# Patient Record
Sex: FEMALE | Race: WHITE | Employment: OTHER | URBAN - METROPOLITAN AREA
[De-identification: names, ages, dates, MRNs, and addresses within clinical notes are randomized per-mention and may not be internally consistent; named-entity substitution may affect disease eponyms.]

---

## 2017-01-26 ENCOUNTER — OFFICE VISIT (OUTPATIENT)
Dept: FAMILY MEDICINE CLINIC | Age: 57
End: 2017-01-26

## 2017-01-26 VITALS
SYSTOLIC BLOOD PRESSURE: 142 MMHG | WEIGHT: 218.4 LBS | RESPIRATION RATE: 16 BRPM | DIASTOLIC BLOOD PRESSURE: 88 MMHG | BODY MASS INDEX: 35.1 KG/M2 | HEART RATE: 100 BPM | HEIGHT: 66 IN

## 2017-01-26 DIAGNOSIS — E11.9 TYPE 2 DIABETES MELLITUS WITHOUT COMPLICATION, WITH LONG-TERM CURRENT USE OF INSULIN (HCC): Primary | ICD-10-CM

## 2017-01-26 DIAGNOSIS — I10 ESSENTIAL HYPERTENSION: ICD-10-CM

## 2017-01-26 DIAGNOSIS — Z12.11 COLON CANCER SCREENING: ICD-10-CM

## 2017-01-26 DIAGNOSIS — F33.1 MODERATE EPISODE OF RECURRENT MAJOR DEPRESSIVE DISORDER (HCC): ICD-10-CM

## 2017-01-26 DIAGNOSIS — E78.00 PURE HYPERCHOLESTEROLEMIA: ICD-10-CM

## 2017-01-26 DIAGNOSIS — Z79.4 TYPE 2 DIABETES MELLITUS WITHOUT COMPLICATION, WITH LONG-TERM CURRENT USE OF INSULIN (HCC): Primary | ICD-10-CM

## 2017-01-26 LAB
ALBUMIN SERPL-MCNC: 4.7 G/DL (ref 3.4–5)
ALP BLD-CCNC: 112 U/L (ref 40–129)
ALT SERPL-CCNC: 24 U/L (ref 10–40)
ANION GAP SERPL CALCULATED.3IONS-SCNC: 16 MMOL/L (ref 3–16)
AST SERPL-CCNC: 19 U/L (ref 15–37)
BILIRUB SERPL-MCNC: 0.3 MG/DL (ref 0–1)
BILIRUBIN DIRECT: <0.2 MG/DL (ref 0–0.3)
BILIRUBIN, INDIRECT: NORMAL MG/DL (ref 0–1)
BUN BLDV-MCNC: 15 MG/DL (ref 7–20)
CALCIUM SERPL-MCNC: 10 MG/DL (ref 8.3–10.6)
CHLORIDE BLD-SCNC: 102 MMOL/L (ref 99–110)
CHOLESTEROL, TOTAL: 207 MG/DL (ref 0–199)
CO2: 25 MMOL/L (ref 21–32)
CREAT SERPL-MCNC: 0.9 MG/DL (ref 0.6–1.1)
GFR AFRICAN AMERICAN: >60
GFR NON-AFRICAN AMERICAN: >60
GLUCOSE BLD-MCNC: 145 MG/DL (ref 70–99)
HDLC SERPL-MCNC: 70 MG/DL (ref 40–60)
LDL CHOLESTEROL CALCULATED: 124 MG/DL
POTASSIUM SERPL-SCNC: 5.1 MMOL/L (ref 3.5–5.1)
SODIUM BLD-SCNC: 143 MMOL/L (ref 136–145)
TOTAL PROTEIN: 7 G/DL (ref 6.4–8.2)
TRIGL SERPL-MCNC: 64 MG/DL (ref 0–150)
VLDLC SERPL CALC-MCNC: 13 MG/DL

## 2017-01-26 PROCEDURE — 36415 COLL VENOUS BLD VENIPUNCTURE: CPT | Performed by: INTERNAL MEDICINE

## 2017-01-26 PROCEDURE — 99214 OFFICE O/P EST MOD 30 MIN: CPT | Performed by: INTERNAL MEDICINE

## 2017-01-26 RX ORDER — METHYLPHENIDATE HYDROCHLORIDE 54 MG/1
54 TABLET ORAL EVERY MORNING
COMMUNITY
End: 2019-04-26

## 2017-01-26 ASSESSMENT — ENCOUNTER SYMPTOMS
RESPIRATORY NEGATIVE: 1
ALLERGIC/IMMUNOLOGIC NEGATIVE: 1

## 2017-01-27 LAB
ESTIMATED AVERAGE GLUCOSE: 203 MG/DL
HBA1C MFR BLD: 8.7 %

## 2017-01-30 RX ORDER — PIOGLITAZONEHYDROCHLORIDE 15 MG/1
15 TABLET ORAL DAILY
Qty: 30 TABLET | Refills: 5 | Status: SHIPPED | OUTPATIENT
Start: 2017-01-30 | End: 2017-04-27 | Stop reason: ALTCHOICE

## 2017-01-31 ENCOUNTER — TELEPHONE (OUTPATIENT)
Dept: FAMILY MEDICINE CLINIC | Age: 57
End: 2017-01-31

## 2017-03-20 ENCOUNTER — TELEPHONE (OUTPATIENT)
Dept: FAMILY MEDICINE CLINIC | Age: 57
End: 2017-03-20

## 2017-04-27 ENCOUNTER — OFFICE VISIT (OUTPATIENT)
Dept: FAMILY MEDICINE CLINIC | Age: 57
End: 2017-04-27

## 2017-04-27 VITALS
HEIGHT: 66 IN | WEIGHT: 216.8 LBS | DIASTOLIC BLOOD PRESSURE: 84 MMHG | BODY MASS INDEX: 34.84 KG/M2 | RESPIRATION RATE: 18 BRPM | HEART RATE: 84 BPM | SYSTOLIC BLOOD PRESSURE: 118 MMHG

## 2017-04-27 DIAGNOSIS — E11.9 TYPE 2 DIABETES MELLITUS WITHOUT COMPLICATION, WITH LONG-TERM CURRENT USE OF INSULIN (HCC): Primary | ICD-10-CM

## 2017-04-27 DIAGNOSIS — F33.1 MODERATE EPISODE OF RECURRENT MAJOR DEPRESSIVE DISORDER (HCC): ICD-10-CM

## 2017-04-27 DIAGNOSIS — Z79.4 TYPE 2 DIABETES MELLITUS WITHOUT COMPLICATION, WITH LONG-TERM CURRENT USE OF INSULIN (HCC): Primary | ICD-10-CM

## 2017-04-27 DIAGNOSIS — I10 ESSENTIAL HYPERTENSION: ICD-10-CM

## 2017-04-27 DIAGNOSIS — E78.00 PURE HYPERCHOLESTEROLEMIA: ICD-10-CM

## 2017-04-27 LAB
ANION GAP SERPL CALCULATED.3IONS-SCNC: 15 MMOL/L (ref 3–16)
BUN BLDV-MCNC: 19 MG/DL (ref 7–20)
CALCIUM SERPL-MCNC: 10 MG/DL (ref 8.3–10.6)
CHLORIDE BLD-SCNC: 99 MMOL/L (ref 99–110)
CO2: 28 MMOL/L (ref 21–32)
CREAT SERPL-MCNC: 1 MG/DL (ref 0.6–1.1)
GFR AFRICAN AMERICAN: >60
GFR NON-AFRICAN AMERICAN: 57
GLUCOSE BLD-MCNC: 91 MG/DL (ref 70–99)
POTASSIUM SERPL-SCNC: 5 MMOL/L (ref 3.5–5.1)
SODIUM BLD-SCNC: 142 MMOL/L (ref 136–145)

## 2017-04-27 PROCEDURE — 36415 COLL VENOUS BLD VENIPUNCTURE: CPT | Performed by: INTERNAL MEDICINE

## 2017-04-27 PROCEDURE — 99214 OFFICE O/P EST MOD 30 MIN: CPT | Performed by: INTERNAL MEDICINE

## 2017-04-27 RX ORDER — ACYCLOVIR 200 MG/1
400 CAPSULE ORAL 3 TIMES DAILY
Qty: 60 CAPSULE | Refills: 0 | Status: SHIPPED | OUTPATIENT
Start: 2017-04-27 | End: 2019-03-22

## 2017-04-27 ASSESSMENT — ENCOUNTER SYMPTOMS
ALLERGIC/IMMUNOLOGIC NEGATIVE: 1
RESPIRATORY NEGATIVE: 1
COLOR CHANGE: 0

## 2017-04-28 LAB
ESTIMATED AVERAGE GLUCOSE: 111.2 MG/DL
HBA1C MFR BLD: 5.5 %

## 2017-06-16 DIAGNOSIS — E11.9 TYPE 2 DIABETES MELLITUS WITHOUT COMPLICATION, WITHOUT LONG-TERM CURRENT USE OF INSULIN (HCC): ICD-10-CM

## 2017-06-16 RX ORDER — METFORMIN HYDROCHLORIDE EXTENDED-RELEASE TABLETS 1000 MG/1
TABLET, FILM COATED, EXTENDED RELEASE ORAL
Qty: 30 TABLET | Refills: 5 | Status: SHIPPED | OUTPATIENT
Start: 2017-06-16 | End: 2018-03-04 | Stop reason: SDUPTHER

## 2017-07-10 DIAGNOSIS — E11.9 TYPE 2 DIABETES MELLITUS WITHOUT COMPLICATION, WITHOUT LONG-TERM CURRENT USE OF INSULIN (HCC): ICD-10-CM

## 2017-07-27 ENCOUNTER — OFFICE VISIT (OUTPATIENT)
Dept: FAMILY MEDICINE CLINIC | Age: 57
End: 2017-07-27

## 2017-07-27 VITALS
HEIGHT: 66 IN | RESPIRATION RATE: 16 BRPM | SYSTOLIC BLOOD PRESSURE: 128 MMHG | OXYGEN SATURATION: 97 % | WEIGHT: 212.6 LBS | BODY MASS INDEX: 34.17 KG/M2 | DIASTOLIC BLOOD PRESSURE: 80 MMHG | HEART RATE: 102 BPM

## 2017-07-27 DIAGNOSIS — E78.00 PURE HYPERCHOLESTEROLEMIA: ICD-10-CM

## 2017-07-27 DIAGNOSIS — Z79.4 TYPE 2 DIABETES MELLITUS WITHOUT COMPLICATION, WITH LONG-TERM CURRENT USE OF INSULIN (HCC): Primary | ICD-10-CM

## 2017-07-27 DIAGNOSIS — I10 ESSENTIAL HYPERTENSION: ICD-10-CM

## 2017-07-27 DIAGNOSIS — E11.9 TYPE 2 DIABETES MELLITUS WITHOUT COMPLICATION, WITH LONG-TERM CURRENT USE OF INSULIN (HCC): Primary | ICD-10-CM

## 2017-07-27 DIAGNOSIS — F33.1 MODERATE EPISODE OF RECURRENT MAJOR DEPRESSIVE DISORDER (HCC): ICD-10-CM

## 2017-07-27 LAB
ANION GAP SERPL CALCULATED.3IONS-SCNC: 13 MMOL/L (ref 3–16)
BUN BLDV-MCNC: 23 MG/DL (ref 7–20)
CALCIUM SERPL-MCNC: 10.3 MG/DL (ref 8.3–10.6)
CHLORIDE BLD-SCNC: 98 MMOL/L (ref 99–110)
CHOLESTEROL, TOTAL: 201 MG/DL (ref 0–199)
CO2: 29 MMOL/L (ref 21–32)
CREAT SERPL-MCNC: 0.9 MG/DL (ref 0.6–1.1)
CREATININE URINE POCT: NORMAL
GFR AFRICAN AMERICAN: >60
GFR NON-AFRICAN AMERICAN: >60
GLUCOSE BLD-MCNC: 208 MG/DL (ref 70–99)
HDLC SERPL-MCNC: 62 MG/DL (ref 40–60)
LDL CHOLESTEROL CALCULATED: 122 MG/DL
MICROALBUMIN/CREAT 24H UR: 0 MG/G{CREAT}
MICROALBUMIN/CREAT UR-RTO: NORMAL
POTASSIUM SERPL-SCNC: 4.9 MMOL/L (ref 3.5–5.1)
SODIUM BLD-SCNC: 140 MMOL/L (ref 136–145)
TRIGL SERPL-MCNC: 83 MG/DL (ref 0–150)
VLDLC SERPL CALC-MCNC: 17 MG/DL

## 2017-07-27 PROCEDURE — G0444 DEPRESSION SCREEN ANNUAL: HCPCS | Performed by: INTERNAL MEDICINE

## 2017-07-27 PROCEDURE — 99214 OFFICE O/P EST MOD 30 MIN: CPT | Performed by: INTERNAL MEDICINE

## 2017-07-27 PROCEDURE — 36415 COLL VENOUS BLD VENIPUNCTURE: CPT | Performed by: INTERNAL MEDICINE

## 2017-07-27 PROCEDURE — 82044 UR ALBUMIN SEMIQUANTITATIVE: CPT | Performed by: INTERNAL MEDICINE

## 2017-07-27 RX ORDER — TRAZODONE HYDROCHLORIDE 100 MG/1
200 TABLET ORAL NIGHTLY
COMMUNITY
Start: 2017-07-16

## 2017-07-27 ASSESSMENT — PATIENT HEALTH QUESTIONNAIRE - PHQ9
10. IF YOU CHECKED OFF ANY PROBLEMS, HOW DIFFICULT HAVE THESE PROBLEMS MADE IT FOR YOU TO DO YOUR WORK, TAKE CARE OF THINGS AT HOME, OR GET ALONG WITH OTHER PEOPLE: 2
6. FEELING BAD ABOUT YOURSELF - OR THAT YOU ARE A FAILURE OR HAVE LET YOURSELF OR YOUR FAMILY DOWN: 2
3. TROUBLE FALLING OR STAYING ASLEEP: 3
SUM OF ALL RESPONSES TO PHQ QUESTIONS 1-9: 22
5. POOR APPETITE OR OVEREATING: 2
4. FEELING TIRED OR HAVING LITTLE ENERGY: 3
9. THOUGHTS THAT YOU WOULD BE BETTER OFF DEAD, OR OF HURTING YOURSELF: 0
7. TROUBLE CONCENTRATING ON THINGS, SUCH AS READING THE NEWSPAPER OR WATCHING TELEVISION: 3
8. MOVING OR SPEAKING SO SLOWLY THAT OTHER PEOPLE COULD HAVE NOTICED. OR THE OPPOSITE, BEING SO FIGETY OR RESTLESS THAT YOU HAVE BEEN MOVING AROUND A LOT MORE THAN USUAL: 3
1. LITTLE INTEREST OR PLEASURE IN DOING THINGS: 3
SUM OF ALL RESPONSES TO PHQ9 QUESTIONS 1 & 2: 6
2. FEELING DOWN, DEPRESSED OR HOPELESS: 3

## 2017-07-27 ASSESSMENT — ENCOUNTER SYMPTOMS
ALLERGIC/IMMUNOLOGIC NEGATIVE: 1
RESPIRATORY NEGATIVE: 1

## 2017-07-28 LAB
ESTIMATED AVERAGE GLUCOSE: 174.3 MG/DL
HBA1C MFR BLD: 7.7 %

## 2017-07-31 ENCOUNTER — TELEPHONE (OUTPATIENT)
Dept: FAMILY MEDICINE CLINIC | Age: 57
End: 2017-07-31

## 2017-08-04 RX ORDER — PIOGLITAZONEHYDROCHLORIDE 15 MG/1
TABLET ORAL
Qty: 30 TABLET | Refills: 4 | Status: SHIPPED | OUTPATIENT
Start: 2017-08-04 | End: 2018-01-13 | Stop reason: SDUPTHER

## 2017-08-04 RX ORDER — CANAGLIFLOZIN 100 MG/1
TABLET, FILM COATED ORAL
Qty: 30 TABLET | Refills: 4 | Status: SHIPPED | OUTPATIENT
Start: 2017-08-04 | End: 2018-01-13 | Stop reason: SDUPTHER

## 2017-11-02 ENCOUNTER — OFFICE VISIT (OUTPATIENT)
Dept: FAMILY MEDICINE CLINIC | Age: 57
End: 2017-11-02

## 2017-11-02 VITALS
BODY MASS INDEX: 33.46 KG/M2 | SYSTOLIC BLOOD PRESSURE: 124 MMHG | HEIGHT: 66 IN | HEART RATE: 98 BPM | RESPIRATION RATE: 16 BRPM | WEIGHT: 208.2 LBS | OXYGEN SATURATION: 98 % | DIASTOLIC BLOOD PRESSURE: 84 MMHG

## 2017-11-02 DIAGNOSIS — E78.00 PURE HYPERCHOLESTEROLEMIA: ICD-10-CM

## 2017-11-02 DIAGNOSIS — Z23 FLU VACCINE NEED: ICD-10-CM

## 2017-11-02 DIAGNOSIS — E11.9 TYPE 2 DIABETES MELLITUS WITHOUT COMPLICATION, WITHOUT LONG-TERM CURRENT USE OF INSULIN (HCC): Primary | ICD-10-CM

## 2017-11-02 DIAGNOSIS — Z79.4 TYPE 2 DIABETES MELLITUS WITHOUT COMPLICATION, WITH LONG-TERM CURRENT USE OF INSULIN (HCC): ICD-10-CM

## 2017-11-02 DIAGNOSIS — E11.9 TYPE 2 DIABETES MELLITUS WITHOUT COMPLICATION, WITH LONG-TERM CURRENT USE OF INSULIN (HCC): ICD-10-CM

## 2017-11-02 DIAGNOSIS — I10 ESSENTIAL HYPERTENSION: ICD-10-CM

## 2017-11-02 LAB
ANION GAP SERPL CALCULATED.3IONS-SCNC: 15 MMOL/L (ref 3–16)
BUN BLDV-MCNC: 18 MG/DL (ref 7–20)
CALCIUM SERPL-MCNC: 9.8 MG/DL (ref 8.3–10.6)
CHLORIDE BLD-SCNC: 102 MMOL/L (ref 99–110)
CO2: 28 MMOL/L (ref 21–32)
CREAT SERPL-MCNC: 1.1 MG/DL (ref 0.6–1.1)
GFR AFRICAN AMERICAN: >60
GFR NON-AFRICAN AMERICAN: 51
GLUCOSE BLD-MCNC: 152 MG/DL (ref 70–99)
POTASSIUM SERPL-SCNC: 4.7 MMOL/L (ref 3.5–5.1)
SODIUM BLD-SCNC: 145 MMOL/L (ref 136–145)

## 2017-11-02 PROCEDURE — 90471 IMMUNIZATION ADMIN: CPT | Performed by: INTERNAL MEDICINE

## 2017-11-02 PROCEDURE — 99214 OFFICE O/P EST MOD 30 MIN: CPT | Performed by: INTERNAL MEDICINE

## 2017-11-02 PROCEDURE — 36415 COLL VENOUS BLD VENIPUNCTURE: CPT | Performed by: INTERNAL MEDICINE

## 2017-11-02 PROCEDURE — 90686 IIV4 VACC NO PRSV 0.5 ML IM: CPT | Performed by: INTERNAL MEDICINE

## 2017-11-02 RX ORDER — ATORVASTATIN CALCIUM 10 MG/1
TABLET, FILM COATED ORAL
Qty: 30 TABLET | Refills: 5 | Status: SHIPPED | OUTPATIENT
Start: 2017-11-02 | End: 2018-05-15 | Stop reason: SDUPTHER

## 2017-11-02 RX ORDER — BENAZEPRIL HYDROCHLORIDE AND HYDROCHLOROTHIAZIDE 20; 12.5 MG/1; MG/1
TABLET ORAL
Qty: 30 TABLET | Refills: 5 | Status: SHIPPED | OUTPATIENT
Start: 2017-11-02 | End: 2018-06-20 | Stop reason: SDUPTHER

## 2017-11-02 ASSESSMENT — ENCOUNTER SYMPTOMS
ALLERGIC/IMMUNOLOGIC NEGATIVE: 1
RESPIRATORY NEGATIVE: 1

## 2017-11-02 NOTE — PROGRESS NOTES
and EOM are normal. Pupils are equal, round, and reactive to light. Neck: Trachea normal, normal range of motion and full passive range of motion without pain. Neck supple. Normal carotid pulses, no hepatojugular reflux and no JVD present. No spinous process tenderness and no muscular tenderness present. Carotid bruit is not present. No tracheal deviation, no edema, no erythema and normal range of motion present. No thyroid mass and no thyromegaly present. Cardiovascular: Normal rate, regular rhythm, S1 normal, S2 normal, normal heart sounds, intact distal pulses and normal pulses. No extrasystoles are present. PMI is not displaced. Exam reveals no gallop, no S3, no S4, no distant heart sounds and no friction rub. No murmur heard. Pulses:       Carotid pulses are 2+ on the right side, and 2+ on the left side. Radial pulses are 2+ on the right side, and 2+ on the left side. Femoral pulses are 2+ on the right side, and 2+ on the left side. Popliteal pulses are 2+ on the right side, and 2+ on the left side. Dorsalis pedis pulses are 2+ on the right side, and 2+ on the left side. Posterior tibial pulses are 2+ on the right side, and 2+ on the left side. Pulmonary/Chest: Effort normal and breath sounds normal. No accessory muscle usage. No apnea, no tachypnea and no bradypnea. No respiratory distress. She has no decreased breath sounds. She has no wheezes. She has no rhonchi. She has no rales. She exhibits no tenderness. Musculoskeletal: Normal range of motion. She exhibits no edema or tenderness. Lymphadenopathy:     She has no cervical adenopathy. She has no axillary adenopathy. Neurological: She is alert and oriented to person, place, and time. She has normal strength and normal reflexes. She is not disoriented. She displays no atrophy and no tremor. No cranial nerve deficit or sensory deficit. She exhibits normal muscle tone.  Coordination normal.   Diabetic foot

## 2017-11-02 NOTE — ASSESSMENT & PLAN NOTE
No change in meds, no c/o with meds, no chest pain, SOB, palpatations, or syncope. Home bp was: 120s/80s.

## 2017-11-02 NOTE — ASSESSMENT & PLAN NOTE
Sugars are good, diet is fair, weight is down 4 lbs, no reported neuropathy, no change in vision, no claudication, no foot ulcers, no new skin lesions. No change in medications. No c/o with meds. Last eye exam 8/2016.  Due.

## 2017-11-03 LAB
ESTIMATED AVERAGE GLUCOSE: 142.7 MG/DL
HBA1C MFR BLD: 6.6 %

## 2017-11-06 ENCOUNTER — TELEPHONE (OUTPATIENT)
Dept: FAMILY MEDICINE CLINIC | Age: 57
End: 2017-11-06

## 2017-11-06 DIAGNOSIS — I10 ESSENTIAL HYPERTENSION: ICD-10-CM

## 2017-11-06 DIAGNOSIS — Z79.4 TYPE 2 DIABETES MELLITUS WITHOUT COMPLICATION, WITH LONG-TERM CURRENT USE OF INSULIN (HCC): Primary | ICD-10-CM

## 2017-11-06 DIAGNOSIS — E78.00 PURE HYPERCHOLESTEROLEMIA: ICD-10-CM

## 2017-11-06 DIAGNOSIS — E11.9 TYPE 2 DIABETES MELLITUS WITHOUT COMPLICATION, WITH LONG-TERM CURRENT USE OF INSULIN (HCC): Primary | ICD-10-CM

## 2017-11-06 NOTE — TELEPHONE ENCOUNTER
Lidia Evans with [facility] is calling regarding the order they received for an internal referral a dietician. They would like to know if this was supposed to be an order for Diabetes Education? If so they will need a new order sent over to them.

## 2017-11-07 ENCOUNTER — TELEPHONE (OUTPATIENT)
Dept: FAMILY MEDICINE CLINIC | Age: 57
End: 2017-11-07

## 2017-11-16 ENCOUNTER — NURSE ONLY (OUTPATIENT)
Dept: FAMILY MEDICINE CLINIC | Age: 57
End: 2017-11-16

## 2017-11-16 DIAGNOSIS — Z23 NEED FOR 23-POLYVALENT PNEUMOCOCCAL POLYSACCHARIDE VACCINE: Primary | ICD-10-CM

## 2017-11-16 PROCEDURE — 90732 PPSV23 VACC 2 YRS+ SUBQ/IM: CPT | Performed by: INTERNAL MEDICINE

## 2017-11-16 PROCEDURE — 90471 IMMUNIZATION ADMIN: CPT | Performed by: INTERNAL MEDICINE

## 2018-01-02 ENCOUNTER — OFFICE VISIT (OUTPATIENT)
Dept: FAMILY MEDICINE CLINIC | Age: 58
End: 2018-01-02

## 2018-01-02 VITALS
HEIGHT: 66 IN | DIASTOLIC BLOOD PRESSURE: 82 MMHG | OXYGEN SATURATION: 97 % | SYSTOLIC BLOOD PRESSURE: 136 MMHG | WEIGHT: 207.4 LBS | BODY MASS INDEX: 33.33 KG/M2 | HEART RATE: 112 BPM

## 2018-01-02 DIAGNOSIS — R35.0 URINARY FREQUENCY: ICD-10-CM

## 2018-01-02 DIAGNOSIS — R30.0 DYSURIA: Primary | ICD-10-CM

## 2018-01-02 LAB
BILIRUBIN, POC: NEGATIVE
BLOOD URINE, POC: NEGATIVE
CLARITY, POC: NORMAL
COLOR, POC: NORMAL
GLUCOSE URINE, POC: NORMAL
KETONES, POC: NORMAL
LEUKOCYTE EST, POC: NEGATIVE
NITRITE, POC: NEGATIVE
PH, POC: 5
PROTEIN, POC: NEGATIVE
SPECIFIC GRAVITY, POC: 1.02
UROBILINOGEN, POC: 0.2

## 2018-01-02 PROCEDURE — 99213 OFFICE O/P EST LOW 20 MIN: CPT | Performed by: PHYSICIAN ASSISTANT

## 2018-01-02 PROCEDURE — 81002 URINALYSIS NONAUTO W/O SCOPE: CPT | Performed by: PHYSICIAN ASSISTANT

## 2018-01-02 NOTE — PROGRESS NOTES
SUBJECTIVE: Severino Carreon is a 65 y.o. female who complains of urinary frequency, urgency and dysuria x weeks, without flank pain, fever, chills, or abnormal vaginal discharge or bleeding. She has been treated late November with macrobid initially at [facility], once culture came back they call and changed to agmentin. Was still symptomatic 2 days after so was recultred and started on keflex on 12/16 for 10 days and symptoms are still present. OBJECTIVE: Appears well, in no apparent distress. Vital signs are normal. The abdomen is soft without tenderness, guarding, mass, rebound or organomegaly. No CVA tenderness or inguinal adenopathy noted. Urine dipstick shows positive for glucose. ASSESSMENT:   1. Dysuria  POCT Urinalysis no Micro    URINE CULTURE   2. Urinary frequency  POCT Urinalysis no Micro    URINE CULTURE         PLAN: Treatment per orders - also push fluids, may use Pyridium OTC prn. Call or return to clinic prn if these symptoms worsen or fail to improve as anticipated.   Will await culture

## 2018-01-04 ENCOUNTER — TELEPHONE (OUTPATIENT)
Dept: FAMILY MEDICINE CLINIC | Age: 58
End: 2018-01-04

## 2018-01-04 NOTE — TELEPHONE ENCOUNTER
Sent Ampicillin 250 mg QID for 10 d to [facility]. rechx urine 1-2 weeks after finish.  Call if no improvement by Monday

## 2018-01-04 NOTE — TELEPHONE ENCOUNTER
The patient was in on 1/2/18 to see Trang Reina for a UTI. She said that Trang Reina told her to call back today if she had not heard anything from our office. She would like to know what the results were of the urine culture. Please give her a call back once those results have been reviewed.

## 2018-01-05 ENCOUNTER — TELEPHONE (OUTPATIENT)
Dept: FAMILY MEDICINE CLINIC | Age: 58
End: 2018-01-05

## 2018-01-05 LAB
ORGANISM: ABNORMAL
URINE CULTURE, ROUTINE: ABNORMAL
URINE CULTURE, ROUTINE: ABNORMAL

## 2018-01-05 RX ORDER — CIPROFLOXACIN 500 MG/1
500 TABLET, FILM COATED ORAL 2 TIMES DAILY
Qty: 10 TABLET | Refills: 0 | Status: SHIPPED | OUTPATIENT
Start: 2018-01-05 | End: 2018-01-10

## 2018-01-05 RX ORDER — NITROFURANTOIN 25; 75 MG/1; MG/1
100 CAPSULE ORAL 2 TIMES DAILY
Qty: 28 CAPSULE | Refills: 0 | Status: SHIPPED | OUTPATIENT
Start: 2018-01-05 | End: 2018-01-19

## 2018-01-15 RX ORDER — PIOGLITAZONEHYDROCHLORIDE 15 MG/1
TABLET ORAL
Qty: 30 TABLET | Refills: 3 | Status: SHIPPED | OUTPATIENT
Start: 2018-01-15 | End: 2018-05-27 | Stop reason: SDUPTHER

## 2018-01-15 RX ORDER — CANAGLIFLOZIN 100 MG/1
TABLET, FILM COATED ORAL
Qty: 30 TABLET | Refills: 3 | Status: SHIPPED | OUTPATIENT
Start: 2018-01-15 | End: 2018-05-30 | Stop reason: SDUPTHER

## 2018-01-24 ENCOUNTER — TELEPHONE (OUTPATIENT)
Dept: FAMILY MEDICINE CLINIC | Age: 58
End: 2018-01-24

## 2018-01-24 NOTE — TELEPHONE ENCOUNTER
Patient was seen on 01-02-18 for a UTI,  and has a 3 mos f/u appt complex for 02-05-18. She thinks her UTI has not been resolved. She would like to know if she needs to see a urologist, wait until her appt on 02-05-18 or other options? Advised Dr. Sophie Ferrer is out of the office until tomorrow.

## 2018-01-24 NOTE — TELEPHONE ENCOUNTER
Per Trina's notes which were reviewed with patient on 1/5/18 she is to follow up with urology if no better.   Patient given phone number for urology group

## 2018-02-05 ENCOUNTER — OFFICE VISIT (OUTPATIENT)
Dept: FAMILY MEDICINE CLINIC | Age: 58
End: 2018-02-05

## 2018-02-05 VITALS
DIASTOLIC BLOOD PRESSURE: 82 MMHG | SYSTOLIC BLOOD PRESSURE: 122 MMHG | WEIGHT: 201.4 LBS | HEART RATE: 109 BPM | OXYGEN SATURATION: 98 % | BODY MASS INDEX: 32.37 KG/M2 | HEIGHT: 66 IN | RESPIRATION RATE: 18 BRPM

## 2018-02-05 DIAGNOSIS — I10 ESSENTIAL HYPERTENSION: ICD-10-CM

## 2018-02-05 DIAGNOSIS — Z79.4 TYPE 2 DIABETES MELLITUS WITHOUT COMPLICATION, WITH LONG-TERM CURRENT USE OF INSULIN (HCC): Primary | ICD-10-CM

## 2018-02-05 DIAGNOSIS — E11.9 TYPE 2 DIABETES MELLITUS WITHOUT COMPLICATION, WITH LONG-TERM CURRENT USE OF INSULIN (HCC): Primary | ICD-10-CM

## 2018-02-05 DIAGNOSIS — N30.01 ACUTE CYSTITIS WITH HEMATURIA: ICD-10-CM

## 2018-02-05 DIAGNOSIS — E78.00 PURE HYPERCHOLESTEROLEMIA: ICD-10-CM

## 2018-02-05 LAB
ALBUMIN SERPL-MCNC: 4.8 G/DL (ref 3.4–5)
ALP BLD-CCNC: 106 U/L (ref 40–129)
ALT SERPL-CCNC: 20 U/L (ref 10–40)
ANION GAP SERPL CALCULATED.3IONS-SCNC: 13 MMOL/L (ref 3–16)
AST SERPL-CCNC: 16 U/L (ref 15–37)
BILIRUB SERPL-MCNC: 0.3 MG/DL (ref 0–1)
BILIRUBIN DIRECT: <0.2 MG/DL (ref 0–0.3)
BILIRUBIN, INDIRECT: NORMAL MG/DL (ref 0–1)
BUN BLDV-MCNC: 17 MG/DL (ref 7–20)
CALCIUM SERPL-MCNC: 10.1 MG/DL (ref 8.3–10.6)
CHLORIDE BLD-SCNC: 98 MMOL/L (ref 99–110)
CHOLESTEROL, TOTAL: 193 MG/DL (ref 0–199)
CO2: 29 MMOL/L (ref 21–32)
CREAT SERPL-MCNC: 0.9 MG/DL (ref 0.6–1.1)
GFR AFRICAN AMERICAN: >60
GFR NON-AFRICAN AMERICAN: >60
GLUCOSE BLD-MCNC: 150 MG/DL (ref 70–99)
HDLC SERPL-MCNC: 66 MG/DL (ref 40–60)
LDL CHOLESTEROL CALCULATED: 111 MG/DL
POTASSIUM SERPL-SCNC: 4.3 MMOL/L (ref 3.5–5.1)
SODIUM BLD-SCNC: 140 MMOL/L (ref 136–145)
TOTAL PROTEIN: 7.3 G/DL (ref 6.4–8.2)
TRIGL SERPL-MCNC: 78 MG/DL (ref 0–150)
VLDLC SERPL CALC-MCNC: 16 MG/DL

## 2018-02-05 PROCEDURE — 36415 COLL VENOUS BLD VENIPUNCTURE: CPT | Performed by: INTERNAL MEDICINE

## 2018-02-05 PROCEDURE — 99214 OFFICE O/P EST MOD 30 MIN: CPT | Performed by: INTERNAL MEDICINE

## 2018-02-05 RX ORDER — GLUCOSAMINE HCL/CHONDROITIN SU 500-400 MG
CAPSULE ORAL
Qty: 100 STRIP | Refills: 5 | Status: SHIPPED | OUTPATIENT
Start: 2018-02-05 | End: 2018-07-10 | Stop reason: SDUPTHER

## 2018-02-05 RX ORDER — LANCETS 30 GAUGE
EACH MISCELLANEOUS
Qty: 100 EACH | Refills: 3 | Status: SHIPPED | OUTPATIENT
Start: 2018-02-05 | End: 2018-07-10 | Stop reason: SDUPTHER

## 2018-02-05 ASSESSMENT — ENCOUNTER SYMPTOMS
RESPIRATORY NEGATIVE: 1
GASTROINTESTINAL NEGATIVE: 1
ALLERGIC/IMMUNOLOGIC NEGATIVE: 1

## 2018-02-05 NOTE — PROGRESS NOTES
edema, no erythema and normal range of motion present. No thyroid mass and no thyromegaly present. Cardiovascular: Normal rate, regular rhythm, S1 normal, S2 normal, normal heart sounds, intact distal pulses and normal pulses. No extrasystoles are present. PMI is not displaced. Exam reveals no gallop, no S3, no S4, no distant heart sounds and no friction rub. No murmur heard. Pulses:       Carotid pulses are 2+ on the right side, and 2+ on the left side. Radial pulses are 2+ on the right side, and 2+ on the left side. Femoral pulses are 2+ on the right side, and 2+ on the left side. Popliteal pulses are 2+ on the right side, and 2+ on the left side. Dorsalis pedis pulses are 2+ on the right side, and 2+ on the left side. Posterior tibial pulses are 2+ on the right side, and 2+ on the left side. Pulmonary/Chest: Effort normal and breath sounds normal. No accessory muscle usage. No apnea, no tachypnea and no bradypnea. No respiratory distress. She has no decreased breath sounds. She has no wheezes. She has no rhonchi. She has no rales. She exhibits no tenderness. Musculoskeletal: Normal range of motion. She exhibits no edema or tenderness. Lymphadenopathy:     She has no cervical adenopathy. She has no axillary adenopathy. Neurological: She is alert and oriented to person, place, and time. She has normal strength and normal reflexes. She is not disoriented. She displays no atrophy and no tremor. No cranial nerve deficit or sensory deficit. She exhibits normal muscle tone. Coordination normal.   Diabetic foot exam was normal with intact light touch and vibratory senses. Skin: Skin is warm, dry and intact. No abrasion and no rash noted. She is not diaphoretic. No cyanosis or erythema. No pallor. Nails show no clubbing. Psychiatric: She has a normal mood and affect.  Her speech is normal and behavior is normal. Judgment and thought content normal. Cognition and memory are normal.       Assessment:      Acute Cystitis With Hematuria. No recent c/o. Saw Uro. Pure Hypercholesterolemia. Continues to improve diet and lose weight. No c/o w/ med. Essential Hypertension. Doing well w/ present med. Good home BPs. Type 2 Diabetes Mellitus Without Complication, With Long-Term Current Use of Insulin ([facility]). Recently no sugars but good idet and wt loss. No c/o w/ med. Plan:      All above problems reviewed and the found to be unchanged except for the following :     Acute Cystitis With Hematuria. To uro for flow study and Cysto. Pure Hypercholesterolemia. Continue to iprove diet and lose weight. Continue med. Essential Hypertension. Continue present meds. Home BP checks. Call if>140/90. Improve diet. Avoid caffeine and salt. Call if new c/o w/ meds. Type 2 Diabetes Mellitus Without Complication, With Long-Term Current Use of Insulin ([facility]). Continue to improve diet and lose weight. Will do labs. Will adjust meds as needed. New meter given.  May need to change Invokana if Uro tests show chronic structural problem

## 2018-02-06 LAB
ESTIMATED AVERAGE GLUCOSE: 157.1 MG/DL
HBA1C MFR BLD: 7.1 %

## 2018-02-08 ENCOUNTER — TELEPHONE (OUTPATIENT)
Dept: FAMILY MEDICINE CLINIC | Age: 58
End: 2018-02-08

## 2018-02-08 NOTE — TELEPHONE ENCOUNTER
The patient called to get the results of her labs completed on 2/5/18. I read over those results with her, she understood and had no questions regarding those results. She would like to know if Dr. Rommel Das would be okay with completing a form she needs for her surgery at the Urology group. She said that she was just in on 2/5/18 and wanted to see if the information from that visit would be okay for the form. She is going to fax it over to the office today so the physician can look it over before making his decision.

## 2018-03-04 DIAGNOSIS — E11.9 TYPE 2 DIABETES MELLITUS WITHOUT COMPLICATION, WITHOUT LONG-TERM CURRENT USE OF INSULIN (HCC): ICD-10-CM

## 2018-03-05 RX ORDER — METFORMIN HYDROCHLORIDE EXTENDED-RELEASE TABLETS 1000 MG/1
TABLET, FILM COATED, EXTENDED RELEASE ORAL
Qty: 30 TABLET | Refills: 5 | Status: SHIPPED | OUTPATIENT
Start: 2018-03-05 | End: 2018-07-10 | Stop reason: SDUPTHER

## 2018-05-04 ENCOUNTER — OFFICE VISIT (OUTPATIENT)
Dept: FAMILY MEDICINE CLINIC | Age: 58
End: 2018-05-04

## 2018-05-04 VITALS
HEIGHT: 66 IN | HEART RATE: 82 BPM | OXYGEN SATURATION: 96 % | RESPIRATION RATE: 16 BRPM | BODY MASS INDEX: 31.57 KG/M2 | DIASTOLIC BLOOD PRESSURE: 80 MMHG | WEIGHT: 196.4 LBS | SYSTOLIC BLOOD PRESSURE: 120 MMHG

## 2018-05-04 DIAGNOSIS — F33.1 MODERATE EPISODE OF RECURRENT MAJOR DEPRESSIVE DISORDER (HCC): ICD-10-CM

## 2018-05-04 DIAGNOSIS — I10 ESSENTIAL HYPERTENSION: ICD-10-CM

## 2018-05-04 DIAGNOSIS — Z79.4 TYPE 2 DIABETES MELLITUS WITHOUT COMPLICATION, WITH LONG-TERM CURRENT USE OF INSULIN (HCC): ICD-10-CM

## 2018-05-04 DIAGNOSIS — E78.00 PURE HYPERCHOLESTEROLEMIA: ICD-10-CM

## 2018-05-04 DIAGNOSIS — E11.9 TYPE 2 DIABETES MELLITUS WITHOUT COMPLICATION, WITH LONG-TERM CURRENT USE OF INSULIN (HCC): ICD-10-CM

## 2018-05-04 DIAGNOSIS — N30.01 ACUTE CYSTITIS WITH HEMATURIA: ICD-10-CM

## 2018-05-04 LAB
BILIRUBIN, POC: 0
BLOOD URINE, POC: 0
CLARITY, POC: CLEAR
COLOR, POC: YELLOW
GLUCOSE URINE, POC: 500
KETONES, POC: ABNORMAL
LEUKOCYTE EST, POC: 0
NITRITE, POC: 0
PH, POC: 5.5
PROTEIN, POC: 0
SPECIFIC GRAVITY, POC: 1.02
UROBILINOGEN, POC: 0.2

## 2018-05-04 PROCEDURE — 81002 URINALYSIS NONAUTO W/O SCOPE: CPT | Performed by: INTERNAL MEDICINE

## 2018-05-04 PROCEDURE — 99214 OFFICE O/P EST MOD 30 MIN: CPT | Performed by: INTERNAL MEDICINE

## 2018-05-04 PROCEDURE — 36415 COLL VENOUS BLD VENIPUNCTURE: CPT | Performed by: INTERNAL MEDICINE

## 2018-05-04 RX ORDER — LAMOTRIGINE 200 MG/1
1 TABLET ORAL NIGHTLY
COMMUNITY
Start: 2018-04-26 | End: 2019-05-28 | Stop reason: SDUPTHER

## 2018-05-04 RX ORDER — BUPROPION HYDROCHLORIDE 150 MG/1
2 TABLET ORAL DAILY
COMMUNITY
Start: 2018-04-19 | End: 2018-10-11 | Stop reason: DRUGHIGH

## 2018-05-04 ASSESSMENT — ENCOUNTER SYMPTOMS
RESPIRATORY NEGATIVE: 1
ALLERGIC/IMMUNOLOGIC NEGATIVE: 1
GASTROINTESTINAL NEGATIVE: 1

## 2018-05-05 LAB
ALBUMIN SERPL-MCNC: 4.8 G/DL (ref 3.4–5)
ALP BLD-CCNC: 96 U/L (ref 40–129)
ALT SERPL-CCNC: 18 U/L (ref 10–40)
ANION GAP SERPL CALCULATED.3IONS-SCNC: 12 MMOL/L (ref 3–16)
AST SERPL-CCNC: 16 U/L (ref 15–37)
BILIRUB SERPL-MCNC: 0.6 MG/DL (ref 0–1)
BILIRUBIN DIRECT: <0.2 MG/DL (ref 0–0.3)
BILIRUBIN, INDIRECT: NORMAL MG/DL (ref 0–1)
BUN BLDV-MCNC: 10 MG/DL (ref 7–20)
CALCIUM SERPL-MCNC: 10.1 MG/DL (ref 8.3–10.6)
CHLORIDE BLD-SCNC: 96 MMOL/L (ref 99–110)
CHOLESTEROL, TOTAL: 190 MG/DL (ref 0–199)
CO2: 29 MMOL/L (ref 21–32)
CREAT SERPL-MCNC: 0.9 MG/DL (ref 0.6–1.1)
ESTIMATED AVERAGE GLUCOSE: 191.5 MG/DL
GFR AFRICAN AMERICAN: >60
GFR NON-AFRICAN AMERICAN: >60
GLUCOSE BLD-MCNC: 120 MG/DL (ref 70–99)
HBA1C MFR BLD: 8.3 %
HDLC SERPL-MCNC: 71 MG/DL (ref 40–60)
LDL CHOLESTEROL CALCULATED: 101 MG/DL
POTASSIUM SERPL-SCNC: 4.7 MMOL/L (ref 3.5–5.1)
SODIUM BLD-SCNC: 137 MMOL/L (ref 136–145)
TOTAL PROTEIN: 6.9 G/DL (ref 6.4–8.2)
TRIGL SERPL-MCNC: 90 MG/DL (ref 0–150)
VLDLC SERPL CALC-MCNC: 18 MG/DL

## 2018-05-09 ENCOUNTER — TELEPHONE (OUTPATIENT)
Dept: FAMILY MEDICINE CLINIC | Age: 58
End: 2018-05-09

## 2018-05-15 DIAGNOSIS — E78.00 PURE HYPERCHOLESTEROLEMIA: ICD-10-CM

## 2018-05-15 RX ORDER — ATORVASTATIN CALCIUM 10 MG/1
TABLET, FILM COATED ORAL
Qty: 30 TABLET | Refills: 5 | Status: SHIPPED | OUTPATIENT
Start: 2018-05-15 | End: 2018-07-10 | Stop reason: SDUPTHER

## 2018-05-29 RX ORDER — PIOGLITAZONEHYDROCHLORIDE 15 MG/1
TABLET ORAL
Qty: 30 TABLET | Refills: 5 | Status: SHIPPED | OUTPATIENT
Start: 2018-05-29 | End: 2018-07-10 | Stop reason: SDUPTHER

## 2018-05-30 RX ORDER — CANAGLIFLOZIN 100 MG/1
TABLET, FILM COATED ORAL
Qty: 30 TABLET | Refills: 2 | Status: SHIPPED | OUTPATIENT
Start: 2018-05-30 | End: 2018-07-10 | Stop reason: SDUPTHER

## 2018-06-20 ENCOUNTER — TELEPHONE (OUTPATIENT)
Dept: FAMILY MEDICINE CLINIC | Age: 58
End: 2018-06-20

## 2018-06-20 DIAGNOSIS — E11.9 TYPE 2 DIABETES MELLITUS WITHOUT COMPLICATION, WITHOUT LONG-TERM CURRENT USE OF INSULIN (HCC): ICD-10-CM

## 2018-06-20 RX ORDER — BENAZEPRIL HYDROCHLORIDE AND HYDROCHLOROTHIAZIDE 20; 12.5 MG/1; MG/1
TABLET ORAL
Qty: 30 TABLET | Refills: 0 | Status: SHIPPED | OUTPATIENT
Start: 2018-06-20 | End: 2018-07-10 | Stop reason: SDUPTHER

## 2018-07-10 ENCOUNTER — OFFICE VISIT (OUTPATIENT)
Dept: FAMILY MEDICINE CLINIC | Age: 58
End: 2018-07-10

## 2018-07-10 VITALS
WEIGHT: 206 LBS | SYSTOLIC BLOOD PRESSURE: 130 MMHG | BODY MASS INDEX: 33.11 KG/M2 | DIASTOLIC BLOOD PRESSURE: 80 MMHG | HEART RATE: 98 BPM | OXYGEN SATURATION: 99 % | HEIGHT: 66 IN

## 2018-07-10 DIAGNOSIS — I10 ESSENTIAL HYPERTENSION: ICD-10-CM

## 2018-07-10 DIAGNOSIS — E78.00 PURE HYPERCHOLESTEROLEMIA: ICD-10-CM

## 2018-07-10 DIAGNOSIS — E11.9 TYPE 2 DIABETES MELLITUS WITHOUT COMPLICATION, WITHOUT LONG-TERM CURRENT USE OF INSULIN (HCC): ICD-10-CM

## 2018-07-10 DIAGNOSIS — E11.9 TYPE 2 DIABETES MELLITUS WITHOUT COMPLICATION, WITH LONG-TERM CURRENT USE OF INSULIN (HCC): ICD-10-CM

## 2018-07-10 DIAGNOSIS — F33.1 MODERATE EPISODE OF RECURRENT MAJOR DEPRESSIVE DISORDER (HCC): ICD-10-CM

## 2018-07-10 DIAGNOSIS — Z79.4 TYPE 2 DIABETES MELLITUS WITHOUT COMPLICATION, WITH LONG-TERM CURRENT USE OF INSULIN (HCC): ICD-10-CM

## 2018-07-10 DIAGNOSIS — Z00.00 ANNUAL PHYSICAL EXAM: Primary | ICD-10-CM

## 2018-07-10 DIAGNOSIS — Z12.39 SCREENING FOR BREAST CANCER: ICD-10-CM

## 2018-07-10 DIAGNOSIS — Z13.820 SCREENING FOR OSTEOPOROSIS: ICD-10-CM

## 2018-07-10 PROCEDURE — 99396 PREV VISIT EST AGE 40-64: CPT | Performed by: INTERNAL MEDICINE

## 2018-07-10 RX ORDER — BUPROPION HYDROCHLORIDE 150 MG/1
300 TABLET ORAL DAILY
Qty: 30 TABLET | Status: CANCELLED | OUTPATIENT
Start: 2018-07-10

## 2018-07-10 RX ORDER — PIOGLITAZONEHYDROCHLORIDE 15 MG/1
15 TABLET ORAL DAILY
Qty: 30 TABLET | Refills: 5 | Status: SHIPPED | OUTPATIENT
Start: 2018-07-10 | End: 2020-06-25 | Stop reason: SDUPTHER

## 2018-07-10 RX ORDER — BLOOD-GLUCOSE METER
1 EACH MISCELLANEOUS DAILY
Qty: 1 KIT | Refills: 0 | Status: SHIPPED | OUTPATIENT
Start: 2018-07-10 | End: 2019-08-25 | Stop reason: SDUPTHER

## 2018-07-10 RX ORDER — ATORVASTATIN CALCIUM 10 MG/1
TABLET, FILM COATED ORAL
Qty: 30 TABLET | Refills: 5 | Status: SHIPPED | OUTPATIENT
Start: 2018-07-10 | End: 2019-05-28 | Stop reason: SDUPTHER

## 2018-07-10 RX ORDER — METFORMIN HYDROCHLORIDE EXTENDED-RELEASE TABLETS 1000 MG/1
1000 TABLET, FILM COATED, EXTENDED RELEASE ORAL 2 TIMES DAILY WITH MEALS
Qty: 60 TABLET | Refills: 5 | Status: SHIPPED | OUTPATIENT
Start: 2018-07-10 | End: 2018-10-11 | Stop reason: SDUPTHER

## 2018-07-10 RX ORDER — GLUCOSAMINE HCL/CHONDROITIN SU 500-400 MG
CAPSULE ORAL
Qty: 100 STRIP | Refills: 5 | Status: SHIPPED | OUTPATIENT
Start: 2018-07-10 | End: 2019-08-25 | Stop reason: SDUPTHER

## 2018-07-10 RX ORDER — TRAZODONE HYDROCHLORIDE 100 MG/1
250 TABLET ORAL
Status: CANCELLED | OUTPATIENT
Start: 2018-07-10

## 2018-07-10 RX ORDER — BENAZEPRIL HYDROCHLORIDE AND HYDROCHLOROTHIAZIDE 20; 12.5 MG/1; MG/1
TABLET ORAL
Qty: 30 TABLET | Refills: 0 | Status: SHIPPED | OUTPATIENT
Start: 2018-07-10 | End: 2018-09-11 | Stop reason: SDUPTHER

## 2018-07-10 RX ORDER — LAMOTRIGINE 200 MG/1
200 TABLET ORAL DAILY
Qty: 30 TABLET | Status: CANCELLED | OUTPATIENT
Start: 2018-07-10

## 2018-07-10 RX ORDER — METHYLPHENIDATE HYDROCHLORIDE 54 MG/1
54 TABLET ORAL EVERY MORNING
Status: CANCELLED | OUTPATIENT
Start: 2018-07-10

## 2018-07-10 RX ORDER — ESCITALOPRAM OXALATE 20 MG/1
20 TABLET ORAL DAILY
Qty: 30 TABLET | Status: CANCELLED | OUTPATIENT
Start: 2018-07-10

## 2018-07-10 RX ORDER — LANCETS 30 GAUGE
EACH MISCELLANEOUS
Qty: 100 EACH | Refills: 3 | Status: SHIPPED | OUTPATIENT
Start: 2018-07-10

## 2018-07-10 ASSESSMENT — ENCOUNTER SYMPTOMS
RESPIRATORY NEGATIVE: 1
EYES NEGATIVE: 1
GASTROINTESTINAL NEGATIVE: 1
ALLERGIC/IMMUNOLOGIC NEGATIVE: 1

## 2018-07-10 NOTE — PROGRESS NOTES
Subjective:      Patient ID: Jimenez Hoyt is a 65 y.o. female. HPI  Annual physical exam  Mammo: way past due  Pap: past due. S/p hysterectomy oophorectomy. Colonoscopy: 5/2009. Due 5/2019. DEXA: due  Eye: 2/4/2018. Hearing: decreased. Immunnization: Flu shot in Oct/Nov. Gave Rx for Shingrix. MMSE: na  Get Up and Go: na  Tob: nonsmoker/never  ETOH: none  Caffeine: low  Cardiac Risk Assessment: on statin  Living will:  yes  Medical power of : yes    Type 2 diabetes mellitus without complication, with long-term current use of insulin ([facility])  Sugars are good, diet is fair, weight is up 10 lbs, no reported neuropathy, no change in vision, no claudication, no foot ulcers, no new skin lesions. No change in medications. No c/o with meds. Last eye exam 2/4/2018. Pure hypercholesterolemia  Stable diet and some wt gain(10 lbs). No c/o w/ meds. Moderate episode of recurrent major depressive disorder ([address]  Saw Psychiatrist and therapist regularly. Stable meds. W/ no new c/o. Still some issues w/ sleep but improved w/ Trazodone. Essential hypertension  No change in meds, no c/o with meds, no chest pain, SOB, palpatations, or syncope. Home bp was: not taken.     Past Medical History:   Diagnosis Date    Anxiety     Depression     DJD (degenerative joint disease)     hands/knees/ankles    Fatty liver     Hyperlipidemia     Hypertension     Irritable bowel syndrome     Mood disorder (HCC)     NOS    Narcotic addiction (HCC)     ANGEL (obstructive sleep apnea)     no CPAP machine    Restless leg syndrome     Type II or unspecified type diabetes mellitus without mention of complication, not stated as uncontrolled     Vision impairment     wears glasses and contacts     Current Outpatient Prescriptions   Medication Sig Dispense Refill    benazepril-hydrochlorthiazide (LOTENSIN HCT) 20-12.5 MG per tablet TAKE ONE TABLET BY MOUTH DAILY 30 tablet 0    INVOKANA 100 MG TABS tablet TAKE ONE TABLET BY MOUTH EVERY MORNING BEFORE BREAKFAST 30 tablet 2    pioglitazone (ACTOS) 15 MG tablet TAKE ONE TABLET BY MOUTH DAILY 30 tablet 5    atorvastatin (LIPITOR) 10 MG tablet TAKE ONE TABLET BY MOUTH DAILY 30 tablet 5    buPROPion (WELLBUTRIN XL) 150 MG extended release tablet Take 2 tablets by mouth daily      lamoTRIgine (LAMICTAL) 200 MG tablet Take 1 tablet by mouth daily      metFORMIN, OSM, (FORTAMET) 1000 MG extended release tablet TAKE ONE TABLET BY MOUTH DAILY WITH BREAKFAST 30 tablet 5    Blood Glucose Monitoring Suppl CONCETTA Test once daily for diabetes e11.9 1 Device 0    Glucose Blood (BLOOD GLUCOSE TEST STRIPS) STRP Test once dialy for diabetes e11.9 100 strip 5    Lancets MISC Test once daily for diabetes e11.9 100 each 3    traZODone (DESYREL) 100 MG tablet 250 mg      insulin glargine (LANTUS SOLOSTAR) 100 UNIT/ML injection pen INJECT 60 UNITS UNDER THE SKIN EVERY NIGHT 15 Pen 5    methylphenidate (CONCERTA) 54 MG extended release tablet Take 54 mg by mouth every morning .  escitalopram (LEXAPRO) 20 MG tablet Take 20 mg by mouth daily.  Blood Glucose Monitoring Suppl (ONE TOUCH ULTRA 2) W/DEVICE KIT by Does not apply route. 1 kit 0    Naproxen Sodium (ALEVE) 220 MG CAPS Take 1 capsule by mouth 2 times daily.  acyclovir (ZOVIRAX) 200 MG capsule Take 2 capsules by mouth 3 times daily 60 capsule 0    glucose blood VI test strips (ASCENSIA AUTODISC VI;ONE TOUCH ULTRA TEST VI) strip Patient has One Touch Ultra 2, tests daily 100 strip 5     No current facility-administered medications for this visit.       Allergies   Allergen Reactions    Morphine Hives     Social History   Substance Use Topics    Smoking status: Never Smoker    Smokeless tobacco: Never Used    Alcohol use No     Family History   Problem Relation Age of Onset    Diabetes Mother     Arthritis Mother     Diabetes Father     Heart Disease Father     Cancer Father         colon    Dementia Father        Review and no CVA tenderness. No hernia. Hernia confirmed negative in the ventral area. Genitourinary: No breast swelling, tenderness, discharge or bleeding. Pelvic exam was performed with patient supine. Genitourinary Comments: NE   Musculoskeletal: Normal range of motion. She exhibits tenderness (OA knees R >>L.). She exhibits no edema. Lymphadenopathy:        Head (right side): No submental, no submandibular, no tonsillar, no preauricular, no posterior auricular and no occipital adenopathy present. Head (left side): No submental, no submandibular, no tonsillar, no preauricular, no posterior auricular and no occipital adenopathy present. She has no cervical adenopathy. She has no axillary adenopathy. Right: No inguinal, no supraclavicular and no epitrochlear adenopathy present. Left: No inguinal, no supraclavicular and no epitrochlear adenopathy present. Neurological: She is alert and oriented to person, place, and time. She has normal strength and normal reflexes. She is not disoriented. She displays no atrophy, no tremor and normal reflexes. No cranial nerve deficit or sensory deficit. She exhibits normal muscle tone. She displays a negative Romberg sign. She displays no seizure activity. Coordination and gait normal.   Reflex Scores:       Tricep reflexes are 2+ on the right side and 2+ on the left side. Bicep reflexes are 2+ on the right side and 2+ on the left side. Brachioradialis reflexes are 2+ on the right side and 2+ on the left side. Patellar reflexes are 2+ on the right side and 2+ on the left side. Achilles reflexes are 2+ on the right side and 2+ on the left side. Diabetic foot exam was normal with intact light touch and vibratory senses. Skin: Skin is warm, dry and intact. No abrasion and no rash noted. She is not diaphoretic. No cyanosis or erythema. No pallor. Nails show no clubbing. Psychiatric: She has a normal mood and affect.  Her speech is normal and behavior is normal. Judgment and thought content normal. Cognition and memory are normal.       Assessment:      Annual Physical Exam   Moderate Episode of Recurrent Major Depressive Disorder (Hcc). Doing well w/ meds. Pure Hypercholesterolemia. Wt is up. Essential Hypertension. Stable w/ meds. Type 2 Diabetes Mellitus Without Complication, With Long-Term Current Use of Insulin (Hcc). Low in am and higher in pm.          Plan:      All above problems reviewed and the found to be unchanged except for the following :     Annual Physical Exam. Due for Mammogram,DEXA scan due to family hx. Rx for Shingrix vaccine given. Moderate Episode of Recurrent Major Depressive Disorder (Hcc). Continue meds and f/u w/ Psychiatrist as scheduled. Pure Hypercholesterolemia. To improve diet and lose weight. Continue med. Essential Hypertension. Continue present meds. Home BP checks. Call if>140/90. Improve diet. Avoid caffeine and salt. Call if new c/o w/ meds. Type 2 Diabetes Mellitus Without Complication, With Long-Term Current Use of Insulin (Hcc). Will decrease LAntus to 58 units. Call if AM sugars still low or PM sugars >150 regularly. Must improve diet and lose weight. May need to adjust meds further.

## 2018-07-10 NOTE — PATIENT INSTRUCTIONS
All above problems reviewed and the found to be unchanged except for the following :     Annual Physical Exam. Due for Mammogram,DEXA scan due to family hx. Rx for Shingrix vaccine given. Moderate Episode of Recurrent Major Depressive Disorder (Hcc). Continue meds and f/u w/ Psychiatrist as scheduled. Pure Hypercholesterolemia. To improve diet and lose weight. Continue med. Essential Hypertension. Continue present meds. Home BP checks. Call if>140/90. Improve diet. Avoid caffeine and salt. Call if new c/o w/ meds. Type 2 Diabetes Mellitus Without Complication, With Long-Term Current Use of Insulin (Hcc). Will decrease LAntus to 58 units. Call if AM sugars still low or PM sugars >150 regularly. Must improve diet and lose weight. May need to adjust meds further. Mammo: way past due  Pap: past due. S/p hysterectomy oophorectomy. Colonoscopy: 5/2009. Due 5/2019. DEXA: due  Eye: 2/4/2018. Hearing: decreased. Immunnization: Flu shot in Oct/Nov. Gave Rx for Shingrix.    MMSE: na  Get Up and Go: na  Tob: nonsmoker/never  ETOH: none  Caffeine: low  Cardiac Risk Assessment: on statin  Living will:  yes  Medical power of : yes

## 2018-07-20 ENCOUNTER — TELEPHONE (OUTPATIENT)
Dept: PRIMARY CARE CLINIC | Age: 58
End: 2018-07-20

## 2018-08-09 PROBLEM — Z00.00 ANNUAL PHYSICAL EXAM: Status: RESOLVED | Noted: 2018-07-10 | Resolved: 2018-08-09

## 2018-08-21 ENCOUNTER — HOSPITAL ENCOUNTER (OUTPATIENT)
Dept: WOMENS IMAGING | Age: 58
Discharge: HOME | End: 2018-08-21
Payer: COMMERCIAL

## 2018-08-21 DIAGNOSIS — Z12.31 VISIT FOR SCREENING MAMMOGRAM: ICD-10-CM

## 2018-08-21 DIAGNOSIS — Z13.820 SCREENING FOR OSTEOPOROSIS: ICD-10-CM

## 2018-08-21 PROCEDURE — 77063 BREAST TOMOSYNTHESIS BI: CPT

## 2018-08-21 PROCEDURE — 77080 DXA BONE DENSITY AXIAL: CPT

## 2018-09-04 DIAGNOSIS — E11.9 TYPE 2 DIABETES MELLITUS WITHOUT COMPLICATION, WITHOUT LONG-TERM CURRENT USE OF INSULIN (HCC): ICD-10-CM

## 2018-09-04 RX ORDER — METFORMIN HYDROCHLORIDE EXTENDED-RELEASE TABLETS 1000 MG/1
TABLET, FILM COATED, EXTENDED RELEASE ORAL
Qty: 30 TABLET | Refills: 5 | Status: SHIPPED | OUTPATIENT
Start: 2018-09-04 | End: 2019-05-27 | Stop reason: ALTCHOICE

## 2018-09-11 DIAGNOSIS — E11.9 TYPE 2 DIABETES MELLITUS WITHOUT COMPLICATION, WITHOUT LONG-TERM CURRENT USE OF INSULIN (HCC): ICD-10-CM

## 2018-09-12 RX ORDER — BENAZEPRIL HYDROCHLORIDE AND HYDROCHLOROTHIAZIDE 20; 12.5 MG/1; MG/1
TABLET ORAL
Qty: 30 TABLET | Refills: 0 | Status: SHIPPED | OUTPATIENT
Start: 2018-09-12 | End: 2018-10-11 | Stop reason: SDUPTHER

## 2018-09-12 NOTE — TELEPHONE ENCOUNTER
Patient called to let Dr. Lin Jules know that she is completely out of medication and her blood pressure has gone up a bit. Please advise.

## 2018-10-11 ENCOUNTER — OFFICE VISIT (OUTPATIENT)
Dept: FAMILY MEDICINE CLINIC | Age: 58
End: 2018-10-11
Payer: COMMERCIAL

## 2018-10-11 VITALS
WEIGHT: 205.4 LBS | BODY MASS INDEX: 33.01 KG/M2 | SYSTOLIC BLOOD PRESSURE: 124 MMHG | RESPIRATION RATE: 16 BRPM | HEIGHT: 66 IN | DIASTOLIC BLOOD PRESSURE: 84 MMHG | OXYGEN SATURATION: 97 % | HEART RATE: 99 BPM

## 2018-10-11 DIAGNOSIS — E78.00 PURE HYPERCHOLESTEROLEMIA: ICD-10-CM

## 2018-10-11 DIAGNOSIS — I10 ESSENTIAL HYPERTENSION: ICD-10-CM

## 2018-10-11 DIAGNOSIS — E11.9 TYPE 2 DIABETES MELLITUS WITHOUT COMPLICATION, WITH LONG-TERM CURRENT USE OF INSULIN (HCC): ICD-10-CM

## 2018-10-11 DIAGNOSIS — Z79.4 TYPE 2 DIABETES MELLITUS WITHOUT COMPLICATION, WITH LONG-TERM CURRENT USE OF INSULIN (HCC): ICD-10-CM

## 2018-10-11 DIAGNOSIS — E11.9 TYPE 2 DIABETES MELLITUS WITHOUT COMPLICATION, WITHOUT LONG-TERM CURRENT USE OF INSULIN (HCC): Primary | ICD-10-CM

## 2018-10-11 DIAGNOSIS — F33.1 MODERATE EPISODE OF RECURRENT MAJOR DEPRESSIVE DISORDER (HCC): ICD-10-CM

## 2018-10-11 LAB
ANION GAP SERPL CALCULATED.3IONS-SCNC: 15 MMOL/L (ref 3–16)
BUN BLDV-MCNC: 20 MG/DL (ref 7–20)
CALCIUM SERPL-MCNC: 10.1 MG/DL (ref 8.3–10.6)
CHLORIDE BLD-SCNC: 102 MMOL/L (ref 99–110)
CO2: 27 MMOL/L (ref 21–32)
CREAT SERPL-MCNC: 0.9 MG/DL (ref 0.6–1.1)
GFR AFRICAN AMERICAN: >60
GFR NON-AFRICAN AMERICAN: >60
GLUCOSE BLD-MCNC: 171 MG/DL (ref 70–99)
POTASSIUM SERPL-SCNC: 5 MMOL/L (ref 3.5–5.1)
SODIUM BLD-SCNC: 144 MMOL/L (ref 136–145)

## 2018-10-11 PROCEDURE — 99214 OFFICE O/P EST MOD 30 MIN: CPT | Performed by: INTERNAL MEDICINE

## 2018-10-11 PROCEDURE — 90686 IIV4 VACC NO PRSV 0.5 ML IM: CPT | Performed by: INTERNAL MEDICINE

## 2018-10-11 PROCEDURE — 90471 IMMUNIZATION ADMIN: CPT | Performed by: INTERNAL MEDICINE

## 2018-10-11 RX ORDER — BUPROPION HYDROCHLORIDE 300 MG/1
300 TABLET ORAL EVERY MORNING
COMMUNITY
End: 2019-10-23 | Stop reason: ALTCHOICE

## 2018-10-11 RX ORDER — BENAZEPRIL HYDROCHLORIDE AND HYDROCHLOROTHIAZIDE 20; 12.5 MG/1; MG/1
1 TABLET ORAL DAILY
Qty: 30 TABLET | Refills: 5 | Status: SHIPPED | OUTPATIENT
Start: 2018-10-11 | End: 2019-03-22

## 2018-10-11 ASSESSMENT — ENCOUNTER SYMPTOMS
ALLERGIC/IMMUNOLOGIC NEGATIVE: 1
RESPIRATORY NEGATIVE: 1

## 2018-10-11 NOTE — PROGRESS NOTES
has no decreased breath sounds. She has no wheezes. She has no rhonchi. She has no rales. She exhibits no tenderness. Musculoskeletal: Normal range of motion. She exhibits no edema or tenderness. Lymphadenopathy:     She has no cervical adenopathy. She has no axillary adenopathy. Neurological: She is alert and oriented to person, place, and time. She has normal strength and normal reflexes. She is not disoriented. She displays no atrophy and no tremor. No cranial nerve deficit or sensory deficit. She exhibits normal muscle tone. Coordination normal.   Diabetic foot exam was normal with intact light touch and vibratory senses. Skin: Skin is warm, dry and intact. No abrasion and no rash noted. She is not diaphoretic. No cyanosis or erythema. No pallor. Nails show no clubbing. Psychiatric: She has a normal mood and affect. Her speech is normal and behavior is normal. Judgment and thought content normal. Cognition and memory are normal.       Assessment:      Problem   Moderate Episode of Recurrent Major Depressive Disorder (Hcc). C/o of memory issues. Pure Hypercholesterolemia. Stable diet and wt. Essential Hypertension. Ok but Diastolic getting higher. Type 2 Diabetes Mellitus Without Complication, With Long-Term Current Use of Insulin (Hcc). Having low sugars when forgets snack. Plan:      All above problems reviewed and the found to be unchanged except for the following : Moderate Episode of Recurrent Major Depressive Disorder (Hcc). To discuss changing ADHD med to longer acting med like Vyvanse. Pure Hypercholesterolemia. Must improve diet and lose weight. Goal is<165 lbs. Essential Hypertension. Continue present meds. Home BP checks. Call if>140/90. Improve diet. Avoid caffeine and salt. Call if new c/o w/ meds. Type 2 Diabetes Mellitus Without Complication, With Long-Term Current Use of Insulin (Hcc). Will do labs and adjust meds as needed.  To eat snack and increase exercise. Improve diet and lose weight as discussed.               Suzanne Sheldon MD

## 2018-10-11 NOTE — ASSESSMENT & PLAN NOTE
Saw Psychiatrist ~ 4 weeks ago. Recent change meds(doubled Wellbutrin). W/ no new c/o. Still some issues w/ sleep but improved w/ Trazodone. C/o of forgetfulness.

## 2018-10-12 LAB
ESTIMATED AVERAGE GLUCOSE: 131.2 MG/DL
HBA1C MFR BLD: 6.2 %

## 2019-01-21 ENCOUNTER — OFFICE VISIT (OUTPATIENT)
Dept: ORTHOPEDIC SURGERY | Age: 59
End: 2019-01-21
Payer: COMMERCIAL

## 2019-01-21 VITALS — WEIGHT: 202 LBS | HEIGHT: 66 IN | BODY MASS INDEX: 32.47 KG/M2

## 2019-01-21 DIAGNOSIS — M25.561 PAIN, JOINT, KNEE, RIGHT: ICD-10-CM

## 2019-01-21 DIAGNOSIS — M25.562 PAIN IN JOINT OF LEFT KNEE: ICD-10-CM

## 2019-01-21 DIAGNOSIS — M17.11 PRIMARY OSTEOARTHRITIS OF RIGHT KNEE: Primary | ICD-10-CM

## 2019-01-21 PROCEDURE — 99243 OFF/OP CNSLTJ NEW/EST LOW 30: CPT | Performed by: ORTHOPAEDIC SURGERY

## 2019-01-21 PROCEDURE — L1830 KO IMMOB CANVAS LONG PRE OTS: HCPCS | Performed by: ORTHOPAEDIC SURGERY

## 2019-01-28 DIAGNOSIS — E11.9 TYPE 2 DIABETES MELLITUS WITHOUT COMPLICATION, WITHOUT LONG-TERM CURRENT USE OF INSULIN (HCC): ICD-10-CM

## 2019-01-28 RX ORDER — METFORMIN HYDROCHLORIDE EXTENDED-RELEASE TABLETS 1000 MG/1
TABLET, FILM COATED, EXTENDED RELEASE ORAL
Qty: 60 TABLET | Refills: 5 | Status: SHIPPED | OUTPATIENT
Start: 2019-01-28 | End: 2019-02-01 | Stop reason: SDUPTHER

## 2019-01-30 ENCOUNTER — HOSPITAL ENCOUNTER (OUTPATIENT)
Age: 59
Discharge: HOME | End: 2019-01-30
Payer: COMMERCIAL

## 2019-01-30 ENCOUNTER — HOSPITAL ENCOUNTER (OUTPATIENT)
Dept: PHYSICAL THERAPY | Age: 59
Setting detail: THERAPIES SERIES
Discharge: HOME | End: 2019-01-30
Payer: COMMERCIAL

## 2019-01-30 LAB
ALBUMIN SERPL-MCNC: 4.4 G/DL (ref 3.4–5)
ANION GAP SERPL CALCULATED.3IONS-SCNC: 14 MMOL/L (ref 3–16)
APTT: 32.8 SEC (ref 26–36)
BASOPHILS ABSOLUTE: 0.1 K/UL (ref 0–0.2)
BASOPHILS RELATIVE PERCENT: 0.7 %
BILIRUBIN URINE: NEGATIVE
BLOOD, URINE: NEGATIVE
BUN BLDV-MCNC: 17 MG/DL (ref 7–20)
CALCIUM SERPL-MCNC: 9.8 MG/DL (ref 8.3–10.6)
CHLORIDE BLD-SCNC: 100 MMOL/L (ref 99–110)
CLARITY: CLEAR
CO2: 28 MMOL/L (ref 21–32)
COLOR: YELLOW
CREAT SERPL-MCNC: 1 MG/DL (ref 0.6–1.1)
EOSINOPHILS ABSOLUTE: 0.1 K/UL (ref 0–0.6)
EOSINOPHILS RELATIVE PERCENT: 1.1 %
GFR AFRICAN AMERICAN: >60
GFR NON-AFRICAN AMERICAN: 57
GLUCOSE BLD-MCNC: 115 MG/DL (ref 70–99)
GLUCOSE URINE: >=1000 MG/DL
HCT VFR BLD CALC: 45.7 % (ref 36–48)
HEMOGLOBIN: 15.3 G/DL (ref 12–16)
INR BLD: 0.92 (ref 0.86–1.14)
KETONES, URINE: NEGATIVE MG/DL
LEUKOCYTE ESTERASE, URINE: NEGATIVE
LYMPHOCYTES ABSOLUTE: 2.1 K/UL (ref 1–5.1)
LYMPHOCYTES RELATIVE PERCENT: 25.9 %
MCH RBC QN AUTO: 31.2 PG (ref 26–34)
MCHC RBC AUTO-ENTMCNC: 33.6 G/DL (ref 31–36)
MCV RBC AUTO: 93 FL (ref 80–100)
MICROSCOPIC EXAMINATION: ABNORMAL
MONOCYTES ABSOLUTE: 0.6 K/UL (ref 0–1.3)
MONOCYTES RELATIVE PERCENT: 7.7 %
NEUTROPHILS ABSOLUTE: 5.3 K/UL (ref 1.7–7.7)
NEUTROPHILS RELATIVE PERCENT: 64.6 %
NITRITE, URINE: NEGATIVE
PDW BLD-RTO: 13 % (ref 12.4–15.4)
PH UA: 5.5
PLATELET # BLD: 267 K/UL (ref 135–450)
PMV BLD AUTO: 9.3 FL (ref 5–10.5)
POTASSIUM SERPL-SCNC: 3.9 MMOL/L (ref 3.5–5.1)
PROTEIN UA: NEGATIVE MG/DL
PROTHROMBIN TIME: 10.5 SEC (ref 9.8–13)
RBC # BLD: 4.92 M/UL (ref 4–5.2)
SODIUM BLD-SCNC: 142 MMOL/L (ref 136–145)
SPECIFIC GRAVITY UA: 1.02
TRANSFERRIN: 234 MG/DL (ref 200–360)
URINE TYPE: ABNORMAL
UROBILINOGEN, URINE: 0.2 E.U./DL
WBC # BLD: 8.2 K/UL (ref 4–11)

## 2019-01-30 PROCEDURE — 87077 CULTURE AEROBIC IDENTIFY: CPT

## 2019-01-30 PROCEDURE — G8978 MOBILITY CURRENT STATUS: HCPCS | Performed by: PHYSICAL THERAPIST

## 2019-01-30 PROCEDURE — 97161 PT EVAL LOW COMPLEX 20 MIN: CPT | Performed by: PHYSICAL THERAPIST

## 2019-01-30 PROCEDURE — 83036 HEMOGLOBIN GLYCOSYLATED A1C: CPT

## 2019-01-30 PROCEDURE — 84466 ASSAY OF TRANSFERRIN: CPT

## 2019-01-30 PROCEDURE — 87186 SC STD MICRODIL/AGAR DIL: CPT

## 2019-01-30 PROCEDURE — 85610 PROTHROMBIN TIME: CPT

## 2019-01-30 PROCEDURE — 85025 COMPLETE CBC W/AUTO DIFF WBC: CPT

## 2019-01-30 PROCEDURE — 81003 URINALYSIS AUTO W/O SCOPE: CPT

## 2019-01-30 PROCEDURE — G8979 MOBILITY GOAL STATUS: HCPCS | Performed by: PHYSICAL THERAPIST

## 2019-01-30 PROCEDURE — 36415 COLL VENOUS BLD VENIPUNCTURE: CPT

## 2019-01-30 PROCEDURE — 80048 BASIC METABOLIC PNL TOTAL CA: CPT

## 2019-01-30 PROCEDURE — 97110 THERAPEUTIC EXERCISES: CPT | Performed by: PHYSICAL THERAPIST

## 2019-01-30 PROCEDURE — 82040 ASSAY OF SERUM ALBUMIN: CPT

## 2019-01-30 PROCEDURE — 85730 THROMBOPLASTIN TIME PARTIAL: CPT

## 2019-01-30 PROCEDURE — 87086 URINE CULTURE/COLONY COUNT: CPT

## 2019-01-31 ENCOUNTER — TELEPHONE (OUTPATIENT)
Dept: ORTHOPEDIC SURGERY | Age: 59
End: 2019-01-31

## 2019-01-31 LAB
ESTIMATED AVERAGE GLUCOSE: 142.7 MG/DL
HBA1C MFR BLD: 6.6 %

## 2019-02-01 ENCOUNTER — OFFICE VISIT (OUTPATIENT)
Dept: FAMILY MEDICINE CLINIC | Age: 59
End: 2019-02-01
Payer: COMMERCIAL

## 2019-02-01 VITALS
HEART RATE: 102 BPM | DIASTOLIC BLOOD PRESSURE: 84 MMHG | RESPIRATION RATE: 16 BRPM | BODY MASS INDEX: 32.88 KG/M2 | OXYGEN SATURATION: 98 % | SYSTOLIC BLOOD PRESSURE: 132 MMHG | WEIGHT: 204.6 LBS | HEIGHT: 66 IN

## 2019-02-01 DIAGNOSIS — F33.1 MODERATE EPISODE OF RECURRENT MAJOR DEPRESSIVE DISORDER (HCC): ICD-10-CM

## 2019-02-01 DIAGNOSIS — E78.00 PURE HYPERCHOLESTEROLEMIA: ICD-10-CM

## 2019-02-01 DIAGNOSIS — Z79.4 TYPE 2 DIABETES MELLITUS WITHOUT COMPLICATION, WITH LONG-TERM CURRENT USE OF INSULIN (HCC): ICD-10-CM

## 2019-02-01 DIAGNOSIS — M25.561 RIGHT KNEE PAIN, UNSPECIFIED CHRONICITY: Primary | ICD-10-CM

## 2019-02-01 DIAGNOSIS — E11.9 TYPE 2 DIABETES MELLITUS WITHOUT COMPLICATION, WITH LONG-TERM CURRENT USE OF INSULIN (HCC): ICD-10-CM

## 2019-02-01 DIAGNOSIS — I10 ESSENTIAL HYPERTENSION: ICD-10-CM

## 2019-02-01 LAB
ORGANISM: ABNORMAL
URINE CULTURE, ROUTINE: ABNORMAL
URINE CULTURE, ROUTINE: ABNORMAL

## 2019-02-01 PROCEDURE — 99214 OFFICE O/P EST MOD 30 MIN: CPT | Performed by: INTERNAL MEDICINE

## 2019-02-01 RX ORDER — SULFAMETHOXAZOLE AND TRIMETHOPRIM 800; 160 MG/1; MG/1
1 TABLET ORAL 2 TIMES DAILY
Qty: 14 TABLET | Refills: 0 | Status: SHIPPED | OUTPATIENT
Start: 2019-02-01 | End: 2019-02-08

## 2019-02-01 ASSESSMENT — ENCOUNTER SYMPTOMS
RESPIRATORY NEGATIVE: 1
ALLERGIC/IMMUNOLOGIC NEGATIVE: 1
GASTROINTESTINAL NEGATIVE: 1

## 2019-02-04 ENCOUNTER — TELEPHONE (OUTPATIENT)
Dept: ORTHOPEDIC SURGERY | Age: 59
End: 2019-02-04

## 2019-02-04 DIAGNOSIS — M17.12 PRIMARY OSTEOARTHRITIS OF LEFT KNEE: Primary | ICD-10-CM

## 2019-02-06 ENCOUNTER — TELEPHONE (OUTPATIENT)
Dept: ORTHOPEDIC SURGERY | Age: 59
End: 2019-02-06

## 2019-02-18 RX ORDER — CANAGLIFLOZIN 100 MG/1
TABLET, FILM COATED ORAL
Qty: 30 TABLET | Refills: 3 | Status: SHIPPED | OUTPATIENT
Start: 2019-02-18 | End: 2019-03-22

## 2019-03-05 ENCOUNTER — TELEPHONE (OUTPATIENT)
Dept: ORTHOPEDIC SURGERY | Age: 59
End: 2019-03-05

## 2019-03-05 ENCOUNTER — OFFICE VISIT (OUTPATIENT)
Dept: FAMILY MEDICINE CLINIC | Age: 59
End: 2019-03-05
Payer: COMMERCIAL

## 2019-03-05 VITALS
SYSTOLIC BLOOD PRESSURE: 104 MMHG | OXYGEN SATURATION: 99 % | HEIGHT: 66 IN | DIASTOLIC BLOOD PRESSURE: 76 MMHG | HEART RATE: 108 BPM | BODY MASS INDEX: 32.5 KG/M2 | WEIGHT: 202.2 LBS

## 2019-03-05 DIAGNOSIS — R30.0 DYSURIA: Primary | ICD-10-CM

## 2019-03-05 LAB
BILIRUBIN, POC: NEGATIVE
BLOOD URINE, POC: ABNORMAL
CLARITY, POC: ABNORMAL
COLOR, POC: ABNORMAL
GLUCOSE URINE, POC: >1000
KETONES, POC: NEGATIVE
LEUKOCYTE EST, POC: ABNORMAL
NITRITE, POC: NEGATIVE
PH, POC: 5
PROTEIN, POC: NEGATIVE
SPECIFIC GRAVITY, POC: 1.02
UROBILINOGEN, POC: 0.2

## 2019-03-05 PROCEDURE — 81002 URINALYSIS NONAUTO W/O SCOPE: CPT | Performed by: PHYSICIAN ASSISTANT

## 2019-03-05 PROCEDURE — 99213 OFFICE O/P EST LOW 20 MIN: CPT | Performed by: PHYSICIAN ASSISTANT

## 2019-03-07 LAB
ORGANISM: ABNORMAL
URINE CULTURE, ROUTINE: ABNORMAL
URINE CULTURE, ROUTINE: ABNORMAL

## 2019-03-13 ENCOUNTER — TELEPHONE (OUTPATIENT)
Dept: FAMILY MEDICINE CLINIC | Age: 59
End: 2019-03-13

## 2019-03-15 RX ORDER — LISINOPRIL AND HYDROCHLOROTHIAZIDE 20; 12.5 MG/1; MG/1
1 TABLET ORAL DAILY
Qty: 30 TABLET | Refills: 5 | Status: SHIPPED | OUTPATIENT
Start: 2019-03-15 | End: 2019-05-03 | Stop reason: ALTCHOICE

## 2019-03-19 ENCOUNTER — OFFICE VISIT (OUTPATIENT)
Dept: FAMILY MEDICINE CLINIC | Age: 59
End: 2019-03-19
Payer: COMMERCIAL

## 2019-03-19 VITALS
HEIGHT: 66 IN | WEIGHT: 203.6 LBS | HEART RATE: 105 BPM | BODY MASS INDEX: 32.72 KG/M2 | DIASTOLIC BLOOD PRESSURE: 74 MMHG | SYSTOLIC BLOOD PRESSURE: 134 MMHG | OXYGEN SATURATION: 98 %

## 2019-03-19 DIAGNOSIS — M17.11 OSTEOARTHRITIS OF RIGHT KNEE, UNSPECIFIED OSTEOARTHRITIS TYPE: Primary | ICD-10-CM

## 2019-03-19 DIAGNOSIS — Z01.818 PREOP EXAMINATION: ICD-10-CM

## 2019-03-19 PROCEDURE — 99242 OFF/OP CONSLTJ NEW/EST SF 20: CPT | Performed by: PHYSICIAN ASSISTANT

## 2019-03-19 PROCEDURE — 93000 ELECTROCARDIOGRAM COMPLETE: CPT | Performed by: PHYSICIAN ASSISTANT

## 2019-03-21 ENCOUNTER — TELEPHONE (OUTPATIENT)
Dept: ORTHOPEDIC SURGERY | Age: 59
End: 2019-03-21

## 2019-03-22 NOTE — PROGRESS NOTES
Obstructive Sleep Apnea (ANGEL) Screening     Patient:  Deven Vuong    YOB: 1960      Medical Record #:  [id number]                     Date:  3/22/2019     1. Are you a loud and/or regular snorer? [x]  Yes       [] No    2. Have you been observed to gasp or stop breathing during sleep? []  Yes       [x] No    3. Do you feel tired or groggy upon awakening or do you awaken with a headache?           []  Yes       [x] No    4. Are you often tired or fatigued during the wake time hours? [x]  Yes       [] No    5. Do you fall asleep sitting, reading, watching TV or driving? []  Yes       [x] No    6. Do you often have problems with memory or concentration? []  Yes       [x] No    **If patient's score is ? 3 they are considered high risk for ANGEL. Notify the anesthesiologist of the high risk and document in focus note. Note:  If the patient's BMI is more than 35 kg m¯² , has neck circumference > 40 cm, and/or high blood pressure the risk is greater (© [employer]nea [employer], 2006).     DID NOT QUALIFY FOR CPAP MACHINE PER SLEEP STUDY YRS AGO PER PATIENT

## 2019-03-25 ENCOUNTER — TELEPHONE (OUTPATIENT)
Dept: FAMILY MEDICINE CLINIC | Age: 59
End: 2019-03-25

## 2019-03-27 ENCOUNTER — ANESTHESIA EVENT (OUTPATIENT)
Dept: OPERATING ROOM | Age: 59
DRG: 470 | End: 2019-03-27
Payer: COMMERCIAL

## 2019-03-27 ENCOUNTER — TELEPHONE (OUTPATIENT)
Dept: ORTHOPEDIC SURGERY | Age: 59
End: 2019-03-27

## 2019-03-29 ENCOUNTER — ANESTHESIA (OUTPATIENT)
Dept: OPERATING ROOM | Age: 59
DRG: 470 | End: 2019-03-29
Payer: COMMERCIAL

## 2019-03-29 ENCOUNTER — HOSPITAL ENCOUNTER (INPATIENT)
Age: 59
LOS: 1 days | Discharge: HOME | DRG: 470 | End: 2019-03-30
Attending: ORTHOPAEDIC SURGERY | Admitting: ORTHOPAEDIC SURGERY
Payer: COMMERCIAL

## 2019-03-29 VITALS
SYSTOLIC BLOOD PRESSURE: 87 MMHG | TEMPERATURE: 98.6 F | RESPIRATION RATE: 3 BRPM | OXYGEN SATURATION: 98 % | DIASTOLIC BLOOD PRESSURE: 53 MMHG

## 2019-03-29 DIAGNOSIS — M17.10 LOCALIZED OSTEOARTHROSIS, LOWER LEG: ICD-10-CM

## 2019-03-29 DIAGNOSIS — M17.11 PRIMARY OSTEOARTHRITIS OF RIGHT KNEE: ICD-10-CM

## 2019-03-29 DIAGNOSIS — Z96.651 S/P TOTAL KNEE ARTHROPLASTY, RIGHT: Primary | ICD-10-CM

## 2019-03-29 LAB
ABO/RH: NORMAL
ANTIBODY SCREEN: NORMAL
GLUCOSE BLD-MCNC: 102 MG/DL (ref 70–99)
GLUCOSE BLD-MCNC: 114 MG/DL (ref 70–99)
GLUCOSE BLD-MCNC: 249 MG/DL (ref 70–99)
GLUCOSE BLD-MCNC: 253 MG/DL (ref 70–99)
PERFORMED ON: ABNORMAL

## 2019-03-29 PROCEDURE — C9290 INJ, BUPIVACAINE LIPOSOME: HCPCS | Performed by: ORTHOPAEDIC SURGERY

## 2019-03-29 PROCEDURE — 6360000002 HC RX W HCPCS: Performed by: PHYSICIAN ASSISTANT

## 2019-03-29 PROCEDURE — 2709999900 HC NON-CHARGEABLE SUPPLY: Performed by: ORTHOPAEDIC SURGERY

## 2019-03-29 PROCEDURE — 6360000002 HC RX W HCPCS: Performed by: NURSE ANESTHETIST, CERTIFIED REGISTERED

## 2019-03-29 PROCEDURE — 76942 ECHO GUIDE FOR BIOPSY: CPT | Performed by: ANESTHESIOLOGY

## 2019-03-29 PROCEDURE — 97110 THERAPEUTIC EXERCISES: CPT

## 2019-03-29 PROCEDURE — 3700000001 HC ADD 15 MINUTES (ANESTHESIA): Performed by: ORTHOPAEDIC SURGERY

## 2019-03-29 PROCEDURE — 86900 BLOOD TYPING SEROLOGIC ABO: CPT

## 2019-03-29 PROCEDURE — 2580000003 HC RX 258: Performed by: PHYSICIAN ASSISTANT

## 2019-03-29 PROCEDURE — 6360000002 HC RX W HCPCS: Performed by: ANESTHESIOLOGY

## 2019-03-29 PROCEDURE — 7100000001 HC PACU RECOVERY - ADDTL 15 MIN: Performed by: ORTHOPAEDIC SURGERY

## 2019-03-29 PROCEDURE — 0MNN0ZZ RELEASE RIGHT KNEE BURSA AND LIGAMENT, OPEN APPROACH: ICD-10-PCS | Performed by: ORTHOPAEDIC SURGERY

## 2019-03-29 PROCEDURE — 2500000003 HC RX 250 WO HCPCS: Performed by: ORTHOPAEDIC SURGERY

## 2019-03-29 PROCEDURE — 2580000003 HC RX 258: Performed by: ANESTHESIOLOGY

## 2019-03-29 PROCEDURE — 2500000003 HC RX 250 WO HCPCS: Performed by: PHYSICIAN ASSISTANT

## 2019-03-29 PROCEDURE — 97162 PT EVAL MOD COMPLEX 30 MIN: CPT

## 2019-03-29 PROCEDURE — 3600000005 HC SURGERY LEVEL 5 BASE: Performed by: ORTHOPAEDIC SURGERY

## 2019-03-29 PROCEDURE — 2500000003 HC RX 250 WO HCPCS: Performed by: NURSE ANESTHETIST, CERTIFIED REGISTERED

## 2019-03-29 PROCEDURE — 86901 BLOOD TYPING SEROLOGIC RH(D): CPT

## 2019-03-29 PROCEDURE — 6360000002 HC RX W HCPCS: Performed by: ORTHOPAEDIC SURGERY

## 2019-03-29 PROCEDURE — 0SRC0JA REPLACEMENT OF RIGHT KNEE JOINT WITH SYNTHETIC SUBSTITUTE, UNCEMENTED, OPEN APPROACH: ICD-10-PCS | Performed by: ORTHOPAEDIC SURGERY

## 2019-03-29 PROCEDURE — 88305 TISSUE EXAM BY PATHOLOGIST: CPT

## 2019-03-29 PROCEDURE — 7100000000 HC PACU RECOVERY - FIRST 15 MIN: Performed by: ORTHOPAEDIC SURGERY

## 2019-03-29 PROCEDURE — 3700000000 HC ANESTHESIA ATTENDED CARE: Performed by: ORTHOPAEDIC SURGERY

## 2019-03-29 PROCEDURE — 88311 DECALCIFY TISSUE: CPT

## 2019-03-29 PROCEDURE — 86850 RBC ANTIBODY SCREEN: CPT

## 2019-03-29 PROCEDURE — 6370000000 HC RX 637 (ALT 250 FOR IP): Performed by: PHYSICIAN ASSISTANT

## 2019-03-29 PROCEDURE — 6370000000 HC RX 637 (ALT 250 FOR IP): Performed by: ANESTHESIOLOGY

## 2019-03-29 PROCEDURE — 3E0T3BZ INTRODUCTION OF ANESTHETIC AGENT INTO PERIPHERAL NERVES AND PLEXI, PERCUTANEOUS APPROACH: ICD-10-PCS | Performed by: ORTHOPAEDIC SURGERY

## 2019-03-29 PROCEDURE — C1776 JOINT DEVICE (IMPLANTABLE): HCPCS | Performed by: ORTHOPAEDIC SURGERY

## 2019-03-29 PROCEDURE — 1200000000 HC SEMI PRIVATE

## 2019-03-29 PROCEDURE — 97165 OT EVAL LOW COMPLEX 30 MIN: CPT

## 2019-03-29 PROCEDURE — 2720000010 HC SURG SUPPLY STERILE: Performed by: ORTHOPAEDIC SURGERY

## 2019-03-29 PROCEDURE — 2580000003 HC RX 258: Performed by: ORTHOPAEDIC SURGERY

## 2019-03-29 PROCEDURE — 64447 NJX AA&/STRD FEMORAL NRV IMG: CPT | Performed by: ANESTHESIOLOGY

## 2019-03-29 PROCEDURE — 8E0Y0CZ ROBOTIC ASSISTED PROCEDURE OF LOWER EXTREMITY, OPEN APPROACH: ICD-10-PCS | Performed by: ORTHOPAEDIC SURGERY

## 2019-03-29 PROCEDURE — 3600000015 HC SURGERY LEVEL 5 ADDTL 15MIN: Performed by: ORTHOPAEDIC SURGERY

## 2019-03-29 PROCEDURE — 97535 SELF CARE MNGMENT TRAINING: CPT

## 2019-03-29 DEVICE — INSERT TIB SZ 3 THK9MM KNEE X3 CNDYL STBL BEAR TECHNOLOGY: Type: IMPLANTABLE DEVICE | Site: KNEE | Status: FUNCTIONAL

## 2019-03-29 DEVICE — BASEPLATE TIB SZ 3 AP44MM ML67MM KNEE TRITANIUM 4 CRUCFRM: Type: IMPLANTABLE DEVICE | Site: KNEE | Status: FUNCTIONAL

## 2019-03-29 DEVICE — IMPLANTABLE DEVICE: Type: IMPLANTABLE DEVICE | Site: KNEE | Status: FUNCTIONAL

## 2019-03-29 DEVICE — COMPONENT PAT DIA31MM THK9MM KNEE TRITANIUM MTL BK: Type: IMPLANTABLE DEVICE | Site: KNEE | Status: FUNCTIONAL

## 2019-03-29 RX ORDER — ONDANSETRON 2 MG/ML
4 INJECTION INTRAMUSCULAR; INTRAVENOUS EVERY 6 HOURS PRN
Status: DISCONTINUED | OUTPATIENT
Start: 2019-03-29 | End: 2019-03-30 | Stop reason: HOSPADM

## 2019-03-29 RX ORDER — NICOTINE POLACRILEX 4 MG
15 LOZENGE BUCCAL PRN
Status: DISCONTINUED | OUTPATIENT
Start: 2019-03-29 | End: 2019-03-30 | Stop reason: HOSPADM

## 2019-03-29 RX ORDER — PROPOFOL 10 MG/ML
INJECTION, EMULSION INTRAVENOUS PRN
Status: DISCONTINUED | OUTPATIENT
Start: 2019-03-29 | End: 2019-03-29 | Stop reason: SDUPTHER

## 2019-03-29 RX ORDER — ATORVASTATIN CALCIUM 10 MG/1
10 TABLET, FILM COATED ORAL DAILY
Status: DISCONTINUED | OUTPATIENT
Start: 2019-03-29 | End: 2019-03-30 | Stop reason: HOSPADM

## 2019-03-29 RX ORDER — ONDANSETRON 2 MG/ML
4 INJECTION INTRAMUSCULAR; INTRAVENOUS PRN
Status: DISCONTINUED | OUTPATIENT
Start: 2019-03-29 | End: 2019-03-29 | Stop reason: HOSPADM

## 2019-03-29 RX ORDER — CELECOXIB 100 MG/1
100 CAPSULE ORAL 2 TIMES DAILY
Status: DISCONTINUED | OUTPATIENT
Start: 2019-03-29 | End: 2019-03-30 | Stop reason: HOSPADM

## 2019-03-29 RX ORDER — METHYLPHENIDATE HYDROCHLORIDE 10 MG/1
15 TABLET ORAL
Status: DISCONTINUED | OUTPATIENT
Start: 2019-03-29 | End: 2019-03-29

## 2019-03-29 RX ORDER — DIPHENHYDRAMINE HYDROCHLORIDE 50 MG/ML
12.5 INJECTION INTRAMUSCULAR; INTRAVENOUS
Status: DISCONTINUED | OUTPATIENT
Start: 2019-03-29 | End: 2019-03-29 | Stop reason: HOSPADM

## 2019-03-29 RX ORDER — OXYCODONE HYDROCHLORIDE AND ACETAMINOPHEN 5; 325 MG/1; MG/1
1 TABLET ORAL PRN
Status: DISCONTINUED | OUTPATIENT
Start: 2019-03-29 | End: 2019-03-29 | Stop reason: HOSPADM

## 2019-03-29 RX ORDER — INSULIN GLARGINE 100 [IU]/ML
60 INJECTION, SOLUTION SUBCUTANEOUS NIGHTLY
Status: DISCONTINUED | OUTPATIENT
Start: 2019-03-29 | End: 2019-03-30 | Stop reason: HOSPADM

## 2019-03-29 RX ORDER — SODIUM CHLORIDE 0.9 % (FLUSH) 0.9 %
10 SYRINGE (ML) INJECTION EVERY 12 HOURS SCHEDULED
Status: DISCONTINUED | OUTPATIENT
Start: 2019-03-29 | End: 2019-03-30 | Stop reason: HOSPADM

## 2019-03-29 RX ORDER — SODIUM CHLORIDE, SODIUM LACTATE, POTASSIUM CHLORIDE, CALCIUM CHLORIDE 600; 310; 30; 20 MG/100ML; MG/100ML; MG/100ML; MG/100ML
INJECTION, SOLUTION INTRAVENOUS CONTINUOUS
Status: DISCONTINUED | OUTPATIENT
Start: 2019-03-29 | End: 2019-03-29

## 2019-03-29 RX ORDER — HYDRALAZINE HYDROCHLORIDE 20 MG/ML
5 INJECTION INTRAMUSCULAR; INTRAVENOUS EVERY 10 MIN PRN
Status: DISCONTINUED | OUTPATIENT
Start: 2019-03-29 | End: 2019-03-29 | Stop reason: HOSPADM

## 2019-03-29 RX ORDER — LAMOTRIGINE 100 MG/1
200 TABLET ORAL NIGHTLY
Status: DISCONTINUED | OUTPATIENT
Start: 2019-03-29 | End: 2019-03-30 | Stop reason: HOSPADM

## 2019-03-29 RX ORDER — DOCUSATE SODIUM 100 MG/1
100 CAPSULE, LIQUID FILLED ORAL 2 TIMES DAILY
Status: DISCONTINUED | OUTPATIENT
Start: 2019-03-29 | End: 2019-03-30 | Stop reason: HOSPADM

## 2019-03-29 RX ORDER — OXYCODONE HYDROCHLORIDE AND ACETAMINOPHEN 5; 325 MG/1; MG/1
2 TABLET ORAL PRN
Status: DISCONTINUED | OUTPATIENT
Start: 2019-03-29 | End: 2019-03-29 | Stop reason: HOSPADM

## 2019-03-29 RX ORDER — LABETALOL HYDROCHLORIDE 5 MG/ML
5 INJECTION, SOLUTION INTRAVENOUS EVERY 10 MIN PRN
Status: DISCONTINUED | OUTPATIENT
Start: 2019-03-29 | End: 2019-03-29 | Stop reason: HOSPADM

## 2019-03-29 RX ORDER — ESCITALOPRAM OXALATE 10 MG/1
20 TABLET ORAL DAILY
Status: DISCONTINUED | OUTPATIENT
Start: 2019-03-29 | End: 2019-03-30 | Stop reason: HOSPADM

## 2019-03-29 RX ORDER — PIOGLITAZONEHYDROCHLORIDE 30 MG/1
15 TABLET ORAL DAILY
Status: DISCONTINUED | OUTPATIENT
Start: 2019-03-29 | End: 2019-03-30 | Stop reason: HOSPADM

## 2019-03-29 RX ORDER — MIDAZOLAM HYDROCHLORIDE 1 MG/ML
INJECTION INTRAMUSCULAR; INTRAVENOUS PRN
Status: DISCONTINUED | OUTPATIENT
Start: 2019-03-29 | End: 2019-03-29 | Stop reason: SDUPTHER

## 2019-03-29 RX ORDER — SODIUM CHLORIDE 0.9 % (FLUSH) 0.9 %
10 SYRINGE (ML) INJECTION EVERY 12 HOURS SCHEDULED
Status: DISCONTINUED | OUTPATIENT
Start: 2019-03-29 | End: 2019-03-29 | Stop reason: HOSPADM

## 2019-03-29 RX ORDER — KETOROLAC TROMETHAMINE 30 MG/ML
15 INJECTION, SOLUTION INTRAMUSCULAR; INTRAVENOUS EVERY 6 HOURS PRN
Status: DISCONTINUED | OUTPATIENT
Start: 2019-03-29 | End: 2019-03-30 | Stop reason: HOSPADM

## 2019-03-29 RX ORDER — CELECOXIB 100 MG/1
200 CAPSULE ORAL ONCE
Status: COMPLETED | OUTPATIENT
Start: 2019-03-29 | End: 2019-03-29

## 2019-03-29 RX ORDER — SODIUM CHLORIDE, SODIUM LACTATE, POTASSIUM CHLORIDE, CALCIUM CHLORIDE 600; 310; 30; 20 MG/100ML; MG/100ML; MG/100ML; MG/100ML
INJECTION, SOLUTION INTRAVENOUS CONTINUOUS
Status: DISCONTINUED | OUTPATIENT
Start: 2019-03-29 | End: 2019-03-30 | Stop reason: HOSPADM

## 2019-03-29 RX ORDER — OXYCODONE HYDROCHLORIDE AND ACETAMINOPHEN 5; 325 MG/1; MG/1
2 TABLET ORAL EVERY 4 HOURS PRN
Status: DISCONTINUED | OUTPATIENT
Start: 2019-03-29 | End: 2019-03-30 | Stop reason: HOSPADM

## 2019-03-29 RX ORDER — CYCLOBENZAPRINE HCL 10 MG
10 TABLET ORAL 3 TIMES DAILY PRN
Status: DISCONTINUED | OUTPATIENT
Start: 2019-03-29 | End: 2019-03-30 | Stop reason: HOSPADM

## 2019-03-29 RX ORDER — ACETAMINOPHEN 500 MG
1000 TABLET ORAL ONCE
Status: COMPLETED | OUTPATIENT
Start: 2019-03-29 | End: 2019-03-29

## 2019-03-29 RX ORDER — LISINOPRIL AND HYDROCHLOROTHIAZIDE 20; 12.5 MG/1; MG/1
1 TABLET ORAL DAILY
Status: DISCONTINUED | OUTPATIENT
Start: 2019-03-29 | End: 2019-03-29 | Stop reason: CLARIF

## 2019-03-29 RX ORDER — CYCLOBENZAPRINE HCL 10 MG
10 TABLET ORAL ONCE
Status: COMPLETED | OUTPATIENT
Start: 2019-03-29 | End: 2019-03-29

## 2019-03-29 RX ORDER — LIDOCAINE HYDROCHLORIDE 20 MG/ML
INJECTION, SOLUTION EPIDURAL; INFILTRATION; INTRACAUDAL; PERINEURAL PRN
Status: DISCONTINUED | OUTPATIENT
Start: 2019-03-29 | End: 2019-03-29 | Stop reason: SDUPTHER

## 2019-03-29 RX ORDER — PROPOFOL 10 MG/ML
INJECTION, EMULSION INTRAVENOUS CONTINUOUS PRN
Status: DISCONTINUED | OUTPATIENT
Start: 2019-03-29 | End: 2019-03-29 | Stop reason: SDUPTHER

## 2019-03-29 RX ORDER — METFORMIN HYDROCHLORIDE 500 MG/1
1000 TABLET, EXTENDED RELEASE ORAL
Status: DISCONTINUED | OUTPATIENT
Start: 2019-03-30 | End: 2019-03-29

## 2019-03-29 RX ORDER — DEXTROSE MONOHYDRATE 25 G/50ML
12.5 INJECTION, SOLUTION INTRAVENOUS PRN
Status: DISCONTINUED | OUTPATIENT
Start: 2019-03-29 | End: 2019-03-30 | Stop reason: HOSPADM

## 2019-03-29 RX ORDER — LISINOPRIL 20 MG/1
20 TABLET ORAL DAILY
Status: DISCONTINUED | OUTPATIENT
Start: 2019-03-29 | End: 2019-03-30 | Stop reason: HOSPADM

## 2019-03-29 RX ORDER — FAMOTIDINE 20 MG/1
20 TABLET, FILM COATED ORAL 2 TIMES DAILY
Status: DISCONTINUED | OUTPATIENT
Start: 2019-03-29 | End: 2019-03-30 | Stop reason: HOSPADM

## 2019-03-29 RX ORDER — EPHEDRINE SULFATE 50 MG/ML
INJECTION, SOLUTION INTRAVENOUS PRN
Status: DISCONTINUED | OUTPATIENT
Start: 2019-03-29 | End: 2019-03-29 | Stop reason: SDUPTHER

## 2019-03-29 RX ORDER — SODIUM CHLORIDE 0.9 % (FLUSH) 0.9 %
10 SYRINGE (ML) INJECTION PRN
Status: DISCONTINUED | OUTPATIENT
Start: 2019-03-29 | End: 2019-03-29 | Stop reason: HOSPADM

## 2019-03-29 RX ORDER — DEXTROSE MONOHYDRATE 50 MG/ML
100 INJECTION, SOLUTION INTRAVENOUS PRN
Status: DISCONTINUED | OUTPATIENT
Start: 2019-03-29 | End: 2019-03-30 | Stop reason: HOSPADM

## 2019-03-29 RX ORDER — FENTANYL CITRATE 50 UG/ML
INJECTION, SOLUTION INTRAMUSCULAR; INTRAVENOUS PRN
Status: DISCONTINUED | OUTPATIENT
Start: 2019-03-29 | End: 2019-03-29 | Stop reason: SDUPTHER

## 2019-03-29 RX ORDER — HYDROCHLOROTHIAZIDE 25 MG/1
12.5 TABLET ORAL DAILY
Status: DISCONTINUED | OUTPATIENT
Start: 2019-03-29 | End: 2019-03-30 | Stop reason: HOSPADM

## 2019-03-29 RX ORDER — MEPERIDINE HYDROCHLORIDE 50 MG/ML
12.5 INJECTION INTRAMUSCULAR; INTRAVENOUS; SUBCUTANEOUS EVERY 5 MIN PRN
Status: DISCONTINUED | OUTPATIENT
Start: 2019-03-29 | End: 2019-03-29 | Stop reason: HOSPADM

## 2019-03-29 RX ORDER — LIDOCAINE HYDROCHLORIDE 10 MG/ML
0.3 INJECTION, SOLUTION EPIDURAL; INFILTRATION; INTRACAUDAL; PERINEURAL
Status: DISCONTINUED | OUTPATIENT
Start: 2019-03-29 | End: 2019-03-29 | Stop reason: HOSPADM

## 2019-03-29 RX ORDER — PROMETHAZINE HYDROCHLORIDE 25 MG/ML
6.25 INJECTION, SOLUTION INTRAMUSCULAR; INTRAVENOUS
Status: DISCONTINUED | OUTPATIENT
Start: 2019-03-29 | End: 2019-03-29 | Stop reason: HOSPADM

## 2019-03-29 RX ORDER — SODIUM CHLORIDE 0.9 % (FLUSH) 0.9 %
10 SYRINGE (ML) INJECTION PRN
Status: DISCONTINUED | OUTPATIENT
Start: 2019-03-29 | End: 2019-03-30 | Stop reason: HOSPADM

## 2019-03-29 RX ORDER — OXYCODONE HYDROCHLORIDE AND ACETAMINOPHEN 5; 325 MG/1; MG/1
1 TABLET ORAL EVERY 4 HOURS PRN
Status: DISCONTINUED | OUTPATIENT
Start: 2019-03-29 | End: 2019-03-30 | Stop reason: HOSPADM

## 2019-03-29 RX ORDER — BUPROPION HYDROCHLORIDE 150 MG/1
300 TABLET ORAL EVERY MORNING
Status: DISCONTINUED | OUTPATIENT
Start: 2019-03-29 | End: 2019-03-30 | Stop reason: HOSPADM

## 2019-03-29 RX ORDER — TRAZODONE HYDROCHLORIDE 50 MG/1
250 TABLET ORAL NIGHTLY
Status: DISCONTINUED | OUTPATIENT
Start: 2019-03-29 | End: 2019-03-30

## 2019-03-29 RX ORDER — DEXAMETHASONE SODIUM PHOSPHATE 10 MG/ML
INJECTION INTRAMUSCULAR; INTRAVENOUS PRN
Status: DISCONTINUED | OUTPATIENT
Start: 2019-03-29 | End: 2019-03-29 | Stop reason: SDUPTHER

## 2019-03-29 RX ADMIN — LIDOCAINE HYDROCHLORIDE 3 ML: 20 INJECTION, SOLUTION EPIDURAL; INFILTRATION; INTRACAUDAL; PERINEURAL at 09:55

## 2019-03-29 RX ADMIN — PHENYLEPHRINE HYDROCHLORIDE 200 MCG: 10 INJECTION INTRAVENOUS at 10:20

## 2019-03-29 RX ADMIN — SODIUM CHLORIDE, POTASSIUM CHLORIDE, SODIUM LACTATE AND CALCIUM CHLORIDE: 600; 310; 30; 20 INJECTION, SOLUTION INTRAVENOUS at 10:51

## 2019-03-29 RX ADMIN — OXYCODONE HYDROCHLORIDE AND ACETAMINOPHEN 1 TABLET: 5; 325 TABLET ORAL at 20:57

## 2019-03-29 RX ADMIN — PROPOFOL 50 MG: 10 INJECTION, EMULSION INTRAVENOUS at 09:55

## 2019-03-29 RX ADMIN — SODIUM CHLORIDE, POTASSIUM CHLORIDE, SODIUM LACTATE AND CALCIUM CHLORIDE: 600; 310; 30; 20 INJECTION, SOLUTION INTRAVENOUS at 20:07

## 2019-03-29 RX ADMIN — ONDANSETRON 4 MG: 2 INJECTION INTRAMUSCULAR; INTRAVENOUS at 10:06

## 2019-03-29 RX ADMIN — PHENYLEPHRINE HYDROCHLORIDE 200 MCG: 10 INJECTION INTRAVENOUS at 10:57

## 2019-03-29 RX ADMIN — SODIUM CHLORIDE, POTASSIUM CHLORIDE, SODIUM LACTATE AND CALCIUM CHLORIDE: 600; 310; 30; 20 INJECTION, SOLUTION INTRAVENOUS at 09:30

## 2019-03-29 RX ADMIN — INSULIN LISPRO 3 UNITS: 100 INJECTION, SOLUTION INTRAVENOUS; SUBCUTANEOUS at 18:21

## 2019-03-29 RX ADMIN — FAMOTIDINE 20 MG: 20 TABLET ORAL at 16:22

## 2019-03-29 RX ADMIN — PHENYLEPHRINE HYDROCHLORIDE 100 MCG: 10 INJECTION INTRAVENOUS at 10:29

## 2019-03-29 RX ADMIN — DEXAMETHASONE SODIUM PHOSPHATE 10 MG: 10 INJECTION INTRAMUSCULAR; INTRAVENOUS at 10:06

## 2019-03-29 RX ADMIN — DOCUSATE SODIUM 100 MG: 100 CAPSULE, LIQUID FILLED ORAL at 20:52

## 2019-03-29 RX ADMIN — INSULIN GLARGINE 60 UNITS: 100 INJECTION, SOLUTION SUBCUTANEOUS at 20:57

## 2019-03-29 RX ADMIN — SODIUM CHLORIDE, POTASSIUM CHLORIDE, SODIUM LACTATE AND CALCIUM CHLORIDE: 600; 310; 30; 20 INJECTION, SOLUTION INTRAVENOUS at 16:24

## 2019-03-29 RX ADMIN — INSULIN LISPRO 1 UNITS: 100 INJECTION, SOLUTION INTRAVENOUS; SUBCUTANEOUS at 20:58

## 2019-03-29 RX ADMIN — CELECOXIB 100 MG: 100 CAPSULE ORAL at 20:52

## 2019-03-29 RX ADMIN — FENTANYL CITRATE 50 MCG: 50 INJECTION, SOLUTION INTRAMUSCULAR; INTRAVENOUS at 09:52

## 2019-03-29 RX ADMIN — EPHEDRINE SULFATE 10 MG: 50 INJECTION INTRAMUSCULAR; INTRAVENOUS; SUBCUTANEOUS at 10:00

## 2019-03-29 RX ADMIN — PHENYLEPHRINE HYDROCHLORIDE 100 MCG: 10 INJECTION INTRAVENOUS at 10:12

## 2019-03-29 RX ADMIN — SODIUM CHLORIDE, POTASSIUM CHLORIDE, SODIUM LACTATE AND CALCIUM CHLORIDE: 600; 310; 30; 20 INJECTION, SOLUTION INTRAVENOUS at 08:35

## 2019-03-29 RX ADMIN — DOCUSATE SODIUM 100 MG: 100 CAPSULE, LIQUID FILLED ORAL at 16:22

## 2019-03-29 RX ADMIN — MIDAZOLAM HYDROCHLORIDE 2 MG: 2 INJECTION, SOLUTION INTRAMUSCULAR; INTRAVENOUS at 09:38

## 2019-03-29 RX ADMIN — FENTANYL CITRATE 50 MCG: 50 INJECTION, SOLUTION INTRAMUSCULAR; INTRAVENOUS at 09:40

## 2019-03-29 RX ADMIN — PHENYLEPHRINE HYDROCHLORIDE 200 MCG: 10 INJECTION INTRAVENOUS at 10:45

## 2019-03-29 RX ADMIN — CELECOXIB 200 MG: 100 CAPSULE ORAL at 08:54

## 2019-03-29 RX ADMIN — TRANEXAMIC ACID 1000 G: 100 INJECTION, SOLUTION INTRAVENOUS at 09:54

## 2019-03-29 RX ADMIN — FAMOTIDINE 20 MG: 20 TABLET ORAL at 20:52

## 2019-03-29 RX ADMIN — PIOGLITAZONE 15 MG: 30 TABLET ORAL at 16:22

## 2019-03-29 RX ADMIN — HYDROMORPHONE HYDROCHLORIDE 0.25 MG: 1 INJECTION, SOLUTION INTRAMUSCULAR; INTRAVENOUS; SUBCUTANEOUS at 12:23

## 2019-03-29 RX ADMIN — PHENYLEPHRINE HYDROCHLORIDE 100 MCG: 10 INJECTION INTRAVENOUS at 10:32

## 2019-03-29 RX ADMIN — PHENYLEPHRINE HYDROCHLORIDE 100 MCG: 10 INJECTION INTRAVENOUS at 11:27

## 2019-03-29 RX ADMIN — CYCLOBENZAPRINE HYDROCHLORIDE 10 MG: 10 TABLET, FILM COATED ORAL at 08:43

## 2019-03-29 RX ADMIN — Medication 2 G: at 18:20

## 2019-03-29 RX ADMIN — LAMOTRIGINE 200 MG: 100 TABLET ORAL at 20:52

## 2019-03-29 RX ADMIN — PROPOFOL 100 MCG/KG/MIN: 10 INJECTION, EMULSION INTRAVENOUS at 10:00

## 2019-03-29 RX ADMIN — PHENYLEPHRINE HYDROCHLORIDE 100 MCG: 10 INJECTION INTRAVENOUS at 10:34

## 2019-03-29 RX ADMIN — FENTANYL CITRATE 50 MCG: 50 INJECTION, SOLUTION INTRAMUSCULAR; INTRAVENOUS at 09:43

## 2019-03-29 RX ADMIN — Medication 2 G: at 09:39

## 2019-03-29 RX ADMIN — ACETAMINOPHEN 1000 MG: 500 TABLET ORAL at 08:44

## 2019-03-29 RX ADMIN — EPHEDRINE SULFATE 10 MG: 50 INJECTION INTRAMUSCULAR; INTRAVENOUS; SUBCUTANEOUS at 10:21

## 2019-03-29 RX ADMIN — SODIUM CHLORIDE, POTASSIUM CHLORIDE, SODIUM LACTATE AND CALCIUM CHLORIDE: 600; 310; 30; 20 INJECTION, SOLUTION INTRAVENOUS at 11:27

## 2019-03-29 ASSESSMENT — PULMONARY FUNCTION TESTS
PIF_VALUE: 0

## 2019-03-29 ASSESSMENT — PAIN SCALES - GENERAL
PAINLEVEL_OUTOF10: 0
PAINLEVEL_OUTOF10: 5
PAINLEVEL_OUTOF10: 3
PAINLEVEL_OUTOF10: 5
PAINLEVEL_OUTOF10: 4
PAINLEVEL_OUTOF10: 5
PAINLEVEL_OUTOF10: 0
PAINLEVEL_OUTOF10: 6

## 2019-03-29 ASSESSMENT — PAIN DESCRIPTION - PAIN TYPE: TYPE: CHRONIC PAIN;SURGICAL PAIN

## 2019-03-29 ASSESSMENT — PAIN - FUNCTIONAL ASSESSMENT: PAIN_FUNCTIONAL_ASSESSMENT: 0-10

## 2019-03-29 ASSESSMENT — PAIN DESCRIPTION - LOCATION: LOCATION: BACK

## 2019-03-29 ASSESSMENT — PAIN DESCRIPTION - DESCRIPTORS: DESCRIPTORS: PRESSURE

## 2019-03-29 NOTE — PROGRESS NOTES
Physical Therapy    Facility/Department: [place] [address] - MED SURG/ORTHO  Initial Assessment    NAME: Marquise Jay  : 1960  MRN: [id number]    Date of Service: 3/29/2019    Discharge Recommendations:  24 hour supervision or assist, Outpatient PT   PT Equipment Recommendations  Equipment Needed: No    Assessment   Body structures, Functions, Activity limitations: Decreased functional mobility ; Decreased ROM; Decreased strength;Decreased balance; Increased Pain;Decreased sensation;Decreased endurance  Assessment: Pt functioning below baseline following R TKA. Pt would benefit from skilled PT to address above deficits and improve functional mobility. Pt with poor balance upon standing needing Mod A. Pt reported being dizzy but BP stable. SPT from bed to chair. Pt positioned in the chair for knee flexion with good ROM. recommend home wtih 24 hr assist and OP PT. Treatment Diagnosis: decreased mobility   Prognosis: Good  Decision Making: Medium Complexity  Patient Education: role of PT, precautions, exercises  Barriers to Learning: none  REQUIRES PT FOLLOW UP: Yes  Activity Tolerance  Activity Tolerance: Patient limited by endurance; Patient limited by fatigue       Patient Diagnosis(es): The primary encounter diagnosis was Localized osteoarthrosis, lower leg. A diagnosis of Primary osteoarthritis of right knee was also pertinent to this visit. has a past medical history of Anxiety, Depression, DJD (degenerative joint disease), Fatty liver, Hyperlipidemia, Hypertension, Irritable bowel syndrome, Mood disorder ([address] Narcotic addiction ([address] ANGEL (obstructive sleep apnea), Restless leg syndrome, Type II or unspecified type diabetes mellitus without mention of complication, not stated as uncontrolled, and Vision impairment. has a past surgical history that includes ele and bso (cervix removed) (4/10); Knee arthroscopy (Right); Plantar fascia surgery; Appendectomy; Cholecystectomy ();  Cystoscopy (7/9/10); Cystoscopy (11/18/10); Colonoscopy (1/2004); and Knee arthroscopy (Left, 10/13). Restrictions  Restrictions/Precautions  Restrictions/Precautions: Weight Bearing  Required Braces or Orthoses?: Yes  Lower Extremity Weight Bearing Restrictions  Right Lower Extremity Weight Bearing: Weight Bearing As Tolerated  Required Braces or Orthoses  Right Lower Extremity Brace: Knee Immobilizer(until able to demo SLR)  Vision/Hearing  Vision: Impaired  Vision Exceptions: Wears glasses for reading  Hearing: Within functional limits     Subjective  General  Chart Reviewed: Yes  Patient assessed for rehabilitation services?: Yes  Response To Previous Treatment: Not applicable  Family / Caregiver Present: Yes  Referring Practitioner: Darvin Barton PA-C  Referral Date : 03/29/19  Diagnosis: R TKA  Follows Commands: Within Functional Limits  General Comment  Comments: cleared by nursing   Subjective  Subjective: Pt resting in bed. Reports 5/10 R knee pain.    Pain Screening  Patient Currently in Pain: Yes     Pre Treatment Pain Screening  Intervention List: Patient able to continue with treatment    Orientation  Orientation  Overall Orientation Status: Within Normal Limits  Social/Functional History  Social/Functional History  Lives With: Spouse  Type of Home: House  Home Layout: One level  Home Access: Stairs to enter without rails  Entrance Stairs - Number of Steps: 1  Bathroom Shower/Tub: Walk-in shower  Bathroom Toilet: Handicap height  Bathroom Equipment: Built-in shower seat, Grab bars in shower  Home Equipment: Cane, Standard walker  ADL Assistance: [address]: Independent  Homemaking Responsibilities: ( is retired and takes care of the house)  Ambulation Assistance: Independent  Active : Yes  Occupation: Full time employment  Cognition        Objective     Observation/Palpation  Posture: Good    PROM RLE (degrees)  RLE General PROM: 0-90*  PROM LLE (degrees)  LLE PROM: [facility]  Strength RLE  Comment: not formally tested  Strength LLE  Strength LLE: WFL  Tone RLE  RLE Tone: Normotonic  Tone LLE  LLE Tone: Normotonic  Sensation  Overall Sensation Status: Impaired(decreased sensation in R LE)  Bed mobility  Supine to Sit: Minimal assistance  Sit to Supine: Unable to assess  Transfers  Sit to Stand: Moderate Assistance  Stand to sit: Minimal Assistance  Stand Pivot Transfers: Moderate Assistance(with SW)  Ambulation  Ambulation?: No     Balance  Posture: Good  Sitting - Static: Good  Sitting - Dynamic: Good  Standing - Static: Poor  Standing - Dynamic: Poor  Exercises  Straight Leg Raise: x10 R with min assist  Quad Sets: x10 R  Heelslides: x10 R  Gluteal Sets: x10   Knee Short Arc Quad: x10 R  Ankle Pumps: x10 R     Plan   Plan  Times per week: BID  Times per day: Twice a day  Current Treatment Recommendations: Strengthening, ROM, Balance Training, Functional Mobility Training, Endurance Training, Transfer Training, Gait Training, Neuromuscular Re-education, Home Exercise Program, Positioning, Modalities, Patient/Caregiver Education & Training, Equipment Evaluation, Education, & procurement  Safety Devices  Type of devices:  All fall risk precautions in place, Call light within reach, Gait belt, Patient at risk for falls, Nurse notified, Left in chair, Chair alarm in place    G-Code       OutComes Score                                                  AM-PAC Score  AM-PAC Inpatient Mobility Raw Score : 12  AM-PAC Inpatient T-Scale Score : 35.33  Mobility Inpatient CMS 0-100% Score: 68.66  Mobility Inpatient CMS G-Code Modifier : CL          Goals  Short term goals  Time Frame for Short term goals: 3 days  Short term goal 1: Pt will compete R LE exercises per TKA protocol  Short term goal 2: Pt will complete all transfers wtih supervision  Short term goal 3: Pt will ambulate x 150' wtih SW with supervision  Short term goal 4: Pt will acheive 0-90* R knee flexion   Short term goal 5: Pt will complete landon step with SW with CGA  Patient Goals   Patient goals : to go home       Therapy Time   Individual Concurrent Group Co-treatment   Time In 1452         Time Out 1520         Minutes 28         Timed Code Treatment Minutes: [address], PT

## 2019-03-29 NOTE — PROGRESS NOTES
Occupational Therapy   Occupational Therapy Initial Assessment and Treatment Note  Date: 3/29/2019   Patient Name: Giancarlo Cotter  MRN: [id number]     : 1960    Date of Service: 3/29/2019    Discharge Recommendations:  24 hour supervision or assist     Assessment   Performance deficits / Impairments: Decreased functional mobility ; Decreased ADL status  After evaluation, pt found to be presenting with the above mentioned occupational performance deficits which are affecting participation in daily living skills. Pt would benefit from continued skilled occupational therapy to address ADLs, functional mobility, and safety while in acute care. Prognosis: Good  Decision Making: Low Complexity  Patient Education: role of OT, transfers, ADLs, safety   REQUIRES OT FOLLOW UP: Yes  Activity Tolerance  Activity Tolerance: Patient Tolerated treatment well  Safety Devices  Safety Devices in place: Yes  Type of devices: Left in chair;Nurse notified;Gait belt;Call light within reach; Chair alarm in place         Patient Diagnosis(es): The primary encounter diagnosis was Localized osteoarthrosis, lower leg. A diagnosis of Primary osteoarthritis of right knee was also pertinent to this visit. has a past medical history of Anxiety, Depression, DJD (degenerative joint disease), Fatty liver, Hyperlipidemia, Hypertension, Irritable bowel syndrome, Mood disorder ([address] Narcotic addiction ([address] ANGEL (obstructive sleep apnea), Restless leg syndrome, Type II or unspecified type diabetes mellitus without mention of complication, not stated as uncontrolled, and Vision impairment. has a past surgical history that includes ele and bso (cervix removed) (4/10); Knee arthroscopy (Right); Plantar fascia surgery; Appendectomy; Cholecystectomy (); Cystoscopy (7/9/10); Cystoscopy (11/18/10); Colonoscopy (2004); and Knee arthroscopy (Left, 10/13).        Restrictions  Restrictions/Precautions  Restrictions/Precautions: Weight Bearing  Required Braces or Orthoses?: Yes  Lower Extremity Weight Bearing Restrictions  Right Lower Extremity Weight Bearing: Weight Bearing As Tolerated  Required Braces or Orthoses  Right Lower Extremity Brace: Knee Immobilizer(until able to demo SLR)  Subjective   General  Chart Reviewed: Yes  Patient assessed for rehabilitation services?: Yes  Family / Caregiver Present: Yes(spouse)  Referring Practitioner: Janine Edmond  Diagnosis: R Knee OA s/p R TKR 3/29/19  General Comment  Comments: RN approved therapy   Pain Assessment  Pain Assessment: 0-10  Pain Level: 5  Patient's Stated Pain Goal: No pain  Pain Type: Chronic pain, Surgical pain  Pain Location: Back  Pain Descriptors: Pressure  Non-Pharmaceutical Pain Intervention(s): Repositioned  Oxygen Therapy  SpO2: 95 %  O2 Device: None (Room air)  Social/Functional History  Social/Functional History  Lives With: Spouse  Type of Home: House  Home Layout: One level  Home Access: Stairs to enter without rails  Entrance Stairs - Number of Steps: 1  Bathroom Shower/Tub: Walk-in shower  Bathroom Toilet: Handicap height  Bathroom Equipment: Built-in shower seat, Grab bars in shower  Home Equipment: Cane, Standard walker  ADL Assistance: Independent  Homemaking Assistance: Independent  Homemaking Responsibilities: ( is retired and takes care of the house)  Ambulation Assistance: Independent  Active : Yes  Occupation: Full time employment     Objective   Vision: Impaired  Vision Exceptions: Wears glasses for reading  Hearing: Within functional limits    Orientation  Overall Orientation Status: Within Functional Limits  Observation/Palpation  Posture: Good  Balance  Sitting Balance: Supervision  Standing Balance: Moderate assistance(SW)  Standing Balance  Activity: Pt attempted to stand 1x from EOB. Pt unable to stand upright due to RLE instability and c/o dizziness. BP was 139/79. Pt stood again and was able to transfer to chair with SW and mod x1.  Cues to compensate for RLE numbness with BUE. RN notified of pt's performance. Sit to stand: Moderate assistance  Stand to sit: Moderate assistance  ADL  Feeding: Independent  LE Dressing: Moderate assistance  Tone RUE  RUE Tone: Normotonic  Tone LUE  LUE Tone: Normotonic  Coordination  Movements Are Fluid And Coordinated: Yes     Bed mobility  Supine to Sit: Minimal assistance  Transfers  Stand Pivot Transfers: Moderate assistance(SW)  Sit to stand: Moderate assistance  Stand to sit: Moderate assistance     Cognition  Overall Cognitive Status: WFL     Sensation  Overall Sensation Status: Impaired(decreased sensation in R LE)      LUE AROM (degrees)  LUE AROM : WFL  RUE AROM (degrees)  RUE AROM : WFL  LUE Strength  Gross LUE Strength: WFL  RUE Strength  Gross RUE Strength: WFL      Plan   Plan  Times per week: 4-6x  AM-PAC Score      AM-PAC Inpatient Daily Activity Raw Score: 17  AM-PAC Inpatient ADL T-Scale Score : 37.26  ADL Inpatient CMS 0-100% Score: 50.11  ADL Inpatient CMS G-Code Modifier : CK  Goals  Short term goals  Time Frame for Short term goals: for 1 week  Short term goal 1: Perform LE dressing with min A  Short term goal 2: Perform functional transfers with SBA with SW by 4/05  Short term goal 3: Stand for 2 ADL tasks at sink with SBA with SW  Short term goal 4: Verbalize understanding of safe car transfer technique  Patient Goals   Patient goals :  \"Go home\"     Therapy Time   Individual Concurrent Group Co-treatment   Time In 7317         Time Out 1520         Minutes 27         Timed Code Treatment Minutes: 17 Minutes(10 min eval )     Nancy Waite OTR/L

## 2019-03-29 NOTE — PROGRESS NOTES
Consult has been PerfectServed to [facility] on 3/29/2019 at 13:51.   Wanda Keller  3/29/2019

## 2019-03-30 VITALS
RESPIRATION RATE: 14 BRPM | SYSTOLIC BLOOD PRESSURE: 109 MMHG | WEIGHT: 200 LBS | HEIGHT: 65 IN | BODY MASS INDEX: 33.32 KG/M2 | OXYGEN SATURATION: 100 % | HEART RATE: 90 BPM | TEMPERATURE: 98.2 F | DIASTOLIC BLOOD PRESSURE: 68 MMHG

## 2019-03-30 LAB
ANION GAP SERPL CALCULATED.3IONS-SCNC: 7 MMOL/L (ref 3–16)
BASOPHILS ABSOLUTE: 0 K/UL (ref 0–0.2)
BASOPHILS RELATIVE PERCENT: 0.2 %
BUN BLDV-MCNC: 18 MG/DL (ref 7–20)
CALCIUM SERPL-MCNC: 9 MG/DL (ref 8.3–10.6)
CHLORIDE BLD-SCNC: 103 MMOL/L (ref 99–110)
CO2: 29 MMOL/L (ref 21–32)
CREAT SERPL-MCNC: 0.7 MG/DL (ref 0.6–1.1)
EOSINOPHILS ABSOLUTE: 0 K/UL (ref 0–0.6)
EOSINOPHILS RELATIVE PERCENT: 0.1 %
GFR AFRICAN AMERICAN: >60
GFR NON-AFRICAN AMERICAN: >60
GLUCOSE BLD-MCNC: 108 MG/DL (ref 70–99)
GLUCOSE BLD-MCNC: 121 MG/DL (ref 70–99)
GLUCOSE BLD-MCNC: 93 MG/DL (ref 70–99)
HCT VFR BLD CALC: 35.5 % (ref 36–48)
HEMOGLOBIN: 11.9 G/DL (ref 12–16)
LYMPHOCYTES ABSOLUTE: 1 K/UL (ref 1–5.1)
LYMPHOCYTES RELATIVE PERCENT: 8.4 %
MCH RBC QN AUTO: 31.6 PG (ref 26–34)
MCHC RBC AUTO-ENTMCNC: 33.6 G/DL (ref 31–36)
MCV RBC AUTO: 94.1 FL (ref 80–100)
MONOCYTES ABSOLUTE: 1.2 K/UL (ref 0–1.3)
MONOCYTES RELATIVE PERCENT: 9.5 %
NEUTROPHILS ABSOLUTE: 10.2 K/UL (ref 1.7–7.7)
NEUTROPHILS RELATIVE PERCENT: 81.8 %
PDW BLD-RTO: 12.7 % (ref 12.4–15.4)
PERFORMED ON: ABNORMAL
PERFORMED ON: NORMAL
PLATELET # BLD: 196 K/UL (ref 135–450)
PMV BLD AUTO: 8.7 FL (ref 5–10.5)
POTASSIUM REFLEX MAGNESIUM: 4.6 MMOL/L (ref 3.5–5.1)
RBC # BLD: 3.77 M/UL (ref 4–5.2)
SODIUM BLD-SCNC: 139 MMOL/L (ref 136–145)
WBC # BLD: 12.4 K/UL (ref 4–11)

## 2019-03-30 PROCEDURE — 2580000003 HC RX 258: Performed by: PHYSICIAN ASSISTANT

## 2019-03-30 PROCEDURE — 85025 COMPLETE CBC W/AUTO DIFF WBC: CPT

## 2019-03-30 PROCEDURE — 99024 POSTOP FOLLOW-UP VISIT: CPT | Performed by: PHYSICIAN ASSISTANT

## 2019-03-30 PROCEDURE — 6370000000 HC RX 637 (ALT 250 FOR IP): Performed by: PHYSICIAN ASSISTANT

## 2019-03-30 PROCEDURE — 97530 THERAPEUTIC ACTIVITIES: CPT

## 2019-03-30 PROCEDURE — 97535 SELF CARE MNGMENT TRAINING: CPT

## 2019-03-30 PROCEDURE — 97110 THERAPEUTIC EXERCISES: CPT

## 2019-03-30 PROCEDURE — 80048 BASIC METABOLIC PNL TOTAL CA: CPT

## 2019-03-30 PROCEDURE — 97116 GAIT TRAINING THERAPY: CPT

## 2019-03-30 PROCEDURE — 36415 COLL VENOUS BLD VENIPUNCTURE: CPT

## 2019-03-30 PROCEDURE — 6360000002 HC RX W HCPCS: Performed by: PHYSICIAN ASSISTANT

## 2019-03-30 RX ORDER — TRAZODONE HYDROCHLORIDE 50 MG/1
200 TABLET ORAL NIGHTLY
Status: DISCONTINUED | OUTPATIENT
Start: 2019-03-30 | End: 2019-03-30 | Stop reason: HOSPADM

## 2019-03-30 RX ORDER — CELECOXIB 100 MG/1
100 CAPSULE ORAL 2 TIMES DAILY
Qty: 60 CAPSULE | Refills: 3 | Status: SHIPPED | OUTPATIENT
Start: 2019-03-30 | End: 2019-05-27 | Stop reason: ALTCHOICE

## 2019-03-30 RX ORDER — OXYCODONE HYDROCHLORIDE AND ACETAMINOPHEN 5; 325 MG/1; MG/1
1-2 TABLET ORAL EVERY 4 HOURS PRN
Qty: 40 TABLET | Refills: 0 | Status: SHIPPED | OUTPATIENT
Start: 2019-03-30 | End: 2019-04-06

## 2019-03-30 RX ORDER — ASPIRIN 325 MG
325 TABLET, DELAYED RELEASE (ENTERIC COATED) ORAL 2 TIMES DAILY
Qty: 60 TABLET | Refills: 0 | Status: SHIPPED | OUTPATIENT
Start: 2019-03-30 | End: 2019-05-03 | Stop reason: ALTCHOICE

## 2019-03-30 RX ADMIN — TRAZODONE HYDROCHLORIDE 200 MG: 50 TABLET ORAL at 00:05

## 2019-03-30 RX ADMIN — ESCITALOPRAM OXALATE 20 MG: 10 TABLET ORAL at 08:34

## 2019-03-30 RX ADMIN — BUPROPION HYDROCHLORIDE 300 MG: 150 TABLET, FILM COATED, EXTENDED RELEASE ORAL at 08:34

## 2019-03-30 RX ADMIN — APIXABAN 2.5 MG: 2.5 TABLET, FILM COATED ORAL at 08:35

## 2019-03-30 RX ADMIN — PIOGLITAZONE 15 MG: 30 TABLET ORAL at 08:35

## 2019-03-30 RX ADMIN — CELECOXIB 100 MG: 100 CAPSULE ORAL at 08:35

## 2019-03-30 RX ADMIN — FAMOTIDINE 20 MG: 20 TABLET ORAL at 08:35

## 2019-03-30 RX ADMIN — SODIUM CHLORIDE, PRESERVATIVE FREE 10 ML: 5 INJECTION INTRAVENOUS at 08:35

## 2019-03-30 RX ADMIN — Medication 2 G: at 01:38

## 2019-03-30 RX ADMIN — DOCUSATE SODIUM 100 MG: 100 CAPSULE, LIQUID FILLED ORAL at 08:35

## 2019-03-30 RX ADMIN — OXYCODONE HYDROCHLORIDE AND ACETAMINOPHEN 1 TABLET: 5; 325 TABLET ORAL at 08:34

## 2019-03-30 RX ADMIN — ATORVASTATIN CALCIUM 10 MG: 10 TABLET, FILM COATED ORAL at 08:35

## 2019-03-30 ASSESSMENT — PAIN SCALES - GENERAL
PAINLEVEL_OUTOF10: 4
PAINLEVEL_OUTOF10: 4

## 2019-03-30 NOTE — PROGRESS NOTES
Occupational Therapy  Facility/Department: [place] [address] - MED SURG/ORTHO  Daily Treatment Note/Discharge Summary   NAME: Natanael Campa  : 1960  MRN: [id number]    Date of Service: 3/30/2019    Discharge Recommendations:  Home with assist PRN     Assessment   Assessment: Pt met OT goals. She plans to d/c home today with 24/7 assistance available from spouse. Pt reports no further concerns regarding ADLs and functional mobility for home. Patient Education: role of OT, transfers, ADLs, safety   REQUIRES OT FOLLOW UP: No  Activity Tolerance  Activity Tolerance: Patient Tolerated treatment well  Safety Devices  Type of devices: Left in chair;Nurse notified;Gait belt;Call light within reach; Chair alarm in place         Patient Diagnosis(es): The primary encounter diagnosis was S/P total knee arthroplasty, right. Diagnoses of Localized osteoarthrosis, lower leg and Primary osteoarthritis of right knee were also pertinent to this visit. has a past medical history of Anxiety, Depression, DJD (degenerative joint disease), Fatty liver, Hyperlipidemia, Hypertension, Irritable bowel syndrome, Mood disorder ([address] Narcotic addiction ([address] ANGEL (obstructive sleep apnea), Restless leg syndrome, Type II or unspecified type diabetes mellitus without mention of complication, not stated as uncontrolled, and Vision impairment. has a past surgical history that includes ele and bso (cervix removed) (4/10); Knee arthroscopy (Right); Plantar fascia surgery; Appendectomy; Cholecystectomy (); Cystoscopy (7/9/10); Cystoscopy (11/18/10); Colonoscopy (2004); Knee arthroscopy (Left, 10/13); and Total knee arthroplasty (Right, 3/29/2019).     Restrictions  Restrictions/Precautions  Restrictions/Precautions: Weight Bearing  Required Braces or Orthoses?: No(Pt is indep w/ R SLR)  Lower Extremity Weight Bearing Restrictions  Right Lower Extremity Weight Bearing: Weight Bearing As Tolerated  Required Braces or Orthoses  Right Lower Extremity Brace: ( )  Subjective   General  Chart Reviewed: Yes  Patient assessed for rehabilitation services?: Yes  Family / Caregiver Present: No  Referring Practitioner: Lissy Berumen  Diagnosis: R Knee OA s/p R TKR 3/29/19  General Comment  Comments: RN approved therapy   Pain Assessment  Pain Level: 4   Orientation  Orientation  Overall Orientation Status: Within Functional Limits  Objective    ADL  Grooming: Supervision(at sink for 3 ADL tasks )  LE Bathing: Minimal assistance  LE Dressing: Minimal assistance  Toileting: Stand by assistance     Balance  Sitting Balance: Supervision  Standing Balance: Stand by assistance(SW)  Standing Balance  Time: x5 min with SW  Sit to stand: Stand by assistance  Stand to sit: Stand by assistance  Functional Mobility  Functional - Mobility Device: Standard Walker  Activity: To/from bathroom  Assist Level: Stand by assistance  Functional Mobility Comments: vc's for sequencing steps with walker   Toilet Transfers  Toilet - Technique: Ambulating(SW)  Equipment Used: Grab bars  Toilet Transfer: Stand by assistance  Shower Transfers  Shower Transfers Comments: Pt instructed on safe shower transfer technique. OT recommended for pt to use shower chair at home. Pt has grab bars in shower. Bed mobility  Supine to Sit: Modified independent  Transfers  Stand Pivot Transfers: Stand by assistance(SW)  Sit to stand: Stand by assistance  Stand to sit: Stand by assistance   Cognition  Overall Cognitive Status: [facility]     Car Transfers  Car Transfers: Pt instructed on safe car transfer technique. Pt reported understanding of directions.     Plan   Plan  Times per week: no further OT  AM-PAC Score   AM-PAC Inpatient Daily Activity Raw Score: 21  AM-PAC Inpatient ADL T-Scale Score : 44.27  ADL Inpatient CMS 0-100% Score: 32.79  ADL Inpatient CMS G-Code Modifier : CJ  Goals  Short term goals  Time Frame for Short term goals: for 1 week  Short term goal 1: Perform LE dressing with min A-goal met 3/30  Short term goal 2: Perform functional transfers with SBA with SW by 4/05-goal met for chair/toilet 3/30  Short term goal 3: Stand for 2 ADL tasks at sink with SBA with SW-goal met 3/30  Short term goal 4: Verbalize understanding of safe car transfer technique-goal met 3/30  Patient Goals   Patient goals :  \"Go home\"     Therapy Time   Individual Concurrent Group Co-treatment   Time In 1564         Time Out 0912         Minutes 39         Timed Code Treatment Minutes: [facility] OTR/L

## 2019-03-30 NOTE — PLAN OF CARE
Problem: Pain:  Description  Pain management should include both nonpharmacologic and pharmacologic interventions. Goal: Pain level will decrease  Description  Pain level will decrease  Outcome: Ongoing     Problem: Pain:  Description  Pain management should include both nonpharmacologic and pharmacologic interventions. Goal: Control of acute pain  Description  Control of acute pain  Outcome: Ongoing     Problem: Falls - Risk of:  Goal: Will remain free from falls  Description  Will remain free from falls  Outcome: Ongoing   Pt using pain scale 0-10 to rate pain . Instructed to call with increasing pain, pain unrelieved or when needs pain med. calllght within reach.  Will continue to monitor

## 2019-03-30 NOTE — DISCHARGE SUMMARY
HYDROmorphone (DILAUDID) injection 0.5 mg, 0.5 mg, Intravenous, Q3H PRN  insulin lispro (HUMALOG) injection vial 0-6 Units, 0-6 Units, Subcutaneous, TID WC  insulin lispro (HUMALOG) injection vial 0-3 Units, 0-3 Units, Subcutaneous, Nightly  ketorolac (TORADOL) injection 15 mg, 15 mg, Intravenous, Q6H PRN  lisinopril (PRINIVIL;ZESTRIL) tablet 20 mg, 20 mg, Oral, Daily **AND** hydrochlorothiazide (HYDRODIURIL) tablet 12.5 mg, 12.5 mg, Oral, Daily    Post-operatively the patients diet was advanced as tolerated and their incision was checked on POD #1. The incision is dressing in place, clean, dry and intact with no signs of infection. The patient remained neurovascularly intact in the lower extremity and had intact pulses distally. Patients calf remained soft and showed no evidence of DVT. The patient was able to move their leg and ankle/foot without any problems post-operatively. Physical therapy and occupational therapy were consulted and began working with the patient post-operatively. The patient progressed with PT/OT as would be expected and continued to improve through their stay. The patients pain was initially controlled with IV medications but we were able to transition to oral pain medications soon after arrival to the floor and their pain remained under good control through their hospital stay. From a medical standpoint the patient remained stable and continued to have the medicine team follow throughout their stay. The patients dressing was changed/incison was checked on day of d/c. The patient will be discharged at this time to Home  with their current diet restrictions and will continue to follow the total knee precautions outlined to them by us and PT/OT. Condition on Discharge: Stable    Patient will take 325mg ASA BID for 4 weeks    Plan  Return visit in 2 weeks. .  Patient was instructed on the use of pain medications, the signs and symptoms of infection, and was given our number to call should they have any questions or concerns following discharge. For opioid prescriptions given at discharge the following statement is provided for compliance with [employer] rules. Patient being given increased dosage/quantity of opoid pain medication in excess of [employer] guidelines which noted a 30 MED daily of opioids due to the fact that he/she has undergone major orthopaedic surgery as outlined in rule 4731-11-13. Dosages and further duration of the pain medication will be re-evaluated at her post op visit in 2 weeks. Patient was educated on dosing expectations and limits of prescribing as a result of the new [employer] rules enacted August 31, 2017. Please also note that this is not the initial opoid prescription issued to this patient but a continuation of medication utilized during the hospital admission as noted in the medical record. [place]RS report has also been utilized to screen for any abuse history or suspicious activity as outlined in [place]. All efforts have been taken to prevent abuse potential and misuse of opioid medications including education, screening, and close clinical follow up.

## 2019-03-30 NOTE — CONSULTS
Hospital Medicine  Consult History & Physical        Chief Complaint:  Right knee pain    Date of Service: Pt seen/examined in consultation on 3/29/2019    History Of Present Illness:      63 y.o. female, s/p total right knee replacement, who we are asked to see/evaluate by Abdulaziz Grey MD for medical management of hypertension. The patient is doing well post operatively. She is resting comfortably in bed. She stated she is taking po well and her pain is well controlled. The patient stated she did work with pt/ot today. Her bp is well controlled at this time. Past Medical History:        Diagnosis Date    Anxiety     Depression     DJD (degenerative joint disease)     hands/knees/ankles    Fatty liver     Hyperlipidemia     Hypertension     Irritable bowel syndrome     Mood disorder (HCC)     NOS    Narcotic addiction ([address]     ANGEL (obstructive sleep apnea)     no CPAP machine - not recommended per sleep study    Restless leg syndrome     Type II or unspecified type diabetes mellitus without mention of complication, not stated as uncontrolled     Vision impairment     wears glasses and contacts     Past Surgical History:        Procedure Laterality Date    APPENDECTOMY      CHOLECYSTECTOMY  2001    COLONOSCOPY  1/2004    Due 2014    CYSTOSCOPY  7/9/10    CYSTOSCOPY  11/18/10    insertion of sparc mesh sling with cystoscopy    KNEE ARTHROSCOPY Right     KNEE ARTHROSCOPY Left 10/13    partial medial menisectomy    PLANTAR FASCIA SURGERY      bilateral    BRANDI AND BSO  4/10    DUB    TOTAL KNEE ARTHROPLASTY Right 3/29/2019    RIGHT TOTAL KNEE MAKOPLASTY REPLACEMENT WITH ADDUCTOR CANAL BLOCK FOR PAIN CONTROL                 JOE MCCLENDON  CPT CODE - 91998  performed by Abdulaziz Grey MD at [facility]     Medications Prior to Admission:    Prior to Admission medications    Medication Sig Start Date End Date Taking?  Authorizing Provider   lisinopril-hydrochlorothiazide [facility]) 20-12.5 MG per tablet Take 1 tablet by mouth daily 3/15/19   C Joseluis Walsh MD   mupirocin [facility]) 2 % ointment 1 22 gram tube. Apply 1 cm (small drop) to a q-tip and swab the inside of each nostril twice a day starting 5 days prior to surgery. 2/19/19   Toribio Rojas MD   buPROPion (WELLBUTRIN XL) 300 MG extended release tablet Take 300 mg by mouth every morning    Historical Provider, MD   metFORMIN, OSM, (FORTAMET) 1000 MG extended release tablet TAKE ONE TABLET BY MOUTH DAILY WITH BREAKFAST 9/4/18   JILL Walsh MD   atorvastatin (LIPITOR) 10 MG tablet TAKE ONE TABLET BY MOUTH DAILY 7/10/18   C Joseluis Walsh MD   Blood Glucose Monitoring Suppl (ONE TOUCH ULTRA 2) w/Device KIT 1 kit by Does not apply route daily 7/10/18   Miki Ballard MD   blood glucose monitor strips Test once dialy for diabetes e11.9 7/10/18   C Joseluis Walsh MD   insulin glargine (LANTUS SOLOSTAR) 100 UNIT/ML injection pen INJECT 60 UNITS UNDER THE SKIN EVERY NIGHT 7/10/18   JILL Walsh MD   canagliflozin (INVOKANA) 100 MG TABS tablet Take 1 tablet by mouth every morning (before breakfast) 7/10/18   C Joseluis Walsh MD   Lancets MISC Test once daily for diabetes e11.9 7/10/18   JILL Walsh MD   pioglitazone (ACTOS) 15 MG tablet Take 1 tablet by mouth daily 7/10/18   Miki Ballard MD   lamoTRIgine (LAMICTAL) 200 MG tablet Take 1 tablet by mouth nightly  4/26/18   Historical Provider, MD   Blood Glucose Monitoring Suppl CONCETTA Test once daily for diabetes e11.9 2/5/18   Miki Ballard MD   traZODone (DESYREL) 100 MG tablet Take 250 mg by mouth nightly  7/16/17   Historical Provider, MD   methylphenidate (CONCERTA) 54 MG extended release tablet Take 54 mg by mouth every morning .     Historical Provider, MD   glucose blood VI test strips (ASCENSIA AUTODISC VI;ONE TOUCH ULTRA TEST VI) strip Patient has One Touch Ultra 2, tests daily 1/26/16 1/20/17  George Whitney MD   escitalopram (LEXAPRO) 20 MG tablet Take 20 mg by mouth daily.    Historical Provider, MD   Naproxen Sodium (ALEVE) 220 MG CAPS Take 1 capsule by mouth 2 times daily. Historical Provider, MD     Allergies:  Morphine    Social History:      The patient currently lives at home with her . She plans to return home upon discharge. TOBACCO:   reports that she has never smoked. She has never used smokeless tobacco.  ETOH:   reports that she does not drink alcohol. Family History:     Reviewed in detail. Positive as follows:        Problem Relation Age of Onset    Diabetes Mother     Arthritis Mother     Diabetes Father     Heart Disease Father     Cancer Father         colon    Dementia Father      REVIEW OF SYSTEMS:   Pertinent positives as noted in the HPI. All other systems reviewed and negative. PHYSICAL EXAM PERFORMED:    /74   Pulse 89   Temp 98.1 °F (36.7 °C) (Oral)   Resp 20   Ht 5' 5\" (1.651 m)   Wt 200 lb (90.7 kg)   SpO2 97%   BMI 33.28 kg/m²      General appearance: Pleasant, obese female in no apparent distress, appears stated age and cooperative. HEENT:  Pupils equal, round, and reactive to light. Extra ocular muscles intact. Conjunctivae/corneas clear. Neck: Supple, with full range of motion. No jugular venous distention. Trachea midline. Respiratory:  Normal respiratory effort. Clear to auscultation, bilaterally without Rales/Wheezes/Rhonchi. Cardiovascular: Regular rate and rhythm with normal S1/S2 without murmurs, rubs or gallops. Abdomen: Soft, obese, round, non-tender, non-distended with normal bowel sounds. Musculoskeletal: No clubbing, cyanosis or edema bilaterally. Decreased ROM RLE d/t surgery  Skin: Skin color, texture, turgor normal.  Right knee ace and brace intact, not taken down   Neurologic:  Neurovascularly intact.  Cranial nerves: II-XII intact, grossly non-focal.  Psychiatric: Alert and oriented, thought content appropriate, normal insight  Capillary Refill: Brisk,< 3 seconds   Peripheral Pulses: +2 palpable, equal bilaterally     Labs:     Urinalysis:    Lab Results   Component Value Date    NITRU Negative 01/30/2019    WBCUA Rare 10/23/2012    BACTERIA 1+ 10/23/2012    RBCUA Rare 10/23/2012    BLOODU trace-lysed 03/05/2019    BLOODU Negative 01/30/2019    SPECGRAV 1.025 03/05/2019    SPECGRAV 1.025 01/30/2019    GLUCOSEU >1,000 03/05/2019    GLUCOSEU >=1000 01/30/2019    GLUCOSEU >=1000 11/13/2010     Radiology: I have reviewed the radiology reports with the following interpretation:     No orders to display     EKG:  I have reviewed the EKG with the following interpretation:    ASSESSMENT:    C/Flako Cook 1106 Problems    Diagnosis Date Noted    Primary localized osteoarthritis of right knee [M17.11] 03/29/2019    Status post total right knee replacement [Z96.651] 03/29/2019    Type 2 diabetes mellitus without complication, with long-term current use of insulin (HCC) [E11.9, Z79.4]     Essential hypertension [I10]      PLAN:    Primary osteoarthritis of right knee  -s/p total right knee replacement  -post op management and pain control per ortho  -continue cefazolin  -cbc in am  -iv and po pain medication    Obesity  With Body mass index is 33.4 kg/m². Complicating assessment and treatment. Placing patient at risk for multiple co-morbidities as well as early death and contributing to the patient's presentation.  Counseled on weight loss    Essential HTN,   -continue lisinopril/hctz    HLD, stable  -continue atorvastatin    DM2, uncontrolled  -  -hemglobin a1c 6.6 on 1/30/2019  -hold home metformin  -continue lantus  -ldssi  -poct ac/hs  -hypoglycemia protocol  -carb control diet    DVT Prophylaxis: eliquis  Diet: DIET GENERAL;  Code Status: Full Code    PT/OT Eval Status: Active and ongoing    Dispo - per primary team    Thank you for the consultation, will follow up as needed    Ashley Segal CNP  -------------------------------------------------Anticipated Dr. quinn

## 2019-03-30 NOTE — PROGRESS NOTES
Department of Orthopedic Surgery  Physician Assistant   Progress Note        Subjective:  No complaints. Doing well postoperatively. S/P right TKA POD #1. pain is perceived as mild (1-3  pain scale)      Vitals  VITALS:  /68   Pulse 90   Temp 98.2 °F (36.8 °C) (Oral)   Resp 14   Ht 5' 5\" (1.651 m)   Wt 200 lb (90.7 kg)   SpO2 100%   BMI 33.28 kg/m²     PHYSICAL EXAM:    Orientation:  alert and oriented to person, place and time    Right Lower Extremity    Incision:  dressing in place, clean, dry and intact    Lower Extremity Motor :    Moving lower extremities without difficulty today. Able to dorsiflex and plantar flex foot/ankle. Lower Extremity Sensory:   Neurovascularly intact to gross sensation and touch in lower extremities. Pulses:    present 2+ bilaterally lower extremities. Brace: Intact and will continue to wear during inpatient stay. LABS:    HgB:    Lab Results   Component Value Date    HGB 11.9 03/30/2019     ASSESSMENT AND PLAN:    Post operative day 1 status post right total knee arthroplasty. 1:  Weight bearing as tolerated  2:  Continue Deep venous thrombosis prophylaxis- ASA  3:  Continue physical therapy  4:  D/C Plan:  Home today  5:  Continue Pain Control      Patient being given increased dosage/quantity of opoid pain medication in excess of OSMB guidelines which noted a 30 MED daily of opioids due to the fact that he/she has undergone major orthopaedic surgery as outlined in rule 4731-11-13. Dosages and further duration of the pain medication will be re-evaluated at her post op visit in 2 weeks. Patient was educated on dosing expectations and limits of prescribing as a result of the new [facility] rules enacted August 31, 2017. Please also note that this is not the initial opoid prescription issued to this patient but a continuation of medication utilized during the hospital admission as noted in the medical record.  OARRS report has also been utilized to screen for any abuse history or suspicious activity as outlined in [place]. All efforts have been taken to prevent abuse potential and misuse of opioid medications including education, screening, and close clinical follow up.

## 2019-03-30 NOTE — PROGRESS NOTES
Physical Therapy  Facility/Department: [place] [address] - MED SURG/ORTHO  Daily Treatment Note  NAME: Ela Welch  : 1960  MRN: [id number]    Date of Service: 3/30/2019    Discharge Recommendations:  24 hour supervision or assist, Outpatient PT   PT Equipment Recommendations  Equipment Needed: No    Patient Diagnosis(es): The primary encounter diagnosis was S/P total knee arthroplasty, right. Diagnoses of Localized osteoarthrosis, lower leg and Primary osteoarthritis of right knee were also pertinent to this visit. has a past medical history of Anxiety, Depression, DJD (degenerative joint disease), Fatty liver, Hyperlipidemia, Hypertension, Irritable bowel syndrome, Mood disorder ([address] Narcotic addiction ([address] ANGEL (obstructive sleep apnea), Restless leg syndrome, Type II or unspecified type diabetes mellitus without mention of complication, not stated as uncontrolled, and Vision impairment. has a past surgical history that includes ele and bso (cervix removed) (4/10); Knee arthroscopy (Right); Plantar fascia surgery; Appendectomy; Cholecystectomy (); Cystoscopy (7/9/10); Cystoscopy (11/18/10); Colonoscopy (2004); Knee arthroscopy (Left, 10/13); and Total knee arthroplasty (Right, 3/29/2019). Restrictions  Restrictions/Precautions  Restrictions/Precautions: Weight Bearing  Required Braces or Orthoses?: No(Pt is indep w/ R SLR)  Lower Extremity Weight Bearing Restrictions  Right Lower Extremity Weight Bearing: Weight Bearing As Tolerated  Required Braces or Orthoses  Right Lower Extremity Brace: ( )  Subjective   General  Chart Reviewed: Yes  Response To Previous Treatment: Patient with no complaints from previous session. Family / Caregiver Present: No  Referring Practitioner: Nicole Reich PA-C  Subjective  Subjective: Pt resting in bed.   General Comment  Comments: cleared by nursing   Pain Screening  Patient Currently in Pain: No(at rest)  Vital Signs  Patient Currently in Pain: No(at rest) Orientation  Orientation  Overall Orientation Status: Within Normal Limits  Cognition      Objective   Bed mobility  Supine to Sit: Modified independent  Sit to Supine: Modified independent  Transfers  Sit to Stand: Supervision  Stand to sit: Supervision  Ambulation  Ambulation?: Yes  Ambulation 1  Surface: level tile  Device: Standard Walker  Assistance: Supervision  Quality of Gait: steady, step-to pattern  Distance: 85 ft  Stairs/Curb  Stairs?: Yes  Stairs  Curbs: 6\"  Device: Standard walker  Assistance: Stand by assistance  Comment: 2 x     Balance  Posture: Good  Sitting - Static: Good  Sitting - Dynamic: Good  Standing - Static: Good;-  Standing - Dynamic: Fair;+  Exercises  Straight Leg Raise: x10 R with min assist  Quad Sets: x10 B  Heelslides: x10 R  Gluteal Sets: x10   Hip Abduction: x10 B  Knee Long Arc Quad: x10 R  Knee Active Range of Motion: 0-100* R knee AAROM  Ankle Pumps: x20 B         Other Activities: Dangling protocol      Assessment   Body structures, Functions, Activity limitations: Decreased functional mobility ; Decreased ROM; Decreased strength;Decreased balance; Increased Pain;Decreased sensation;Decreased endurance  Assessment: Pt is mod indep bed mobility and supervision with transfs, ambulation (SBA on step).   Treatment Diagnosis: decreased mobility   Prognosis: Good  Patient Education: role of PT, precautions, exercises  REQUIRES PT FOLLOW UP: Yes  Activity Tolerance  Activity Tolerance: Patient Tolerated treatment well       AM-PAC Score  AM-PAC Inpatient Mobility Raw Score : 23  AM-PAC Inpatient T-Scale Score : 56.93  Mobility Inpatient CMS 0-100% Score: 11.2  Mobility Inpatient CMS G-Code Modifier : CI          Goals  Short term goals  Time Frame for Short term goals: 3 days  Short term goal 1: Pt will compete R LE exercises per TKA protocol  Short term goal 2: Pt will complete all transfers wtih supervision  Short term goal 3: Pt will ambulate x 150' wtih SW with supervision  Short

## 2019-04-01 ENCOUNTER — TELEPHONE (OUTPATIENT)
Dept: ORTHOPEDIC SURGERY | Age: 59
End: 2019-04-01

## 2019-04-02 ENCOUNTER — TELEPHONE (OUTPATIENT)
Dept: ORTHOPEDIC SURGERY | Age: 59
End: 2019-04-02

## 2019-04-02 DIAGNOSIS — Z96.651 STATUS POST TOTAL RIGHT KNEE REPLACEMENT: ICD-10-CM

## 2019-04-02 NOTE — TELEPHONE ENCOUNTER
Pt called back complaining of constipation. Last BM was 3/28/19, taking stool softeners daily, drinking water, and taking pain meds 4 times daily as needed. Recommended to pt to try a laxative, recommended Milk of magnesia, if that is unsuccessful pt should try a suppository. If that doesn't work, pt should call RN back. Pt verbalized understanding. Pt had no other questions or concerns at this time.     Junior Ohara   Orthopedic Nurse Navigator   Phone number: [phone]

## 2019-04-02 NOTE — TELEPHONE ENCOUNTER
Attempted to contact pt, left voicemail with purpose and call back number.     Dipak Franco  Orthopedic Nurse Navigator  Phone number: [phone]    Follow up appointments:    Future Appointments   Date Time Provider Melecio Cerrato   4/3/2019 12:00 PM Kim Paniagua, PT Collin Oneal AND PT None   4/12/2019 10:50 AM PAULA Johnson AND BRIAN   8/2/2019  9:00 AM JILL Doty MD [facility]

## 2019-04-03 ENCOUNTER — HOSPITAL ENCOUNTER (OUTPATIENT)
Dept: PHYSICAL THERAPY | Age: 59
Setting detail: THERAPIES SERIES
Discharge: HOME | End: 2019-04-03
Payer: COMMERCIAL

## 2019-04-03 PROCEDURE — 97161 PT EVAL LOW COMPLEX 20 MIN: CPT

## 2019-04-03 PROCEDURE — 97016 VASOPNEUMATIC DEVICE THERAPY: CPT

## 2019-04-03 PROCEDURE — 97110 THERAPEUTIC EXERCISES: CPT

## 2019-04-03 PROCEDURE — 97140 MANUAL THERAPY 1/> REGIONS: CPT

## 2019-04-03 NOTE — FLOWSHEET NOTE
Pt ed anatomy, surgery, RICE, PT progression, prognosis 8 min     Manual Intervention      PROM, HS/gastroc stretch 8 min                                   NMR re-education                                                                Therapeutic Exercise and NMR EXR  [x] (92080) Provided verbal/tactile cueing for activities related to strengthening, flexibility, endurance, ROM for improvements in LE, proximal hip, and core control with self care, mobility, lifting, ambulation.  [] (99414) Provided verbal/tactile cueing for activities related to improving balance, coordination, kinesthetic sense, posture, motor skill, proprioception  to assist with LE, proximal hip, and core control in self care, mobility, lifting, ambulation and eccentric single leg control.      NMR and Therapeutic Activities:    [x] (29047 or 97577) Provided verbal/tactile cueing for activities related to improving balance, coordination, kinesthetic sense, posture, motor skill, proprioception and motor activation to allow for proper function of core, proximal hip and LE with self care and ADLs  [] (67666) Gait Re-education- Provided training and instruction to the patient for proper LE, core and proximal hip recruitment and positioning and eccentric body weight control with ambulation re-education including up and down stairs     Home Exercise Program:    [x] (19674) Reviewed/Progressed HEP activities related to strengthening, flexibility, endurance, ROM of core, proximal hip and LE for functional self-care, mobility, lifting and ambulation/stair navigation   [] (57246)Reviewed/Progressed HEP activities related to improving balance, coordination, kinesthetic sense, posture, motor skill, proprioception of core, proximal hip and LE for self care, mobility, lifting, and ambulation/stair navigation      Manual Treatments:  PROM / STM / Oscillations-Mobs:  G-I, II, III, IV (PA's, Inf., Post.)  [x] (39059) Provided manual therapy to ADLs.     New or Updated Goals (if applicable):  [x] No change to goals established upon initial eval/last progress note:  New Goals:    Progression Towards Functional goals:   [] Patient is progressing as expected towards functional goals listed. [] Progression is slowed due to complexities listed. [] Progression has been slowed due to co-morbidities.   [x] Plan just implemented, too soon to assess goals progression  [] Other:     ASSESSMENT:    [] Improvement noted relative to goals:  [] No Improvement noted related to goals:  Summary/Patient's response to treatment: See Eval    Treatment/Activity Tolerance:  [x] Patient tolerated treatment well [] Patient limited by fatique  [] Patient limited by pain  [] Patient limited by other medical complications  [] Other:     Prognosis: [x] Good [] Fair  [] Poor    Patient Requires Follow-up: [x] Yes  [] No    PLAN: See eval  [] Continue per plan of care [] Alter current plan (see comments)  [x] Plan of care initiated [] Hold pending MD visit [] Discharge    Electronically signed by: Adin Gordillo [facility], DPT  [id number]

## 2019-04-03 NOTE — PLAN OF CARE
killers  Provocative factors: movement, walking, bending     Type: []Constant   []Intermittent  []Radiating []Localized []other:     Numbness/Tingling: numbness near incision    Occupation/School:    Living Status/Prior Level of Function: Independent with ADLs and IADLs, walking, stairs at home    OBJECTIVE:     ROM LEFT RIGHT   Knee ext  -4   Knee Flex  85   Strength  LEFT RIGHT   HIP Abductors     HIP Ext     Hip ER     Knee EXT (quad)  SLR mod assist   Knee Flex (HS)       Reflexes/Sensation:    []Dermatomes/Myotomes intact    []Reflexes equal and normal bilaterally   []Other:    Joint mobility:    []Normal    [x]Hypo   []Hyper     Palpation: TTP and moderate effusion present in knee joint, medial aspect most tender    Functional Mobility/Transfers: requires mod assist to lift leg to plintyh    Posture: anterior pelvic tilt, genu valgum in single leg stance    Bandages/Dressings/Incisions: healing well no signs of infection    Gait: (include devices/WB status) walker, step to, locked in immobilizer     Orthopedic Special Tests: none                       [x] Patient history, allergies, meds reviewed. Medical chart reviewed. See intake form. Review Of Systems (ROS):  [x]Performed Review of systems (Integumentary, CardioPulmonary, Neurological) by intake and observation. Intake form has been scanned into medical record. Patient has been instructed to contact their primary care physician regarding ROS issues if not already being addressed at this time.       Co-morbidities/Complexities (which will affect course of rehabilitation):   []None           Arthritic conditions   []Rheumatoid arthritis (M05.9)  [x]Osteoarthritis (M19.91)   Cardiovascular conditions   [x]Hypertension (I10)  []Hyperlipidemia (E78.5)  []Angina pectoris (I20)  []Atherosclerosis (I70)   Musculoskeletal conditions   []Disc pathology   []Congenital spine pathologies   [x]Prior surgical intervention  []Osteoporosis complexity using standardized patient assessment instrument and/or measurable assessment of functional outcome. [x] EVAL (LOW) 23073 (typically 20 minutes face-to-face)  [] EVAL (MOD) 62098 (typically 30 minutes face-to-face)  [] EVAL (HIGH) 43138 (typically 45 minutes face-to-face)  [] RE-EVAL     PLAN:   Frequency/Duration:  1-2 days per week for 12 Weeks:  Interventions:  [x]  Therapeutic exercise including: strength training, ROM, for Lower extremity and core   [x]  NMR activation and proprioception for LE, Glutes and Core   [x]  Manual therapy as indicated for LE, Hip and spine to include: Dry Needling/IASTM, STM, PROM, Gr I-IV mobilizations, manipulation. [x] Modalities as needed that may include: thermal agents, E-stim, Biofeedback, US, iontophoresis as indicated  [x] Patient education on joint protection, postural re-education, activity modification, progression of HEP. HEP instruction: (see scanned forms)    GOALS:  Patient stated goal: Walk with no AD. Therapist goals for Patient:   Short Term Goals: To be achieved in: 2 weeks  1. Independent in HEP and progression per patient tolerance, in order to prevent re-injury. 2. Patient will have a decrease in pain to facilitate improvement in movement, function, and ADLs as indicated by Functional Deficits. Long Term Goals: To be achieved in: 12 weeks  1. Disability index score of 40% or less for the LEFS to assist with reaching prior level of function. 2. Patient will demonstrate increased AROM to 0-120 to allow for proper joint functioning as indicated by patients Functional Deficits. 3. Patient will demonstrate an increase in Strength to good proximal hip strength and control, within 5lb HHD in LE to allow for proper functional mobility as indicated by patients Functional Deficits. 4. Patient will return to functional activities without increased symptoms or restriction.    5. Patient will be able to ascend/descend stairs with reciprocal pattern for return to normal ADLs.      Electronically signed by:  Jarrell Arrieta 05, [id number]

## 2019-04-05 ENCOUNTER — HOSPITAL ENCOUNTER (OUTPATIENT)
Dept: PHYSICAL THERAPY | Age: 59
Setting detail: THERAPIES SERIES
Discharge: HOME | End: 2019-04-05
Payer: COMMERCIAL

## 2019-04-05 PROCEDURE — 97140 MANUAL THERAPY 1/> REGIONS: CPT

## 2019-04-05 PROCEDURE — 97110 THERAPEUTIC EXERCISES: CPT

## 2019-04-05 PROCEDURE — 97016 VASOPNEUMATIC DEVICE THERAPY: CPT

## 2019-04-05 NOTE — FLOWSHEET NOTE
stretch 3 x 30\"  X   Heel slide  x 10  X   Quad set  SAQ 10 x 10\"  2 x 10  X   SLR  x 10 Mod-min assist X   Heel prop 1 x 6 min  X   EOB flexion  10 x 10\"                                                     Pt ed anatomy, surgery, RICE, PT progression, prognosis 8 min     Manual Intervention      PROM, HS/gastroc stretch 8 min     Assisted flexion EOB 5 min                             NMR re-education                                                                Therapeutic Exercise and NMR EXR  [x] (89158) Provided verbal/tactile cueing for activities related to strengthening, flexibility, endurance, ROM for improvements in LE, proximal hip, and core control with self care, mobility, lifting, ambulation.  [] (32102) Provided verbal/tactile cueing for activities related to improving balance, coordination, kinesthetic sense, posture, motor skill, proprioception  to assist with LE, proximal hip, and core control in self care, mobility, lifting, ambulation and eccentric single leg control.      NMR and Therapeutic Activities:    [x] (30773 or 28676) Provided verbal/tactile cueing for activities related to improving balance, coordination, kinesthetic sense, posture, motor skill, proprioception and motor activation to allow for proper function of core, proximal hip and LE with self care and ADLs  [] (46462) Gait Re-education- Provided training and instruction to the patient for proper LE, core and proximal hip recruitment and positioning and eccentric body weight control with ambulation re-education including up and down stairs     Home Exercise Program:    [x] (14722) Reviewed/Progressed HEP activities related to strengthening, flexibility, endurance, ROM of core, proximal hip and LE for functional self-care, mobility, lifting and ambulation/stair navigation   [] (57822)Reviewed/Progressed HEP activities related to improving balance, coordination, kinesthetic sense, posture, motor skill, proprioception of core, proximal hip and LE for self care, mobility, lifting, and ambulation/stair navigation      Manual Treatments:  PROM / STM / Oscillations-Mobs:  G-I, II, III, IV (PA's, Inf., Post.)  [x] (08587) Provided manual therapy to mobilize LE, proximal hip and/or LS spine soft tissue/joints for the purpose of modulating pain, promoting relaxation,  increasing ROM, reducing/eliminating soft tissue swelling/inflammation/restriction, improving soft tissue extensibility and allowing for proper ROM for normal function with self care, mobility, lifting and ambulation. Modalities:       [] GR/ESU 15 min    [x] GR 15 min  [] ESU     [] CP    [] MHP    [] declined     Charges:  Timed Code Treatment Minutes: 40   Total Treatment Minutes: 60     [] EVAL (LOW) 87616 (typically 20 minutes face-to-face)  [] EVAL (MOD) 21531 (typically 30 minutes face-to-face)  [] EVAL (HIGH) 93883 (typically 45 minutes face-to-face)  [] RE-EVAL     [x] UC(10113) x  2   [] IONTO  [] NMR (20964) x      [x] VASO  [x] Manual (55242) x  1    [] Other:  [] TA x       [] Mech Traction (83947)  [] ES(attended) (34698)      [] ES (un) (25506):     GOALS: Patient stated goal: Walk with no AD. Therapist goals for Patient:   Short Term Goals: To be achieved in: 2 weeks  1. Independent in HEP and progression per patient tolerance, in order to prevent re-injury. 2. Patient will have a decrease in pain to facilitate improvement in movement, function, and ADLs as indicated by Functional Deficits. Long Term Goals: To be achieved in: 12 weeks  1. Disability index score of 40% or less for the LEFS to assist with reaching prior level of function. 2. Patient will demonstrate increased AROM to 0-120 to allow for proper joint functioning as indicated by patients Functional Deficits.    3. Patient will demonstrate an increase in Strength to good proximal hip strength and control, within 5lb HHD in LE to allow for proper functional mobility as indicated by patients Functional Deficits. 4. Patient will return to functional activities without increased symptoms or restriction. 5. Patient will be able to ascend/descend stairs with reciprocal pattern for return to normal ADLs. New or Updated Goals (if applicable):  [x] No change to goals established upon initial eval/last progress note:  New Goals:    Progression Towards Functional goals:   [] Patient is progressing as expected towards functional goals listed. [] Progression is slowed due to complexities listed. [] Progression has been slowed due to co-morbidities.   [x] Plan just implemented, too soon to assess goals progression  [] Other:     ASSESSMENT:    [] Improvement noted relative to goals:  [] No Improvement noted related to goals:  Summary/Patient's response to treatment: See Eval    Treatment/Activity Tolerance:  [x] Patient tolerated treatment well [] Patient limited by fatique  [] Patient limited by pain  [] Patient limited by other medical complications  [] Other:     Prognosis: [x] Good [] Fair  [] Poor    Patient Requires Follow-up: [x] Yes  [] No    PLAN: See eval  [] Continue per plan of care [] Alter current plan (see comments)  [x] Plan of care initiated [] Hold pending MD visit [] Discharge    Electronically signed by: Phuong Lewis, DPT  [id number]

## 2019-04-09 ENCOUNTER — TELEPHONE (OUTPATIENT)
Dept: ORTHOPEDIC SURGERY | Age: 59
End: 2019-04-09

## 2019-04-09 DIAGNOSIS — Z96.651 S/P TOTAL KNEE ARTHROPLASTY, RIGHT: Primary | ICD-10-CM

## 2019-04-09 RX ORDER — HYDROCODONE BITARTRATE AND ACETAMINOPHEN 5; 325 MG/1; MG/1
1 TABLET ORAL EVERY 4 HOURS PRN
Qty: 42 TABLET | Refills: 0 | Status: SHIPPED | OUTPATIENT
Start: 2019-04-09 | End: 2019-04-18 | Stop reason: SDUPTHER

## 2019-04-09 NOTE — TELEPHONE ENCOUNTER
3/29/19 RT TKR HAKAN    Percocet is too hard on her stomach. Would like to step down to [place].  Is this ok? Patient would also like materials for a dressing change to be left at the  for her.         [address]

## 2019-04-10 ENCOUNTER — TELEPHONE (OUTPATIENT)
Dept: ORTHOPEDIC SURGERY | Age: 59
End: 2019-04-10

## 2019-04-10 ENCOUNTER — HOSPITAL ENCOUNTER (OUTPATIENT)
Dept: PHYSICAL THERAPY | Age: 59
Setting detail: THERAPIES SERIES
Discharge: HOME | End: 2019-04-10
Payer: COMMERCIAL

## 2019-04-10 DIAGNOSIS — Z96.651 STATUS POST TOTAL RIGHT KNEE REPLACEMENT: ICD-10-CM

## 2019-04-10 PROCEDURE — 97140 MANUAL THERAPY 1/> REGIONS: CPT

## 2019-04-10 PROCEDURE — 97016 VASOPNEUMATIC DEVICE THERAPY: CPT

## 2019-04-10 PROCEDURE — 97110 THERAPEUTIC EXERCISES: CPT

## 2019-04-10 NOTE — TELEPHONE ENCOUNTER
Attempted to contact pt, left voicemail with purpose and call back number.     Phillips Jennifre  Orthopedic Nurse Navigator  Phone number: [phone]    Follow up appointments:    Future Appointments   Date Time Provider Melecio Cerrato   4/10/2019  1:30 PM Matt Suero, PT [facility] AND PT None   4/12/2019 10:50 AM PAULA Oliva AND [facility]   5/20/7011 41:17 AM Hawk Stephens, PT [facility] AND PT None   4/15/2019  1:00 PM Matt Suero, PT [facility] AND PT None   7/08/8567  1:73 PM Hawk Stephens, PT [facility] AND PT None   4/23/2019  1:00 PM Matt Suero, PT [facility] AND PT None   7/73/2892  5:90 PM Hawk Stephens, PT [facility] AND PT None   4/30/2019  1:00 PM Matt Suero, PT [facility] AND PT None   5/3/2019  1:30 PM Matt Suero, PT [facility] AND PT None   8/2/2019  9:00 AM JILL Loredo MD [facility]

## 2019-04-10 NOTE — FLOWSHEET NOTE
[place] [facility], [address]  Phone: [phone]  Fax [phone]    Physical Therapy Daily Treatment Note  Date:  4/10/2019    Patient Name:  Sandra Guevara    :  1960  MRN: [id number]  Restrictions/Precautions:    Physician Information:  Referring Practitioner: Dr. Alla Tolentino  Medical/Treatment Diagnosis Information:  Diagnosis: M 25.561 R knee pain; M17.11 R knee OA  · Treatment Diagnosis: M25.561 R knee pain; M25.661 right knee stiffness   [] Conservative / [x] Surgical - DOS: 3/29/19  Therapy Diagnosis/Practice Pattern:  Practice Pattern H: Joint Arthroplasty  Insurance/Certification information:  PT Insurance Information:  BCBS - 250D-80/20-$0CP-25PT PER COND. - NEEDS AUTH AFTR 25V - EXP 19  Plan of care signed: [x] YES  [] NO  Number of Comorbidities:  []0     [x]1-2    []3+  Date of Patient follow up with Physician:     G-Code (if applicable):      Date G-Code Applied:         Progress Note: [x]  Yes  []  No  Next due by: Visit #10        Latex Allergy:  [x]NO      []YES  Preferred Language for Healthcare:   [x]English       []other:    Visit # Insurance Allowable Reporting Period   3  Needs auth after 25  Begin Date: 4/10/2019               End Date:      RECERT DUE BY: 12 weeks    SUBJECTIVE:  Patient had pain meds switched to Vicodin and starting to take miralax which has helped GI issues. Did not take pain killers before coming in and feeling knee pain starting to come on.  Doing HEP over weekend and SLR improving     OBJECTIVE: See eval  Observation:  Palpation:     Test used Initial score Current Score   Pain Summary VAS 6 5-6   Functional questionnaire LEFS 86%    ROM flexion 86 93    extension -6 -1   Strength quad SLR mod assist SLR mod assist    ABD      flexion          RESTRICTIONS/PRECAUTIONS: none    Exercises/Interventions:     Therapeutic Ex Sets/reps Notes HEP   Long sit HS stretch 3 x 30\"  X   Gastroc strap stretch 3 x 30\"  X   Heel slide  x 10  X   Quad set with ADD  SAQ with ADD 10 x 10\"  20 x  X   SLR 2 x 10 min assist X   Heel prop 1 x 6 min 5# X   EOB flexion  10 x 10\"     LAQ eccentric lower with ADD squeeze  2 x 10       Mini squats from table 10 x     Standing ext 10 x                                   Pt ed anatomy, surgery, RICE, PT progression, prognosis      Manual Intervention      PROM, HS/gastroc stretch 10 min     Assisted flexion EOB 5 min                             NMR re-education                                                                Therapeutic Exercise and NMR EXR  [x] (66189) Provided verbal/tactile cueing for activities related to strengthening, flexibility, endurance, ROM for improvements in LE, proximal hip, and core control with self care, mobility, lifting, ambulation.  [] (87653) Provided verbal/tactile cueing for activities related to improving balance, coordination, kinesthetic sense, posture, motor skill, proprioception  to assist with LE, proximal hip, and core control in self care, mobility, lifting, ambulation and eccentric single leg control.      NMR and Therapeutic Activities:    [x] (46540 or 32553) Provided verbal/tactile cueing for activities related to improving balance, coordination, kinesthetic sense, posture, motor skill, proprioception and motor activation to allow for proper function of core, proximal hip and LE with self care and ADLs  [] (16803) Gait Re-education- Provided training and instruction to the patient for proper LE, core and proximal hip recruitment and positioning and eccentric body weight control with ambulation re-education including up and down stairs     Home Exercise Program:    [x] (42419) Reviewed/Progressed HEP activities related to strengthening, flexibility, endurance, ROM of core, proximal hip and LE for functional self-care, mobility, lifting and ambulation/stair navigation   [] (03099)Reviewed/Progressed proximal hip strength and control, within 5lb HHD in LE to allow for proper functional mobility as indicated by patients Functional Deficits. 4. Patient will return to functional activities without increased symptoms or restriction. 5. Patient will be able to ascend/descend stairs with reciprocal pattern for return to normal ADLs. New or Updated Goals (if applicable):  [x] No change to goals established upon initial eval/last progress note:  New Goals:    Progression Towards Functional goals:   [] Patient is progressing as expected towards functional goals listed. [] Progression is slowed due to complexities listed. [] Progression has been slowed due to co-morbidities. [x] Plan just implemented, too soon to assess goals progression  [] Other:     ASSESSMENT:    [] Improvement noted relative to goals:  [] No Improvement noted related to goals:  Summary/Patient's response to treatment: Poor tolerance for manual and therex this visit secondary to increased pain.     Treatment/Activity Tolerance:  [x] Patient tolerated treatment well [] Patient limited by fatique  [] Patient limited by pain  [] Patient limited by other medical complications  [] Other:     Prognosis: [x] Good [] Fair  [] Poor    Patient Requires Follow-up: [x] Yes  [] No    PLAN: See eval  [x] Continue per plan of care [] Alter current plan (see comments)  [] Plan of care initiated [] Hold pending MD visit [] Discharge    Electronically signed by: Scar Bhakta, [address], DPT  [id number]

## 2019-04-12 ENCOUNTER — HOSPITAL ENCOUNTER (OUTPATIENT)
Dept: PHYSICAL THERAPY | Age: 59
Setting detail: THERAPIES SERIES
Discharge: HOME | End: 2019-04-12
Payer: COMMERCIAL

## 2019-04-12 ENCOUNTER — OFFICE VISIT (OUTPATIENT)
Dept: ORTHOPEDIC SURGERY | Age: 59
End: 2019-04-12

## 2019-04-12 DIAGNOSIS — Z96.651 S/P TOTAL KNEE ARTHROPLASTY, RIGHT: Primary | ICD-10-CM

## 2019-04-12 PROCEDURE — 97140 MANUAL THERAPY 1/> REGIONS: CPT | Performed by: PHYSICAL THERAPIST

## 2019-04-12 PROCEDURE — 99024 POSTOP FOLLOW-UP VISIT: CPT | Performed by: PHYSICIAN ASSISTANT

## 2019-04-12 RX ORDER — CYCLOBENZAPRINE HCL 10 MG
10 TABLET ORAL NIGHTLY PRN
Qty: 30 TABLET | Refills: 0 | Status: SHIPPED | OUTPATIENT
Start: 2019-04-12 | End: 2019-04-22

## 2019-04-12 NOTE — FLOWSHEET NOTE
[place] [facility], [address]  Phone: [phone]  Fax [phone]    Physical Therapy Daily Treatment Note  Date:  2019    Patient Name:  Harmony Hernandez    :  1960  MRN: [id number]  Restrictions/Precautions:    Physician Information:  Referring Practitioner: Dr. Mita Garcia  Medical/Treatment Diagnosis Information:  Diagnosis: M 25.561 R knee pain; M17.11 R knee OA  · Treatment Diagnosis: M25.561 R knee pain; M25.661 right knee stiffness   [] Conservative / [x] Surgical - DOS: 3/29/19  Therapy Diagnosis/Practice Pattern:  Practice Pattern H: Joint Arthroplasty  Insurance/Certification information:  PT Insurance Information:  BCBS - 250D-80/20-$0CP-25PT PER COND. - NEEDS AUTH AFTR 25V - EXP 19  Plan of care signed: [x] YES  [] NO  Number of Comorbidities:  []0     [x]1-2    []3+  Date of Patient follow up with Physician:     G-Code (if applicable):      Date G-Code Applied:         Progress Note: [x]  Yes  []  No  Next due by: Visit #10        Latex Allergy:  [x]NO      []YES  Preferred Language for Healthcare:   [x]English       []other:    Visit # Insurance Allowable Reporting Period    Needs auth after 25  Begin Date: 2019               End Date:      RECERT DUE BY: 12 weeks    SUBJECTIVE:  Pt came over from  30 min late. Pt has been battling nausea and pain.  prescribed new meds and removed immobilizer. Discussed need for freq ROM and quad activities hourly. Pt asked to leave apptmt    OBJECTIVE: See eval  Observation:  Palpation:     Test used Initial score Current Score   Pain Summary VAS 6 5-6   Functional questionnaire LEFS 86%    ROM flexion 86 86    extension -6 -1   Strength quad SLR mod assist Unable.      ABD      flexion          RESTRICTIONS/PRECAUTIONS: none    Exercises/Interventions:     Therapeutic Ex Sets/reps Notes HEP   Bike Rocking 2 min Pt became nauseated and had to stop. Long sit HS stretch 3 x 30\"  X   Gastroc strap stretch 3 x 30\"  X   Heel slide  x 10  X   Quad set with ADD  SAQ with ADD 10 x 10\"  20 x  X   SLR 2 x 10 min assist X   Heel prop 1 x 6 min 5# X   EOB flexion  10 x 10\"     LAQ eccentric lower with ADD squeeze  2 x 10       Mini squats from table 10 x     Standing ext 10 x                                   Pt ed anatomy, surgery, RICE, PT progression, prognosis      Manual Intervention      PROM, HS/gastroc stretch 10 min     Assisted flexion EOB 5 min                             NMR re-education                                                                Therapeutic Exercise and NMR EXR  [x] (61469) Provided verbal/tactile cueing for activities related to strengthening, flexibility, endurance, ROM for improvements in LE, proximal hip, and core control with self care, mobility, lifting, ambulation.  [] (95287) Provided verbal/tactile cueing for activities related to improving balance, coordination, kinesthetic sense, posture, motor skill, proprioception  to assist with LE, proximal hip, and core control in self care, mobility, lifting, ambulation and eccentric single leg control.      NMR and Therapeutic Activities:    [x] (18054 or 50415) Provided verbal/tactile cueing for activities related to improving balance, coordination, kinesthetic sense, posture, motor skill, proprioception and motor activation to allow for proper function of core, proximal hip and LE with self care and ADLs  [] (84328) Gait Re-education- Provided training and instruction to the patient for proper LE, core and proximal hip recruitment and positioning and eccentric body weight control with ambulation re-education including up and down stairs     Home Exercise Program:    [x] (96629) Reviewed/Progressed HEP activities related to strengthening, flexibility, endurance, ROM of core, proximal hip and LE for functional self-care, mobility, lifting and ambulation/stair demonstrate an increase in Strength to good proximal hip strength and control, within 5lb HHD in LE to allow for proper functional mobility as indicated by patients Functional Deficits. 4. Patient will return to functional activities without increased symptoms or restriction. 5. Patient will be able to ascend/descend stairs with reciprocal pattern for return to normal ADLs. New or Updated Goals (if applicable):  [x] No change to goals established upon initial eval/last progress note:  New Goals:    Progression Towards Functional goals:   [] Patient is progressing as expected towards functional goals listed. [] Progression is slowed due to complexities listed. [] Progression has been slowed due to co-morbidities. [x] Plan just implemented, too soon to assess goals progression  [] Other:     ASSESSMENT:    [] Improvement noted relative to goals:  [] No Improvement noted related to goals:  Summary/Patient's response to treatment: Poor tolerance for manual and therex this visit secondary to increased pain.     Treatment/Activity Tolerance:  [x] Patient tolerated treatment well [] Patient limited by fatique  [] Patient limited by pain  [] Patient limited by other medical complications  [] Other:     Prognosis: [x] Good [] Fair  [] Poor    Patient Requires Follow-up: [x] Yes  [] No    PLAN: See eval  [x] Continue per plan of care [] Alter current plan (see comments)  [] Plan of care initiated [] Hold pending MD visit [] Discharge    Electronically signed by: Brennen Porras, [facility], DPT  [id number]

## 2019-04-15 ENCOUNTER — HOSPITAL ENCOUNTER (OUTPATIENT)
Dept: PHYSICAL THERAPY | Age: 59
Setting detail: THERAPIES SERIES
Discharge: HOME | End: 2019-04-15
Payer: COMMERCIAL

## 2019-04-15 PROCEDURE — 97016 VASOPNEUMATIC DEVICE THERAPY: CPT

## 2019-04-15 PROCEDURE — 97110 THERAPEUTIC EXERCISES: CPT

## 2019-04-15 PROCEDURE — 97140 MANUAL THERAPY 1/> REGIONS: CPT

## 2019-04-15 NOTE — FLOWSHEET NOTE
[facility], [address]  Phone: [phone]  Fax [phone]    Physical Therapy Daily Treatment Note  Date:  4/15/2019    Patient Name:  Rafat Walker    :  1960  MRN: [id number]  Restrictions/Precautions:    Physician Information:  Referring Practitioner: Dr. Jose Guadalupe Cabral  Medical/Treatment Diagnosis Information:  Diagnosis: M 25.561 R knee pain; M17.11 R knee OA  · Treatment Diagnosis: M25.561 R knee pain; M25.661 right knee stiffness   [] Conservative / [x] Surgical - DOS: 3/29/19  Therapy Diagnosis/Practice Pattern:  Practice Pattern H: Joint Arthroplasty  Insurance/Certification information:  PT Insurance Information:  BCBS - 250D-80/20-$0CP-25PT PER COND. - NEEDS AUTH AFTR 25V - EXP 19  Plan of care signed: [x] YES  [] NO  Number of Comorbidities:  []0     [x]1-2    []3+  Date of Patient follow up with Physician:     G-Code (if applicable):      Date G-Code Applied:         Progress Note: [x]  Yes  []  No  Next due by: Visit #10        Latex Allergy:  [x]NO      []YES  Preferred Language for Healthcare:   [x]English       []other:    Visit # Insurance Allowable Reporting Period    Needs auth after 25  Begin Date: 4/15/2019               End Date:      RECERT DUE BY: 12 weeks    SUBJECTIVE:  Pt doing better today, took pain killer 30 mins prior to appointment. Patient feeling better with less nausea past few days. Feels knee is moving a little better and started taking muscle relaxer on Saturday. OBJECTIVE: See eval  Observation:  Palpation:     Test used Initial score Current Score   Pain Summary VAS 6 5-6   Functional questionnaire LEFS 86%    ROM flexion 86 93  4/15    extension -6 -1   Strength quad SLR mod assist Unable.      ABD      flexion          RESTRICTIONS/PRECAUTIONS: none    Exercises/Interventions:     Therapeutic Ex Sets/reps Notes HEP   Bike Rocking 2 min Pt became nauseated and had to stop. Long sit HS stretch 3 x 30\"  X   Gastroc strap stretch 3 x 30\"  X   Heel slide with SB  15 x 5\"  X   Quad set with ADD  SAQ with ADD 10 x 10\"  20 x  X   SLR 2 x 10 min assist X   Heel prop 1 x 6 min 5# X   EOB flexion  10 x 10\"     LAQ eccentric lower with ADD squeeze  2 x 10       Mini squats 20 x     Standing ext/abd/march 20 x                                   Pt ed anatomy, surgery, RICE, PT progression, prognosis      Manual Intervention      PROM, HS/gastroc stretch 10 min     Assisted flexion EOB 5 min                             NMR re-education                                                                Therapeutic Exercise and NMR EXR  [x] (13056) Provided verbal/tactile cueing for activities related to strengthening, flexibility, endurance, ROM for improvements in LE, proximal hip, and core control with self care, mobility, lifting, ambulation.  [] (10974) Provided verbal/tactile cueing for activities related to improving balance, coordination, kinesthetic sense, posture, motor skill, proprioception  to assist with LE, proximal hip, and core control in self care, mobility, lifting, ambulation and eccentric single leg control.      NMR and Therapeutic Activities:    [x] (26044 or 80573) Provided verbal/tactile cueing for activities related to improving balance, coordination, kinesthetic sense, posture, motor skill, proprioception and motor activation to allow for proper function of core, proximal hip and LE with self care and ADLs  [] (96891) Gait Re-education- Provided training and instruction to the patient for proper LE, core and proximal hip recruitment and positioning and eccentric body weight control with ambulation re-education including up and down stairs     Home Exercise Program:    [x] (51154) Reviewed/Progressed HEP activities related to strengthening, flexibility, endurance, ROM of core, proximal hip and LE for functional self-care, mobility, lifting and ambulation/stair navigation   [] (17393)Reviewed/Progressed HEP activities related to improving balance, coordination, kinesthetic sense, posture, motor skill, proprioception of core, proximal hip and LE for self care, mobility, lifting, and ambulation/stair navigation      Manual Treatments:  PROM / STM / Oscillations-Mobs:  G-I, II, III, IV (PA's, Inf., Post.)  [x] (97748) Provided manual therapy to mobilize LE, proximal hip and/or LS spine soft tissue/joints for the purpose of modulating pain, promoting relaxation,  increasing ROM, reducing/eliminating soft tissue swelling/inflammation/restriction, improving soft tissue extensibility and allowing for proper ROM for normal function with self care, mobility, lifting and ambulation. Modalities:       [] GR/ESU 15 min    [x] GR 15 min  [] ESU     [] CP    [] MHP    [] declined     Charges:  Timed Code Treatment Minutes: 40   Total Treatment Minutes: 60     [] EVAL (LOW) 44865 (typically 20 minutes face-to-face)  [] EVAL (MOD) 73950 (typically 30 minutes face-to-face)  [] EVAL (HIGH) 11446 (typically 45 minutes face-to-face)  [] RE-EVAL     [x] TW(44533) x  2   [] IONTO  [] NMR (00775) x      [x] VASO  [x] Manual (66617) x  1    [] Other:  [] TA x       [] Mech Traction (89961)  [] ES(attended) (01549)      [] ES (un) (20274):     GOALS: Patient stated goal: Walk with no AD. Therapist goals for Patient:   Short Term Goals: To be achieved in: 2 weeks  1. Independent in HEP and progression per patient tolerance, in order to prevent re-injury. 2. Patient will have a decrease in pain to facilitate improvement in movement, function, and ADLs as indicated by Functional Deficits. Long Term Goals: To be achieved in: 12 weeks  1. Disability index score of 40% or less for the LEFS to assist with reaching prior level of function. 2. Patient will demonstrate increased AROM to 0-120 to allow for proper joint functioning as indicated by patients Functional Deficits. 3. Patient will demonstrate an increase in Strength to good proximal hip strength and control, within 5lb HHD in LE to allow for proper functional mobility as indicated by patients Functional Deficits. 4. Patient will return to functional activities without increased symptoms or restriction. 5. Patient will be able to ascend/descend stairs with reciprocal pattern for return to normal ADLs. New or Updated Goals (if applicable):  [x] No change to goals established upon initial eval/last progress note:  New Goals:    Progression Towards Functional goals:   [] Patient is progressing as expected towards functional goals listed. [] Progression is slowed due to complexities listed. [] Progression has been slowed due to co-morbidities. [x] Plan just implemented, too soon to assess goals progression  [] Other:     ASSESSMENT:    [] Improvement noted relative to goals:  [] No Improvement noted related to goals:  Summary/Patient's response to treatment: Improved tolerance for therex this visit. Improved strength and quad activation.      Treatment/Activity Tolerance:  [x] Patient tolerated treatment well [] Patient limited by fatique  [] Patient limited by pain  [] Patient limited by other medical complications  [] Other:     Prognosis: [x] Good [] Fair  [] Poor    Patient Requires Follow-up: [x] Yes  [] No    PLAN: See eval  [x] Continue per plan of care [] Alter current plan (see comments)  [] Plan of care initiated [] Hold pending MD visit [] Discharge    Electronically signed by: Phuong Robert, DPT  [id number]

## 2019-04-18 ENCOUNTER — HOSPITAL ENCOUNTER (OUTPATIENT)
Dept: PHYSICAL THERAPY | Age: 59
Setting detail: THERAPIES SERIES
Discharge: HOME | End: 2019-04-18
Payer: COMMERCIAL

## 2019-04-18 DIAGNOSIS — Z96.651 S/P TOTAL KNEE ARTHROPLASTY, RIGHT: Primary | ICD-10-CM

## 2019-04-18 PROCEDURE — 97140 MANUAL THERAPY 1/> REGIONS: CPT | Performed by: PHYSICAL THERAPIST

## 2019-04-18 PROCEDURE — 97110 THERAPEUTIC EXERCISES: CPT | Performed by: PHYSICAL THERAPIST

## 2019-04-18 PROCEDURE — 97016 VASOPNEUMATIC DEVICE THERAPY: CPT | Performed by: PHYSICAL THERAPIST

## 2019-04-18 RX ORDER — HYDROCODONE BITARTRATE AND ACETAMINOPHEN 5; 325 MG/1; MG/1
1 TABLET ORAL EVERY 4 HOURS PRN
Qty: 42 TABLET | Refills: 0 | Status: SHIPPED | OUTPATIENT
Start: 2019-04-18 | End: 2019-04-25

## 2019-04-18 NOTE — FLOWSHEET NOTE
[facility], [address] [place] [address]  Phone: [phone]  Fax [phone]    Physical Therapy Daily Treatment Note  Date:  2019    Patient Name:  Harmony Hernandez    :  1960  MRN: [id number]  Restrictions/Precautions:    Physician Information:  Referring Practitioner: Dr. Mita Garcia  Medical/Treatment Diagnosis Information:  Diagnosis: M 25.561 R knee pain; M17.11 R knee OA  · Treatment Diagnosis: M25.561 R knee pain; M25.661 right knee stiffness   [] Conservative / [x] Surgical - DOS: 3/29/19  Therapy Diagnosis/Practice Pattern:  Practice Pattern H: Joint Arthroplasty  Insurance/Certification information:  PT Insurance Information:  BCBS - 250D-80/20-$0CP-25PT PER COND. - NEEDS AUTH AFTR 25V - EXP 19  Plan of care signed: [x] YES  [] NO  Number of Comorbidities:  []0     [x]1-2    []3+  Date of Patient follow up with Physician:     G-Code (if applicable):      Date G-Code Applied:         Progress Note: [x]  Yes  []  No  Next due by: Visit #10        Latex Allergy:  [x]NO      []YES  Preferred Language for Healthcare:   [x]English       []other:    Visit # Insurance Allowable Reporting Period   5 25 Needs auth after 25  Begin Date: 2019               End Date:      RECERT DUE BY: 12 weeks    SUBJECTIVE:  Pt cont to feel dizzy with meds. Pt is not sleeping well. Pt is using standard walker. OBJECTIVE: See eval  Observation:  Palpation:     Test used Initial score Current Score   Pain Summary VAS 6 5-6   Functional questionnaire LEFS 86%    ROM flexion 86 106       extension -6 -1   Strength quad SLR mod assist Unable. ABD      flexion          RESTRICTIONS/PRECAUTIONS: none    Exercises/Interventions:     Therapeutic Ex Sets/reps Notes HEP   Bike  3 min Able to perform full rev.     Long sit HS stretch 3 x 30\"  X   Gastroc strap stretch 3 x 30\"  X   Heel slide with SB  15 x 5\"  X   Quad set with ADD  SAQ with ADD 10 x 10\"  20 x  X   SLR 2 x 10 min assist X   Heel prop 1 x 6 min 5# X   EOB flexion  10 x 10\"     LAQ eccentric lower with ADD squeeze  2 x 10       Mini squats 20 x     Standing ext/abd/march 20 x                                   Pt ed anatomy, surgery, RICE, PT progression, prognosis      Manual Intervention      PROM, HS/gastroc stretch 10 min     Assisted flexion EOB 5 min                             NMR re-education                                                                Therapeutic Exercise and NMR EXR  [x] (52728) Provided verbal/tactile cueing for activities related to strengthening, flexibility, endurance, ROM for improvements in LE, proximal hip, and core control with self care, mobility, lifting, ambulation.  [] (90462) Provided verbal/tactile cueing for activities related to improving balance, coordination, kinesthetic sense, posture, motor skill, proprioception  to assist with LE, proximal hip, and core control in self care, mobility, lifting, ambulation and eccentric single leg control.      NMR and Therapeutic Activities:    [x] (60747 or 73442) Provided verbal/tactile cueing for activities related to improving balance, coordination, kinesthetic sense, posture, motor skill, proprioception and motor activation to allow for proper function of core, proximal hip and LE with self care and ADLs  [] (36296) Gait Re-education- Provided training and instruction to the patient for proper LE, core and proximal hip recruitment and positioning and eccentric body weight control with ambulation re-education including up and down stairs     Home Exercise Program:    [x] (96169) Reviewed/Progressed HEP activities related to strengthening, flexibility, endurance, ROM of core, proximal hip and LE for functional self-care, mobility, lifting and ambulation/stair navigation   [] (66759)Reviewed/Progressed HEP activities related to improving balance, coordination, kinesthetic sense, posture, mobility as indicated by patients Functional Deficits. 4. Patient will return to functional activities without increased symptoms or restriction. 5. Patient will be able to ascend/descend stairs with reciprocal pattern for return to normal ADLs. New or Updated Goals (if applicable):  [x] No change to goals established upon initial eval/last progress note:  New Goals:    Progression Towards Functional goals:   [] Patient is progressing as expected towards functional goals listed. [] Progression is slowed due to complexities listed. [] Progression has been slowed due to co-morbidities. [x] Plan just implemented, too soon to assess goals progression  [] Other:     ASSESSMENT:    [] Improvement noted relative to goals:  [] No Improvement noted related to goals:  Summary/Patient's response to treatment: Improved tolerance for therex this visit. Improved strength and quad activation.      Treatment/Activity Tolerance:  [x] Patient tolerated treatment well [] Patient limited by fatique  [] Patient limited by pain  [] Patient limited by other medical complications  [] Other:     Prognosis: [x] Good [] Fair  [] Poor    Patient Requires Follow-up: [x] Yes  [] No    PLAN: See eval  [x] Continue per plan of care [] Alter current plan (see comments)  [] Plan of care initiated [] Hold pending MD visit [] Discharge    Electronically signed by: Sunday Bourne PT

## 2019-04-19 ENCOUNTER — TELEPHONE (OUTPATIENT)
Dept: ORTHOPEDIC SURGERY | Age: 59
End: 2019-04-19

## 2019-04-19 DIAGNOSIS — Z96.651 STATUS POST TOTAL RIGHT KNEE REPLACEMENT: ICD-10-CM

## 2019-04-23 ENCOUNTER — APPOINTMENT (OUTPATIENT)
Dept: PHYSICAL THERAPY | Age: 59
End: 2019-04-23
Payer: COMMERCIAL

## 2019-04-25 ENCOUNTER — TELEPHONE (OUTPATIENT)
Dept: ORTHOPEDIC SURGERY | Age: 59
End: 2019-04-25

## 2019-04-25 DIAGNOSIS — Z96.651 STATUS POST TOTAL RIGHT KNEE REPLACEMENT: ICD-10-CM

## 2019-04-25 DIAGNOSIS — Z96.651 S/P TOTAL KNEE ARTHROPLASTY, RIGHT: Primary | ICD-10-CM

## 2019-04-25 RX ORDER — ONDANSETRON 4 MG/1
4 TABLET, FILM COATED ORAL DAILY PRN
Qty: 30 TABLET | Refills: 0 | Status: SHIPPED | OUTPATIENT
Start: 2019-04-25 | End: 2019-05-03 | Stop reason: ALTCHOICE

## 2019-04-25 NOTE — TELEPHONE ENCOUNTER
Nurse Navigator called patient for one final follow up:  Still not feeling that great, doesn't seem to hold a whole lot of food down. Feels gassy. Pt is having a BM once every other day, soft stool. States it is a lack of appetite and nausea after eating. Pt is taking some zofran that her  had from his surgeries, seems to be working. RN will call office to see if they can get her a script for zofran and RN suggested pt reach out to PCP about continued nausea. Otherwise knee is doing well. Pt has no questions or concerns. Signed off from pt. Instructed pt to call RN or [facility] office with any issues or concerns.     Beto John  Orthopedic Nurse Navigator  Phone number: [phone]

## 2019-04-26 ENCOUNTER — APPOINTMENT (OUTPATIENT)
Dept: PHYSICAL THERAPY | Age: 59
End: 2019-04-26
Payer: COMMERCIAL

## 2019-04-26 ENCOUNTER — APPOINTMENT (OUTPATIENT)
Dept: CT IMAGING | Age: 59
DRG: 683 | End: 2019-04-26
Payer: COMMERCIAL

## 2019-04-26 ENCOUNTER — HOSPITAL ENCOUNTER (INPATIENT)
Age: 59
LOS: 2 days | Discharge: HOME | DRG: 683 | End: 2019-04-28
Attending: EMERGENCY MEDICINE | Admitting: INTERNAL MEDICINE
Payer: COMMERCIAL

## 2019-04-26 DIAGNOSIS — R19.7 NAUSEA VOMITING AND DIARRHEA: ICD-10-CM

## 2019-04-26 DIAGNOSIS — N17.9 AKI (ACUTE KIDNEY INJURY) (HCC): Primary | ICD-10-CM

## 2019-04-26 DIAGNOSIS — R11.2 NAUSEA VOMITING AND DIARRHEA: ICD-10-CM

## 2019-04-26 DIAGNOSIS — I95.9 HYPOTENSION, UNSPECIFIED HYPOTENSION TYPE: ICD-10-CM

## 2019-04-26 DIAGNOSIS — R00.0 TACHYCARDIA: ICD-10-CM

## 2019-04-26 LAB
A/G RATIO: 1.6 (ref 1.1–2.2)
ALBUMIN SERPL-MCNC: 4.5 G/DL (ref 3.4–5)
ALP BLD-CCNC: 100 U/L (ref 40–129)
ALT SERPL-CCNC: 11 U/L (ref 10–40)
ANION GAP SERPL CALCULATED.3IONS-SCNC: 23 MMOL/L (ref 3–16)
AST SERPL-CCNC: 12 U/L (ref 15–37)
BACTERIA: ABNORMAL /HPF
BASOPHILS ABSOLUTE: 0.1 K/UL (ref 0–0.2)
BASOPHILS RELATIVE PERCENT: 0.6 %
BILIRUB SERPL-MCNC: 0.3 MG/DL (ref 0–1)
BILIRUBIN URINE: NEGATIVE
BLOOD, URINE: NEGATIVE
BUN BLDV-MCNC: 45 MG/DL (ref 7–20)
CALCIUM SERPL-MCNC: 10 MG/DL (ref 8.3–10.6)
CHLORIDE BLD-SCNC: 91 MMOL/L (ref 99–110)
CLARITY: CLEAR
CO2: 22 MMOL/L (ref 21–32)
COLOR: YELLOW
CREAT SERPL-MCNC: 3.8 MG/DL (ref 0.6–1.1)
CREATININE URINE: 136.8 MG/DL (ref 28–259)
EKG ATRIAL RATE: 93 BPM
EKG DIAGNOSIS: NORMAL
EKG P AXIS: 67 DEGREES
EKG P-R INTERVAL: 164 MS
EKG Q-T INTERVAL: 364 MS
EKG QRS DURATION: 90 MS
EKG QTC CALCULATION (BAZETT): 452 MS
EKG R AXIS: 47 DEGREES
EKG T AXIS: 63 DEGREES
EKG VENTRICULAR RATE: 93 BPM
EOSINOPHILS ABSOLUTE: 0.3 K/UL (ref 0–0.6)
EOSINOPHILS RELATIVE PERCENT: 2.3 %
EPITHELIAL CELLS, UA: ABNORMAL /HPF
GFR AFRICAN AMERICAN: 15
GFR NON-AFRICAN AMERICAN: 12
GLOBULIN: 2.8 G/DL
GLUCOSE BLD-MCNC: 133 MG/DL (ref 70–99)
GLUCOSE BLD-MCNC: 75 MG/DL (ref 70–99)
GLUCOSE URINE: 250 MG/DL
HCT VFR BLD CALC: 40.7 % (ref 36–48)
HEMOGLOBIN: 13.9 G/DL (ref 12–16)
KETONES, URINE: NEGATIVE MG/DL
LACTIC ACID: 1.9 MMOL/L (ref 0.4–2)
LEUKOCYTE ESTERASE, URINE: ABNORMAL
LYMPHOCYTES ABSOLUTE: 1.8 K/UL (ref 1–5.1)
LYMPHOCYTES RELATIVE PERCENT: 16.4 %
MCH RBC QN AUTO: 31.7 PG (ref 26–34)
MCHC RBC AUTO-ENTMCNC: 34 G/DL (ref 31–36)
MCV RBC AUTO: 93.3 FL (ref 80–100)
MICROSCOPIC EXAMINATION: YES
MONOCYTES ABSOLUTE: 0.6 K/UL (ref 0–1.3)
MONOCYTES RELATIVE PERCENT: 5.7 %
NEUTROPHILS ABSOLUTE: 8.4 K/UL (ref 1.7–7.7)
NEUTROPHILS RELATIVE PERCENT: 75 %
NITRITE, URINE: NEGATIVE
PDW BLD-RTO: 13.4 % (ref 12.4–15.4)
PERFORMED ON: NORMAL
PH UA: 5 (ref 5–8)
PLATELET # BLD: 321 K/UL (ref 135–450)
PMV BLD AUTO: 8.6 FL (ref 5–10.5)
POTASSIUM REFLEX MAGNESIUM: 4.6 MMOL/L (ref 3.5–5.1)
PROTEIN UA: NEGATIVE MG/DL
RBC # BLD: 4.36 M/UL (ref 4–5.2)
RBC UA: ABNORMAL /HPF (ref 0–2)
SODIUM BLD-SCNC: 136 MMOL/L (ref 136–145)
SODIUM URINE: 29 MMOL/L
SPECIFIC GRAVITY UA: 1.02 (ref 1–1.03)
SPECIMEN STATUS: NORMAL
TOTAL PROTEIN: 7.3 G/DL (ref 6.4–8.2)
URINE REFLEX TO CULTURE: YES
URINE TYPE: ABNORMAL
UROBILINOGEN, URINE: 0.2 E.U./DL
WBC # BLD: 11.2 K/UL (ref 4–11)
WBC UA: ABNORMAL /HPF (ref 0–5)

## 2019-04-26 PROCEDURE — 82570 ASSAY OF URINE CREATININE: CPT

## 2019-04-26 PROCEDURE — 96361 HYDRATE IV INFUSION ADD-ON: CPT

## 2019-04-26 PROCEDURE — 83935 ASSAY OF URINE OSMOLALITY: CPT

## 2019-04-26 PROCEDURE — 80053 COMPREHEN METABOLIC PANEL: CPT

## 2019-04-26 PROCEDURE — 1200000000 HC SEMI PRIVATE

## 2019-04-26 PROCEDURE — 99291 CRITICAL CARE FIRST HOUR: CPT

## 2019-04-26 PROCEDURE — 87086 URINE CULTURE/COLONY COUNT: CPT

## 2019-04-26 PROCEDURE — 36415 COLL VENOUS BLD VENIPUNCTURE: CPT

## 2019-04-26 PROCEDURE — 2580000003 HC RX 258: Performed by: INTERNAL MEDICINE

## 2019-04-26 PROCEDURE — 74176 CT ABD & PELVIS W/O CONTRAST: CPT

## 2019-04-26 PROCEDURE — 85025 COMPLETE CBC W/AUTO DIFF WBC: CPT

## 2019-04-26 PROCEDURE — 6370000000 HC RX 637 (ALT 250 FOR IP): Performed by: INTERNAL MEDICINE

## 2019-04-26 PROCEDURE — 81001 URINALYSIS AUTO W/SCOPE: CPT

## 2019-04-26 PROCEDURE — 83605 ASSAY OF LACTIC ACID: CPT

## 2019-04-26 PROCEDURE — 93010 ELECTROCARDIOGRAM REPORT: CPT | Performed by: INTERNAL MEDICINE

## 2019-04-26 PROCEDURE — 83930 ASSAY OF BLOOD OSMOLALITY: CPT

## 2019-04-26 PROCEDURE — 2580000003 HC RX 258: Performed by: EMERGENCY MEDICINE

## 2019-04-26 PROCEDURE — 93005 ELECTROCARDIOGRAM TRACING: CPT | Performed by: EMERGENCY MEDICINE

## 2019-04-26 PROCEDURE — 6360000002 HC RX W HCPCS: Performed by: INTERNAL MEDICINE

## 2019-04-26 PROCEDURE — 96360 HYDRATION IV INFUSION INIT: CPT

## 2019-04-26 PROCEDURE — 84300 ASSAY OF URINE SODIUM: CPT

## 2019-04-26 RX ORDER — ONDANSETRON 2 MG/ML
4 INJECTION INTRAMUSCULAR; INTRAVENOUS EVERY 6 HOURS PRN
Status: DISCONTINUED | OUTPATIENT
Start: 2019-04-26 | End: 2019-04-28 | Stop reason: HOSPADM

## 2019-04-26 RX ORDER — BUPROPION HYDROCHLORIDE 150 MG/1
300 TABLET ORAL EVERY MORNING
Status: DISCONTINUED | OUTPATIENT
Start: 2019-04-27 | End: 2019-04-28 | Stop reason: HOSPADM

## 2019-04-26 RX ORDER — NICOTINE POLACRILEX 4 MG
15 LOZENGE BUCCAL PRN
Status: DISCONTINUED | OUTPATIENT
Start: 2019-04-26 | End: 2019-04-28 | Stop reason: HOSPADM

## 2019-04-26 RX ORDER — HEPARIN SODIUM 5000 [USP'U]/ML
5000 INJECTION, SOLUTION INTRAVENOUS; SUBCUTANEOUS EVERY 8 HOURS SCHEDULED
Status: DISCONTINUED | OUTPATIENT
Start: 2019-04-26 | End: 2019-04-28 | Stop reason: HOSPADM

## 2019-04-26 RX ORDER — ESCITALOPRAM OXALATE 10 MG/1
20 TABLET ORAL DAILY
Status: DISCONTINUED | OUTPATIENT
Start: 2019-04-27 | End: 2019-04-28 | Stop reason: HOSPADM

## 2019-04-26 RX ORDER — LAMOTRIGINE 100 MG/1
200 TABLET ORAL NIGHTLY
Status: DISCONTINUED | OUTPATIENT
Start: 2019-04-26 | End: 2019-04-28 | Stop reason: HOSPADM

## 2019-04-26 RX ORDER — SODIUM CHLORIDE 9 MG/ML
INJECTION, SOLUTION INTRAVENOUS CONTINUOUS
Status: DISCONTINUED | OUTPATIENT
Start: 2019-04-26 | End: 2019-04-28 | Stop reason: HOSPADM

## 2019-04-26 RX ORDER — DOCUSATE SODIUM 100 MG/1
100 CAPSULE, LIQUID FILLED ORAL 2 TIMES DAILY
Status: DISCONTINUED | OUTPATIENT
Start: 2019-04-26 | End: 2019-04-28 | Stop reason: HOSPADM

## 2019-04-26 RX ORDER — ONDANSETRON HYDROCHLORIDE 8 MG/1
4 TABLET, FILM COATED ORAL DAILY PRN
Status: DISCONTINUED | OUTPATIENT
Start: 2019-04-26 | End: 2019-04-28 | Stop reason: HOSPADM

## 2019-04-26 RX ORDER — DEXTROSE MONOHYDRATE 25 G/50ML
12.5 INJECTION, SOLUTION INTRAVENOUS PRN
Status: DISCONTINUED | OUTPATIENT
Start: 2019-04-26 | End: 2019-04-28 | Stop reason: HOSPADM

## 2019-04-26 RX ORDER — SODIUM CHLORIDE 0.9 % (FLUSH) 0.9 %
10 SYRINGE (ML) INJECTION EVERY 12 HOURS SCHEDULED
Status: DISCONTINUED | OUTPATIENT
Start: 2019-04-26 | End: 2019-04-28 | Stop reason: HOSPADM

## 2019-04-26 RX ORDER — TRAZODONE HYDROCHLORIDE 50 MG/1
200 TABLET ORAL NIGHTLY
Status: DISCONTINUED | OUTPATIENT
Start: 2019-04-27 | End: 2019-04-28 | Stop reason: HOSPADM

## 2019-04-26 RX ORDER — ATORVASTATIN CALCIUM 10 MG/1
10 TABLET, FILM COATED ORAL DAILY
Status: DISCONTINUED | OUTPATIENT
Start: 2019-04-26 | End: 2019-04-28 | Stop reason: HOSPADM

## 2019-04-26 RX ORDER — SODIUM CHLORIDE 0.9 % (FLUSH) 0.9 %
10 SYRINGE (ML) INJECTION PRN
Status: DISCONTINUED | OUTPATIENT
Start: 2019-04-26 | End: 2019-04-28 | Stop reason: HOSPADM

## 2019-04-26 RX ORDER — 0.9 % SODIUM CHLORIDE 0.9 %
1000 INTRAVENOUS SOLUTION INTRAVENOUS ONCE
Status: COMPLETED | OUTPATIENT
Start: 2019-04-26 | End: 2019-04-26

## 2019-04-26 RX ORDER — DEXTROSE MONOHYDRATE 50 MG/ML
100 INJECTION, SOLUTION INTRAVENOUS PRN
Status: DISCONTINUED | OUTPATIENT
Start: 2019-04-26 | End: 2019-04-28 | Stop reason: HOSPADM

## 2019-04-26 RX ADMIN — LAMOTRIGINE 200 MG: 100 TABLET ORAL at 21:14

## 2019-04-26 RX ADMIN — Medication 10 ML: at 21:15

## 2019-04-26 RX ADMIN — ATORVASTATIN CALCIUM 10 MG: 10 TABLET, FILM COATED ORAL at 21:14

## 2019-04-26 RX ADMIN — HEPARIN SODIUM 5000 UNITS: 5000 INJECTION INTRAVENOUS; SUBCUTANEOUS at 21:20

## 2019-04-26 RX ADMIN — SODIUM CHLORIDE 1000 ML: 9 INJECTION, SOLUTION INTRAVENOUS at 17:27

## 2019-04-26 RX ADMIN — CEFTRIAXONE SODIUM 1 G: 1 INJECTION, POWDER, FOR SOLUTION INTRAMUSCULAR; INTRAVENOUS at 21:16

## 2019-04-26 RX ADMIN — DOCUSATE SODIUM 100 MG: 100 CAPSULE, LIQUID FILLED ORAL at 21:14

## 2019-04-26 RX ADMIN — SODIUM CHLORIDE: 9 INJECTION, SOLUTION INTRAVENOUS at 21:16

## 2019-04-26 ASSESSMENT — PAIN SCALES - GENERAL
PAINLEVEL_OUTOF10: 0
PAINLEVEL_OUTOF10: 5
PAINLEVEL_OUTOF10: 0

## 2019-04-26 ASSESSMENT — ENCOUNTER SYMPTOMS
STRIDOR: 0
DIARRHEA: 1
SHORTNESS OF BREATH: 0
COUGH: 0
NAUSEA: 1
WHEEZING: 0
TROUBLE SWALLOWING: 0
CHEST TIGHTNESS: 0
ABDOMINAL PAIN: 1
SORE THROAT: 0
RHINORRHEA: 0
VOMITING: 1

## 2019-04-26 ASSESSMENT — PAIN DESCRIPTION - LOCATION: LOCATION: KNEE

## 2019-04-26 NOTE — ED NOTES
5583 Nestor Mccabe @ 8126 per Dr. Alannah Ross.   Dr. Otto Pope called back @ 6515 and was transferred to Dr. Robert Oro  04/26/19 6199

## 2019-04-26 NOTE — H&P
Hospital Medicine History & Physical      PCP: Gianna Ma MD    Date of Admission: 4/26/2019    Date of Service: Pt seen/examined on 04/26/19 and Admitted to Inpatient with expected LOS greater than two midnights due to medical therapy. Chief Complaint   Patient presents with    Abdominal Pain     Had knee replacement end of March, now having abd pain with nausea, vomiting and diarrhea     History Of Present Illness:    63 y.o. female with PMH of HTN , HLD ,Type II DM,  recent right TKR on 3/29/19 who presented to [facility] ED with c/o nonspecific abdominal pain associated with nausea, vomiting and diarrhea for the past 2 days. Patient accompanied by her  at bedside who assisted in providing current history. Patient reports that she was taking Percocet when necessary for her right knee pain, which made her constipated and hence she was changed on  Vicodin and subsequently changed to Tylenol. For the last 2 days suddenly she started having nausea associated with vomiting and unable to keep anything down. She also had diarrhea for which she took Imodium and since been resolved.  reports that he had hard time getting her blood pressure checked at home for the last 2 days. Patient reports she continued to feel sick and hence came to ED for further evaluation and management    ED course : Upon arrival to ED patient was hypotensive and tachycardic ; labs suggestive of elevated BUN/creatinine - 45/3.8 with elevated anion gap 23 . UA showed Cystitis . CT abdomen and pelvis negative for any acute abnormality .    Lactic acid pending     Past Medical History:      Diagnosis Date    Anxiety     Depression     DJD (degenerative joint disease)     hands/knees/ankles    Fatty liver     Hyperlipidemia     Hypertension     Irritable bowel syndrome     Mood disorder (HCC)     NOS    Narcotic addiction (HCC)     ANGEL (obstructive sleep apnea)     no CPAP machine - not recommended per sleep study    Restless leg syndrome     Type II or unspecified type diabetes mellitus without mention of complication, not stated as uncontrolled     Vision impairment     wears glasses and contacts       Past Surgical History:        Procedure Laterality Date    APPENDECTOMY      CHOLECYSTECTOMY  2001    COLONOSCOPY  1/2004    Due 2014    CYSTOSCOPY  7/9/10    CYSTOSCOPY  11/18/10    insertion of sparc mesh sling with cystoscopy    KNEE ARTHROSCOPY Right     KNEE ARTHROSCOPY Left 10/13    partial medial menisectomy    PLANTAR FASCIA SURGERY      bilateral    BRANDI AND BSO  4/10    DUB    TOTAL KNEE ARTHROPLASTY Right 3/29/2019    RIGHT TOTAL KNEE MAKOPLASTY REPLACEMENT WITH ADDUCTOR CANAL BLOCK FOR PAIN CONTROL                 JOE HAKAN  CPT CODE - 92101  performed by Abdulaziz Grey MD at [facility] [address]       Medications Prior to Admission:    Prior to Admission medications    Medication Sig Start Date End Date Taking?  Authorizing Provider   ondansetron (ZOFRAN) 4 MG tablet Take 1 tablet by mouth daily as needed for Nausea or Vomiting 4/25/19   Adelia Meigs, PA-C   celecoxib (CELEBREX) 100 MG capsule Take 1 capsule by mouth 2 times daily 3/30/19   Adelia Meigs, PA-C   aspirin 325 MG EC tablet Take 1 tablet by mouth 2 times daily 3/30/19   Adelia Meigs, PA-C   lisinopril-hydrochlorothiazide (PRINZIDE;ZESTORETIC) 20-12.5 MG per tablet Take 1 tablet by mouth daily 3/15/19   C Mary Gaines MD   buPROPion (WELLBUTRIN XL) 300 MG extended release tablet Take 300 mg by mouth every morning    Historical Provider, MD   metFORMIN, OSM, (FORTAMET) 1000 MG extended release tablet TAKE ONE TABLET BY MOUTH DAILY WITH BREAKFAST 9/4/18   C Mary Gaines MD   atorvastatin (LIPITOR) 10 MG tablet TAKE ONE TABLET BY MOUTH DAILY 7/10/18   Hyacinth Raygoza MD   Blood Glucose Monitoring Suppl (ONE TOUCH ULTRA 2) w/Device KIT 1 kit by Does not apply route daily 7/10/18   Hyacinth Raygoza MD   blood glucose monitor strips Test once dialy for diabetes e11.9 7/10/18   C Jannie Wallace MD   insulin glargine (LANTUS SOLOSTAR) 100 UNIT/ML injection pen INJECT 60 UNITS UNDER THE SKIN EVERY NIGHT 7/10/18   JILL Wallace MD   canagliflozin (INVOKANA) 100 MG TABS tablet Take 1 tablet by mouth every morning (before breakfast) 7/10/18   JILL Wallace MD   Lancets MISC Test once daily for diabetes e11.9 7/10/18   JILL Wallace MD   pioglitazone (ACTOS) 15 MG tablet Take 1 tablet by mouth daily 7/10/18   C Jannie Wallace MD   lamoTRIgine (LAMICTAL) 200 MG tablet Take 1 tablet by mouth nightly  4/26/18   Historical Provider, MD   Blood Glucose Monitoring Suppl CONCETTA Test once daily for diabetes e11.9 2/5/18   C Jannie Wallace MD   traZODone (DESYREL) 100 MG tablet Take 200 mg by mouth nightly  7/16/17   Historical Provider, MD   glucose blood VI test strips (ASCENSIA AUTODISC VI;ONE TOUCH ULTRA TEST VI) strip Patient has One Touch Ultra 2, tests daily 1/26/16 1/20/17  Matthew Hanley MD   escitalopram (LEXAPRO) 20 MG tablet Take 20 mg by mouth daily. Historical Provider, MD       Allergies:  Morphine    Social History:    The patient currently lives at home and is independent of IADLs    TOBACCO:   reports that she has never smoked. She has never used smokeless tobacco.  ETOH:   reports that she does not drink alcohol. Family History:     Reviewed in detail and negative for DM, CAD, Cancer, CVA. Positive as follows:        Problem Relation Age of Onset    Diabetes Mother     Arthritis Mother     Diabetes Father     Heart Disease Father     Cancer Father         colon    Dementia Father        REVIEW OF SYSTEMS:   Pertinent positives as noted in the HPI. All other systems reviewed and negative.     PHYSICAL EXAM PERFORMED:  BP (!) 106/59   Pulse 98   Temp 97.9 °F (36.6 °C) (Oral)   Resp 13   Ht 5' 5.5\" (1.664 m)   Wt 200 lb (90.7 kg)   SpO2 100%   BMI 32.78 kg/m²     General appearance: Pleasant female in no apparent distress, appears stated age and cooperative. HEENT:  Pupils equal, round, and reactive to light. Extra ocular muscles intact. Conjunctivae/corneas clear. Neck: Supple, with full range of motion. No jugular venous distention. Trachea midline. Respiratory:  Normal respiratory effort. Clear to auscultation, bilaterally without Rales/Wheezes/Rhonchi. Cardiovascular: Regular rate and rhythm with normal S1/S2 without murmurs, rubs or gallops. Abdomen: Soft, obese, round, non-tender, non-distended with normal bowel sounds. Musculoskeletal: No clubbing, cyanosis or edema bilaterally. Skin: Skin color, texture, turgor normal.    Neurologic:  Neurovascularly intact. Cranial nerves: II-XII intact, grossly non-focal.  Psychiatric: Alert and oriented, thought content appropriate, normal insight  Capillary Refill: Brisk,< 3 seconds   Peripheral Pulses: +2 palpable, equal bilaterally     Labs:   Recent Labs     04/26/19  1620   WBC 11.2*   HGB 13.9   HCT 40.7        Recent Labs     04/26/19  1600      K 4.6   CL 91*   CO2 22   BUN 45*   CREATININE 3.8*   CALCIUM 10.0     Recent Labs     04/26/19  1600   AST 12*   ALT 11   BILITOT 0.3   ALKPHOS 100     Urinalysis:    Lab Results   Component Value Date    NITRU Negative 04/26/2019    BLOODU Negative 04/26/2019    SPECGRAV 1.020 04/26/2019    GLUCOSEU 250 04/26/2019       EKG:  I have reviewed the EKG with the following interpretation: NSR , Normal Axis , Normal Intervals , Nonspecific ST/T changes      Radiology:    I have reviewed the Imaging  with the following interpretation:  CT ABDOMEN PELVIS WO CONTRAST Additional Contrast? None   Final Result   1. No CT evidence of an acute intra-abdominal or intrapelvic process. 2.  Mild intra and extrahepatic bile duct dilatation status post   cholecystectomy typical of reservoir effect. 3. Nonobstructing punctate calculus inferior pole left kidney.            Active Hospital Problems    Diagnosis Date Noted    [facility] [N17.9] 04/26/2019     ASSESSMENT/PLAN:  1. ESTEBAN likely prerenal in the setting of nausea and vomiting, diarrhea ( POA) - as evidenced by elevated BUN/creatinine - 45/3.8 .   - CT abdomen pelvis negative for acute intra-abdominal/pelvic process   - Lactic pending; received 1 L fluid bolus in ED; continue aggressive IV hydration  - Hold home medications ( metformin, lisinopril, hydrochlorothiazide, Celebrex, invokona)   - Monitor BMP     2. Acute Cystitis w/o Hematuria - UA abnormal; started on IV Rocephin empirically; follow urine cultures    3. HTN - Currently Hypotensive on admission - hold home BP medications ; Continue aggressive  IV hydration     4. Type II DM - Optimal Control based on Recent A1C - 6.6% (1/30/19) -  patient on metformin, Actos, Invokona & Lantus 60 units Nightly  - Hold and cover with SSI while IP     5. Obesity -  With Body mass index is 32.07 kg/m². Complicating assessment and treatment. Placing patient at risk for multiple co-morbidities . Counseled on weight loss. 6. Recent Right TKR - stable currently     DVT Prophylaxis: Heparin   Diet: No diet orders on file  Code Status: Prior    PT/OT Eval Status: Not yet ordered     Dispo - Anticipate > 2 MN stay in the hospital      Paul Carr MD    The note was completed using Dragon -speech recognition software & EMR  . Every effort was made to ensure accuracy; however, inadvertent computerized transcription errors may be present. Thank you Gregorio Vieira MD for the opportunity to be involved in this patient's care. If you have any questions or concerns please feel free to contact me at [phone].

## 2019-04-26 NOTE — ED PROVIDER NOTES
PAST MEDICAL HISTORY     Past Medical History:   Diagnosis Date    Anxiety     Depression     DJD (degenerative joint disease)     hands/knees/ankles    Fatty liver     Hyperlipidemia     Hypertension     Irritable bowel syndrome     Mood disorder ([facility]     NOS    Narcotic addiction ([facility]     ANGEL (obstructive sleep apnea)     no CPAP machine - not recommended per sleep study    Restless leg syndrome     Type II or unspecified type diabetes mellitus without mention of complication, not stated as uncontrolled     Vision impairment     wears glasses and contacts         SURGICAL HISTORY       Past Surgical History:   Procedure Laterality Date    APPENDECTOMY      CHOLECYSTECTOMY  2001    COLONOSCOPY  1/2004    Due 2014    CYSTOSCOPY  7/9/10    CYSTOSCOPY  11/18/10    insertion of sparc mesh sling with cystoscopy    KNEE ARTHROSCOPY Right     KNEE ARTHROSCOPY Left 10/13    partial medial menisectomy    PLANTAR FASCIA SURGERY      bilateral    BRANDI AND BSO  4/10    DUB    TOTAL KNEE ARTHROPLASTY Right 3/29/2019    RIGHT TOTAL KNEE MAKOPLASTY REPLACEMENT WITH ADDUCTOR CANAL BLOCK FOR PAIN CONTROL                 JOE AYALAO  CPT CODE - 49652  performed by Adela Neal MD at [address] [facility]       Current Discharge Medication List      CONTINUE these medications which have NOT CHANGED    Details   ondansetron (ZOFRAN) 4 MG tablet Take 1 tablet by mouth daily as needed for Nausea or Vomiting  Qty: 30 tablet, Refills: 0    Associated Diagnoses: S/P total knee arthroplasty, right      celecoxib (CELEBREX) 100 MG capsule Take 1 capsule by mouth 2 times daily  Qty: 60 capsule, Refills: 3      aspirin 325 MG EC tablet Take 1 tablet by mouth 2 times daily  Qty: 60 tablet, Refills: 0      lisinopril-hydrochlorothiazide (PRINZIDE;ZESTORETIC) 20-12.5 MG per tablet Take 1 tablet by mouth daily  Qty: 30 tablet, Refills: 5      buPROPion (WELLBUTRIN XL) 300 MG extended release tablet Take 300 mg by mouth every morning      metFORMIN, OSM, (FORTAMET) 1000 MG extended release tablet TAKE ONE TABLET BY MOUTH DAILY WITH BREAKFAST  Qty: 30 tablet, Refills: 5    Associated Diagnoses: Type 2 diabetes mellitus without complication, without long-term current use of insulin ([facility])      atorvastatin (LIPITOR) 10 MG tablet TAKE ONE TABLET BY MOUTH DAILY  Qty: 30 tablet, Refills: 5    Associated Diagnoses: Pure hypercholesterolemia      canagliflozin (INVOKANA) 100 MG TABS tablet Take 1 tablet by mouth every morning (before breakfast)  Qty: 30 tablet, Refills: 5      pioglitazone (ACTOS) 15 MG tablet Take 1 tablet by mouth daily  Qty: 30 tablet, Refills: 5      lamoTRIgine (LAMICTAL) 200 MG tablet Take 1 tablet by mouth nightly       traZODone (DESYREL) 100 MG tablet Take 200 mg by mouth nightly       escitalopram (LEXAPRO) 20 MG tablet Take 20 mg by mouth daily.       Blood Glucose Monitoring Suppl (ONE TOUCH ULTRA 2) w/Device KIT 1 kit by Does not apply route daily  Qty: 1 kit, Refills: 0      blood glucose monitor strips Test once dialy for diabetes e11.9  Qty: 100 strip, Refills: 5      insulin glargine (LANTUS SOLOSTAR) 100 UNIT/ML injection pen INJECT 60 UNITS UNDER THE SKIN EVERY NIGHT  Qty: 15 pen, Refills: 5    Associated Diagnoses: Type 2 diabetes mellitus without complication, without long-term current use of insulin ([facility])      Lancets MISC Test once daily for diabetes e11.9  Qty: 100 each, Refills: 3      Blood Glucose Monitoring Suppl CONCETTA Test once daily for diabetes e11.9  Qty: 1 Device, Refills: 0             ALLERGIES     Morphine    FAMILY HISTORY       Family History   Problem Relation Age of Onset    Diabetes Mother     Arthritis Mother     Diabetes Father     Heart Disease Father     Cancer Father         colon    Dementia Father           SOCIAL HISTORY       Social History     Socioeconomic History    Marital status:      Spouse name: None    Number of children: None    light. Conjunctivae and EOM are normal.   Neck: Normal range of motion. Neck supple. Cardiovascular: Normal rate, regular rhythm, normal heart sounds and intact distal pulses. Exam reveals no gallop and no friction rub. No murmur heard. Pulmonary/Chest: Effort normal and breath sounds normal. No respiratory distress. She has no decreased breath sounds. She has no wheezes. She has no rhonchi. She has no rales. Abdominal: Soft. Normal appearance and bowel sounds are normal. She exhibits no distension. There is no tenderness. There is no rebound and no guarding. Musculoskeletal: Normal range of motion. She exhibits no edema, tenderness or deformity. Neurological: She is alert and oriented to person, place, and time. GCS eye subscore is 4. GCS verbal subscore is 5. GCS motor subscore is 6. Skin: Skin is warm and dry. No rash noted. DIAGNOSTIC RESULTS     EKG: All EKG's are interpreted by the Emergency Department Physicianwho either signs or Co-signs this chart in the absence of a cardiologist.    The Ekg interpreted by me shows  normal sinus rhythm with a rate of 93  Axis is   Normal  QTc is  452  Intervals and Durations are unremarkable. ST Segments: normal  No significant change from prior EKG dated 3*19*19      RADIOLOGY:   Non-plain film images such as CT, Ultrasound and MRI are read by the radiologist. Plain radiographic images are visualized and preliminarily interpreted by the emergency physician with the below findings:      Interpretation per the Radiologist below, if available at the time of this note:    CT ABDOMEN PELVIS WO CONTRAST Additional Contrast? None   Final Result   1. No CT evidence of an acute intra-abdominal or intrapelvic process. 2.  Mild intra and extrahepatic bile duct dilatation status post   cholecystectomy typical of reservoir effect. 3. Nonobstructing punctate calculus inferior pole left kidney.                ED BEDSIDE ULTRASOUND:   Performed by ED Physician - none    LABS:  Labs Reviewed   CBC WITH AUTO DIFFERENTIAL - Abnormal; Notable for the following components:       Result Value    WBC 11.2 (*)     Neutrophils # 8.4 (*)     All other components within normal limits    Narrative:     Performed at:  [address], [facility]   Phone [phone]   COMPREHENSIVE METABOLIC PANEL W/ REFLEX TO MG FOR LOW K - Abnormal; Notable for the following components:    Chloride 91 (*)     Anion Gap 23 (*)     Glucose 133 (*)     BUN 45 (*)     CREATININE 3.8 (*)     GFR Non- 12 (*)     GFR  15 (*)     AST 12 (*)     All other components within normal limits    Narrative:     Performed at:  [address], [facility]   Phone [phone]   URINE RT REFLEX TO CULTURE - Abnormal; Notable for the following components:    Glucose, Ur 250 (*)     Leukocyte Esterase, Urine SMALL (*)     All other components within normal limits    Narrative:     Performed at:  [address], [facility]   Phone [phone]   MICROSCOPIC URINALYSIS - Abnormal; Notable for the following components:    WBC, UA 10-20 (*)     RBC, UA 3-5 (*)     Bacteria, UA 2+ (*)     All other components within normal limits    Narrative:     Performed at:  [address], [facility]   Phone [phone]   URINE CULTURE   SAMPLE POSSIBLE BLOOD BANK TESTING    Narrative:     Performed at:  [address], [facility]   Phone [phone]   LACTIC ACID, PLASMA    Narrative:     Collection has been rescheduled by [facility] at 4/26/2019 20:47.  Reason: No   s  uitable vein for venipuncture  Performed at:  [address], [facility]   Phone [phone] CREATININE, RANDOM URINE    Narrative:     Performed at:  [facility]) [address], [facility] [employer]   Phone [phone]   SODIUM, URINE, RANDOM    Narrative:     Performed at:  [facility]) [address], [facility] [employer]   Phone [phone]   OSMOLALITY   OSMOLALITY, URINE   POCT GLUCOSE    Narrative:     Performed at:  [facility]) [address], [facility] [employer]   Phone [phone]   POCT GLUCOSE   POCT GLUCOSE       All other labs were within normal range ornot returned as of this dictation. EMERGENCY DEPARTMENT COURSE and DIFFERENTIAL DIAGNOSIS/MDM:   Vitals:    Vitals:    04/26/19 1801 04/26/19 1830 04/26/19 1945 04/26/19 2345   BP: 112/64 (!) 106/59 113/69 95/61   Pulse: 97 98 100 103   Resp: 12 13 18 17   Temp:  97.9 °F (36.6 °C) 99.1 °F (37.3 °C) 98.4 °F (36.9 °C)   TempSrc:  Oral Oral Oral   SpO2: 100% 100% 100% 100%   Weight:   195 lb 11.2 oz (88.8 kg)    Height:             [facility]    ED COURSE/MDM    -Jory Sands is a 63 y.o. female with a history of hypertension, diabetes who presents to ED for decreased in PO intake, n/v/d  -Patient seen and evaluated. Oldrecords reviewed. Labs and imaging reviewed and results discussed with patient. Workup was significant for elevated BUN, creatinine of 3.8, anion gap of 23  -in triage bp 74/57 and tachycardic at 112  -bp normally 109/68, 120/80, 130/86  -Patient was given IVF bolus in the ED   -declined zofran and pain medication  -CT a/p: No CT evidence of an acute intra-abdominal or intrapelvic process. Mild intra-and extrahepatic bile duct dilation status post cholecystectomy typical respiratory effort. Nonobstructing calculus inferior pole left kidney.   -IV fluid bolus patient's blood pressure improved to 106/59 and tachycardia improved to 98  -Plan for admission for further workup and treatment  For ESTEBAN discussed with patient and family. Patient and family in agreement with plan and have nofurther questions/concerns      REASSESSMENT      Well appearing, non toxic, alert, oriented speaking in full sentences and hemodynamically stable upon admission      CRITICAL CARE TIME   Total Critical Care time was 35 minutes, excluding separately reportableprocedures. There was a high probability of clinicallysignificant/life threatening deterioration in the patient's condition which required my urgent intervention. CONSULTS:  IP CONSULT TO HOSPITALIST    PROCEDURES:  Unless otherwise noted below, none     Procedures    FINAL IMPRESSION      1. ESTEBAN (acute kidney injury) ([facility] [address]    2. Nausea vomiting and diarrhea    3. Tachycardia    4.  Hypotension, unspecified hypotension type          DISPOSITION/PLAN   DISPOSITION        PATIENT REFERREDTO:  Keyanna Timmons MD  [address] [place] [address] [address]  [place] [id number]  [phone]            DISCHARGE MEDICATIONS:  Current Discharge Medication List             (Please note that portions of this note were completed with a voice recognition program.  Efforts were made to edit the dictations but occasionally wordsare mis-transcribed.)    Keely Marie MD (electronically signed)  Attending Emergency Physician            Keely Marie MD  04/27/19 6942

## 2019-04-27 LAB
ANION GAP SERPL CALCULATED.3IONS-SCNC: 11 MMOL/L (ref 3–16)
BASOPHILS ABSOLUTE: 0 K/UL (ref 0–0.2)
BASOPHILS RELATIVE PERCENT: 0.7 %
BUN BLDV-MCNC: 25 MG/DL (ref 7–20)
CALCIUM SERPL-MCNC: 8.9 MG/DL (ref 8.3–10.6)
CHLORIDE BLD-SCNC: 105 MMOL/L (ref 99–110)
CO2: 20 MMOL/L (ref 21–32)
CREAT SERPL-MCNC: 1.5 MG/DL (ref 0.6–1.1)
EOSINOPHILS ABSOLUTE: 0.2 K/UL (ref 0–0.6)
EOSINOPHILS RELATIVE PERCENT: 4.2 %
GFR AFRICAN AMERICAN: 43
GFR NON-AFRICAN AMERICAN: 36
GLUCOSE BLD-MCNC: 100 MG/DL (ref 70–99)
GLUCOSE BLD-MCNC: 102 MG/DL (ref 70–99)
GLUCOSE BLD-MCNC: 114 MG/DL (ref 70–99)
GLUCOSE BLD-MCNC: 82 MG/DL (ref 70–99)
GLUCOSE BLD-MCNC: 83 MG/DL (ref 70–99)
GLUCOSE BLD-MCNC: 85 MG/DL (ref 70–99)
HCT VFR BLD CALC: 37.8 % (ref 36–48)
HEMOGLOBIN: 12.7 G/DL (ref 12–16)
LYMPHOCYTES ABSOLUTE: 1.2 K/UL (ref 1–5.1)
LYMPHOCYTES RELATIVE PERCENT: 20.9 %
MCH RBC QN AUTO: 31.7 PG (ref 26–34)
MCHC RBC AUTO-ENTMCNC: 33.5 G/DL (ref 31–36)
MCV RBC AUTO: 94.6 FL (ref 80–100)
MONOCYTES ABSOLUTE: 0.4 K/UL (ref 0–1.3)
MONOCYTES RELATIVE PERCENT: 6.7 %
NEUTROPHILS ABSOLUTE: 3.8 K/UL (ref 1.7–7.7)
NEUTROPHILS RELATIVE PERCENT: 67.5 %
PDW BLD-RTO: 13.2 % (ref 12.4–15.4)
PERFORMED ON: ABNORMAL
PERFORMED ON: ABNORMAL
PERFORMED ON: NORMAL
PLATELET # BLD: 217 K/UL (ref 135–450)
PMV BLD AUTO: 8.6 FL (ref 5–10.5)
POTASSIUM REFLEX MAGNESIUM: 4.7 MMOL/L (ref 3.5–5.1)
RBC # BLD: 4 M/UL (ref 4–5.2)
SODIUM BLD-SCNC: 136 MMOL/L (ref 136–145)
WBC # BLD: 5.6 K/UL (ref 4–11)

## 2019-04-27 PROCEDURE — 1200000000 HC SEMI PRIVATE

## 2019-04-27 PROCEDURE — 6370000000 HC RX 637 (ALT 250 FOR IP): Performed by: NURSE PRACTITIONER

## 2019-04-27 PROCEDURE — 36415 COLL VENOUS BLD VENIPUNCTURE: CPT

## 2019-04-27 PROCEDURE — 6360000002 HC RX W HCPCS: Performed by: INTERNAL MEDICINE

## 2019-04-27 PROCEDURE — 85025 COMPLETE CBC W/AUTO DIFF WBC: CPT

## 2019-04-27 PROCEDURE — 2580000003 HC RX 258: Performed by: INTERNAL MEDICINE

## 2019-04-27 PROCEDURE — 80048 BASIC METABOLIC PNL TOTAL CA: CPT

## 2019-04-27 PROCEDURE — 6370000000 HC RX 637 (ALT 250 FOR IP): Performed by: INTERNAL MEDICINE

## 2019-04-27 RX ORDER — SIMETHICONE 80 MG
80 TABLET,CHEWABLE ORAL EVERY 6 HOURS PRN
Status: DISCONTINUED | OUTPATIENT
Start: 2019-04-27 | End: 2019-04-28 | Stop reason: HOSPADM

## 2019-04-27 RX ORDER — ACETAMINOPHEN 325 MG/1
650 TABLET ORAL EVERY 4 HOURS PRN
Status: DISCONTINUED | OUTPATIENT
Start: 2019-04-27 | End: 2019-04-28 | Stop reason: HOSPADM

## 2019-04-27 RX ADMIN — HEPARIN SODIUM 5000 UNITS: 5000 INJECTION INTRAVENOUS; SUBCUTANEOUS at 06:22

## 2019-04-27 RX ADMIN — SODIUM CHLORIDE: 9 INJECTION, SOLUTION INTRAVENOUS at 09:30

## 2019-04-27 RX ADMIN — TRAZODONE HYDROCHLORIDE 200 MG: 50 TABLET ORAL at 20:15

## 2019-04-27 RX ADMIN — ACETAMINOPHEN 650 MG: 325 TABLET ORAL at 16:38

## 2019-04-27 RX ADMIN — CEFTRIAXONE SODIUM 1 G: 1 INJECTION, POWDER, FOR SOLUTION INTRAMUSCULAR; INTRAVENOUS at 20:15

## 2019-04-27 RX ADMIN — DOCUSATE SODIUM 100 MG: 100 CAPSULE, LIQUID FILLED ORAL at 08:44

## 2019-04-27 RX ADMIN — ONDANSETRON 4 MG: 2 INJECTION INTRAMUSCULAR; INTRAVENOUS at 09:25

## 2019-04-27 RX ADMIN — LAMOTRIGINE 200 MG: 100 TABLET ORAL at 20:15

## 2019-04-27 RX ADMIN — TRAZODONE HYDROCHLORIDE 200 MG: 50 TABLET ORAL at 00:18

## 2019-04-27 RX ADMIN — Medication 10 ML: at 22:06

## 2019-04-27 RX ADMIN — SODIUM CHLORIDE: 9 INJECTION, SOLUTION INTRAVENOUS at 20:15

## 2019-04-27 RX ADMIN — HEPARIN SODIUM 5000 UNITS: 5000 INJECTION INTRAVENOUS; SUBCUTANEOUS at 23:01

## 2019-04-27 RX ADMIN — BUPROPION HYDROCHLORIDE 300 MG: 150 TABLET, FILM COATED, EXTENDED RELEASE ORAL at 08:43

## 2019-04-27 RX ADMIN — ACETAMINOPHEN 650 MG: 325 TABLET ORAL at 06:26

## 2019-04-27 RX ADMIN — HEPARIN SODIUM 5000 UNITS: 5000 INJECTION INTRAVENOUS; SUBCUTANEOUS at 13:46

## 2019-04-27 RX ADMIN — DOCUSATE SODIUM 100 MG: 100 CAPSULE, LIQUID FILLED ORAL at 20:15

## 2019-04-27 RX ADMIN — ESCITALOPRAM OXALATE 20 MG: 10 TABLET ORAL at 08:43

## 2019-04-27 RX ADMIN — ATORVASTATIN CALCIUM 10 MG: 10 TABLET, FILM COATED ORAL at 08:44

## 2019-04-27 ASSESSMENT — PAIN SCALES - GENERAL
PAINLEVEL_OUTOF10: 0
PAINLEVEL_OUTOF10: 7
PAINLEVEL_OUTOF10: 0
PAINLEVEL_OUTOF10: 3
PAINLEVEL_OUTOF10: 3

## 2019-04-27 NOTE — PROGRESS NOTES
Hospitalist Progress Note  4/27/2019 2:53 PM  Subjective:   Admit Date: 4/26/2019  PCP: Holly Cardenas MD Status: Inpatient [101]  Interval History: Hospital Day: 2, admitted with acute kidney injury secondary to dehydration from nausea and vomiting and acute hemorrhagic cystitis with hypotension exacerbated by Invokana. Nausea treated with Zofran. DIET CARB CONTROL; Carb Control: 4 carb choices (60 gms)/meal  Medications:   Sodium chloride at 100 ml/hr  docusate sodium  100 mg Oral BID   heparin (porcine)  5,000 Units Subcutaneous 3 times per day   cefTRIAXone  1 g Intravenous Q24H (4/26, day # 2)   insulin lispro SSI   0-6 Units Subcutaneous TID WC / HS   atorvastatin  10 mg Oral Daily   buPROPion  300 mg Oral QAM   escitalopram  20 mg Oral Daily   lamoTRIgine  200 mg Oral Nightly   traZODone  200 mg Oral Nightly     Recent Labs     04/26/19  1620 04/27/19  1420   WBC 11.2* 5.6   HGB 13.9 12.7    217   MCV 93.3 94.6     Recent Labs     04/26/19  1600 04/27/19  1419    136   K 4.6 4.7   CL 91* 105   CO2 22 20*   BUN 45* 25*   CREATININE 3.8* 1.5*   GLUCOSE 133* 114*     Recent Labs     04/26/19  1600   AST 12*   ALT 11   BILITOT 0.3   ALKPHOS 100     POC Glucose:   04/26/19 2000 04/27/19 0222 04/27/19 0722 04/27/19 1137   75 82 83 85       Objective:   Vitals:  BP 97/68   Pulse 102   Temp 98.5 °F (oral)   Resp 18   Ht 5' 6\"  Wt 88.8 kg  SpO2 98%   BMI 32.07 kg/m²   General appearance: alert and cooperative with exam  Lungs: clear to auscultation bilaterally  Heart: regular rate and rhythm, S1, S2 normal, no murmur, click, rub or gallop  Abdomen:  Mostly benign, diffusely tender, active bowel sounds  Extremities: extremities normal, atraumatic, no cyanosis or edema, left knee post-op  Neurologic: No obvious focal neurologic deficits. Assessment and Plan:   1. Acute kidney injury secondary dehydration:  Improved BUN/Cr with normal saline volume expansion.    2. Abdominal pain with nausea and vomiting:  CT abdomen pelvis negative for acute intra - abdominal or pelvic process   3. Leukocytosis:  Resolving. 4. Acute Cystitis w/o hematuria:  Ceftriaxone 1 gm IV daily x 3 days. 5. Hypertension:  Continue hypotension. Medications held. 6. Type 2 Diabetes (controlled, HgbA1c 6.6% on 1/30/19): Metformin, Actos, and Invokana held. Cover with a \"sliding scale\" lispro moderate scale prandial correction insulin. 7. Class I Obesity (BMI 11.2) complicated medical management. 8. Right total knee arthroplasty (3/29/19) stable. She has been participating with therapy.       Advance Directive: Full Code  DVT prophylaxis with heparin (renal dosing)  Discharge planning: Sunday, April 550 Karli Wills MD  [facility] Hospitalist

## 2019-04-27 NOTE — PROGRESS NOTES
Patient admitted to room 364  from ED. Patient oriented to room, call light, bed rails, phone, lights, & bathroom. Patient instructed about the schedule of the day including: VS frequency, lab draws, possible tests, frequency of MD & staff rounds. Pt instructed about prescribed diet, how/when to call for meal service & television. Telemetry box in place, patient aware of placement & reason. 4 eyes skin check completed with Terry Godinez RN with no skin issues noted, healing R knee surgical incision scar. Bed locked, in lowest position, side rails up 2/4, call light within reach.

## 2019-04-28 VITALS
BODY MASS INDEX: 31.45 KG/M2 | WEIGHT: 195.7 LBS | TEMPERATURE: 98.7 F | HEIGHT: 66 IN | DIASTOLIC BLOOD PRESSURE: 76 MMHG | OXYGEN SATURATION: 98 % | RESPIRATION RATE: 16 BRPM | HEART RATE: 98 BPM | SYSTOLIC BLOOD PRESSURE: 115 MMHG

## 2019-04-28 PROBLEM — N17.9 AKI (ACUTE KIDNEY INJURY) (HCC): Status: RESOLVED | Noted: 2019-04-26 | Resolved: 2019-04-28

## 2019-04-28 LAB
ANION GAP SERPL CALCULATED.3IONS-SCNC: 10 MMOL/L (ref 3–16)
BUN BLDV-MCNC: 14 MG/DL (ref 7–20)
CALCIUM SERPL-MCNC: 9 MG/DL (ref 8.3–10.6)
CHLORIDE BLD-SCNC: 107 MMOL/L (ref 99–110)
CO2: 25 MMOL/L (ref 21–32)
CREAT SERPL-MCNC: 0.9 MG/DL (ref 0.6–1.1)
GFR AFRICAN AMERICAN: >60
GFR NON-AFRICAN AMERICAN: >60
GLUCOSE BLD-MCNC: 106 MG/DL (ref 70–99)
GLUCOSE BLD-MCNC: 87 MG/DL (ref 70–99)
PERFORMED ON: ABNORMAL
POTASSIUM REFLEX MAGNESIUM: 4.5 MMOL/L (ref 3.5–5.1)
SODIUM BLD-SCNC: 142 MMOL/L (ref 136–145)

## 2019-04-28 PROCEDURE — 6370000000 HC RX 637 (ALT 250 FOR IP): Performed by: INTERNAL MEDICINE

## 2019-04-28 PROCEDURE — 36415 COLL VENOUS BLD VENIPUNCTURE: CPT

## 2019-04-28 PROCEDURE — 80048 BASIC METABOLIC PNL TOTAL CA: CPT

## 2019-04-28 PROCEDURE — 6360000002 HC RX W HCPCS: Performed by: INTERNAL MEDICINE

## 2019-04-28 PROCEDURE — 6370000000 HC RX 637 (ALT 250 FOR IP): Performed by: NURSE PRACTITIONER

## 2019-04-28 PROCEDURE — 2580000003 HC RX 258: Performed by: INTERNAL MEDICINE

## 2019-04-28 PROCEDURE — 97161 PT EVAL LOW COMPLEX 20 MIN: CPT

## 2019-04-28 RX ADMIN — BUPROPION HYDROCHLORIDE 300 MG: 150 TABLET, FILM COATED, EXTENDED RELEASE ORAL at 08:54

## 2019-04-28 RX ADMIN — ACETAMINOPHEN 650 MG: 325 TABLET ORAL at 08:54

## 2019-04-28 RX ADMIN — HEPARIN SODIUM 5000 UNITS: 5000 INJECTION INTRAVENOUS; SUBCUTANEOUS at 06:34

## 2019-04-28 RX ADMIN — ESCITALOPRAM OXALATE 20 MG: 10 TABLET ORAL at 08:54

## 2019-04-28 RX ADMIN — Medication 10 ML: at 08:56

## 2019-04-28 RX ADMIN — SIMETHICONE 80 MG: 80 TABLET, CHEWABLE ORAL at 08:54

## 2019-04-28 RX ADMIN — DOCUSATE SODIUM 100 MG: 100 CAPSULE, LIQUID FILLED ORAL at 08:54

## 2019-04-28 RX ADMIN — ATORVASTATIN CALCIUM 10 MG: 10 TABLET, FILM COATED ORAL at 08:54

## 2019-04-28 ASSESSMENT — PAIN SCALES - GENERAL
PAINLEVEL_OUTOF10: 5
PAINLEVEL_OUTOF10: 0
PAINLEVEL_OUTOF10: 0
PAINLEVEL_OUTOF10: 5

## 2019-04-28 ASSESSMENT — PAIN DESCRIPTION - LOCATION: LOCATION: KNEE

## 2019-04-28 NOTE — PROGRESS NOTES
Tele removed. [address] notified. IV removed without complication. Discharge paperwork completed. Questions answered. Denies further needs.

## 2019-04-28 NOTE — DISCHARGE SUMMARY
Hospital Medicine Discharge Summary    Maxi Davies  :  1960  MRN:  [id number]    Admit date:  2019  Discharge date:  2019    Admitting Physician:  Sven Holugin MD  Primary Care Physician:  Gerber Sheridan MD  Code Status:   Full Code  Status: Inpatient  Discharged Condition: Stable  Activity: activity as tolerated  Diet:  DIET CARB CONTROL; Carb Control: 4 carb choices (60 gms)/meal      Discharge Diagnoses:      Acute kidney injuruy    Acute cystitis    Urinary tract infection    Leukocytosis     Hypertension    Abdominal pain with nausea and vomiting    Type 2 Diabetes (controlled, HgbA1c 6.6% on 19)    Right total knee arthroplasty    Hospital Course:   63 y.o. female admitted with admitted with acute kidney injury secondary to dehydration from nausea and vomiting and acute hemorrhagic cystitis with hypotension exacerbated by Invokana. Nausea treated with Zofran. Completed three days of ceftriaxone antibiotic for cystitis. Leukocytosis resolved. Creatinine decreased from 3.8 on admission to 0.9 mmol/L at discharge. 1. Acute kidney injury secondary dehydration:  Improved BUN/Cr with normal saline volume expansion. 2. Abdominal pain with nausea and vomiting:  CT abdomen pelvis negative for acute intra - abdominal or pelvic process   3. Leukocytosis:  Resolving. 4. Acute Cystitis w/o hematuria:  Ceftriaxone 1 gm IV daily x 3 days. 5. Hypertension:  Continue hypotension. Medications held. 6. Type 2 Diabetes (controlled, HgbA1c 6.6% on 19): Metformin, Actos, and Invokana held. Cover with a \"sliding scale\" lispro moderate scale prandial correction insulin. 7. Class I Obesity (BMI 63.9) complicated medical management. 8. Right total knee arthroplasty (3/29/19) stable.   She has been participating with therapy      Discharge Medications:    aspirin 325 MG EC tablet  Take 1 tablet by mouth 2 times daily     atorvastatin (LIPITOR) 10 MG tablet  TAKE ONE TABLET BY MOUTH DAILY     buPROPion (WELLBUTRIN XL) 300 MG extended release tablet  Take 300 mg by mouth every morning     canagliflozin (INVOKANA) 100 MG TABS tablet  Take 1 tablet by mouth every morning (before breakfast)     celecoxib (CELEBREX) 100 MG capsule  Take 1 capsule by mouth 2 times daily     escitalopram (LEXAPRO) 20 MG tablet  Take 20 mg by mouth daily. insulin glargine (LANTUS SOLOSTAR) 100 UNIT/ML injection pen  INJECT 60 UNITS UNDER THE SKIN EVERY NIGHT     lamoTRIgine (LAMICTAL) 200 MG tablet  Take 1 tablet by mouth nightly      lisinopril-hydrochlorothiazide (PRINZIDE;ZESTORETIC) 20-12.5 MG per tablet  Take 1 tablet by mouth daily (held at discharge)     metFORMIN, OSM, (FORTAMET) 1000 MG extended release tablet  TAKE ONE TABLET BY MOUTH DAILY WITH BREAKFAST     ondansetron (ZOFRAN) 4 MG tablet  Take 1 tablet by mouth daily as needed for Nausea or Vomiting     pioglitazone (ACTOS) 15 MG tablet  Take 1 tablet by mouth daily     traZODone (DESYREL) 100 MG tablet  Take 200 mg by mouth nightly          Consults:  none    Significant Diagnostic Studies:    1. Urinalysis with urine culture: Growth too young, Further report to follow   2. CT abd/pelvis:  No CT evidence of an acute intra-abdominal or intrapelvic process. Mild intra and extrahepatic bile duct dilatation status post cholecystectomy typical of reservoir effect. Nonobstructing punctate calculus inferior pole left kidney. Treatments:   Volume expansion with IV saline, IV ceftriaxone x 3 days    Disposition:   Home with self care  Follow up with Cachorro Guaman MD in 1-2 weeks.       Signed:  LENARD ELDRIDGE  4/28/2019, 1:35 PM

## 2019-04-28 NOTE — PROGRESS NOTES
Physical Therapy    Facility/Department: [facility] B3 - MED SURG  Initial Assessment and D/c Summary    NAME: Love Sever  : 1960  MRN: [id number]    Date of Service: 2019    Discharge Recommendations:  Home with assist PRN, Outpatient PT   PT Equipment Recommendations  Equipment Needed: No    Assessment   Assessment: Pt safe to return home; amb 150 ft Mod I with RW. Recommend home w assist PRN and continue OPPT for s/p R TKA. No further acute PT indicated at this time. Prognosis: Excellent  Decision Making: Low Complexity  Patient Education: role of PT, d/c recs, amb with RW, use of call light; pt verbalizes understanding  REQUIRES PT FOLLOW UP: No  Activity Tolerance  Activity Tolerance: Patient Tolerated treatment well       Patient Diagnosis(es): The primary encounter diagnosis was ESTEBAN (acute kidney injury) ([address] Diagnoses of Nausea vomiting and diarrhea, Tachycardia, and Hypotension, unspecified hypotension type were also pertinent to this visit. has a past medical history of Anxiety, Depression, DJD (degenerative joint disease), Fatty liver, Hyperlipidemia, Hypertension, Irritable bowel syndrome, Mood disorder ([address] Narcotic addiction ([address] ANGEL (obstructive sleep apnea), Restless leg syndrome, Type II or unspecified type diabetes mellitus without mention of complication, not stated as uncontrolled, and Vision impairment. has a past surgical history that includes ele and bso (cervix removed) (4/10); Knee arthroscopy (Right); Plantar fascia surgery; Appendectomy; Cholecystectomy (); Cystoscopy (7/9/10); Cystoscopy (11/18/10); Colonoscopy (2004); Knee arthroscopy (Left, 10/13); and Total knee arthroplasty (Right, 3/29/2019).     Restrictions  Restrictions/Precautions  Restrictions/Precautions: Fall Risk, General Precautions  Position Activity Restriction  Other position/activity restrictions: up w assist, high fall  Vision/Hearing        Subjective  General  Chart Reviewed: Yes  Patient to Stand: Modified independent  Stand to sit: Modified independent  Bed to Chair: Unable to assess  Ambulation  Ambulation?: Yes  Ambulation 1  Surface: level tile  Device: Rolling Walker  Assistance: Modified Independent  Quality of Gait: partial step through with RLE leading, steady throughout  Distance: 150 ft  Stairs/Curb  Stairs?: No              Plan   Plan  Plan Comment: d/c acute PT  Safety Devices  Type of devices:  All fall risk precautions in place, Call light within reach, Left in bed, Sitter present, Gait belt  Restraints  Initially in place: No    G-Code  PT G-Codes  Functional Assessment Tool Used: [facility]  Score: 23  Functional Limitation: Mobility: Walking and moving around    AM-PAC Score  AM-PAC Inpatient Mobility Raw Score : 23  AM-PAC Inpatient T-Scale Score : 56.93  Mobility Inpatient CMS 0-100% Score: 11.2  Mobility Inpatient CMS G-Code Modifier : CI          Therapy Time   Individual Concurrent Group Co-treatment   Time In 1314         Time Out 1324         Minutes 10         Timed Code Treatment Minutes: 0 Minutes(10 for eval)       Martin Client, PT, DPT #[id number]

## 2019-04-29 ENCOUNTER — CARE COORDINATION (OUTPATIENT)
Dept: CASE MANAGEMENT | Age: 59
End: 2019-04-29

## 2019-04-29 LAB
ORGANISM: ABNORMAL
OSMOLALITY URINE: 294 MOSM/KG (ref 390–1070)
OSMOLALITY: 292 MOSM/KG (ref 278–305)
URINE CULTURE, ROUTINE: ABNORMAL
URINE CULTURE, ROUTINE: ABNORMAL

## 2019-04-29 NOTE — CARE COORDINATION
Jenna [facility] Transitions Initial Follow Up Call    Call within 2 business days of discharge: Yes    Patient: Rosette Vincent Patient : 1960   MRN: [id number]  Reason for Admission: ESTEBAN   Discharge Date: 19 RARS: Readmission Risk Score: 8      Last Discharge [facility]       Complaint Diagnosis Description Type Department Provider    19 Abdominal Pain ESTEBAN (acute kidney injury) ([facility] . .. ED to Hosp-Admission (Discharged) (ADMITTED) Joselin Phelps MD; Suzanne Rossi. .. Spoke with: One Tasha Leigh: Antoinette Segura     Non-face-to-face services provided:  Obtained and reviewed discharge summary and/or continuity of care documents    Care Transitions 24 Hour Call    Do you have any ongoing symptoms?:  Yes  Patient-reported symptoms:  Abdominal Pain, Nausea, Other  Do you have a copy of your discharge instructions?:  Yes  Do you have all of your prescriptions and are they filled?:  Yes  Have you been contacted by a [facility] Pharmacist?:  No  Have you scheduled your follow up appointment?:  Yes  How are you going to get to your appointment?:  Car - family or friend to transport  Were you discharged with any Home Care or Post Acute Services:  No  Do you feel like you have everything you need to keep you well at home?:  Yes  Care Transitions Interventions       Spoke with patient who reports she is not doing to good right now. Patient stated she continues to have nausea, diarrhea, and some abd pain. Patient reported she is taking zofran to help with the nausea and she has drank some ginger ale this morning and keeping fluids down. Patient reported she will try some crackers soon as well. Reviewed stopped medication with patient and she reported she is taking her medications as prescribed. Offered to schedule PCP follow up apt and patient declined stating she will call the PCP office today and get scheduled. Denies any needs at present time. Agreeable to f/u calls.   Gave reminder that if they have any questions/concerns at anytime, they can always call PCP/specialist as they always have an MD on call 24/7.           Follow Up  Future Appointments   Date Time Provider Melecio Cerrato   4/30/2019  1:00 PM Taylor Ryan, PT Adelia Pacheco AND PT None   5/3/2019 12:20 PM Tanesha Brasher AND BRIAN   5/3/2019  1:30 PM Taylor Ryan, PT Adelia Pacheco AND PT None   8/2/2019  9:00 AM JILL Rios MD [facility]     Aline Sandoval RN, BSN, [facility] Transitions Coordinator    [phone]

## 2019-04-30 ENCOUNTER — HOSPITAL ENCOUNTER (OUTPATIENT)
Dept: PHYSICAL THERAPY | Age: 59
Setting detail: THERAPIES SERIES
Discharge: HOME | End: 2019-04-30
Payer: COMMERCIAL

## 2019-04-30 PROCEDURE — 97110 THERAPEUTIC EXERCISES: CPT

## 2019-04-30 PROCEDURE — 97140 MANUAL THERAPY 1/> REGIONS: CPT

## 2019-04-30 NOTE — FLOWSHEET NOTE
functional self-care, mobility, lifting and ambulation/stair navigation   [] (18690)Reviewed/Progressed HEP activities related to improving balance, coordination, kinesthetic sense, posture, motor skill, proprioception of core, proximal hip and LE for self care, mobility, lifting, and ambulation/stair navigation      Manual Treatments:  PROM / STM / Oscillations-Mobs:  G-I, II, III, IV (PA's, Inf., Post.)  [x] (06943) Provided manual therapy to mobilize LE, proximal hip and/or LS spine soft tissue/joints for the purpose of modulating pain, promoting relaxation,  increasing ROM, reducing/eliminating soft tissue swelling/inflammation/restriction, improving soft tissue extensibility and allowing for proper ROM for normal function with self care, mobility, lifting and ambulation. Modalities:       [] GR/ESU 15 min    [x] GR 15 min  [] ESU     [] CP    [] MHP    [x] declined     Charges:  Timed Code Treatment Minutes: 55   Total Treatment Minutes: 55     [] EVAL (LOW) 88878 (typically 20 minutes face-to-face)  [] EVAL (MOD) 71490 (typically 30 minutes face-to-face)  [] EVAL (HIGH) 45760 (typically 45 minutes face-to-face)  [] RE-EVAL     [x] AX(12710) x  2   [] IONTO  [] NMR (55419) x      [] VASO  [x] Manual (53752) x  2    [] Other:  [] TA x       [] Mech Traction (23499)  [] ES(attended) (44604)      [] ES (un) (56268):     GOALS: Patient stated goal: Walk with no AD. Therapist goals for Patient:   Short Term Goals: To be achieved in: 2 weeks  1. Independent in HEP and progression per patient tolerance, in order to prevent re-injury. 2. Patient will have a decrease in pain to facilitate improvement in movement, function, and ADLs as indicated by Functional Deficits. Long Term Goals: To be achieved in: 12 weeks  1. Disability index score of 40% or less for the LEFS to assist with reaching prior level of function.    2. Patient will demonstrate increased AROM to 0-120 to allow for proper joint functioning as indicated by patients Functional Deficits. 3. Patient will demonstrate an increase in Strength to good proximal hip strength and control, within 5lb HHD in LE to allow for proper functional mobility as indicated by patients Functional Deficits. 4. Patient will return to functional activities without increased symptoms or restriction. 5. Patient will be able to ascend/descend stairs with reciprocal pattern for return to normal ADLs. New or Updated Goals (if applicable):  [x] No change to goals established upon initial eval/last progress note:  New Goals:    Progression Towards Functional goals:   [] Patient is progressing as expected towards functional goals listed. [x] Progression is slowed due to complexities listed. [] Progression has been slowed due to co-morbidities. [] Plan just implemented, too soon to assess goals progression  [] Other:     ASSESSMENT:    [] Improvement noted relative to goals:  [] No Improvement noted related to goals:  Summary/Patient's response to treatment: Improved tolerance for therex this visit. Improved strength and quad activation.      Treatment/Activity Tolerance:  [x] Patient tolerated treatment well [] Patient limited by fatique  [] Patient limited by pain  [] Patient limited by other medical complications  [] Other:     Prognosis: [x] Good [] Fair  [] Poor    Patient Requires Follow-up: [x] Yes  [] No    PLAN: See eval  [x] Continue per plan of care [] Alter current plan (see comments)  [] Plan of care initiated [] Hold pending MD visit [] Discharge    Electronically signed by: Angelito Dudley, [address] [facility], DPT [id number]

## 2019-04-30 NOTE — RESULT ENCOUNTER NOTE
Culture result reviewed no further treatment needed  Patient was admitted and treated with cephalosporin

## 2019-05-02 ENCOUNTER — CARE COORDINATION (OUTPATIENT)
Dept: CASE MANAGEMENT | Age: 59
End: 2019-05-02

## 2019-05-02 NOTE — CARE COORDINATION
[facility] Transitions Follow Up Call    2019    Patient: Karla Swan  Patient : 1960   MRN: [id number]  Reason for Admission: ESTEBAN   Discharge Date: 19 RARS: Readmission Risk Score: 8         Spoke with: [address] [place] [place]  Transitions Subsequent and Final Call    Subsequent and Final Calls  Do you have any ongoing symptoms?:  No  Have your medications changed?:  No  Do you have any questions related to your medications?:  No  Do you currently have any active services?:  No  Do you have any needs or concerns that I can assist you with?:  No  Identified Barriers:  None  Care Transitions Interventions  Other Interventions:          Spoke with patient who reports she is doing just fine and denied any further abd pain or diarrhea. Patient reported she is having regular bowel movements and denied any needs at this time. Patient has post-op apt on 5/3/19 with PAULA Guy. Denies any needs at present time. Agreeable to f/u calls. Gave reminder that if they have any questions/concerns at anytime, they can always call PCP/specialist as they always have an MD on call .             Follow Up  Future Appointments   Date Time Provider Melecio Cerrato   5/3/2019 12:20 PM Tanesha Mathews AND BRIAN   5/3/2019  1:30 PM Luz Maria Castañeda, PT [facility] AT [place] AND PT None   5/3/2019  3:20 PM JILL Paiz MD [facility] FP [facility]   2019  9:00 AM JILL Paiz MD [facility]       Long Benavides RN, BSN, [address] [facility] Transitions Coordinator    [phone]

## 2019-05-03 ENCOUNTER — TELEPHONE (OUTPATIENT)
Dept: FAMILY MEDICINE CLINIC | Age: 59
End: 2019-05-03

## 2019-05-03 ENCOUNTER — OFFICE VISIT (OUTPATIENT)
Dept: FAMILY MEDICINE CLINIC | Age: 59
End: 2019-05-03
Payer: COMMERCIAL

## 2019-05-03 ENCOUNTER — HOSPITAL ENCOUNTER (OUTPATIENT)
Dept: PHYSICAL THERAPY | Age: 59
Setting detail: THERAPIES SERIES
Discharge: HOME | End: 2019-05-03
Payer: COMMERCIAL

## 2019-05-03 ENCOUNTER — OFFICE VISIT (OUTPATIENT)
Dept: ORTHOPEDIC SURGERY | Age: 59
End: 2019-05-03

## 2019-05-03 VITALS
OXYGEN SATURATION: 98 % | HEART RATE: 112 BPM | SYSTOLIC BLOOD PRESSURE: 86 MMHG | HEIGHT: 66 IN | DIASTOLIC BLOOD PRESSURE: 60 MMHG | RESPIRATION RATE: 16 BRPM | WEIGHT: 192.2 LBS | BODY MASS INDEX: 30.89 KG/M2

## 2019-05-03 DIAGNOSIS — Z79.4 TYPE 2 DIABETES MELLITUS WITHOUT COMPLICATION, WITH LONG-TERM CURRENT USE OF INSULIN (HCC): ICD-10-CM

## 2019-05-03 DIAGNOSIS — F33.1 MODERATE EPISODE OF RECURRENT MAJOR DEPRESSIVE DISORDER (HCC): ICD-10-CM

## 2019-05-03 DIAGNOSIS — E11.9 TYPE 2 DIABETES MELLITUS WITHOUT COMPLICATION, WITH LONG-TERM CURRENT USE OF INSULIN (HCC): ICD-10-CM

## 2019-05-03 DIAGNOSIS — N17.9 PRERENAL ACUTE RENAL FAILURE (HCC): Primary | ICD-10-CM

## 2019-05-03 DIAGNOSIS — Z96.651 S/P TOTAL KNEE ARTHROPLASTY, RIGHT: Primary | ICD-10-CM

## 2019-05-03 DIAGNOSIS — E78.00 PURE HYPERCHOLESTEROLEMIA: ICD-10-CM

## 2019-05-03 PROCEDURE — 97110 THERAPEUTIC EXERCISES: CPT

## 2019-05-03 PROCEDURE — 97140 MANUAL THERAPY 1/> REGIONS: CPT

## 2019-05-03 PROCEDURE — 99024 POSTOP FOLLOW-UP VISIT: CPT | Performed by: PHYSICIAN ASSISTANT

## 2019-05-03 PROCEDURE — 99213 OFFICE O/P EST LOW 20 MIN: CPT | Performed by: INTERNAL MEDICINE

## 2019-05-03 PROCEDURE — 97016 VASOPNEUMATIC DEVICE THERAPY: CPT

## 2019-05-03 RX ORDER — LISINOPRIL 10 MG/1
10 TABLET ORAL DAILY
Qty: 30 TABLET | Refills: 5 | Status: SHIPPED | OUTPATIENT
Start: 2019-05-03 | End: 2019-11-06 | Stop reason: SDUPTHER

## 2019-05-03 ASSESSMENT — ENCOUNTER SYMPTOMS
ALLERGIC/IMMUNOLOGIC NEGATIVE: 1
RESPIRATORY NEGATIVE: 1

## 2019-05-03 NOTE — FLOWSHEET NOTE
[place] [facility] [facility], [address]  Phone: [phone]  Fax [phone]    Physical Therapy Daily Treatment Note  Date:  5/3/2019    Patient Name:  Abelino Pickett    :  1960  MRN: [id number]  Restrictions/Precautions:    Physician Information:  Referring Practitioner: Dr. Layla Lepe  Medical/Treatment Diagnosis Information:  Diagnosis: M 25.561 R knee pain; M17.11 R knee OA  · Treatment Diagnosis: M25.561 R knee pain; M25.661 right knee stiffness   [] Conservative / [x] Surgical - DOS: 3/29/19  Therapy Diagnosis/Practice Pattern:  Practice Pattern H: Joint Arthroplasty  Insurance/Certification information:  PT Insurance Information:  BCBS - [id number]/20-$[id number] PER COND. - NEEDS AUTH AFTR 25V - EXP 19  Plan of care signed: [x] YES  [] NO  Number of Comorbidities:  []0     [x]1-2    []3+  Date of Patient follow up with Physician:     G-Code (if applicable):      Date G-Code Applied:         Progress Note: [x]  Yes  []  No  Next due by: Visit #10        Latex Allergy:  [x]NO      []YES  Preferred Language for Healthcare:   [x]English       []other:    Visit # Insurance Allowable Reporting Period   7  Needs auth after 25  Begin Date: 5/3/2019               End Date:      RECERT DUE BY: 12 weeks    SUBJECTIVE:  Patient saw PA today, goes back in 3 weeks, tentative plan is return to work as  in 4 weeks. Patient reports knee doing a little better today, feels motion continues to improve. OBJECTIVE: See eval  Observation:  Palpation:     Test used Initial score Current Score   Pain Summary VAS 6 5-6   Functional questionnaire LEFS 86%    ROM flexion 86 116 5/3/19       extension -6 -1   Strength quad SLR mod assist SLR independent    ABD      flexion          RESTRICTIONS/PRECAUTIONS: none    Exercises/Interventions:     Therapeutic Ex Sets/reps Notes HEP   Bike  8 min Able to perform full rev. Long sit HS stretch 3 x 30\"  X   Gastroc strap stretch 3 x 30\"  X   Heel slide with SB  15 x 5\"  X   Quad set with ADD  SAQ with ADD 10 x 10\"  20 x  X   SLR 2 x 10 min assist X   Heel prop 1 x 6 min 5# X   EOB flexion  10 x 10\"     LAQ eccentric lower with ADD squeeze  2 x 10       Mini squats 20 x SOB and lightheaded    Standing ext/abd/march  HELD    Bridge  2 x 10                                   Manual Intervention      PROM, HS/gastroc stretch 14 min     Assisted flexion EOB 5 min     Pt ed anatomy, surgery, RICE, PT progression, prognosis 5 min Emphasis on hydration, continuing to work on exercises at home, eating with medications                      NMR re-education                                                                Therapeutic Exercise and NMR EXR  [x] (05876) Provided verbal/tactile cueing for activities related to strengthening, flexibility, endurance, ROM for improvements in LE, proximal hip, and core control with self care, mobility, lifting, ambulation.  [] (97796) Provided verbal/tactile cueing for activities related to improving balance, coordination, kinesthetic sense, posture, motor skill, proprioception  to assist with LE, proximal hip, and core control in self care, mobility, lifting, ambulation and eccentric single leg control.      NMR and Therapeutic Activities:    [x] (71593 or 15207) Provided verbal/tactile cueing for activities related to improving balance, coordination, kinesthetic sense, posture, motor skill, proprioception and motor activation to allow for proper function of core, proximal hip and LE with self care and ADLs  [] (47055) Gait Re-education- Provided training and instruction to the patient for proper LE, core and proximal hip recruitment and positioning and eccentric body weight control with ambulation re-education including up and down stairs     Home Exercise Program:    [x] (87425) Reviewed/Progressed HEP activities related to strengthening, flexibility, endurance, ROM of core, proximal hip and LE for functional self-care, mobility, lifting and ambulation/stair navigation   [] (28016)Reviewed/Progressed HEP activities related to improving balance, coordination, kinesthetic sense, posture, motor skill, proprioception of core, proximal hip and LE for self care, mobility, lifting, and ambulation/stair navigation      Manual Treatments:  PROM / STM / Oscillations-Mobs:  G-I, II, III, IV (PA's, Inf., Post.)  [x] (92725) Provided manual therapy to mobilize LE, proximal hip and/or LS spine soft tissue/joints for the purpose of modulating pain, promoting relaxation,  increasing ROM, reducing/eliminating soft tissue swelling/inflammation/restriction, improving soft tissue extensibility and allowing for proper ROM for normal function with self care, mobility, lifting and ambulation. Modalities:       [] GR/ESU 15 min    [x] GR 15 min  [] ESU     [] CP    [] MHP    [] declined     Charges:  Timed Code Treatment Minutes: 40   Total Treatment Minutes: 55     [] EVAL (LOW) 03008 (typically 20 minutes face-to-face)  [] EVAL (MOD) 12536 (typically 30 minutes face-to-face)  [] EVAL (HIGH) 74314 (typically 45 minutes face-to-face)  [] RE-EVAL     [x] EO(88109) x  2   [] IONTO  [] NMR (82584) x      [x] VASO  [x] Manual (00626) x  1    [] Other:  [] TA x       [] Mech Traction (09384)  [] ES(attended) (27123)      [] ES (un) (01050):     GOALS: Patient stated goal: Walk with no AD. Therapist goals for Patient:   Short Term Goals: To be achieved in: 2 weeks  1. Independent in HEP and progression per patient tolerance, in order to prevent re-injury. 2. Patient will have a decrease in pain to facilitate improvement in movement, function, and ADLs as indicated by Functional Deficits. Long Term Goals: To be achieved in: 12 weeks  1. Disability index score of 40% or less for the LEFS to assist with reaching prior level of function.    2. Patient will demonstrate increased AROM to 0-120 to allow for proper joint functioning as indicated by patients Functional Deficits. 3. Patient will demonstrate an increase in Strength to good proximal hip strength and control, within 5lb HHD in LE to allow for proper functional mobility as indicated by patients Functional Deficits. 4. Patient will return to functional activities without increased symptoms or restriction. 5. Patient will be able to ascend/descend stairs with reciprocal pattern for return to normal ADLs. New or Updated Goals (if applicable):  [x] No change to goals established upon initial eval/last progress note:  New Goals:    Progression Towards Functional goals:   [] Patient is progressing as expected towards functional goals listed. [x] Progression is slowed due to complexities listed. [] Progression has been slowed due to co-morbidities. [] Plan just implemented, too soon to assess goals progression  [] Other:     ASSESSMENT:    [] Improvement noted relative to goals:  [] No Improvement noted related to goals:  Summary/Patient's response to treatment: Improved tolerance for therex this visit. Improved strength and quad activation.      Treatment/Activity Tolerance:  [x] Patient tolerated treatment well [] Patient limited by fatique  [] Patient limited by pain  [] Patient limited by other medical complications  [] Other:     Prognosis: [x] Good [] Fair  [] Poor    Patient Requires Follow-up: [x] Yes  [] No    PLAN: See eval  [x] Continue per plan of care [] Alter current plan (see comments)  [] Plan of care initiated [] Hold pending MD visit [] Discharge    Electronically signed by: Zack Larsen, [address] [facility], DPT [id number]

## 2019-05-03 NOTE — PROGRESS NOTES
History of present illness: The patient returns today after right total knee replacement. Pain control has been satisfactory with oral medications. There have been no fevers or chills. She is now 5 weeks postop. Unfortunately, she was readmitted for dehydration and some acute renal failure but this is now been corrected. Pain Assessment  Location of Pain: Knee  Location Modifiers: Right  Severity of Pain: 4  Frequency of Pain: Intermittent  Aggravating Factors: Standing, Walking  Limiting Behavior: Some  Result of Injury: No  Work-Related Injury: No  Are there other pain locations you wish to document?: No]    Physical examination: The incision is clean, dry, and intact with no drainage or signs of infection. Range of motion is 0 degrees of extension to 115 degrees of flexion. There is expected swelling with no evidence of DVT and a negative Homans sign. Neurovascular exam is intact. Assessment/plan: We would like to continue outpatient physical therapy to restore range of motion and strength. The patient was given local wound care instructions. We did not refill their pain medication today. We will followup in 3-4 weeks for reevaluation.

## 2019-05-03 NOTE — PROGRESS NOTES
Subjective:      Patient ID: Love Sever is a 63 y.o. female. HPI  Prerenal acute renal failure [facility])  Was admitted w/ ARF. Cr up to 3.8. Hydrated and N/V tx. Cr 0.9 at discharge. Feels much better. Sugars are better and BP is low. Type 2 diabetes mellitus without complication, with long-term current use of insulin (HCC)  Sugars are very good since hospitalization, presently off Lantus w/ good readings. Moderate episode of recurrent major depressive disorder [facility])  Sees Psychiatrist and counselor regularly. Had doubled Wellbutrin. W/ no new c/o. Still some issues w/ sleep but improved w/ Trazodone. Pure hypercholesterolemia  Stable diet and wt. No c/o w/ meds.      Past Medical History:   Diagnosis Date    Anxiety     Depression     DJD (degenerative joint disease)     hands/knees/ankles    Fatty liver     Hyperlipidemia     Hypertension     Irritable bowel syndrome     Mood disorder (HCC)     NOS    Narcotic addiction (HCC)     ANGEL (obstructive sleep apnea)     no CPAP machine - not recommended per sleep study    Restless leg syndrome     Type II or unspecified type diabetes mellitus without mention of complication, not stated as uncontrolled     Vision impairment     wears glasses and contacts     Current Outpatient Medications   Medication Sig Dispense Refill    lisinopril (PRINIVIL;ZESTRIL) 10 MG tablet Take 1 tablet by mouth daily 30 tablet 5    celecoxib (CELEBREX) 100 MG capsule Take 1 capsule by mouth 2 times daily 60 capsule 3    buPROPion (WELLBUTRIN XL) 300 MG extended release tablet Take 300 mg by mouth every morning      metFORMIN, OSM, (FORTAMET) 1000 MG extended release tablet TAKE ONE TABLET BY MOUTH DAILY WITH BREAKFAST 30 tablet 5    atorvastatin (LIPITOR) 10 MG tablet TAKE ONE TABLET BY MOUTH DAILY 30 tablet 5    canagliflozin (INVOKANA) 100 MG TABS tablet Take 1 tablet by mouth every morning (before breakfast) 30 tablet 5    pioglitazone (ACTOS) 15 MG tablet Take 1 tablet by mouth daily 30 tablet 5    lamoTRIgine (LAMICTAL) 200 MG tablet Take 1 tablet by mouth nightly       traZODone (DESYREL) 100 MG tablet Take 200 mg by mouth nightly       escitalopram (LEXAPRO) 20 MG tablet Take 20 mg by mouth daily.  blood glucose test strips (ASCENSIA AUTODISC VI;ONE TOUCH ULTRA TEST VI) strip Patient has One Touch Ultra 2, tests daily 100 strip 5    Blood Glucose Monitoring Suppl (ONE TOUCH ULTRA 2) w/Device KIT 1 kit by Does not apply route daily 1 kit 0    blood glucose monitor strips Test once dialy for diabetes e11.9 100 strip 5    Lancets MISC Test once daily for diabetes e11.9 100 each 3    Blood Glucose Monitoring Suppl CONCETTA Test once daily for diabetes e11.9 1 Device 0     No current facility-administered medications for this visit. Allergies   Allergen Reactions    Morphine Hives     Social History     Tobacco Use    Smoking status: Never Smoker    Smokeless tobacco: Never Used   Substance Use Topics    Alcohol use: No     Family History   Problem Relation Age of Onset    Diabetes Mother     Arthritis Mother     Diabetes Father     Heart Disease Father     Cancer Father         colon    Dementia Father        Review of Systems   Constitutional: Negative. Respiratory: Negative. Cardiovascular: Negative. Endocrine: Negative. Genitourinary: Negative. Musculoskeletal: Negative. Skin: Negative. Allergic/Immunologic: Negative. Neurological: Negative. Hematological: Negative. Psychiatric/Behavioral: Positive for dysphoric mood and sleep disturbance. The patient is nervous/anxious. Vitals:    05/03/19 1510 05/03/19 1533   BP: (!) 84/58 86/60   Pulse: 112    Resp: 16    SpO2: 98%    Weight: 192 lb 3.2 oz (87.2 kg)    Height: 5' 5.5\" (1.664 m)      Objective:   Physical Exam   Constitutional: She is oriented to person, place, and time. She appears well-developed and well-nourished. Non-toxic appearance.  She does not displays no atrophy and no tremor. No cranial nerve deficit or sensory deficit. She exhibits normal muscle tone. Coordination normal.   Skin: Skin is warm, dry and intact. No abrasion and no rash noted. She is not diaphoretic. No cyanosis or erythema. No pallor. Nails show no clubbing. Psychiatric: She has a normal mood and affect. Her speech is normal and behavior is normal. Judgment and thought content normal. Cognition and memory are normal.       Assessment:      Problem   Moderate Episode of Recurrent Major Depressive Disorder (Hcc). Doing well w/ meds. Pure Hypercholesterolemia. Stable diet and wt. Type 2 Diabetes Mellitus Without Complication, With Long-Term Current Use of Insulin ([facility]). Sugars good w/ iinsulin. Prerenal Acute Renal Failure (Hcc). Much improved off Celebrex and after IV fluids. Plan:      All above problems reviewed and the found to be unchanged except for the following : Moderate Episode of Recurrent Major Depressive Disorder (Hcc). Continue meds and f/u w/ Psychiatrist as scheduled. Call if new c/o. Pure Hypercholesterolemia. Continue to improve diet and exercise as tolerated. Type 2 Diabetes Mellitus Without Complication, With Long-Term Current Use of Insulin (Hcc). To continue to improve diet and lose weight. To follow Diabetic diet. If needs further help w/ Diabetic diet to call and will refer back to dietician. Home sugar checks. To call if sudden change up or down in sugars. To do regular foot checks. Call if problem. Will do labs and adjust meds after results are evaluated. Prerenal Acute Renal Failure (Hcc). To hold Celebrex, continue fluids. Changed Lisinopril HCT from 20/12.5 to Lisinopril 10 mg. Home BP checks. Call if >130/80.  Call if BP<80/60 regularly             Hyacinth Raygoza MD

## 2019-05-03 NOTE — PATIENT INSTRUCTIONS
All above problems reviewed and the found to be unchanged except for the following : Moderate Episode of Recurrent Major Depressive Disorder (Hcc). Continue meds and f/u w/ Psychiatrist as scheduled. Call if new c/o. Pure Hypercholesterolemia. Continue to improve diet and exercise as tolerated. Type 2 Diabetes Mellitus Without Complication, With Long-Term Current Use of Insulin (Hcc). To continue to improve diet and lose weight. To follow Diabetic diet. If needs further help w/ Diabetic diet to call and will refer back to dietician. Home sugar checks. To call if sudden change up or down in sugars. To do regular foot checks. Call if problem. Will do labs and adjust meds after results are evaluated. Prerenal Acute Renal Failure (Hcc). To hold Celebrex, continue fluids. Changed Lisinopril HCT from 20/12.5 to Lisinopril 10 mg. Home BP checks. Call if >130/80.  Call if BP<80/60 regularly

## 2019-05-03 NOTE — ASSESSMENT & PLAN NOTE
Sees Psychiatrist and counselor regularly. Had doubled Wellbutrin. W/ no new c/o. Still some issues w/ sleep but improved w/ Trazodone.

## 2019-05-03 NOTE — ASSESSMENT & PLAN NOTE
Was admitted w/ ARF. Cr up to 3.8. Hydrated and N/V tx. Cr 0.9 at discharge. Feels much better. Sugars are better and BP is low.

## 2019-05-04 LAB
ALBUMIN SERPL-MCNC: 4.8 G/DL (ref 3.4–5)
ALP BLD-CCNC: 118 U/L (ref 40–129)
ALT SERPL-CCNC: 23 U/L (ref 10–40)
ANION GAP SERPL CALCULATED.3IONS-SCNC: 16 MMOL/L (ref 3–16)
AST SERPL-CCNC: 14 U/L (ref 15–37)
BASOPHILS ABSOLUTE: 0.1 K/UL (ref 0–0.2)
BASOPHILS RELATIVE PERCENT: 1 %
BILIRUB SERPL-MCNC: <0.2 MG/DL (ref 0–1)
BILIRUBIN DIRECT: <0.2 MG/DL (ref 0–0.3)
BILIRUBIN, INDIRECT: ABNORMAL MG/DL (ref 0–1)
BUN BLDV-MCNC: 20 MG/DL (ref 7–20)
CALCIUM SERPL-MCNC: 10.7 MG/DL (ref 8.3–10.6)
CHLORIDE BLD-SCNC: 95 MMOL/L (ref 99–110)
CO2: 25 MMOL/L (ref 21–32)
CREAT SERPL-MCNC: 1.6 MG/DL (ref 0.6–1.1)
EOSINOPHILS ABSOLUTE: 0.2 K/UL (ref 0–0.6)
EOSINOPHILS RELATIVE PERCENT: 1.8 %
GFR AFRICAN AMERICAN: 40
GFR NON-AFRICAN AMERICAN: 33
GLUCOSE BLD-MCNC: 110 MG/DL (ref 70–99)
HCT VFR BLD CALC: 40.1 % (ref 36–48)
HEMOGLOBIN: 13.2 G/DL (ref 12–16)
LYMPHOCYTES ABSOLUTE: 2.3 K/UL (ref 1–5.1)
LYMPHOCYTES RELATIVE PERCENT: 19.6 %
MCH RBC QN AUTO: 31.4 PG (ref 26–34)
MCHC RBC AUTO-ENTMCNC: 33 G/DL (ref 31–36)
MCV RBC AUTO: 95 FL (ref 80–100)
MONOCYTES ABSOLUTE: 0.7 K/UL (ref 0–1.3)
MONOCYTES RELATIVE PERCENT: 5.9 %
NEUTROPHILS ABSOLUTE: 8.5 K/UL (ref 1.7–7.7)
NEUTROPHILS RELATIVE PERCENT: 71.7 %
PDW BLD-RTO: 13.7 % (ref 12.4–15.4)
PLATELET # BLD: 323 K/UL (ref 135–450)
PMV BLD AUTO: 8.8 FL (ref 5–10.5)
POTASSIUM SERPL-SCNC: 4.8 MMOL/L (ref 3.5–5.1)
RBC # BLD: 4.22 M/UL (ref 4–5.2)
SODIUM BLD-SCNC: 136 MMOL/L (ref 136–145)
TOTAL PROTEIN: 7 G/DL (ref 6.4–8.2)
WBC # BLD: 11.9 K/UL (ref 4–11)

## 2019-05-05 DIAGNOSIS — E78.00 PURE HYPERCHOLESTEROLEMIA: ICD-10-CM

## 2019-05-06 RX ORDER — ATORVASTATIN CALCIUM 10 MG/1
TABLET, FILM COATED ORAL
Qty: 30 TABLET | Refills: 5 | Status: SHIPPED | OUTPATIENT
Start: 2019-05-06 | End: 2019-11-03 | Stop reason: SDUPTHER

## 2019-05-07 ENCOUNTER — CARE COORDINATION (OUTPATIENT)
Dept: CASE MANAGEMENT | Age: 59
End: 2019-05-07

## 2019-05-07 ENCOUNTER — TELEPHONE (OUTPATIENT)
Dept: FAMILY MEDICINE CLINIC | Age: 59
End: 2019-05-07

## 2019-05-07 DIAGNOSIS — N20.0 KIDNEY STONES: Primary | ICD-10-CM

## 2019-05-07 NOTE — CARE COORDINATION
[facility] Transitions Follow Up Call    2019    Patient: Marquise Jay  Patient : 1960   MRN: [id number]  Reason for Admission: ESTEBAN   Discharge Date: 19 RARS: Readmission Risk Score: 8         Spoke with: [address]  Transitions Subsequent and Final Call    Subsequent and Final Calls  Have your medications changed?:  No  Do you have any questions related to your medications?:  No  Do you currently have any active services?:  No  Do you have any needs or concerns that I can assist you with?:  No  Identified Barriers:  None  Care Transitions Interventions  Other Interventions:          Spoke with patient who reports she isn't feeling to good today but feels it is r/t something she ate yesterday and that isn't sitting right with her. Patient did report some mild nausea and vomiting. Patient stated her PCP reached out to her and wants patient to have an ultrasound to check for kidney stone. Patient denied any issues with urination at this time and denied any fever/chills/or flank pain. Denies any needs at present time. Agreeable to f/u calls. Gave reminder that if they have any questions/concerns at anytime, they can always call PCP/specialist as they always have an MD on call .             Follow Up  Future Appointments   Date Time Provider Melecio Cerrato   992  9:23 PM Jacqueline Hastings PT [facility] AND PT None   2019  1:00 PM Geena Aguilar PT [facility] AND PT None   5/15/2019  1:00 PM Geena Aguilar PT [facility] AND PT None    78:17 PM Jacqueline Hastings PT [facility] AND PT None     6:11 PM Jacqueline Hastings PT [facility] AND PT None   2019  1:50 PM MD WILSON Jenkins AND [facility]   8224  8:42 PM Jacqueline Hastings PT [facility] AND PT None   2019  1:00 PM Geena Aguilar PT [facility] AND PT None   6/3/2019 11:00 AM PRUDENCE Malcolm AND PT None   2019  1:30 PM Geena Aguilar PT [facility] AND PT None   2019  9:00 AM JILL Jung MD [facility] MMA Perez Power RN, BSN, [address] [facility] Transitions Coordinator    [phone]

## 2019-05-07 NOTE — TELEPHONE ENCOUNTER
Notes recorded by Izabela Soriano MA on 5/7/2019 at 9:44 AM EDT  Pt called back. She wants to have ultrasound  done at [place]. Please place orders  ------    Notes recorded by Basil Silvestre LPN on 4/3/4877 at 2:16 PM EDT  Left message to rtrn call  ------    Notes recorded by Keyanna Timmons MD on 5/4/2019 at 3:53 PM EDT  Stable labs at present. Probably some underlying kidney issue. Probably due to kidney stones seen in Kidneys on CT last month. Will do US kidneys to f/u.

## 2019-05-08 ENCOUNTER — HOSPITAL ENCOUNTER (OUTPATIENT)
Dept: PHYSICAL THERAPY | Age: 59
Setting detail: THERAPIES SERIES
Discharge: HOME | End: 2019-05-08
Payer: COMMERCIAL

## 2019-05-08 PROCEDURE — 97140 MANUAL THERAPY 1/> REGIONS: CPT | Performed by: PHYSICAL THERAPIST

## 2019-05-08 PROCEDURE — 97110 THERAPEUTIC EXERCISES: CPT | Performed by: PHYSICAL THERAPIST

## 2019-05-08 NOTE — FLOWSHEET NOTE
[facility], [address] [place] [address]  Phone: [phone]  Fax [phone]    Physical Therapy Daily Treatment Note  Date:  2019    Patient Name:  Aishwarya Lewis    :  1960  MRN: [id number]  Restrictions/Precautions:    Physician Information:  Referring Practitioner: Dr. Manuel Jin  Medical/Treatment Diagnosis Information:  Diagnosis: M 25.561 R knee pain; M17.11 R knee OA  · Treatment Diagnosis: M25.561 R knee pain; M25.661 right knee stiffness   [] Conservative / [x] Surgical - DOS: 3/29/19  Therapy Diagnosis/Practice Pattern:  Practice Pattern H: Joint Arthroplasty  Insurance/Certification information:  PT Insurance Information:  BCBS - 250D-80/20-$0CP-25PT PER COND. - NEEDS AUTH AFTR 25V - EXP 19  Plan of care signed: [x] YES  [] NO  Number of Comorbidities:  []0     [x]1-2    []3+  Date of Patient follow up with Physician:     G-Code (if applicable):      Date G-Code Applied:         Progress Note: [x]  Yes  []  No  Next due by: Visit #10        Latex Allergy:  [x]NO      []YES  Preferred Language for Healthcare:   [x]English       []other:    Visit # Insurance Allowable Reporting Period   8  Needs auth after 25  Begin Date: 2019               End Date:      RECERT DUE BY: 12 weeks    SUBJECTIVE:  Pt has suspected kidney stone. She will see a dr tomorrow. OBJECTIVE: See eval  Observation:  Palpation:     Test used Initial score Current Score   Pain Summary VAS 6 5-6   Functional questionnaire LEFS 86%    ROM flexion 86 116 19       extension -6 -1   Strength quad SLR mod assist SLR independent    ABD      flexion          RESTRICTIONS/PRECAUTIONS: none    Exercises/Interventions:     Therapeutic Ex Sets/reps Notes HEP   Bike  8 min Able to perform full rev.     Long sit HS stretch 3 x 30\"  X   Gastroc strap stretch 3 x 30\"  X   Heel slide with SB  15 x 5\"  X   Quad set with ADD  SAQ with ADD 10 x 10\"  20 x  X   SLR 2 x 10 min assist X   SL hip abd 2 x 10      Heel prop 1 x 6 min 5# X   EOB flexion  10 x 10\"     LAQ eccentric lower with ADD squeeze  2 x 10       Mini squats 20 x SOB and lightheaded    Standing ext/abd/march  HELD    Bridge  2 x 10     HR/  TL X 20            Leg press bilat 80#  X 30     MEDARDO   4inch x 20            Manual Intervention      PROM, HS/gastroc stretch 14 min     Assisted flexion EOB 5 min     Pt ed anatomy, surgery, RICE, PT progression, prognosis 5 min Emphasis on hydration, continuing to work on exercises at home, eating with medications                      NMR re-education                                                                Therapeutic Exercise and NMR EXR  [x] (65082) Provided verbal/tactile cueing for activities related to strengthening, flexibility, endurance, ROM for improvements in LE, proximal hip, and core control with self care, mobility, lifting, ambulation.  [] (03558) Provided verbal/tactile cueing for activities related to improving balance, coordination, kinesthetic sense, posture, motor skill, proprioception  to assist with LE, proximal hip, and core control in self care, mobility, lifting, ambulation and eccentric single leg control.      NMR and Therapeutic Activities:    [x] (39378 or 12352) Provided verbal/tactile cueing for activities related to improving balance, coordination, kinesthetic sense, posture, motor skill, proprioception and motor activation to allow for proper function of core, proximal hip and LE with self care and ADLs  [] (58718) Gait Re-education- Provided training and instruction to the patient for proper LE, core and proximal hip recruitment and positioning and eccentric body weight control with ambulation re-education including up and down stairs     Home Exercise Program:    [x] (07624) Reviewed/Progressed HEP activities related to strengthening, flexibility, endurance, ROM of core, proximal hip and LE for functional self-care, mobility, lifting and ambulation/stair navigation   [] (32044)Reviewed/Progressed HEP activities related to improving balance, coordination, kinesthetic sense, posture, motor skill, proprioception of core, proximal hip and LE for self care, mobility, lifting, and ambulation/stair navigation      Manual Treatments:  PROM / STM / Oscillations-Mobs:  G-I, II, III, IV (PA's, Inf., Post.)  [x] (92757) Provided manual therapy to mobilize LE, proximal hip and/or LS spine soft tissue/joints for the purpose of modulating pain, promoting relaxation,  increasing ROM, reducing/eliminating soft tissue swelling/inflammation/restriction, improving soft tissue extensibility and allowing for proper ROM for normal function with self care, mobility, lifting and ambulation. Modalities:       [] GR/ESU 15 min    [x] GR 15 min  [] ESU     [] CP    [] MHP    [] declined     Charges:  Timed Code Treatment Minutes: 45   Total Treatment Minutes: 60     [] EVAL (LOW) 12346 (typically 20 minutes face-to-face)  [] EVAL (MOD) 27419 (typically 30 minutes face-to-face)  [] EVAL (HIGH) 29469 (typically 45 minutes face-to-face)  [] RE-EVAL     [x] GT(84569) x  2   [] IONTO  [] NMR (51186) x      [x] VASO  [x] Manual (47344) x  1    [] Other:  [] TA x       [] Mech Traction (71234)  [] ES(attended) (47219)      [] ES (un) (53908):     GOALS: Patient stated goal: Walk with no AD. Therapist goals for Patient:   Short Term Goals: To be achieved in: 2 weeks  1. Independent in HEP and progression per patient tolerance, in order to prevent re-injury. 2. Patient will have a decrease in pain to facilitate improvement in movement, function, and ADLs as indicated by Functional Deficits. Long Term Goals: To be achieved in: 12 weeks  1. Disability index score of 40% or less for the LEFS to assist with reaching prior level of function.    2. Patient will demonstrate increased AROM to 0-120 to allow for proper joint functioning as indicated by patients Functional Deficits. 3. Patient will demonstrate an increase in Strength to good proximal hip strength and control, within 5lb HHD in LE to allow for proper functional mobility as indicated by patients Functional Deficits. 4. Patient will return to functional activities without increased symptoms or restriction. 5. Patient will be able to ascend/descend stairs with reciprocal pattern for return to normal ADLs. New or Updated Goals (if applicable):  [x] No change to goals established upon initial eval/last progress note:  New Goals:    Progression Towards Functional goals:   [] Patient is progressing as expected towards functional goals listed. [x] Progression is slowed due to complexities listed. [] Progression has been slowed due to co-morbidities. [] Plan just implemented, too soon to assess goals progression  [] Other:     ASSESSMENT:    [] Improvement noted relative to goals:  [] No Improvement noted related to goals:  Summary/Patient's response to treatment: Improved tolerance for therex this visit. Improved strength and quad activation.      Treatment/Activity Tolerance:  [x] Patient tolerated treatment well [] Patient limited by fatique  [] Patient limited by pain  [] Patient limited by other medical complications  [] Other:     Prognosis: [x] Good [] Fair  [] Poor    Patient Requires Follow-up: [x] Yes  [] No    PLAN: See eval  [x] Continue per plan of care [] Alter current plan (see comments)  [] Plan of care initiated [] Hold pending MD visit [] Discharge    Electronically signed by: Whit Antunez PT

## 2019-05-09 ENCOUNTER — HOSPITAL ENCOUNTER (OUTPATIENT)
Dept: ULTRASOUND IMAGING | Age: 59
Discharge: HOME | End: 2019-05-09
Payer: COMMERCIAL

## 2019-05-09 DIAGNOSIS — N20.0 KIDNEY STONES: ICD-10-CM

## 2019-05-09 PROCEDURE — 76770 US EXAM ABDO BACK WALL COMP: CPT

## 2019-05-10 ENCOUNTER — CARE COORDINATION (OUTPATIENT)
Dept: CASE MANAGEMENT | Age: 59
End: 2019-05-10

## 2019-05-13 ENCOUNTER — HOSPITAL ENCOUNTER (OUTPATIENT)
Dept: PHYSICAL THERAPY | Age: 59
Setting detail: THERAPIES SERIES
Discharge: HOME | End: 2019-05-13
Payer: COMMERCIAL

## 2019-05-13 PROCEDURE — 97016 VASOPNEUMATIC DEVICE THERAPY: CPT

## 2019-05-13 PROCEDURE — 97110 THERAPEUTIC EXERCISES: CPT

## 2019-05-13 PROCEDURE — 97140 MANUAL THERAPY 1/> REGIONS: CPT

## 2019-05-13 NOTE — FLOWSHEET NOTE
[facility], [address]  Phone: [phone]  Fax [phone]    Physical Therapy Daily Treatment Note  Date:  2019    Patient Name:  Mey Tao    :  1960  MRN: [id number]  Restrictions/Precautions:    Physician Information:  Referring Practitioner: Dr. Mayra Mcdonald  Medical/Treatment Diagnosis Information:  Diagnosis: M 25.561 R knee pain; M17.11 R knee OA  · Treatment Diagnosis: M25.561 R knee pain; M25.661 right knee stiffness   [] Conservative / [x] Surgical - DOS: 3/29/19  Therapy Diagnosis/Practice Pattern:  Practice Pattern H: Joint Arthroplasty  Insurance/Certification information:  PT Insurance Information:  BCBS - 250D-80/20-$0CP-25PT PER COND. - NEEDS AUTH AFTR 25V - EXP 19  Plan of care signed: [x] YES  [] NO  Number of Comorbidities:  []0     [x]1-2    []3+  Date of Patient follow up with Physician:     G-Code (if applicable):      Date G-Code Applied:         Progress Note: [x]  Yes  []  No  Next due by: Visit #10        Latex Allergy:  [x]NO      []YES  Preferred Language for Healthcare:   [x]English       []other:    Visit # Insurance Allowable Reporting Period   9  Needs auth after 25  Begin Date: 2019               End Date:      RECERT DUE BY: 12 weeks    SUBJECTIVE:  Pt reports she has been doing more and knee hurting more after. Has not been icing much. OBJECTIVE: See eval  Observation:  Palpation:     Test used Initial score Current Score   Pain Summary VAS 6 5-6   Functional questionnaire LEFS 86%    ROM flexion 86 116 19       extension -6 -1   Strength quad SLR mod assist SLR independent    ABD      flexion          RESTRICTIONS/PRECAUTIONS: none    Exercises/Interventions:     Therapeutic Ex Sets/reps Notes HEP   Bike  8 min Able to perform full rev.     Long sit HS stretch 3 x 30\"  X   Gastroc strap stretch 3 x 30\"  X   Heel slide with SB  15 x 5\"

## 2019-05-15 ENCOUNTER — TELEPHONE (OUTPATIENT)
Dept: ORTHOPEDIC SURGERY | Age: 59
End: 2019-05-15

## 2019-05-15 ENCOUNTER — HOSPITAL ENCOUNTER (OUTPATIENT)
Dept: VASCULAR LAB | Age: 59
Discharge: HOME | End: 2019-05-15
Payer: COMMERCIAL

## 2019-05-15 DIAGNOSIS — M79.89 PAIN AND SWELLING OF LOWER LEG, RIGHT: Primary | ICD-10-CM

## 2019-05-15 DIAGNOSIS — M79.661 PAIN AND SWELLING OF LOWER LEG, RIGHT: Primary | ICD-10-CM

## 2019-05-15 DIAGNOSIS — M79.89 PAIN AND SWELLING OF LOWER LEG, RIGHT: ICD-10-CM

## 2019-05-15 DIAGNOSIS — M79.661 PAIN AND SWELLING OF LOWER LEG, RIGHT: ICD-10-CM

## 2019-05-15 PROCEDURE — 93971 EXTREMITY STUDY: CPT

## 2019-05-20 ENCOUNTER — OFFICE VISIT (OUTPATIENT)
Dept: ORTHOPEDIC SURGERY | Age: 59
End: 2019-05-20

## 2019-05-20 ENCOUNTER — APPOINTMENT (OUTPATIENT)
Dept: PHYSICAL THERAPY | Age: 59
End: 2019-05-20
Payer: COMMERCIAL

## 2019-05-20 DIAGNOSIS — Z96.651 S/P TOTAL KNEE ARTHROPLASTY, RIGHT: Primary | ICD-10-CM

## 2019-05-20 PROCEDURE — 99024 POSTOP FOLLOW-UP VISIT: CPT | Performed by: ORTHOPAEDIC SURGERY

## 2019-05-20 RX ORDER — METHYLPREDNISOLONE 4 MG/1
TABLET ORAL
Qty: 1 KIT | Refills: 0 | Status: SHIPPED | OUTPATIENT
Start: 2019-05-20 | End: 2019-05-27 | Stop reason: ALTCHOICE

## 2019-05-20 RX ORDER — GABAPENTIN 100 MG/1
100 CAPSULE ORAL 3 TIMES DAILY
Qty: 90 CAPSULE | Refills: 0 | Status: SHIPPED | OUTPATIENT
Start: 2019-05-20 | End: 2019-06-17 | Stop reason: SDUPTHER

## 2019-05-20 NOTE — PROGRESS NOTES
History of present illness: The patient returns today after right total knee replacement. The patient's been having a significant increase in pain since last Wednesday. Not sure of a specific event but she is having pain increasing from mid thigh all the way down to the ankle. She did have some increased swelling last week and was sent her for Doppler ultrasound which came back negative for DVT. She does have a history of lumbar disc herniation which was diagnosed many years ago. There have been no fevers or chills. Pain Assessment  Location of Pain: Knee  Location Modifiers: Right  Severity of Pain: 10  Quality of Pain: Throbbing, Sharp  Frequency of Pain: Constant  Aggravating Factors: Standing, Walking, Stairs  Limiting Behavior: Yes  Result of Injury: No  Work-Related Injury: No  Are there other pain locations you wish to document?: No]    Physical examination: The incision is clean, dry, and intact with no drainage or signs of infection. Range of motion is 0 degrees of extension to 100 degrees of flexion. Her extensor mechanism is intact. She does have some diffuse tenderness mostly over the medial aspect of the knee. There is expected swelling with no evidence of DVT and a negative Homans sign. Doppler ultrasounds been negative. The knee is stable to varus and valgus stress testing. Neurovascular exam is intact. Examination of the right hip reveals intact skin. The patient demonstrates full painless range of motion with regards to flexion, abduction, internal and external rotation. There is no tenderness about the greater trochanter. There is a negative straight leg raise against resistance. Strength is 5/5 throughout all planes. Radiographs: X-rays taken today including AP, lateral, and skyline views of the right knee show excellent alignment of the prosthesis. No evidence of septic or aseptic loosening or periprosthetic fractures. Assessment/plan:  This patient is having acute

## 2019-05-22 ENCOUNTER — HOSPITAL ENCOUNTER (OUTPATIENT)
Dept: PHYSICAL THERAPY | Age: 59
Setting detail: THERAPIES SERIES
End: 2019-05-22
Payer: COMMERCIAL

## 2019-05-27 ENCOUNTER — HOSPITAL ENCOUNTER (EMERGENCY)
Age: 59
Discharge: HOME | End: 2019-05-27
Attending: EMERGENCY MEDICINE
Payer: COMMERCIAL

## 2019-05-27 ENCOUNTER — APPOINTMENT (OUTPATIENT)
Dept: CT IMAGING | Age: 59
End: 2019-05-27
Payer: COMMERCIAL

## 2019-05-27 VITALS
HEART RATE: 86 BPM | TEMPERATURE: 98 F | WEIGHT: 210 LBS | OXYGEN SATURATION: 100 % | RESPIRATION RATE: 20 BRPM | SYSTOLIC BLOOD PRESSURE: 122 MMHG | DIASTOLIC BLOOD PRESSURE: 75 MMHG | HEIGHT: 66 IN | BODY MASS INDEX: 33.75 KG/M2

## 2019-05-27 DIAGNOSIS — N63.0 BREAST NODULE: ICD-10-CM

## 2019-05-27 DIAGNOSIS — R42 DIZZINESS: ICD-10-CM

## 2019-05-27 DIAGNOSIS — R81 GLUCOSURIA: ICD-10-CM

## 2019-05-27 DIAGNOSIS — E86.9 VOLUME DEPLETION: ICD-10-CM

## 2019-05-27 DIAGNOSIS — R11.2 NON-INTRACTABLE VOMITING WITH NAUSEA, UNSPECIFIED VOMITING TYPE: Primary | ICD-10-CM

## 2019-05-27 LAB
A/G RATIO: 2.1 (ref 1.1–2.2)
ALBUMIN SERPL-MCNC: 4.8 G/DL (ref 3.4–5)
ALP BLD-CCNC: 89 U/L (ref 40–129)
ALT SERPL-CCNC: 11 U/L (ref 10–40)
ANION GAP SERPL CALCULATED.3IONS-SCNC: 15 MMOL/L (ref 3–16)
AST SERPL-CCNC: 10 U/L (ref 15–37)
BILIRUB SERPL-MCNC: 0.7 MG/DL (ref 0–1)
BILIRUBIN URINE: NEGATIVE
BLOOD, URINE: NEGATIVE
BUN BLDV-MCNC: 19 MG/DL (ref 7–20)
CALCIUM SERPL-MCNC: 10.5 MG/DL (ref 8.3–10.6)
CHLORIDE BLD-SCNC: 94 MMOL/L (ref 99–110)
CLARITY: CLEAR
CO2: 27 MMOL/L (ref 21–32)
COLOR: YELLOW
CREAT SERPL-MCNC: 1.1 MG/DL (ref 0.6–1.1)
EKG ATRIAL RATE: 103 BPM
EKG DIAGNOSIS: NORMAL
EKG P AXIS: 39 DEGREES
EKG P-R INTERVAL: 142 MS
EKG Q-T INTERVAL: 348 MS
EKG QRS DURATION: 86 MS
EKG QTC CALCULATION (BAZETT): 455 MS
EKG R AXIS: 10 DEGREES
EKG T AXIS: 50 DEGREES
EKG VENTRICULAR RATE: 103 BPM
GFR AFRICAN AMERICAN: >60
GFR NON-AFRICAN AMERICAN: 51
GLOBULIN: 2.3 G/DL
GLUCOSE BLD-MCNC: 210 MG/DL (ref 70–99)
GLUCOSE URINE: >=1000 MG/DL
HCT VFR BLD CALC: 46.9 % (ref 36–48)
HEMOGLOBIN: 15.8 G/DL (ref 12–16)
KETONES, URINE: 15 MG/DL
LACTIC ACID: 1.7 MMOL/L (ref 0.4–2)
LEUKOCYTE ESTERASE, URINE: NEGATIVE
MCH RBC QN AUTO: 31.7 PG (ref 26–34)
MCHC RBC AUTO-ENTMCNC: 33.7 G/DL (ref 31–36)
MCV RBC AUTO: 93.9 FL (ref 80–100)
MICROSCOPIC EXAMINATION: ABNORMAL
NITRITE, URINE: NEGATIVE
PDW BLD-RTO: 14.1 % (ref 12.4–15.4)
PH UA: 5.5 (ref 5–8)
PLATELET # BLD: 292 K/UL (ref 135–450)
PMV BLD AUTO: 8.3 FL (ref 5–10.5)
POTASSIUM SERPL-SCNC: 4.1 MMOL/L (ref 3.5–5.1)
PROTEIN UA: NEGATIVE MG/DL
RBC # BLD: 5 M/UL (ref 4–5.2)
SODIUM BLD-SCNC: 136 MMOL/L (ref 136–145)
SPECIFIC GRAVITY UA: 1.02 (ref 1–1.03)
TOTAL PROTEIN: 7.1 G/DL (ref 6.4–8.2)
TROPONIN: <0.01 NG/ML
URINE TYPE: ABNORMAL
UROBILINOGEN, URINE: 0.2 E.U./DL
WBC # BLD: 14 K/UL (ref 4–11)

## 2019-05-27 PROCEDURE — 81003 URINALYSIS AUTO W/O SCOPE: CPT

## 2019-05-27 PROCEDURE — 93005 ELECTROCARDIOGRAM TRACING: CPT | Performed by: EMERGENCY MEDICINE

## 2019-05-27 PROCEDURE — 80053 COMPREHEN METABOLIC PANEL: CPT

## 2019-05-27 PROCEDURE — 74176 CT ABD & PELVIS W/O CONTRAST: CPT

## 2019-05-27 PROCEDURE — 93010 ELECTROCARDIOGRAM REPORT: CPT | Performed by: INTERNAL MEDICINE

## 2019-05-27 PROCEDURE — 99285 EMERGENCY DEPT VISIT HI MDM: CPT

## 2019-05-27 PROCEDURE — 83605 ASSAY OF LACTIC ACID: CPT

## 2019-05-27 PROCEDURE — 84484 ASSAY OF TROPONIN QUANT: CPT

## 2019-05-27 PROCEDURE — 96360 HYDRATION IV INFUSION INIT: CPT

## 2019-05-27 PROCEDURE — 85027 COMPLETE CBC AUTOMATED: CPT

## 2019-05-27 PROCEDURE — 2580000003 HC RX 258: Performed by: EMERGENCY MEDICINE

## 2019-05-27 RX ORDER — ONDANSETRON 4 MG/1
4 TABLET, FILM COATED ORAL EVERY 8 HOURS PRN
COMMUNITY

## 2019-05-27 RX ORDER — ONDANSETRON 2 MG/ML
4 INJECTION INTRAMUSCULAR; INTRAVENOUS EVERY 30 MIN PRN
Status: DISCONTINUED | OUTPATIENT
Start: 2019-05-27 | End: 2019-05-27 | Stop reason: HOSPADM

## 2019-05-27 RX ORDER — LAMOTRIGINE 200 MG/1
200 TABLET ORAL DAILY
COMMUNITY

## 2019-05-27 RX ORDER — PROMETHAZINE HYDROCHLORIDE 25 MG/1
25 SUPPOSITORY RECTAL EVERY 6 HOURS PRN
Qty: 12 SUPPOSITORY | Refills: 0 | Status: SHIPPED | OUTPATIENT
Start: 2019-05-27 | End: 2019-05-30

## 2019-05-27 RX ORDER — 0.9 % SODIUM CHLORIDE 0.9 %
500 INTRAVENOUS SOLUTION INTRAVENOUS ONCE
Status: COMPLETED | OUTPATIENT
Start: 2019-05-27 | End: 2019-05-27

## 2019-05-27 RX ADMIN — SODIUM CHLORIDE 500 ML: 9 INJECTION, SOLUTION INTRAVENOUS at 09:20

## 2019-05-27 ASSESSMENT — ENCOUNTER SYMPTOMS
TROUBLE SWALLOWING: 0
EYE PAIN: 0
BACK PAIN: 0
COLOR CHANGE: 0
COUGH: 0
STRIDOR: 0
WHEEZING: 0
DIARRHEA: 0
APNEA: 0
RHINORRHEA: 0
VOMITING: 0
SHORTNESS OF BREATH: 0
CHEST TIGHTNESS: 0
ABDOMINAL PAIN: 0
ABDOMINAL DISTENTION: 0
PHOTOPHOBIA: 0
EYE DISCHARGE: 0
SORE THROAT: 0
BLOOD IN STOOL: 0
CONSTIPATION: 0
EYE REDNESS: 0
SINUS PRESSURE: 0
VOICE CHANGE: 0
RECTAL PAIN: 0
NAUSEA: 1

## 2019-05-27 ASSESSMENT — PAIN SCALES - GENERAL: PAINLEVEL_OUTOF10: 7

## 2019-05-27 ASSESSMENT — PAIN DESCRIPTION - LOCATION: LOCATION: CHEST;KNEE

## 2019-05-27 ASSESSMENT — PAIN DESCRIPTION - ORIENTATION: ORIENTATION: RIGHT

## 2019-05-27 NOTE — ED PROVIDER NOTES
Tim Curry is a 63year old female who had right total knee replacement on March 19th by Dr. Namita Thao. She has had a difficult recovery period, has not been able to start physical therapy due to persistent pain, and was hospitalized for volume depletion and low blood pressure. Patient reported to her  this morning that she was weak and nauseated since yesterday, and she vomited once this morning. Her  attempted to get her blood pressure, and the monitor \"couldn't read anything\". He was concerned that her BP was low and that she might be dehydrated, so they presented to the ED for evaluation. She reports nausea and dizziness, but has not yet taken her morning meds.  reports that she is not drinking adequate fluids. /79   Pulse 111   Temp 98 °F (36.7 °C) (Oral)   Resp 16   Ht 5' 5.5\" (1.664 m)   Wt 210 lb (95.3 kg)   SpO2 99%   BMI 34.41 kg/m²     I have reviewed the following from the nursing documentation:      Prior to Admission medications    Medication Sig Start Date End Date Taking? Authorizing Provider   ondansetron (ZOFRAN) 4 MG tablet Take 4 mg by mouth every 8 hours as needed for Nausea or Vomiting   Yes Historical Provider, MD   lamoTRIgine (LAMICTAL) 200 MG tablet Take 200 mg by mouth daily   Yes Historical Provider, MD   gabapentin (NEURONTIN) 100 MG capsule Take 1 capsule by mouth 3 times daily for 30 days.  5/20/19 6/19/19 Yes Sabine Aldrich MD   atorvastatin (LIPITOR) 10 MG tablet TAKE ONE TABLET BY MOUTH DAILY 5/6/19  Yes Vianney Zaragoza MD   lisinopril (PRINIVIL;ZESTRIL) 10 MG tablet Take 1 tablet by mouth daily 5/3/19  Yes Vianney Zaragoza MD   blood glucose test strips (ASCENSIA AUTODISC VI;ONE TOUCH ULTRA TEST VI) strip Patient has One Touch Ultra 2, tests daily 4/28/19 4/22/20 Yes Makenzie Arteaga MD   buPROPion (WELLBUTRIN XL) 300 MG extended release tablet Take 300 mg by mouth every morning   Yes Historical Provider, MD   atorvastatin (LIPITOR) 10 MG tablet TAKE ONE TABLET BY MOUTH DAILY 7/10/18  Yes Hyacinth Raygoza MD   Blood Glucose Monitoring Suppl (ONE TOUCH ULTRA 2) w/Device KIT 1 kit by Does not apply route daily 7/10/18  Yes Hyacinth Raygoza MD   blood glucose monitor strips Test once dialy for diabetes e11.9 7/10/18  Yes Hyacinth Raygoza MD   canagliflozin (INVOKANA) 100 MG TABS tablet Take 1 tablet by mouth every morning (before breakfast) 7/10/18  Yes Hyacinth Raygoza MD   Lancets MISC Test once daily for diabetes e11.9 7/10/18  Yes Hyacinth Raygoza MD   pioglitazone (ACTOS) 15 MG tablet Take 1 tablet by mouth daily 7/10/18  Yes Hyacinth Raygoza MD   lamoTRIgine (LAMICTAL) 200 MG tablet Take 1 tablet by mouth nightly  4/26/18  Yes Historical Provider, MD   Blood Glucose Monitoring Suppl CONCETTA Test once daily for diabetes e11.9 2/5/18  Yes Hyacinth Raygoza MD   traZODone (DESYREL) 100 MG tablet Take 200 mg by mouth nightly  7/16/17  Yes Historical Provider, MD   escitalopram (LEXAPRO) 20 MG tablet Take 10 mg by mouth daily    Yes Historical Provider, MD       Allergies as of 05/27/2019 - Review Complete 05/27/2019   Allergen Reaction Noted    Morphine Hives 05/28/2009       Past Medical History:   Diagnosis Date    Anxiety     Depression     DJD (degenerative joint disease)     hands/knees/ankles    Fatty liver     Hyperlipidemia     Hypertension     Irritable bowel syndrome     Mood disorder ([address]     NOS    N[address] addiction ([facility] [address]     ANGEL (obstructive sleep apnea)     no CPAP machine - not recommended per sleep study    Restless leg syndrome     Type II or unspecified type diabetes mellitus without mention of complication, not stated as uncontrolled     Vision impairment     wears glasses and contacts        Surgical History:   Past Surgical History:   Procedure Laterality Date    APPENDECTOMY      CHOLECYSTECTOMY  2001    COLONOSCOPY  1/2004    Due 2014    CYSTOSCOPY  7/9/10    CYSTOSCOPY  11/18/10    insertion of sparc mesh sling with cystoscopy    KNEE ARTHROSCOPY Right     KNEE ARTHROSCOPY Left 10/13    partial medial menisectomy    PLANTAR FASCIA SURGERY      bilateral    BRANDI AND BSO  4/10    DUB    TOTAL KNEE ARTHROPLASTY Right 3/29/2019    RIGHT TOTAL KNEE MAKOPLASTY REPLACEMENT WITH ADDUCTOR CANAL BLOCK FOR PAIN CONTROL                 JOE MCCLENDON  CPT CODE - 67033  performed by Mildred Burnham MD at [facility] [place] [facility]        Family History:    Family History   Problem Relation Age of Onset    Diabetes Mother     Arthritis Mother     Diabetes Father     Heart Disease Father     Cancer Father         colon    Dementia Father        Social History     Socioeconomic History    Marital status:      Spouse name: Not on file    Number of children: Not on file    Years of education: Not on file    Highest education level: Not on file   Occupational History    Not on file   Social Needs    Financial resource strain: Not on file    Food insecurity:     Worry: Not on file     Inability: Not on file    Transportation needs:     Medical: Not on file     Non-medical: Not on file   Tobacco Use    Smoking status: Never Smoker    Smokeless tobacco: Never Used   Substance and Sexual Activity    Alcohol use: No    Drug use: No    Sexual activity: Not on file   Lifestyle    Physical activity:     Days per week: Not on file     Minutes per session: Not on file    Stress: Not on file   Relationships    Social connections:     Talks on phone: Not on file     Gets together: Not on file     Attends [employer] service: Not on file     Active member of club or organization: Not on file     Attends meetings of clubs or organizations: Not on file     Relationship status: Not on file    Intimate partner violence:     Fear of current or ex partner: Not on file     Emotionally abused: Not on file     Physically abused: Not on file     Forced sexual activity: Not on file   Other Topics Concern    Not on file   Social History Narrative    Not on file         Review of Systems   Constitutional: Negative for activity change, appetite change, chills, diaphoresis, fatigue, fever and unexpected weight change. HENT: Negative for congestion, ear pain, mouth sores, rhinorrhea, sinus pressure, sore throat, tinnitus, trouble swallowing and voice change. Eyes: Negative for photophobia, pain, discharge, redness and visual disturbance. Respiratory: Negative for apnea, cough, chest tightness, shortness of breath, wheezing and stridor. Cardiovascular: Positive for leg swelling (right knee since surgery; no calf pain). Negative for chest pain and palpitations. Gastrointestinal: Positive for nausea. Negative for abdominal distention, abdominal pain, blood in stool, constipation, diarrhea, rectal pain and vomiting. Genitourinary: Negative for difficulty urinating, dyspareunia, dysuria, flank pain, frequency, genital sores, menstrual problem, pelvic pain, urgency, vaginal bleeding, vaginal discharge and vaginal pain. Musculoskeletal: Negative for arthralgias, back pain, joint swelling, neck pain and neck stiffness. Skin: Negative for color change and rash. Neurological: Positive for dizziness. Negative for tremors, seizures, syncope, facial asymmetry, speech difficulty, weakness, light-headedness, numbness and headaches. Hematological: Negative for adenopathy. Does not bruise/bleed easily. Psychiatric/Behavioral: Negative for agitation, confusion, dysphoric mood, hallucinations, self-injury, sleep disturbance and suicidal ideas. All other systems reviewed and are negative. Physical Exam   Constitutional: She is oriented to person, place, and time. She appears well-developed and well-nourished. No distress. HENT:   Head: Normocephalic and atraumatic. Left Ear: External ear normal.   Mouth/Throat: Oropharynx is clear and moist. No oropharyngeal exudate. Eyes: Pupils are equal, round, and reactive to light.  Conjunctivae and EOM are normal. Right eye exhibits no discharge. Left eye exhibits no discharge. Neck: Normal range of motion. No JVD present. No tracheal deviation present. Cardiovascular: Normal rate, regular rhythm, normal heart sounds and intact distal pulses. Exam reveals no gallop and no friction rub. No murmur heard. Pulmonary/Chest: Effort normal and breath sounds normal. No stridor. No respiratory distress. She has no wheezes. She has no rales. She exhibits no tenderness. Abdominal: Soft. Bowel sounds are normal. She exhibits no distension and no mass. There is no tenderness. There is no rebound and no guarding. Musculoskeletal: Normal range of motion. She exhibits edema (right knee. Surgical incision is well healed; moderate soft tissue swelling with evidence of infection. No calf tenderness. ). She exhibits no tenderness. Lymphadenopathy:     She has no cervical adenopathy. Neurological: She is alert and oriented to person, place, and time. She displays normal reflexes. No cranial nerve deficit. She exhibits normal muscle tone. Coordination normal.   Skin: No rash noted. Psychiatric: She has a normal mood and affect.  Her behavior is normal. Judgment and thought content normal.       Procedures    MDM  Results for orders placed or performed during the hospital encounter of 05/27/19   CBC   Result Value Ref Range    WBC 14.0 (H) 4.0 - 11.0 K/uL    RBC 5.00 4.00 - 5.20 M/uL    Hemoglobin 15.8 12.0 - 16.0 g/dL    Hematocrit 46.9 36.0 - 48.0 %    MCV 93.9 80.0 - 100.0 fL    MCH 31.7 26.0 - 34.0 pg    MCHC 33.7 31.0 - 36.0 g/dL    RDW 14.1 12.4 - 15.4 %    Platelets 375 022 - 587 K/uL    MPV 8.3 5.0 - 10.5 fL   Comprehensive Metabolic Panel   Result Value Ref Range    Sodium 136 136 - 145 mmol/L    Potassium 4.1 3.5 - 5.1 mmol/L    Chloride 94 (L) 99 - 110 mmol/L    CO2 27 21 - 32 mmol/L    Anion Gap 15 3 - 16    Glucose 210 (H) 70 - 99 mg/dL    BUN 19 7 - 20 mg/dL    CREATININE 1.1 0.6 - 1.1 mg/dL    GFR Non- American 51 (A) >60    GFR African American >60 >60    Calcium 10.5 8.3 - 10.6 mg/dL    Total Protein 7.1 6.4 - 8.2 g/dL    Alb 4.8 3.4 - 5.0 g/dL    Albumin/Globulin Ratio 2.1 1.1 - 2.2    Total Bilirubin 0.7 0.0 - 1.0 mg/dL    Alkaline Phosphatase 89 40 - 129 U/L    ALT 11 10 - 40 U/L    AST 10 (L) 15 - 37 U/L    Globulin 2.3 g/dL   Troponin   Result Value Ref Range    Troponin <0.01 <0.01 ng/mL   Lactic Acid, Plasma   Result Value Ref Range    Lactic Acid 1.7 0.4 - 2.0 mmol/L   EKG 12 Lead   Result Value Ref Range    Ventricular Rate 103 BPM    Atrial Rate 103 BPM    P-R Interval 142 ms    QRS Duration 86 ms    Q-T Interval 348 ms    QTc Calculation (Bazett) 455 ms    P Axis 39 degrees    R Axis 10 degrees    T Axis 50 degrees    Diagnosis       Sinus tachycardiaOtherwise normal ECGWhen compared with ECG of 26-APR-2019 16:42,No significant change was found         I estimate there is LOW risk for ACUTE APPENDICITIS, BOWEL OBSTRUCTION, CHOLECYSTITIS, DIVERTICULITIS, INCARCERATED HERNIA, PANCREATITIS, or PERFORATED BOWEL or ULCER, thus I consider the discharge disposition reasonable. Also, there is no evidence or peritonitis, sepsis, or toxicity. Ela Welch and I have discussed the diagnosis and risks, and we agree with discharging home to follow-up with their primary doctor. We also discussed returning to the Emergency Department immediately if new or worsening symptoms occur. We have discussed the symptoms which are most concerning (e.g., bloody stool, fever, changing or worsening pain, vomiting) that necessitate immediate return. FINAL Impression    1. Non-intractable vomiting with nausea, unspecified vomiting type    2. Dizziness    3. Volume depletion    4. Breast nodule    5. Glucosuria        Blood pressure 122/75, pulse 96, temperature 98 °F (36.7 °C), temperature source Oral, resp. rate 20, height 5' 5.5\" (1.664 m), weight 210 lb (95.3 kg), SpO2 99 %.     Radiology  Ct Abdomen Pelvis Wo Contrast Additional Contrast? None    Result Date: 5/27/2019  EXAMINATION: CT OF THE ABDOMEN AND PELVIS WITHOUT CONTRAST 5/27/2019 11:43 am TECHNIQUE: CT of the abdomen and pelvis was performed without the administration of intravenous contrast. Multiplanar reformatted images are provided for review. Dose modulation, iterative reconstruction, and/or weight based adjustment of the mA/kV was utilized to reduce the radiation dose to as low as reasonably achievable. COMPARISON: 04/26/2019 HISTORY: ORDERING SYSTEM PROVIDED HISTORY: abdominal pain TECHNOLOGIST PROVIDED HISTORY: Additional Contrast?->None Ordering Physician Provided Reason for Exam: vomiting X 1 day, LT flank pain Acuity: Acute Type of Exam: Initial Relevant Medical/Surgical History: surgeries appy, gallbladder, hysterectomy FINDINGS: Lower Chest: Small hiatal hernia is seen. There is nonspecific thickening at the GE junction. Trace pleural fluid is seen Left breast nodule measures 1.4 cm. De pendant opacity is seen at the lung bases, likely atelectasis. Organs: No perisplenic fluid. Adrenal glands appear normal.  No hydronephrosis on the right. 2 mm stone seen in the right kidney. No hydronephrosis on the left. 2 mm stone seen in left kidney There are clips from prior cholecystectomy No significant intrahepatic ductal dilatation. No perihepatic fluid. No peripancreatic inflammatory change GI/Bowel: Moderate to large stool load is seen in the colon. No bowel obstruction. A few colonic diverticula are seen. No significant pericolonic stranding Appendix not clearly seen. Pelvis: Bladder is incompletely distended, accentuating its wall thickness. Uterus is not seen. No free fluid. Pelvic phleboliths are seen Peritoneum/Retroperitoneum: No retroperitoneal adenopathy. No aortic aneurysm Bones/Soft Tissues: Spurring is seen in the spine. Spurring is seen in the hips.   Sclerotic density is seen in the left iliac wing, likely bone island     Punctate

## 2019-05-28 ENCOUNTER — OFFICE VISIT (OUTPATIENT)
Dept: ORTHOPEDIC SURGERY | Age: 59
End: 2019-05-28

## 2019-05-28 ENCOUNTER — HOSPITAL ENCOUNTER (OUTPATIENT)
Dept: PHYSICAL THERAPY | Age: 59
Setting detail: THERAPIES SERIES
Discharge: HOME | End: 2019-05-28
Payer: COMMERCIAL

## 2019-05-28 DIAGNOSIS — Z96.651 S/P TOTAL KNEE ARTHROPLASTY, RIGHT: Primary | ICD-10-CM

## 2019-05-28 PROCEDURE — 97110 THERAPEUTIC EXERCISES: CPT | Performed by: PHYSICAL THERAPIST

## 2019-05-28 PROCEDURE — 97140 MANUAL THERAPY 1/> REGIONS: CPT | Performed by: PHYSICAL THERAPIST

## 2019-05-28 PROCEDURE — 99024 POSTOP FOLLOW-UP VISIT: CPT | Performed by: PHYSICIAN ASSISTANT

## 2019-05-28 RX ORDER — TRAMADOL HYDROCHLORIDE 50 MG/1
50 TABLET ORAL EVERY 6 HOURS PRN
Qty: 28 TABLET | Refills: 0 | Status: SHIPPED | OUTPATIENT
Start: 2019-05-28 | End: 2019-06-04

## 2019-05-28 NOTE — PROGRESS NOTES
[facility], [address] [place] [address]  Phone: [phone]  Fax [phone]      Physical Therapy Progress Note  Date: 2019        Patient Name:  Estrella Dsouza    :  1960  MRN: [id number]  Restrictions/Precautions:     Medical/Treatment Diagnosis Information:   Diagnosis: M 25.561 R knee pain; M17.11 R knee OA  Treatment Diagnosis: M25.561 R knee pain; M25.661 right knee stiffness  Insurance/Certification information:  PT Insurance Information:  [employer] - [id number] PER COND. - NEEDS AUTH AFTR 25V - EXP 19  Physician Information:  Referring Practitioner: Dr. Vazquez Hidden of care signed (Y/N):    Visit# / total visits:  10/  Pain level: 5-6/10     G-Code (if applicable):      Date G-Code Applied:  19   LEFS  80%     Time Period for Report:   4/3/19 - 19  Cancels/No-shows to date:     Plan of Care/Treatment to date:  [x] Therapeutic Exercise    [x] Modalities:  [] Therapeutic Activity     [] Ultrasound  [x] Electrical Stim  [x] Gait Training      [] Cervical Traction    [] Lumbar Traction  [x] Neuromuscular Re-education  [] Cold/hotpack [] Iontophoresis  [x] Instruction in HEP      Other:  [x] Manual Therapy       [x]  vaso  [] Aquatic Therapy       []                    ? Significant Findings At Last Visit/Comments:    Subjective:     Pt had to go to ER last night for dehydration. Pt is having difficulty sleeping due to pain. She is not icing often. Objective:  Observation:  Test measurements:        Test used Initial score Current Score   Pain Summary VAS 6 5-6   Functional questionnaire LEFS 86% 80   ROM flexion 86 116 19       extension -6 -1   Strength quad SLR mod assist SLR - 10 degree ext lag. ABD      flexion       Assessment:  Summary:   Patient's response to treatment:     Progress towards goals:     Long Term Goals: To be achieved in: 12 weeks  1. Disability index score of 40% or less for the LEFS to assist with reaching prior level of function. Not met  2. Patient will demonstrate increased AROM to 0-120 to allow for proper joint functioning as indicated by patients Functional Deficits. Not met  3. Patient will demonstrate an increase in Strength to good proximal hip strength and control, within 5lb HHD in LE to allow for proper functional mobility as indicated by patients Functional Deficits. Not met  4. Patient will return to functional activities without increased symptoms or restriction. Not met  5. Patient will be able to ascend/descend stairs with reciprocal pattern for return to normal ADLs. Not attempted    Current Frequency/Duration:   # Days per week: [] 1 day # Weeks: [] 1 week [] 7 weeks     [x] 2 days? [] 2 weeks [x] 8 weeks     [] 3 days   [] 3 weeks [] 9 weeks     [] 4 days   [] 4 weeks [] 10 weeks      [] 5 days   [] 5 weeks [] 11 weeks          [] 6 weeks [] 12 weeks    Rehab Potential: [] Excellent [x] Good [] Fair  [] Poor     Goal Status:  [] Achieved [] Partially Achieved  [x] Not Achieved     Patient Status: [x] Continue per initial plan of Care     [] Patient now discharged     [] Additional visits requested, Please re-certify for additional visits:      Requested frequency/duration:  2x week for 8 weeks. Electronically signed by:  Jenn Hager PT    If you have any questions or concerns, please don't hesitate to call.   Thank you for your referral.    Physician Signature:________________________________Date:__________________  By signing above, therapists plan is approved by physician

## 2019-05-28 NOTE — FLOWSHEET NOTE
[facility],  [address] [place]  Phone: [phone]  Fax [phone]      ATHLETIC TRAINING [address]  Date:  2019    Patient Name:  Maxi Davies    :  1960  MRN: [id number]  Restrictions/Precautions:    Medical/Treatment Diagnosis Information:  ·  R knee OA  ·    Physician Information:   Shlomo Kast Post-op  8 wks  12 wks 16 wks 20 wks   24 wks                            Activity Log                                                  DOS/DOI:                                                    Date: 19    ATC communication Short on time today    Needs quad strengthening- lacks full ext   Bike    Elliptical    Treadmill    Airdyne        Gastroc stretch    Soleus stretch    Hamstring stretch    ITB stretch    Hip Flexor stretch    Quad stretch    Adductor stretch        Weight Shifting sp                              fp                              tp    Lateral walking (with/w/o TB)        Balance: PEP/Patience board                   SLS          Star excursion load/explode          Extremity reach UE/LE        Leg Press Julio. 60# 2x10                     Ecc. 40# 2x10                     Inv. Calf Press Julio. Ecc.                        Inv.        JOSIAH   Flex               ABd               ADd              TKE 45# 20x5\"  Reverse 60# 20x5\"              Ext        Steps Up 4\" x10              Up and Over               Down               Lateral               Rotation        Squats  mini                  wall                 BOSU         Lunges:  Lunge to Balance                   Balance to Lunge                   Walking        Knee Extension Bilat. Ecc.                               Inv. Hamstring Curls Bilat. Ecc.                               Inv.        Soleus Press Bilat. Ecc.                           Inv.                             Ladders                Square               Jump/Hop  Low                      Med.                      High                                                            Modality declined   Initials                             DTM   Time spent one on one (workers comp)    Time spent with PT assistant

## 2019-05-28 NOTE — FLOWSHEET NOTE
[facility], [address]  Phone: [phone]  Fax [phone]    Physical Therapy Daily Treatment Note  Date:  2019    Patient Name:  Tim Curry    :  1960  MRN: [id number]  Restrictions/Precautions:    Physician Information:  Referring Practitioner: Dr. Sabine Aldrich  Medical/Treatment Diagnosis Information:  Diagnosis: M 25.561 R knee pain; M17.11 R knee OA  · Treatment Diagnosis: M25.561 R knee pain; M25.661 right knee stiffness   [] Conservative / [x] Surgical - DOS: 3/29/19  Therapy Diagnosis/Practice Pattern:  Practice Pattern H: Joint Arthroplasty  Insurance/Certification information:  PT Insurance Information:  BCBS - 250D-80/20-$0CP-25PT PER COND. - NEEDS AUTH AFTR 25V - EXP 19  Plan of care signed: [x] YES  [] NO  Number of Comorbidities:  []0     [x]1-2    []3+  Date of Patient follow up with Physician:     G-Code (if applicable):      Date G-Code Applied:         Progress Note: [x]  Yes  []  No  Next due by: Visit #10        Latex Allergy:  [x]NO      []YES  Preferred Language for Healthcare:   [x]English       []other:    Visit # Insurance Allowable Reporting Period   10 25 Needs auth after 25  Begin Date: 2019               End Date:      RECERT DUE BY: 12 weeks    SUBJECTIVE:  Pt had to go to ER last night for dehydration. Pt is having difficulty sleeping due to pain. She is not icing often. OBJECTIVE: See eval  Observation: Patient knee and calf swollen today, mid calf 48 cm on R LE 41 on L LE. Knee warm and positive homans; PA consulted and doppler ordered, hold therapy today  Palpation:     Test used Initial score Current Score   Pain Summary VAS 6 5-6   Functional questionnaire LEFS 86% 80   ROM flexion 86 116 19       extension -6 -1   Strength quad SLR mod assist SLR - 10 degree ext lag.      ABD      flexion          RESTRICTIONS/PRECAUTIONS: (67327)Reviewed/Progressed HEP activities related to improving balance, coordination, kinesthetic sense, posture, motor skill, proprioception of core, proximal hip and LE for self care, mobility, lifting, and ambulation/stair navigation      Manual Treatments:  PROM / STM / Oscillations-Mobs:  G-I, II, III, IV (PA's, Inf., Post.)  [x] (04268) Provided manual therapy to mobilize LE, proximal hip and/or LS spine soft tissue/joints for the purpose of modulating pain, promoting relaxation,  increasing ROM, reducing/eliminating soft tissue swelling/inflammation/restriction, improving soft tissue extensibility and allowing for proper ROM for normal function with self care, mobility, lifting and ambulation. Modalities:       [] GR/ESU 15 min    [x] GR 15 min  [] ESU     [] CP    [] MHP    [] declined     Charges:  Timed Code Treatment Minutes: 50   Total Treatment Minutes: 50     [] EVAL (LOW) 25079 (typically 20 minutes face-to-face)  [] EVAL (MOD) 02514 (typically 30 minutes face-to-face)  [] EVAL (HIGH) 53095 (typically 45 minutes face-to-face)  [] RE-EVAL     [x] VH(64362) x  2   [] IONTO  [] NMR (79228) x      [] VASO  [x] Manual (99897) x  1    [] Other:  [] TA x       [] Mech Traction (96136)  [] ES(attended) (07457)      [] ES (un) (77213):     GOALS: Patient stated goal: Walk with no AD. Therapist goals for Patient:   Short Term Goals: To be achieved in: 2 weeks  1. Independent in HEP and progression per patient tolerance, in order to prevent re-injury. 2. Patient will have a decrease in pain to facilitate improvement in movement, function, and ADLs as indicated by Functional Deficits. Long Term Goals: To be achieved in: 12 weeks  1. Disability index score of 40% or less for the LEFS to assist with reaching prior level of function. Not met  2. Patient will demonstrate increased AROM to 0-120 to allow for proper joint functioning as indicated by patients Functional Deficits. Not met  3.  Patient will demonstrate an increase in Strength to good proximal hip strength and control, within 5lb HHD in LE to allow for proper functional mobility as indicated by patients Functional Deficits. Not met  4. Patient will return to functional activities without increased symptoms or restriction. Not met  5. Patient will be able to ascend/descend stairs with reciprocal pattern for return to normal ADLs. Not attempted    New or Updated Goals (if applicable):  [x] No change to goals established upon initial eval/last progress note:  New Goals:    Progression Towards Functional goals:   [] Patient is progressing as expected towards functional goals listed. [x] Progression is slowed due to complexities listed. [] Progression has been slowed due to co-morbidities. [] Plan just implemented, too soon to assess goals progression  [] Other:     ASSESSMENT:    [] Improvement noted relative to goals:  [] No Improvement noted related to goals:  Summary/Patient's response to treatment: Improved tolerance for therex this visit. Improved strength and quad activation.      Treatment/Activity Tolerance:  [x] Patient tolerated treatment well [] Patient limited by fatique  [] Patient limited by pain  [] Patient limited by other medical complications  [] Other:     Prognosis: [x] Good [] Fair  [] Poor    Patient Requires Follow-up: [x] Yes  [] No    PLAN: See eval  [x] Continue per plan of care [] Alter current plan (see comments)  [] Plan of care initiated [] Hold pending MD visit [] Discharge    Electronically signed by: Azul Perera PT

## 2019-05-28 NOTE — PROGRESS NOTES
History of present illness: The patient returns today after right total knee replacement. Pain control has been satisfactory with oral medications. There have been no fevers or chills. Patient is 9 weeks postop. Pain Assessment  Location of Pain: Knee  Location Modifiers: Right  Severity of Pain: 7  Quality of Pain: Sharp  Duration of Pain: Persistent  Frequency of Pain: Intermittent  Aggravating Factors: Walking, Standing, Bending, Stairs  Limiting Behavior: Some  Relieving Factors: Rest, Ice]    Physical examination: The incision is clean, dry, and intact with no drainage or signs of infection. Range of motion is 0 degrees of extension to 110 degrees of flexion. There is expected swelling with no evidence of DVT and a negative Homans sign. Neurovascular exam is intact. Assessment/plan: We would like to contniue outpatient physical therapy to restore range of motion and strength. The patient was given local wound care instructions. We did refill their pain medication today. We will followup in 3-4 weeks for reevaluation. She cannot take NSAIDs due to kidney failure.

## 2019-05-30 ENCOUNTER — HOSPITAL ENCOUNTER (OUTPATIENT)
Dept: PHYSICAL THERAPY | Age: 59
Setting detail: THERAPIES SERIES
Discharge: HOME | End: 2019-05-30
Payer: COMMERCIAL

## 2019-05-30 PROCEDURE — 97140 MANUAL THERAPY 1/> REGIONS: CPT

## 2019-05-30 PROCEDURE — 97110 THERAPEUTIC EXERCISES: CPT

## 2019-05-30 PROCEDURE — G0283 ELEC STIM OTHER THAN WOUND: HCPCS

## 2019-05-31 ENCOUNTER — OFFICE VISIT (OUTPATIENT)
Dept: FAMILY MEDICINE CLINIC | Age: 59
End: 2019-05-31
Payer: COMMERCIAL

## 2019-05-31 VITALS
DIASTOLIC BLOOD PRESSURE: 68 MMHG | HEART RATE: 105 BPM | WEIGHT: 188.2 LBS | BODY MASS INDEX: 30.25 KG/M2 | SYSTOLIC BLOOD PRESSURE: 104 MMHG | OXYGEN SATURATION: 99 % | HEIGHT: 66 IN | RESPIRATION RATE: 16 BRPM

## 2019-05-31 DIAGNOSIS — N63.0 BREAST NODULE: ICD-10-CM

## 2019-05-31 DIAGNOSIS — E11.9 TYPE 2 DIABETES MELLITUS WITHOUT COMPLICATION, WITH LONG-TERM CURRENT USE OF INSULIN (HCC): Primary | ICD-10-CM

## 2019-05-31 DIAGNOSIS — R10.13 EPIGASTRIC PAIN: ICD-10-CM

## 2019-05-31 DIAGNOSIS — Z79.4 TYPE 2 DIABETES MELLITUS WITHOUT COMPLICATION, WITH LONG-TERM CURRENT USE OF INSULIN (HCC): Primary | ICD-10-CM

## 2019-05-31 LAB
ALBUMIN SERPL-MCNC: 4.5 G/DL (ref 3.4–5)
ANION GAP SERPL CALCULATED.3IONS-SCNC: 13 MMOL/L (ref 3–16)
BUN BLDV-MCNC: 16 MG/DL (ref 7–20)
CALCIUM SERPL-MCNC: 10.4 MG/DL (ref 8.3–10.6)
CHLORIDE BLD-SCNC: 98 MMOL/L (ref 99–110)
CO2: 27 MMOL/L (ref 21–32)
CREAT SERPL-MCNC: 0.9 MG/DL (ref 0.6–1.1)
GFR AFRICAN AMERICAN: >60
GFR NON-AFRICAN AMERICAN: >60
GLUCOSE BLD-MCNC: 146 MG/DL (ref 70–99)
HCT VFR BLD CALC: 44.1 % (ref 36–48)
HEMOGLOBIN: 14.8 G/DL (ref 12–16)
MCH RBC QN AUTO: 32.1 PG (ref 26–34)
MCHC RBC AUTO-ENTMCNC: 33.6 G/DL (ref 31–36)
MCV RBC AUTO: 95.5 FL (ref 80–100)
PDW BLD-RTO: 14.6 % (ref 12.4–15.4)
PHOSPHORUS: 3 MG/DL (ref 2.5–4.9)
PLATELET # BLD: 265 K/UL (ref 135–450)
PMV BLD AUTO: 8.7 FL (ref 5–10.5)
POTASSIUM SERPL-SCNC: 4.7 MMOL/L (ref 3.5–5.1)
RBC # BLD: 4.61 M/UL (ref 4–5.2)
SODIUM BLD-SCNC: 138 MMOL/L (ref 136–145)
WBC # BLD: 7.8 K/UL (ref 4–11)

## 2019-05-31 PROCEDURE — 99213 OFFICE O/P EST LOW 20 MIN: CPT | Performed by: INTERNAL MEDICINE

## 2019-05-31 ASSESSMENT — ENCOUNTER SYMPTOMS
ABDOMINAL PAIN: 1
ANAL BLEEDING: 0
CONSTIPATION: 0
DIARRHEA: 0
RECTAL PAIN: 0
VOMITING: 0
NAUSEA: 0
RESPIRATORY NEGATIVE: 1
BLOOD IN STOOL: 0

## 2019-05-31 NOTE — ASSESSMENT & PLAN NOTE
Much improved. N/V and low BP due to dehydration. Went to ER. Given IV fluids. Anti nausea med. Much improved. BP still a little low.

## 2019-05-31 NOTE — PATIENT INSTRUCTIONS
All above problems reviewed and the found to be unchanged except for the following :     Breast Nodule. To Breast surgeon. Will need Mammo/US. Epigastric Pain. Omeprazole daily for 2 weeks. If no better may need to do EGD.

## 2019-05-31 NOTE — PROGRESS NOTES
Subjective:      Patient ID: Rosette Vincent is a 63 y.o. female. HPI  Breast nodule  Found on CT Abdomen done for probable Gastritis. L breast 1.4 cm probable cyst.     Epigastric pain  Much improved. N/V and low BP due to dehydration. Went to ER. Given IV fluids. Anti nausea med. Much improved. BP still a little low. Hx of DM. Sugars were good. No low or highs. Past Medical History:   Diagnosis Date    Anxiety     Depression     DJD (degenerative joint disease)     hands/knees/ankles    Fatty liver     Hyperlipidemia     Hypertension     Irritable bowel syndrome     Mood disorder (HCC)     NOS    Narcotic addiction (HCC)     ANGEL (obstructive sleep apnea)     no CPAP machine - not recommended per sleep study    Restless leg syndrome     Type II or unspecified type diabetes mellitus without mention of complication, not stated as uncontrolled     Vision impairment     wears glasses and contacts     Current Outpatient Medications   Medication Sig Dispense Refill    traMADol (ULTRAM) 50 MG tablet Take 1 tablet by mouth every 6 hours as needed for Pain for up to 7 days. Intended supply: 7 days. Take lowest dose possible to manage pain 28 tablet 0    ondansetron (ZOFRAN) 4 MG tablet Take 4 mg by mouth every 8 hours as needed for Nausea or Vomiting      gabapentin (NEURONTIN) 100 MG capsule Take 1 capsule by mouth 3 times daily for 30 days.  90 capsule 0    atorvastatin (LIPITOR) 10 MG tablet TAKE ONE TABLET BY MOUTH DAILY 30 tablet 5    lisinopril (PRINIVIL;ZESTRIL) 10 MG tablet Take 1 tablet by mouth daily 30 tablet 5    buPROPion (WELLBUTRIN XL) 300 MG extended release tablet Take 300 mg by mouth every morning      canagliflozin (INVOKANA) 100 MG TABS tablet Take 1 tablet by mouth every morning (before breakfast) 30 tablet 5    pioglitazone (ACTOS) 15 MG tablet Take 1 tablet by mouth daily 30 tablet 5    traZODone (DESYREL) 100 MG tablet Take 200 mg by mouth nightly       escitalopram (LEXAPRO) 20 MG tablet Take 10 mg by mouth daily       lamoTRIgine (LAMICTAL) 200 MG tablet Take 200 mg by mouth daily      blood glucose test strips (ASCENSIA AUTODISC VI;ONE TOUCH ULTRA TEST VI) strip Patient has One Touch Ultra 2, tests daily 100 strip 5    Blood Glucose Monitoring Suppl (ONE TOUCH ULTRA 2) w/Device KIT 1 kit by Does not apply route daily 1 kit 0    blood glucose monitor strips Test once dialy for diabetes e11.9 100 strip 5    Lancets MISC Test once daily for diabetes e11.9 100 each 3    Blood Glucose Monitoring Suppl CONCETTA Test once daily for diabetes e11.9 1 Device 0     No current facility-administered medications for this visit. Allergies   Allergen Reactions    Morphine Hives     Social History     Tobacco Use    Smoking status: Never Smoker    Smokeless tobacco: Never Used   Substance Use Topics    Alcohol use: No     Family History   Problem Relation Age of Onset    Diabetes Mother     Arthritis Mother     Diabetes Father     Heart Disease Father     Cancer Father         colon    Dementia Father        Review of Systems   Constitutional: Positive for fatigue. Respiratory: Negative. Cardiovascular: Negative. Gastrointestinal: Positive for abdominal pain. Negative for anal bleeding, blood in stool, constipation, diarrhea, nausea, rectal pain and vomiting. Genitourinary: Negative. Musculoskeletal: Negative. Skin: Negative. Neurological: Positive for light-headedness. Vitals:    05/31/19 0843 05/31/19 0917   BP: 94/70 104/68   Pulse: 105    Resp: 16    SpO2: 99%    Weight: 188 lb 3.2 oz (85.4 kg)    Height: 5' 5.5\" (1.664 m)        Objective:   Physical Exam   Constitutional: She is oriented to person, place, and time. She appears well-developed and well-nourished. Non-toxic appearance. She does not have a sickly appearance. She does not appear ill. No distress. HENT:   Head: Normocephalic and atraumatic.    Eyes: Pupils are equal, round, and reactive to light. Conjunctivae and EOM are normal.   Neck: Trachea normal, normal range of motion and full passive range of motion without pain. Neck supple. Normal carotid pulses, no hepatojugular reflux and no JVD present. No spinous process tenderness and no muscular tenderness present. Carotid bruit is not present. No tracheal deviation, no edema, no erythema and normal range of motion present. No thyroid mass and no thyromegaly present. Cardiovascular: Normal rate, regular rhythm, S1 normal, S2 normal, normal heart sounds, intact distal pulses and normal pulses. No extrasystoles are present. PMI is not displaced. Exam reveals no gallop, no S3, no S4, no distant heart sounds, no friction rub and no decreased pulses. No murmur heard. Pulses:       Carotid pulses are 2+ on the right side, and 2+ on the left side. Radial pulses are 2+ on the right side, and 2+ on the left side. Femoral pulses are 2+ on the right side, and 2+ on the left side. Popliteal pulses are 2+ on the right side, and 2+ on the left side. Dorsalis pedis pulses are 2+ on the right side, and 2+ on the left side. Posterior tibial pulses are 2+ on the right side, and 2+ on the left side. Pulmonary/Chest: Effort normal and breath sounds normal. No accessory muscle usage. No apnea, no tachypnea and no bradypnea. No respiratory distress. She has no decreased breath sounds. She has no wheezes. She has no rhonchi. She has no rales. She exhibits no tenderness. Abdominal: Soft. Bowel sounds are normal. She exhibits no distension and no mass. There is no tenderness. There is no rebound and no guarding. No hernia. No GB present(s/p Cholecystectomy in past)   Musculoskeletal: Normal range of motion. She exhibits tenderness (s/p R TKR.). She exhibits no edema. Lymphadenopathy:     She has no cervical adenopathy. She has no axillary adenopathy.    Neurological: She is alert and oriented to person, place, and time. She has normal strength and normal reflexes. She is not disoriented. She displays no atrophy and no tremor. No cranial nerve deficit or sensory deficit. She exhibits normal muscle tone. Gait abnormal. Coordination normal.   Skin: Skin is warm, dry and intact. No abrasion and no rash noted. She is not diaphoretic. No cyanosis or erythema. No pallor. Nails show no clubbing. Psychiatric: She has a normal mood and affect. Her speech is normal and behavior is normal. Judgment and thought content normal. Cognition and memory are normal.       Assessment:      Problem   Breast Nodule. Found on CT   Epigastric Pain. Much improved. Plan:      All above problems reviewed and the found to be unchanged except for the following :     Breast Nodule. To Breast surgeon. Will need Mammo/US. Epigastric Pain. Omeprazole daily for 2 weeks. If no better may need to do EGD.               Aruna Strauss MD

## 2019-06-01 LAB
ESTIMATED AVERAGE GLUCOSE: 131.2 MG/DL
HBA1C MFR BLD: 6.2 %

## 2019-06-03 ENCOUNTER — HOSPITAL ENCOUNTER (OUTPATIENT)
Dept: PHYSICAL THERAPY | Age: 59
Setting detail: THERAPIES SERIES
Discharge: HOME | End: 2019-06-03
Payer: COMMERCIAL

## 2019-06-03 PROCEDURE — 97110 THERAPEUTIC EXERCISES: CPT

## 2019-06-03 PROCEDURE — 97140 MANUAL THERAPY 1/> REGIONS: CPT

## 2019-06-03 PROCEDURE — 97016 VASOPNEUMATIC DEVICE THERAPY: CPT

## 2019-06-03 NOTE — FLOWSHEET NOTE
[facility], [address] [place]  Phone: [phone]  Fax [phone]    Physical Therapy Daily Treatment Note  Date:  6/3/2019    Patient Name:  Tim Curry    :  1960  MRN: [id number]  Restrictions/Precautions:    Physician Information:  Referring Practitioner: Dr. Sabine Aldrich  Medical/Treatment Diagnosis Information:  Diagnosis: M 25.561 R knee pain; M17.11 R knee OA  · Treatment Diagnosis: M25.561 R knee pain; M25.661 right knee stiffness   [] Conservative / [x] Surgical - DOS: 3/29/19  Therapy Diagnosis/Practice Pattern:  Practice Pattern H: Joint Arthroplasty  Insurance/Certification information:  PT Insurance Information:  BCBS - 250D-80/20-$0CP-25PT PER COND. - NEEDS AUTH AFTR 25V - EXP 19  Plan of care signed: [x] YES  [] NO  Number of Comorbidities:  []0     [x]1-2    []3+  Date of Patient follow up with Physician:     G-Code (if applicable):      Date G-Code Applied:         Progress Note: [x]  Yes  []  No  Next due by: Visit #10        Latex Allergy:  [x]NO      []YES  Preferred Language for Healthcare:   [x]English       []other:    Visit # Insurance Allowable Reporting Period    Needs auth after 25  Begin Date: 6/3/2019               End Date:      RECERT DUE BY: 12 weeks    SUBJECTIVE:  Pt reports knee doing okay, feeling better overall. MD thinks breast nodule may just be cyst, sees doctor Thursday for official appointment. OBJECTIVE: See eval  Observation: Patient knee and calf swollen today, mid calf 48 cm on R LE 41 on L LE. Knee warm and positive homans; PA consulted and doppler ordered, hold therapy today  Palpation:     Test used Initial score Current Score   Pain Summary VAS 6 5-6   Functional questionnaire LEFS 86% 80   ROM flexion 86 122 6/3/19       extension -6 -1   Strength quad SLR mod assist SLR - 10 degree ext lag.      ABD      flexion RESTRICTIONS/PRECAUTIONS: none    Exercises/Interventions: Therapy held 5/15/19     Therapeutic Ex Sets/reps Notes HEP   Bike  8 min Able to perform full rev. Long sit HS stretch 3 x 30\"  X   Gastroc strap stretch 3 x 30\"  X    X   Quad set with ADD  10 x 10\"  X   SLR 2 x 10 Held due to lag X   SL hip abd 2 x 10      Heel prop 1 x 6 min 5# X   EOB flexion  10 x 10\"     LAQ eccentric lower with ADD squeeze  2 x 10       Mini squats 20 x     JOSIAH TKE/ext/ABD 20 x 45#    Bridge  2 x 10     HR/  TL X 20           Leg press bilat 80#  X 30 atc    4inch x 20              PROM, HS/gastroc stretch 14 min         Emphasis on hydration, continuing to work on exercises at home, eating with medications                      NMR re-education                                                                Therapeutic Exercise and NMR EXR  [x] (39145) Provided verbal/tactile cueing for activities related to strengthening, flexibility, endurance, ROM for improvements in LE, proximal hip, and core control with self care, mobility, lifting, ambulation.  [] (39605) Provided verbal/tactile cueing for activities related to improving balance, coordination, kinesthetic sense, posture, motor skill, proprioception  to assist with LE, proximal hip, and core control in self care, mobility, lifting, ambulation and eccentric single leg control.      NMR and Therapeutic Activities:    [x] (82685 or 57217) Provided verbal/tactile cueing for activities related to improving balance, coordination, kinesthetic sense, posture, motor skill, proprioception and motor activation to allow for proper function of core, proximal hip and LE with self care and ADLs  [] (70501) Gait Re-education- Provided training and instruction to the patient for proper LE, core and proximal hip recruitment and positioning and eccentric body weight control with ambulation re-education including up and down stairs     Home Exercise Program:    [x] (79421) Reviewed/Progressed function. Not met  2. Patient will demonstrate increased AROM to 0-120 to allow for proper joint functioning as indicated by patients Functional Deficits. Not met  3. Patient will demonstrate an increase in Strength to good proximal hip strength and control, within 5lb HHD in LE to allow for proper functional mobility as indicated by patients Functional Deficits. Not met  4. Patient will return to functional activities without increased symptoms or restriction. Not met  5. Patient will be able to ascend/descend stairs with reciprocal pattern for return to normal ADLs. Not attempted    New or Updated Goals (if applicable):  [x] No change to goals established upon initial eval/last progress note:  New Goals:    Progression Towards Functional goals:   [] Patient is progressing as expected towards functional goals listed. [x] Progression is slowed due to complexities listed. [] Progression has been slowed due to co-morbidities. [] Plan just implemented, too soon to assess goals progression  [] Other:     ASSESSMENT:    [] Improvement noted relative to goals:  [] No Improvement noted related to goals:  Summary/Patient's response to treatment: Improved tolerance for therex this visit. Improved strength and quad activation.      Treatment/Activity Tolerance:  [x] Patient tolerated treatment well [] Patient limited by fatique  [] Patient limited by pain  [] Patient limited by other medical complications  [] Other:     Prognosis: [x] Good [] Fair  [] Poor    Patient Requires Follow-up: [x] Yes  [] No    PLAN: See eval  [x] Continue per plan of care [] Alter current plan (see comments)  [] Plan of care initiated [] Hold pending MD visit [] Discharge    Electronically signed by: Phuong Webster, DPT [id number]

## 2019-06-05 ENCOUNTER — HOSPITAL ENCOUNTER (OUTPATIENT)
Dept: PHYSICAL THERAPY | Age: 59
Setting detail: THERAPIES SERIES
Discharge: HOME | End: 2019-06-05
Payer: COMMERCIAL

## 2019-06-05 PROCEDURE — 97110 THERAPEUTIC EXERCISES: CPT

## 2019-06-05 PROCEDURE — 97140 MANUAL THERAPY 1/> REGIONS: CPT

## 2019-06-05 PROCEDURE — 97016 VASOPNEUMATIC DEVICE THERAPY: CPT

## 2019-06-05 NOTE — FLOWSHEET NOTE
activities related to improving balance, coordination, kinesthetic sense, posture, motor skill, proprioception of core, proximal hip and LE for self care, mobility, lifting, and ambulation/stair navigation      Manual Treatments:  PROM / STM / Oscillations-Mobs:  G-I, II, III, IV (PA's, Inf., Post.)  [x] (27263) Provided manual therapy to mobilize LE, proximal hip and/or LS spine soft tissue/joints for the purpose of modulating pain, promoting relaxation,  increasing ROM, reducing/eliminating soft tissue swelling/inflammation/restriction, improving soft tissue extensibility and allowing for proper ROM for normal function with self care, mobility, lifting and ambulation. Modalities:       [] GR/ESU 15 min    [x] GR 15 min  [] ESU     [] CP    [] MHP    [] declined     Charges:  Timed Code Treatment Minutes: 45   Total Treatment Minutes: 60     [] EVAL (LOW) 07587 (typically 20 minutes face-to-face)  [] EVAL (MOD) 00527 (typically 30 minutes face-to-face)  [] EVAL (HIGH) 84592 (typically 45 minutes face-to-face)  [] RE-EVAL     [x] HL(49680) x  2   [] IONTO  [] NMR (30129) x      [x] VASO   [x] Manual (92747) x  1    [] Other:  [] TA x       [] Mech Traction (93721)  [] ES(attended) (76564)      [] ES (un) (66581):     GOALS: Patient stated goal: Walk with no AD. Therapist goals for Patient:   Short Term Goals: To be achieved in: 2 weeks  1. Independent in HEP and progression per patient tolerance, in order to prevent re-injury. 2. Patient will have a decrease in pain to facilitate improvement in movement, function, and ADLs as indicated by Functional Deficits. Long Term Goals: To be achieved in: 12 weeks  1. Disability index score of 40% or less for the LEFS to assist with reaching prior level of function. Not met  2. Patient will demonstrate increased AROM to 0-120 to allow for proper joint functioning as indicated by patients Functional Deficits. Not met  3.  Patient will demonstrate an increase in Strength to good proximal hip strength and control, within 5lb HHD in LE to allow for proper functional mobility as indicated by patients Functional Deficits. Not met  4. Patient will return to functional activities without increased symptoms or restriction. Not met  5. Patient will be able to ascend/descend stairs with reciprocal pattern for return to normal ADLs. Not attempted    New or Updated Goals (if applicable):  [x] No change to goals established upon initial eval/last progress note:  New Goals:    Progression Towards Functional goals:   [] Patient is progressing as expected towards functional goals listed. [x] Progression is slowed due to complexities listed. [] Progression has been slowed due to co-morbidities. [] Plan just implemented, too soon to assess goals progression  [] Other:     ASSESSMENT:    [] Improvement noted relative to goals:  [] No Improvement noted related to goals:  Summary/Patient's response to treatment: Improved tolerance for therex this visit. Improved strength and quad activation.      Treatment/Activity Tolerance:  [x] Patient tolerated treatment well [] Patient limited by fatique  [] Patient limited by pain  [] Patient limited by other medical complications  [] Other:     Prognosis: [x] Good [] Fair  [] Poor    Patient Requires Follow-up: [x] Yes  [] No    PLAN: See eval  [x] Continue per plan of care [] Alter current plan (see comments)  [] Plan of care initiated [] Hold pending MD visit [] Discharge    Electronically signed by: Phuong Robert, DPT [id number]

## 2019-06-06 ENCOUNTER — OFFICE VISIT (OUTPATIENT)
Dept: SURGERY | Age: 59
End: 2019-06-06
Payer: COMMERCIAL

## 2019-06-06 VITALS
BODY MASS INDEX: 30.53 KG/M2 | HEIGHT: 66 IN | HEART RATE: 110 BPM | OXYGEN SATURATION: 97 % | SYSTOLIC BLOOD PRESSURE: 114 MMHG | DIASTOLIC BLOOD PRESSURE: 60 MMHG | WEIGHT: 190 LBS

## 2019-06-06 DIAGNOSIS — N63.0 BREAST MASS: ICD-10-CM

## 2019-06-06 DIAGNOSIS — Z80.3 FAMILY HX-BREAST MALIGNANCY: Primary | ICD-10-CM

## 2019-06-06 DIAGNOSIS — Z12.39 BREAST CANCER SCREENING, HIGH RISK PATIENT: ICD-10-CM

## 2019-06-06 PROCEDURE — 99204 OFFICE O/P NEW MOD 45 MIN: CPT | Performed by: SURGERY

## 2019-06-06 ASSESSMENT — ENCOUNTER SYMPTOMS
CHEST TIGHTNESS: 0
ABDOMINAL DISTENTION: 0
SHORTNESS OF BREATH: 0
COLOR CHANGE: 0
ABDOMINAL PAIN: 0

## 2019-06-06 NOTE — PROGRESS NOTES
CC:Left breast mass identified on CT scan    HPI:  Harmony Hernandez is a 63 y.o. woman who presents with left breast nodule, identified incidentally on recent abdominal CT scan for left flank pain. Last mammogram was 2018, showing slight increase in volume of previously evaluated left breast cyst. BIRADS 1. I reviewed imaging with Dr. Lucas Adamson, who feels follow up imaging in August, with dx mamm/sono on left, is appropriate. She has no breast complaints. No history of breast biopsy. Her sister did die of metastatic breast cancer at age 46, and she and her  are anxious and concerned today. Based on Tyrer-Cuzik 8.0 risk model, her lifetime risk of breast cancer is 24.2%, although she did have BRANDI/BSO at age 40. She had BRCA testing, which she reports was negative. She is unsure whether or not her sister underwent testing. Recent TKR, still in therapy. Recent admission for ARF, now resolved, most recent BUN/Cr are normal.   Due for colonoscopy, needs derm exam.     Menstrual History  Menarche age 16.      Age first live birth 33  Breastfeeding No  postmenopausal 40  hysterectomy with BSO  Oral contraceptives Yes for 15 years  Hormone replacement No      Past Medical History:   Diagnosis Date    Anxiety     Depression     DJD (degenerative joint disease)     hands/knees/ankles    Fatty liver     Hyperlipidemia     Hypertension     Irritable bowel syndrome     Mood disorder (HCC)     NOS    Narcotic addiction ([address]     ANGEL (obstructive sleep apnea)     no CPAP machine - not recommended per sleep study    Restless leg syndrome     Type II or unspecified type diabetes mellitus without mention of complication, not stated as uncontrolled     Vision impairment     wears glasses and contacts     Past Surgical History:   Procedure Laterality Date    APPENDECTOMY      CHOLECYSTECTOMY      COLONOSCOPY  2004    Due     CYSTOSCOPY  7/9/10    CYSTOSCOPY  11/18/10 multiple benign nevi, evidence of sun exposure back, arms, chest  Extremities: Well perfused, no edema. Neurologic: Alert and oriented. Assessment and Plan:  High Risk for breast cancer- Based on Tyrer-Cuzik 8.0 risk model, her lifetime risk of breast cancer is 24.2%, although this has been mitigated some by early oophorectomy. We discussed options, including high risk surveillance, alternating MRI/mammogram q 6 months. We discussed surgical risk reduction with B mastectomies, and endocrine therapy with arimidex. Genetic testing has already been completed and was negative. She would like to begin high risk surveillance. MRI now. October 2019 will plan for B dx mamm, left breast ultrasound, to document stability of cyst.     Return in about 4 months (around 10/6/2019) for Imaging and examination. All of the patient's questions were answered at this time. Approximately 45 minutes of time were spent in this visit of which 50% or more of the time was related to coordination of care.

## 2019-06-12 ENCOUNTER — HOSPITAL ENCOUNTER (OUTPATIENT)
Dept: PHYSICAL THERAPY | Age: 59
Setting detail: THERAPIES SERIES
End: 2019-06-12
Payer: COMMERCIAL

## 2019-06-14 ENCOUNTER — HOSPITAL ENCOUNTER (OUTPATIENT)
Dept: PHYSICAL THERAPY | Age: 59
Setting detail: THERAPIES SERIES
Discharge: HOME | End: 2019-06-14
Payer: COMMERCIAL

## 2019-06-14 PROCEDURE — 97110 THERAPEUTIC EXERCISES: CPT

## 2019-06-14 PROCEDURE — 97016 VASOPNEUMATIC DEVICE THERAPY: CPT

## 2019-06-14 PROCEDURE — 97140 MANUAL THERAPY 1/> REGIONS: CPT

## 2019-06-14 NOTE — FLOWSHEET NOTE
[facility], [address] [place] [address]  Phone: [phone]  Fax [phone]    Physical Therapy Daily Treatment Note  Date:  2019    Patient Name:  Mey Tao    :  1960  MRN: [id number]  Restrictions/Precautions:    Physician Information:  Referring Practitioner: Dr. Mayra Mcdonald  Medical/Treatment Diagnosis Information:  Diagnosis: M 25.561 R knee pain; M17.11 R knee OA  · Treatment Diagnosis: M25.561 R knee pain; M25.661 right knee stiffness   [] Conservative / [x] Surgical - DOS: 3/29/19  Therapy Diagnosis/Practice Pattern:  Practice Pattern H: Joint Arthroplasty  Insurance/Certification information:  PT Insurance Information:  BCBS - 250D-80/20-$0CP-25PT PER COND. - NEEDS AUTH AFTR 25V - EXP 19  Plan of care signed: [x] YES  [] NO  Number of Comorbidities:  []0     [x]1-2    []3+  Date of Patient follow up with Physician:     G-Code (if applicable):      Date G-Code Applied:         Progress Note: [x]  Yes  []  No  Next due by: Visit #10        Latex Allergy:  [x]NO      []YES  Preferred Language for Healthcare:   [x]English       []other:    Visit # Insurance Allowable Reporting Period   14  Needs auth after 25  Begin Date: 2019               End Date:      RECERT DUE BY: 12 weeks    SUBJECTIVE:  Pt reports she was vomiting and had to cancel earlier in week. Feels she needs more endurance and strength. Feels knee is a little swollen today. OBJECTIVE: See eval  Observation:  Palpation:     Test used Initial score Current Score   Pain Summary VAS 6 5-6   Functional questionnaire LEFS 86% 80   ROM flexion 86 122 6/3/19       extension -6 -1   Strength quad SLR mod assist SLR - 10 degree ext lag. ABD      flexion          RESTRICTIONS/PRECAUTIONS: none    Exercises/Interventions: Therapy held 5/15/19     Therapeutic Ex Sets/reps Notes HEP   Bike  8 min Able to perform full rev.     Long sit HS stretch 3 x 30\"  X   Gastroc strap stretch 3 x 30\"  X    X   Quad set with ADD  10 x 10\"  X   SLR 2 x 10 Held due to lag X   SL hip abd 2 x 10      Heel prop 1 x 6 min 5# X   EOB flexion  10 x 10\"     LAQ eccentric lower with ADD squeeze  2 x 10       Mini squats 3D 20 x     JOSIAH TKE/ext/ABD 20 x 45#    Bridge  2 x 10     HR/  TL X 20           Leg press bilat 80#  X 30 atc    FSU/ LSU and over6 inch x 20              PROM, HS/gastroc stretch 14 min         Emphasis on hydration, continuing to work on exercises at home, eating with medications                      NMR re-education                                                                Therapeutic Exercise and NMR EXR  [x] (96770) Provided verbal/tactile cueing for activities related to strengthening, flexibility, endurance, ROM for improvements in LE, proximal hip, and core control with self care, mobility, lifting, ambulation.  [] (83615) Provided verbal/tactile cueing for activities related to improving balance, coordination, kinesthetic sense, posture, motor skill, proprioception  to assist with LE, proximal hip, and core control in self care, mobility, lifting, ambulation and eccentric single leg control.      NMR and Therapeutic Activities:    [x] (28962 or 34482) Provided verbal/tactile cueing for activities related to improving balance, coordination, kinesthetic sense, posture, motor skill, proprioception and motor activation to allow for proper function of core, proximal hip and LE with self care and ADLs  [] (03007) Gait Re-education- Provided training and instruction to the patient for proper LE, core and proximal hip recruitment and positioning and eccentric body weight control with ambulation re-education including up and down stairs     Home Exercise Program:    [x] (42619) Reviewed/Progressed HEP activities related to strengthening, flexibility, endurance, ROM of core, proximal hip and LE for functional self-care, mobility, lifting and Deficits. Not met  3. Patient will demonstrate an increase in Strength to good proximal hip strength and control, within 5lb HHD in LE to allow for proper functional mobility as indicated by patients Functional Deficits. Not met  4. Patient will return to functional activities without increased symptoms or restriction. Not met  5. Patient will be able to ascend/descend stairs with reciprocal pattern for return to normal ADLs. Not attempted    New or Updated Goals (if applicable):  [x] No change to goals established upon initial eval/last progress note:  New Goals:    Progression Towards Functional goals:   [] Patient is progressing as expected towards functional goals listed. [x] Progression is slowed due to complexities listed. [] Progression has been slowed due to co-morbidities. [] Plan just implemented, too soon to assess goals progression  [] Other:     ASSESSMENT:    [] Improvement noted relative to goals:  [] No Improvement noted related to goals:  Summary/Patient's response to treatment: Improved tolerance for therex this visit. Improved strength and quad activation.      Treatment/Activity Tolerance:  [x] Patient tolerated treatment well [] Patient limited by fatique  [] Patient limited by pain  [] Patient limited by other medical complications  [] Other:     Prognosis: [x] Good [] Fair  [] Poor    Patient Requires Follow-up: [x] Yes  [] No    PLAN: See eval  [x] Continue per plan of care [] Alter current plan (see comments)  [] Plan of care initiated [] Hold pending MD visit [] Discharge    Electronically signed by: Phuong Nguyen, DPT [id number]

## 2019-06-17 RX ORDER — GABAPENTIN 100 MG/1
100 CAPSULE ORAL 3 TIMES DAILY
Qty: 90 CAPSULE | Refills: 0 | Status: SHIPPED | OUTPATIENT
Start: 2019-06-17 | End: 2019-07-15 | Stop reason: SDUPTHER

## 2019-06-17 RX ORDER — CANAGLIFLOZIN 100 MG/1
TABLET, FILM COATED ORAL
Qty: 30 TABLET | Refills: 5 | Status: SHIPPED | OUTPATIENT
Start: 2019-06-17 | End: 2019-08-02 | Stop reason: SDUPTHER

## 2019-06-18 ENCOUNTER — HOSPITAL ENCOUNTER (OUTPATIENT)
Dept: PHYSICAL THERAPY | Age: 59
Setting detail: THERAPIES SERIES
Discharge: HOME | End: 2019-06-18
Payer: COMMERCIAL

## 2019-06-18 PROCEDURE — 97110 THERAPEUTIC EXERCISES: CPT

## 2019-06-18 PROCEDURE — 97140 MANUAL THERAPY 1/> REGIONS: CPT

## 2019-06-18 PROCEDURE — 97112 NEUROMUSCULAR REEDUCATION: CPT

## 2019-06-18 PROCEDURE — 97016 VASOPNEUMATIC DEVICE THERAPY: CPT

## 2019-06-18 NOTE — FLOWSHEET NOTE
[facility], [address] [place] [address] [place]  Phone: [phone]  Fax [phone]    Physical Therapy Daily Treatment Note  Date:  2019    Patient Name:  Mallika Key    :  1960  MRN: [id number]  Restrictions/Precautions:    Physician Information:  Referring Practitioner: Dr. Tony Garrett  Medical/Treatment Diagnosis Information:  Diagnosis: M 25.561 R knee pain; M17.11 R knee OA  · Treatment Diagnosis: M25.561 R knee pain; M25.661 right knee stiffness   [] Conservative / [x] Surgical - DOS: 3/29/19  Therapy Diagnosis/Practice Pattern:  Practice Pattern H: Joint Arthroplasty  Insurance/Certification information:  PT Insurance Information:  BCBS - 250D-80/20-$0CP-25PT PER COND. - NEEDS AUTH AFTR 25V - EXP 19  Plan of care signed: [x] YES  [] NO  Number of Comorbidities:  []0     [x]1-2    []3+  Date of Patient follow up with Physician:     G-Code (if applicable):      Date G-Code Applied:         Progress Note: [x]  Yes  []  No  Next due by: Visit #10        Latex Allergy:  [x]NO      []YES  Preferred Language for Healthcare:   [x]English       []other:    Visit # Insurance Allowable Reporting Period   15  Needs auth after 25  Begin Date: 2019               End Date:      RECERT DUE BY: 12 weeks    SUBJECTIVE:  Pt reports she has been on feet a lot already today and is tired and sore. Doing HEP at home, using can less at home. .    OBJECTIVE: See eval  Observation:  Palpation:     Test used Initial score Current Score   Pain Summary VAS 6 5-6   Functional questionnaire LEFS 86% 80   ROM flexion 86 122 6/3/19       extension -6 -1   Strength quad SLR mod assist SLR - 10 degree ext lag. ABD      flexion          RESTRICTIONS/PRECAUTIONS: none    Exercises/Interventions: Therapy held 5/15/19     Therapeutic Ex Sets/reps Notes HEP   Bike  8 min Able to perform full rev.     Long sit HS stretch 3 x 30\"  X Gastroc strap stretch 3 x 30\"  X    X   Quad set with ADD  10 x 10\"  X   SLR 2 x 10 Held due to lag X   SL hip abd 2 x 10      Heel prop 1 x 6 min 5# X   EOB flexion  10 x 10\"     LAQ eccentric lower with ADD squeeze  2 x 10       Mini squats 3D 20 x     JOSIAH TKE/ext/ABD 20 x 45#    Bridge  2 x 10     HR/  TL X 20           Leg press bilat 80#  X 30     FSU/ LSU and over6 inch x 20              PROM, HS/gastroc stretch 14 min         Emphasis on hydration, continuing to work on exercises at home, eating with medications                      NMR re-education                                                                Therapeutic Exercise and NMR EXR  [x] (13085) Provided verbal/tactile cueing for activities related to strengthening, flexibility, endurance, ROM for improvements in LE, proximal hip, and core control with self care, mobility, lifting, ambulation.  [] (80311) Provided verbal/tactile cueing for activities related to improving balance, coordination, kinesthetic sense, posture, motor skill, proprioception  to assist with LE, proximal hip, and core control in self care, mobility, lifting, ambulation and eccentric single leg control.      NMR and Therapeutic Activities:    [x] (65013 or 51745) Provided verbal/tactile cueing for activities related to improving balance, coordination, kinesthetic sense, posture, motor skill, proprioception and motor activation to allow for proper function of core, proximal hip and LE with self care and ADLs  [] (70032) Gait Re-education- Provided training and instruction to the patient for proper LE, core and proximal hip recruitment and positioning and eccentric body weight control with ambulation re-education including up and down stairs     Home Exercise Program:    [x] (35986) Reviewed/Progressed HEP activities related to strengthening, flexibility, endurance, ROM of core, proximal hip and LE for functional self-care, mobility, lifting and ambulation/stair navigation   [] demonstrate an increase in Strength to good proximal hip strength and control, within 5lb HHD in LE to allow for proper functional mobility as indicated by patients Functional Deficits. Not met  4. Patient will return to functional activities without increased symptoms or restriction. Not met  5. Patient will be able to ascend/descend stairs with reciprocal pattern for return to normal ADLs. Not attempted    New or Updated Goals (if applicable):  [x] No change to goals established upon initial eval/last progress note:  New Goals:    Progression Towards Functional goals:   [] Patient is progressing as expected towards functional goals listed. [x] Progression is slowed due to complexities listed. [] Progression has been slowed due to co-morbidities. [] Plan just implemented, too soon to assess goals progression  [] Other:     ASSESSMENT:    [] Improvement noted relative to goals:  [] No Improvement noted related to goals:  Summary/Patient's response to treatment: Improved tolerance for therex this visit. Improved strength and quad activation.      Treatment/Activity Tolerance:  [x] Patient tolerated treatment well [] Patient limited by fatique  [] Patient limited by pain  [] Patient limited by other medical complications  [] Other:     Prognosis: [x] Good [] Fair  [] Poor    Patient Requires Follow-up: [x] Yes  [] No    PLAN: See eval  [x] Continue per plan of care [] Alter current plan (see comments)  [] Plan of care initiated [] Hold pending MD visit [] Discharge    Electronically signed by: Phuong Jeff, DPT [id number]

## 2019-06-20 ENCOUNTER — HOSPITAL ENCOUNTER (OUTPATIENT)
Dept: PHYSICAL THERAPY | Age: 59
Setting detail: THERAPIES SERIES
Discharge: HOME | End: 2019-06-20
Payer: COMMERCIAL

## 2019-06-20 PROCEDURE — 97016 VASOPNEUMATIC DEVICE THERAPY: CPT

## 2019-06-20 PROCEDURE — 97140 MANUAL THERAPY 1/> REGIONS: CPT

## 2019-06-20 PROCEDURE — 97110 THERAPEUTIC EXERCISES: CPT

## 2019-06-20 NOTE — FLOWSHEET NOTE
[facility], [address] [place] [address] [place]  Phone: [phone]  Fax [phone]    Physical Therapy Daily Treatment Note  Date:  2019    Patient Name:  Marquise Jay    :  1960  MRN: [id number]  Restrictions/Precautions:    Physician Information:  Referring Practitioner: Dr. Jc Salcedo  Medical/Treatment Diagnosis Information:  Diagnosis: M 25.561 R knee pain; M17.11 R knee OA  · Treatment Diagnosis: M25.561 R knee pain; M25.661 right knee stiffness   [] Conservative / [x] Surgical - DOS: 3/29/19  Therapy Diagnosis/Practice Pattern:  Practice Pattern H: Joint Arthroplasty  Insurance/Certification information:  PT Insurance Information:  BCBS - 250D-80/20-$0CP-25PT PER COND. - NEEDS AUTH AFTR 25V - EXP 19  Plan of care signed: [x] YES  [] NO  Number of Comorbidities:  []0     [x]1-2    []3+  Date of Patient follow up with Physician:     G-Code (if applicable):      Date G-Code Applied:         Progress Note: [x]  Yes  []  No  Next due by: Visit #10        Latex Allergy:  [x]NO      []YES  Preferred Language for Healthcare:   [x]English       []other:    Visit # Insurance Allowable Reporting Period   16 25 Needs auth after 25  Begin Date: 2019               End Date:      RECERT DUE BY: 12 weeks    SUBJECTIVE:  Pt .    OBJECTIVE: See eval  Observation:  Palpation:     Test used Initial score Current Score   Pain Summary VAS 6 5-6   Functional questionnaire LEFS 86% 80   ROM flexion 86 122 6/3/19       extension -6 -1   Strength quad SLR mod assist SLR - 10 degree ext lag. ABD      flexion          RESTRICTIONS/PRECAUTIONS: none    Exercises/Interventions: Therapy held 5/15/19     Therapeutic Ex Sets/reps Notes HEP   Bike  8 min Able to perform full rev.     Long sit HS stretch 3 x 30\"  X   Gastroc strap stretch 3 x 30\"  X    X   Quad set with ADD  10 x 10\"  X   SLR 2 x 10 1.5# X   SL hip abd 2 x 10 1.5#    Heel prop  5# X   EOB flexion  10 x 10\"     LAQ eccentric lower  squeeze  2 x 10       Mini squats 3D 20 x     JOSIAH TKE/ext/ABD 20 x 45#    Bridge  2 x 10     HR/  TL X 20           Leg press bilat 80#  X 30     FSU/ LSU and over6 inch x 20              PROM, HS/gastroc stretch 10 min         Emphasis on hydration, continuing to work on exercises at home, eating with medications                      NMR re-education                                                                Therapeutic Exercise and NMR EXR  [x] (99019) Provided verbal/tactile cueing for activities related to strengthening, flexibility, endurance, ROM for improvements in LE, proximal hip, and core control with self care, mobility, lifting, ambulation.  [] (53543) Provided verbal/tactile cueing for activities related to improving balance, coordination, kinesthetic sense, posture, motor skill, proprioception  to assist with LE, proximal hip, and core control in self care, mobility, lifting, ambulation and eccentric single leg control.      NMR and Therapeutic Activities:    [x] (71811 or 90568) Provided verbal/tactile cueing for activities related to improving balance, coordination, kinesthetic sense, posture, motor skill, proprioception and motor activation to allow for proper function of core, proximal hip and LE with self care and ADLs  [] (68008) Gait Re-education- Provided training and instruction to the patient for proper LE, core and proximal hip recruitment and positioning and eccentric body weight control with ambulation re-education including up and down stairs     Home Exercise Program:    [x] (42799) Reviewed/Progressed HEP activities related to strengthening, flexibility, endurance, ROM of core, proximal hip and LE for functional self-care, mobility, lifting and ambulation/stair navigation   [] (18707)Reviewed/Progressed HEP activities related to improving balance, coordination, kinesthetic sense, posture, motor skill, proprioception of core, proximal hip and LE for self care, mobility, lifting, and ambulation/stair navigation      Manual Treatments:  PROM / STM / Oscillations-Mobs:  G-I, II, III, IV (PA's, Inf., Post.)  [x] (62757) Provided manual therapy to mobilize LE, proximal hip and/or LS spine soft tissue/joints for the purpose of modulating pain, promoting relaxation,  increasing ROM, reducing/eliminating soft tissue swelling/inflammation/restriction, improving soft tissue extensibility and allowing for proper ROM for normal function with self care, mobility, lifting and ambulation. Modalities:       [] GR/ESU 15 min    [x] GR 15 min  [] ESU     [] CP    [] MHP    [] declined     Charges:  Timed Code Treatment Minutes: 45   Total Treatment Minutes: 60     [] EVAL (LOW) 88930 (typically 20 minutes face-to-face)  [] EVAL (MOD) 81081 (typically 30 minutes face-to-face)  [] EVAL (HIGH) 62627 (typically 45 minutes face-to-face)  [] RE-EVAL     [x] AM(67626) x  2   [] IONTO  [] NMR (01428) x      [x] VASO   [x] Manual (15072) x  1    [] Other:  [] TA x       [] Mech Traction (91660)  [] ES(attended) (33234)      [] ES (un) (06510):     GOALS: Patient stated goal: Walk with no AD. Therapist goals for Patient:   Short Term Goals: To be achieved in: 2 weeks  1. Independent in HEP and progression per patient tolerance, in order to prevent re-injury. 2. Patient will have a decrease in pain to facilitate improvement in movement, function, and ADLs as indicated by Functional Deficits. Long Term Goals: To be achieved in: 12 weeks  1. Disability index score of 40% or less for the LEFS to assist with reaching prior level of function. Not met  2. Patient will demonstrate increased AROM to 0-120 to allow for proper joint functioning as indicated by patients Functional Deficits. Not met  3.  Patient will demonstrate an increase in Strength to good proximal hip strength and control, within 5lb HHD in LE to allow for proper functional mobility as indicated by patients Functional Deficits. Not met  4. Patient will return to functional activities without increased symptoms or restriction. Not met  5. Patient will be able to ascend/descend stairs with reciprocal pattern for return to normal ADLs. Not attempted    New or Updated Goals (if applicable):  [x] No change to goals established upon initial eval/last progress note:  New Goals:    Progression Towards Functional goals:   [] Patient is progressing as expected towards functional goals listed. [x] Progression is slowed due to complexities listed. [] Progression has been slowed due to co-morbidities. [] Plan just implemented, too soon to assess goals progression  [] Other:     ASSESSMENT:    [] Improvement noted relative to goals:  [] No Improvement noted related to goals:  Summary/Patient's response to treatment: Improved tolerance for therex this visit. Improved strength and quad activation.      Treatment/Activity Tolerance:  [x] Patient tolerated treatment well [] Patient limited by fatique  [] Patient limited by pain  [] Patient limited by other medical complications  [] Other:     Prognosis: [x] Good [] Fair  [] Poor    Patient Requires Follow-up: [x] Yes  [] No    PLAN: See eval  [x] Continue per plan of care [] Alter current plan (see comments)  [] Plan of care initiated [] Hold pending MD visit [] Discharge    Electronically signed by: Gene Basilio, [address] [facility], DPT [id number]

## 2019-06-21 ENCOUNTER — HOSPITAL ENCOUNTER (OUTPATIENT)
Dept: MRI IMAGING | Age: 59
Discharge: HOME | End: 2019-06-21
Payer: COMMERCIAL

## 2019-06-21 DIAGNOSIS — Z12.39 BREAST CANCER SCREENING, HIGH RISK PATIENT: ICD-10-CM

## 2019-06-21 DIAGNOSIS — Z80.3 FAMILY HX-BREAST MALIGNANCY: ICD-10-CM

## 2019-06-21 DIAGNOSIS — N63.0 BREAST MASS: ICD-10-CM

## 2019-06-21 PROCEDURE — 77049 MRI BREAST C-+ W/CAD BI: CPT

## 2019-06-21 PROCEDURE — A9579 GAD-BASE MR CONTRAST NOS,1ML: HCPCS | Performed by: INTERNAL MEDICINE

## 2019-06-21 PROCEDURE — 6360000004 HC RX CONTRAST MEDICATION: Performed by: INTERNAL MEDICINE

## 2019-06-21 RX ADMIN — GADOTERIDOL 20 ML: 279.3 INJECTION, SOLUTION INTRAVENOUS at 14:37

## 2019-06-24 ENCOUNTER — HOSPITAL ENCOUNTER (OUTPATIENT)
Dept: PHYSICAL THERAPY | Age: 59
Setting detail: THERAPIES SERIES
Discharge: HOME | End: 2019-06-24
Payer: COMMERCIAL

## 2019-06-24 PROCEDURE — 97110 THERAPEUTIC EXERCISES: CPT | Performed by: PHYSICAL THERAPIST

## 2019-06-24 PROCEDURE — 97140 MANUAL THERAPY 1/> REGIONS: CPT | Performed by: PHYSICAL THERAPIST

## 2019-06-24 NOTE — FLOWSHEET NOTE
[facility], [address] [place] [address]  Phone: [phone]  Fax [phone]    Physical Therapy Daily Treatment Note  Date:  2019    Patient Name:  Tim Curry    :  1960  MRN: [id number]  Restrictions/Precautions:    Physician Information:  Referring Practitioner: Dr. Sabine Aldrich  Medical/Treatment Diagnosis Information:   Diagnosis: M 25.561 R knee pain; M17.11 R knee OA  · Treatment Diagnosis: M25.561 R knee pain; M25.661 right knee stiffness   [] Conservative / [x] Surgical - DOS: 3/29/19  Therapy Diagnosis/Practice Pattern:  Practice Pattern H: Joint Arthroplasty  Insurance/Certification information:  PT Insurance Information:  BCBS - 250D-80/20-$0CP-25PT PER COND. - NEEDS AUTH AFTR 25V - EXP 19  Plan of care signed: [x] YES  [] NO  Number of Comorbidities:  []0     [x]1-2    []3+  Date of Patient follow up with Physician:     G-Code (if applicable):      Date G-Code Applied:         Progress Note: [x]  Yes  []  No  Next due by: Visit #10        Latex Allergy:  [x]NO      []YES  Preferred Language for Healthcare:   [x]English       []other:    Visit # Insurance Allowable Reporting Period   17  Needs auth after 25  Begin Date: 2019               End Date:      RECERT DUE BY: 12 weeks    SUBJECTIVE:  Patient arrived 1.25 hours late for session. Struggling with car transfers due to tightness in the knee. Uses UE to get out of chairs. Avoiding basement stairs. Sleeping well. Hoping to return to work in the next 1-2 weeks. OBJECTIVE: See eval  Observation:  Palpation:     Test used Initial score Current Score   Pain Summary VAS 6 5-6   Functional questionnaire LEFS 86% 80   ROM flexion 86 122 6/3/19       extension -6 -1   Strength quad SLR mod assist SLR - 10 degree ext lag.      ABD      flexion          RESTRICTIONS/PRECAUTIONS: none    Exercises/Interventions: Therapy held 5/15/19 Therapeutic Ex Sets/reps Notes HEP   Bike  6 min Able to perform full rev. Long sit HS stretch 3 x 30\"  X   Gastroc strap stretch 3 x 30\"  X   Heel slide with strap  15 x 5\"  X   Quad set with ADD  10 x 10\"  X   SLR 2 x 10 X   SL hip abd 2 x 10     Heel prop  5# X   EOB flexion  10 x 10\"     LAQ eccentric lower  squeeze  2 x 10; 5\" hold       Mini squats 3D 20 x     JOSIAH TKE/ext/ABD 20 x 45# - atc    Bridge  2 x 10     HR/  TL X 20      EZ chair     Leg press bilat 80#  X 30 atc    FSU/ LSU and over6 inch x 20              PROM, HS/gastroc stretch 10 min     Roller to quad, supine hip flex stretch X 4 min                            NMR re-education                                                                Therapeutic Exercise and NMR EXR  [x] (49453) Provided verbal/tactile cueing for activities related to strengthening, flexibility, endurance, ROM for improvements in LE, proximal hip, and core control with self care, mobility, lifting, ambulation.  [] (32941) Provided verbal/tactile cueing for activities related to improving balance, coordination, kinesthetic sense, posture, motor skill, proprioception  to assist with LE, proximal hip, and core control in self care, mobility, lifting, ambulation and eccentric single leg control.      NMR and Therapeutic Activities:    [x] (18446 or 16736) Provided verbal/tactile cueing for activities related to improving balance, coordination, kinesthetic sense, posture, motor skill, proprioception and motor activation to allow for proper function of core, proximal hip and LE with self care and ADLs  [] (99035) Gait Re-education- Provided training and instruction to the patient for proper LE, core and proximal hip recruitment and positioning and eccentric body weight control with ambulation re-education including up and down stairs     Home Exercise Program:    [x] (48187) Reviewed/Progressed HEP activities related to strengthening, flexibility, endurance, ROM of core, for proper joint functioning as indicated by patients Functional Deficits. Not met  3. Patient will demonstrate an increase in Strength to good proximal hip strength and control, within 5lb HHD in LE to allow for proper functional mobility as indicated by patients Functional Deficits. Not met  4. Patient will return to functional activities without increased symptoms or restriction. Not met  5. Patient will be able to ascend/descend stairs with reciprocal pattern for return to normal ADLs. Not attempted    New or Updated Goals (if applicable):  [x] No change to goals established upon initial eval/last progress note:  New Goals:    Progression Towards Functional goals:   [] Patient is progressing as expected towards functional goals listed. [x] Progression is slowed due to complexities listed. [] Progression has been slowed due to co-morbidities. [] Plan just implemented, too soon to assess goals progression  [] Other:     ASSESSMENT:    [] Improvement noted relative to goals:  [] No Improvement noted related to goals:  Summary/Patient's response to treatment: Continues to have active tightness in quad and knee flexion limiting her ability to perform stairs and transfers. Patient unable to perform leg lifts with full extension. Held Snappy shuttle      Treatment/Activity Tolerance:  [x] Patient tolerated treatment well [] Patient limited by fatique  [] Patient limited by pain  [] Patient limited by other medical complications  [] Other:     Prognosis: [x] Good [] Fair  [] Poor    Patient Requires Follow-up: [x] Yes  [] No    PLAN: See eval  [x] Continue per plan of care [] Alter current plan (see comments)  [] Plan of care initiated [] Hold pending MD visit [] Discharge    Electronically signed by: Phuong Neff, DPT [id number]

## 2019-06-25 ENCOUNTER — APPOINTMENT (OUTPATIENT)
Dept: PHYSICAL THERAPY | Age: 59
End: 2019-06-25
Payer: COMMERCIAL

## 2019-06-27 ENCOUNTER — HOSPITAL ENCOUNTER (OUTPATIENT)
Dept: PHYSICAL THERAPY | Age: 59
Setting detail: THERAPIES SERIES
Discharge: HOME | End: 2019-06-27
Payer: COMMERCIAL

## 2019-06-27 ENCOUNTER — OFFICE VISIT (OUTPATIENT)
Dept: ORTHOPEDIC SURGERY | Age: 59
End: 2019-06-27

## 2019-06-27 DIAGNOSIS — M76.31 IT BAND SYNDROME, RIGHT: ICD-10-CM

## 2019-06-27 DIAGNOSIS — Z96.651 S/P TOTAL KNEE ARTHROPLASTY, RIGHT: Primary | ICD-10-CM

## 2019-06-27 DIAGNOSIS — M17.11 PRIMARY OSTEOARTHRITIS OF RIGHT KNEE: ICD-10-CM

## 2019-06-27 PROCEDURE — 99024 POSTOP FOLLOW-UP VISIT: CPT | Performed by: ORTHOPAEDIC SURGERY

## 2019-06-27 PROCEDURE — 97110 THERAPEUTIC EXERCISES: CPT

## 2019-06-27 PROCEDURE — 97016 VASOPNEUMATIC DEVICE THERAPY: CPT

## 2019-06-27 PROCEDURE — 97140 MANUAL THERAPY 1/> REGIONS: CPT

## 2019-06-27 NOTE — FLOWSHEET NOTE
[facility], [address] [place] [address]  Phone: [phone]  Fax [phone]    Physical Therapy Daily Treatment Note  Date:  2019    Patient Name:  Love Sever    :  1960  MRN: [id number]  Restrictions/Precautions:    Physician Information:  Referring Practitioner: Dr. Abdulaziz Grey  Medical/Treatment Diagnosis Information:   Diagnosis: M 25.561 R knee pain; M17.11 R knee OA  · Treatment Diagnosis: M25.561 R knee pain; M25.661 right knee stiffness   [] Conservative / [x] Surgical - DOS: 3/29/19  Therapy Diagnosis/Practice Pattern:  Practice Pattern H: Joint Arthroplasty  Insurance/Certification information:  PT Insurance Information:  BCBS - 250D-80/20-$0CP-25PT PER COND. - NEEDS AUTH AFTR 25V - EXP 19  Plan of care signed: [x] YES  [] NO  Number of Comorbidities:  []0     [x]1-2    []3+  Date of Patient follow up with Physician:     G-Code (if applicable):      Date G-Code Applied:         Progress Note: [x]  Yes  []  No  Next due by: Visit #10        Latex Allergy:  [x]NO      []YES  Preferred Language for Healthcare:   [x]English       []other:    Visit # Insurance Allowable Reporting Period   18  Needs auth after 25  Begin Date: 2019               End Date:      RECERT DUE BY: 12 weeks    SUBJECTIVE:  Patient reports she has been in ICU with mother past few days. Reports knee is diong better and moving more. OBJECTIVE: See eval  Observation:  Palpation:     Test used Initial score Current Score   Pain Summary VAS 6 5-6   Functional questionnaire LEFS 86% 80   ROM flexion 86 122 6/3/19       extension -6 -1   Strength quad SLR mod assist SLR - 10 degree ext lag. ABD      flexion          RESTRICTIONS/PRECAUTIONS: none    Exercises/Interventions: Therapy held 5/15/19     Therapeutic Ex Sets/reps Notes HEP   Bike  6 min Able to perform full rev.     Long sit HS stretch 3 x 30\"  X   Gastroc Strength to good proximal hip strength and control, within 5lb HHD in LE to allow for proper functional mobility as indicated by patients Functional Deficits. Not met  4. Patient will return to functional activities without increased symptoms or restriction. Not met  5. Patient will be able to ascend/descend stairs with reciprocal pattern for return to normal ADLs. Not attempted    New or Updated Goals (if applicable):  [x] No change to goals established upon initial eval/last progress note:  New Goals:    Progression Towards Functional goals:   [] Patient is progressing as expected towards functional goals listed. [x] Progression is slowed due to complexities listed. [] Progression has been slowed due to co-morbidities. [] Plan just implemented, too soon to assess goals progression  [] Other:     ASSESSMENT:    [] Improvement noted relative to goals:  [] No Improvement noted related to goals:  Summary/Patient's response to treatment: Continues to have active tightness in quad and knee flexion limiting her ability to perform stairs and transfers. Patient unable to perform leg lifts with full extension. Held [employer]      Treatment/Activity Tolerance:  [x] Patient tolerated treatment well [] Patient limited by fatique  [] Patient limited by pain  [] Patient limited by other medical complications  [] Other:     Prognosis: [x] Good [] Fair  [] Poor    Patient Requires Follow-up: [x] Yes  [] No    PLAN: See eval  [x] Continue per plan of care [] Alter current plan (see comments)  [] Plan of care initiated [] Hold pending MD visit [] Discharge    Electronically signed by: Phuong Webster, DPT [id number]

## 2019-06-27 NOTE — PROGRESS NOTES
Patient is 12 weeks status post right total knee replacement. She is overall doing well. Her preop pain is gone. She does have some soft tissue pain in the iliotibial band area and hip. Pain Assessment  Location of Pain: Knee  Location Modifiers: Right  Severity of Pain: 7  Quality of Pain: Throbbing, Sharp, Dull, Aching  Duration of Pain: Persistent  Frequency of Pain: Intermittent  Aggravating Factors: Walking, Bending, Straightening  Limiting Behavior: Some  Relieving Factors: Rest, Ice, Other (Comment)]    On physical exam, patient's main nidus of pain is over the iliotibial band at the hip. There is minimal pain at the knee. Here range of motion from full extension to at least 120 degrees knee flexion. There is no signs of infection or DVT. At this time I recommend outpatient physical therapy to include iliotibial band stretching and modalities of the hip. If this does not work, we can try trochanteric bursal injections versus a Medrol Dosepak. She did have some renal failure around the time of surgery therefore would like to stay away from nonsteroidal anti-inflammatories. We will see her back in 4 weeks if she continues to have pain.

## 2019-07-01 ENCOUNTER — HOSPITAL ENCOUNTER (OUTPATIENT)
Dept: PHYSICAL THERAPY | Age: 59
Setting detail: THERAPIES SERIES
Discharge: HOME | End: 2019-07-01
Payer: COMMERCIAL

## 2019-07-01 PROCEDURE — 97110 THERAPEUTIC EXERCISES: CPT

## 2019-07-01 PROCEDURE — 97016 VASOPNEUMATIC DEVICE THERAPY: CPT

## 2019-07-01 PROCEDURE — 97140 MANUAL THERAPY 1/> REGIONS: CPT

## 2019-07-01 NOTE — FLOWSHEET NOTE
X   Gastroc strap stretch 3 x 30\"  X   Heel slide with strap  15 x 5\"  X   Quad set with ADD  10 x 10\"  X   SLR 3 x 10 X   SL hip abd 2 x 10           EOB flexion  10 x 10\"     LAQ eccentric lower  squeeze  2 x 10; 5\" hold       Mini squats 3D 20 x     JOSIAH TKE/ext/ABD 20 x 45# - atc    Bridge  2 x 10     HR/  TL X 20      EZ chair     Leg press bilat 80#  X 30 atc    FSU/ LSU and over6 inch x 20      Stair training 5 min     Manual Intervention      PROM, HS/gastroc stretch 10 min     Roller to quad, supine hip flex stretch X 4 min                            NMR re-education                                                                Therapeutic Exercise and NMR EXR  [x] (80569) Provided verbal/tactile cueing for activities related to strengthening, flexibility, endurance, ROM for improvements in LE, proximal hip, and core control with self care, mobility, lifting, ambulation.  [] (12477) Provided verbal/tactile cueing for activities related to improving balance, coordination, kinesthetic sense, posture, motor skill, proprioception  to assist with LE, proximal hip, and core control in self care, mobility, lifting, ambulation and eccentric single leg control.      NMR and Therapeutic Activities:    [x] (48808 or 03026) Provided verbal/tactile cueing for activities related to improving balance, coordination, kinesthetic sense, posture, motor skill, proprioception and motor activation to allow for proper function of core, proximal hip and LE with self care and ADLs  [] (88952) Gait Re-education- Provided training and instruction to the patient for proper LE, core and proximal hip recruitment and positioning and eccentric body weight control with ambulation re-education including up and down stairs     Home Exercise Program:    [x] (76734) Reviewed/Progressed HEP activities related to strengthening, flexibility, endurance, ROM of core, proximal hip and LE for functional self-care, mobility, lifting and ambulation/stair navigation   [] (76559)Reviewed/Progressed HEP activities related to improving balance, coordination, kinesthetic sense, posture, motor skill, proprioception of core, proximal hip and LE for self care, mobility, lifting, and ambulation/stair navigation      Manual Treatments:  PROM / STM / Oscillations-Mobs:  G-I, II, III, IV (PA's, Inf., Post.)  [x] (16497) Provided manual therapy to mobilize LE, proximal hip and/or LS spine soft tissue/joints for the purpose of modulating pain, promoting relaxation,  increasing ROM, reducing/eliminating soft tissue swelling/inflammation/restriction, improving soft tissue extensibility and allowing for proper ROM for normal function with self care, mobility, lifting and ambulation. Modalities:       [] GR/ESU 15 min    [x] GR 15 min  [] ESU     [] CP    [] MHP    [] declined     Charges:  Timed Code Treatment Minutes: 45   Total Treatment Minutes: 60     [] EVAL (LOW) 32355 (typically 20 minutes face-to-face)  [] EVAL (MOD) 03732 (typically 30 minutes face-to-face)  [] EVAL (HIGH) 77771 (typically 45 minutes face-to-face)  [] RE-EVAL     [x] EN(60045) x  2   [] IONTO  [] NMR (65674) x      [x] VASO   [x] Manual (20295) x  1    [] Other:  [] TA x       [] Mech Traction (66046)  [] ES(attended) (00077)      [] ES (un) (39242):     GOALS: Patient stated goal: Walk with no AD. Therapist goals for Patient:   Short Term Goals: To be achieved in: 2 weeks  1. Independent in HEP and progression per patient tolerance, in order to prevent re-injury. MET  2. Patient will have a decrease in pain to facilitate improvement in movement, function, and ADLs as indicated by Functional Deficits. MET    Long Term Goals: To be achieved in: 12 weeks  1. Disability index score of 40% or less for the LEFS to assist with reaching prior level of function. Progressing  2.  Patient will demonstrate increased AROM to 0-120 to allow for proper joint functioning as indicated by patients

## 2019-07-02 ENCOUNTER — TELEPHONE (OUTPATIENT)
Dept: ORTHOPEDIC SURGERY | Age: 59
End: 2019-07-02

## 2019-07-03 ENCOUNTER — APPOINTMENT (OUTPATIENT)
Dept: PHYSICAL THERAPY | Age: 59
End: 2019-07-03
Payer: COMMERCIAL

## 2019-07-11 ENCOUNTER — HOSPITAL ENCOUNTER (OUTPATIENT)
Dept: PHYSICAL THERAPY | Age: 59
Setting detail: THERAPIES SERIES
Discharge: HOME | End: 2019-07-11
Payer: COMMERCIAL

## 2019-07-11 PROCEDURE — 97140 MANUAL THERAPY 1/> REGIONS: CPT

## 2019-07-11 PROCEDURE — 97112 NEUROMUSCULAR REEDUCATION: CPT

## 2019-07-11 PROCEDURE — 97016 VASOPNEUMATIC DEVICE THERAPY: CPT

## 2019-07-11 PROCEDURE — 97110 THERAPEUTIC EXERCISES: CPT

## 2019-07-15 RX ORDER — GABAPENTIN 100 MG/1
CAPSULE ORAL
Qty: 90 CAPSULE | Refills: 0 | Status: SHIPPED | OUTPATIENT
Start: 2019-07-15 | End: 2019-08-02 | Stop reason: SDUPTHER

## 2019-07-17 ENCOUNTER — HOSPITAL ENCOUNTER (OUTPATIENT)
Dept: PHYSICAL THERAPY | Age: 59
Setting detail: THERAPIES SERIES
Discharge: HOME | End: 2019-07-17
Payer: COMMERCIAL

## 2019-07-17 PROCEDURE — 97110 THERAPEUTIC EXERCISES: CPT

## 2019-07-17 PROCEDURE — 97140 MANUAL THERAPY 1/> REGIONS: CPT

## 2019-07-17 PROCEDURE — 97016 VASOPNEUMATIC DEVICE THERAPY: CPT

## 2019-07-17 RX ORDER — GABAPENTIN 100 MG/1
CAPSULE ORAL
Qty: 90 CAPSULE | Refills: 0 | Status: SHIPPED | OUTPATIENT
Start: 2019-07-17 | End: 2019-09-17 | Stop reason: SDUPTHER

## 2019-07-24 ENCOUNTER — APPOINTMENT (OUTPATIENT)
Dept: PHYSICAL THERAPY | Age: 59
End: 2019-07-24
Payer: COMMERCIAL

## 2019-07-26 ENCOUNTER — APPOINTMENT (OUTPATIENT)
Dept: PHYSICAL THERAPY | Age: 59
End: 2019-07-26
Payer: COMMERCIAL

## 2019-07-29 ENCOUNTER — OFFICE VISIT (OUTPATIENT)
Dept: ORTHOPEDIC SURGERY | Age: 59
End: 2019-07-29
Payer: COMMERCIAL

## 2019-07-29 DIAGNOSIS — M70.61 GREATER TROCHANTERIC BURSITIS OF RIGHT HIP: Primary | ICD-10-CM

## 2019-07-29 PROCEDURE — 99213 OFFICE O/P EST LOW 20 MIN: CPT | Performed by: PHYSICIAN ASSISTANT

## 2019-07-29 PROCEDURE — 20611 DRAIN/INJ JOINT/BURSA W/US: CPT | Performed by: PHYSICIAN ASSISTANT

## 2019-07-29 NOTE — PROGRESS NOTES
Patient is 4 months status post right total knee replacement. She is overall doing well. Her preop pain is gone. She does have some soft tissue pain in the iliotibial band area and hip. We started her on a Medrol Dosepak on her last visit but she had too many side effects to continue this. Pain Assessment  Location of Pain: Knee  Location Modifiers: Right  Severity of Pain: 4  Frequency of Pain: Intermittent  Aggravating Factors: Standing, Walking, Stairs  Limiting Behavior: Some  Result of Injury: No  Work-Related Injury: No  Are there other pain locations you wish to document?: No]    On physical exam, patient's main area of pain is over the iliotibial band at the hip. Tender to palpation directly over the right greater trochanter. There is minimal pain at the knee. Here range of motion from full extension to at least 120 degrees knee flexion. There is no signs of infection or DVT. As far as her knee is concerned this seems to be doing quite well. I would like for her to continue with an independent home exercise program.  She is aware that will take 6 months to year for full recovery. She is having some continual pain over the lateral aspect of her hip and we will go ahead and proceed with a right hip greater trochanteric injection today. Follow-up with us in 1 year for an annual checkup    Procedure: Right hip greater trochanteric injection  I discussed in detail the risks, benefits, and complications of an injection which include but are not limited to infection, skin reactions, hot swollen joints, and anaphylaxis with the patient. The patient verbalized good understanding and gave informed consent for the right hip injection. The skin was prepped using sterile alcohol. A sterile 22-gauge needle was inserted into the area of maximal tenderness over the greater trochanter and a mixture of 2ml Beta-Beta, 5 mL of 0.5% bupivacaine was injected under sterile technique.  The needle was withdrawn and the puncture site sealed with a Band-Aid. Technique: Under sterile conditions a SonDoist ultrasound unit with a variable frequency (6.0-15.0 MHz) linear transducer was used to localize the placement of a 22-gauge needle into the area of maximal tenderness over the greater trochanter. Findings: Successful needle placement for Hip injection. Final images were taken and saved for permanent record. The patient tolerated the injection well. The patient was instructed to call the office immediately if there is any pain, redness, warmth, fever, or chills. No

## 2019-07-31 ENCOUNTER — HOSPITAL ENCOUNTER (OUTPATIENT)
Dept: PHYSICAL THERAPY | Age: 59
Setting detail: THERAPIES SERIES
Discharge: HOME | End: 2019-07-31
Payer: COMMERCIAL

## 2019-07-31 PROCEDURE — 97140 MANUAL THERAPY 1/> REGIONS: CPT

## 2019-07-31 PROCEDURE — 97110 THERAPEUTIC EXERCISES: CPT

## 2019-07-31 PROCEDURE — 97016 VASOPNEUMATIC DEVICE THERAPY: CPT

## 2019-07-31 NOTE — FLOWSHEET NOTE
[facility], [address] [place] [address]  Phone: [phone]  Fax [phone]    Physical Therapy Daily Treatment Note  Date:  2019    Patient Name:  Jony Shabazz    :  1960  MRN: [id number]  Restrictions/Precautions:    Physician Information:  Referring Practitioner: Dr. Patricia Khan  Medical/Treatment Diagnosis Information:   Diagnosis: M 25.561 R knee pain; M17.11 R knee OA  · Treatment Diagnosis: M25.561 R knee pain; M25.661 right knee stiffness   [] Conservative / [x] Surgical - DOS: 3/29/19  Therapy Diagnosis/Practice Pattern:  Practice Pattern H: Joint Arthroplasty  Insurance/Certification information:  PT Insurance Information:  BCBS - 250D-80/20-$0CP-25PT PER COND. - NEEDS AUTH AFTR 25V - EXP 19  Plan of care signed: [x] YES  [] NO  Number of Comorbidities:  []0     [x]1-2    []3+  Date of Patient follow up with Physician:     G-Code (if applicable):      Date G-Code Applied:         Progress Note: [x]  Yes  []  No  Next due by: Visit #10        Latex Allergy:  [x]NO      []YES  Preferred Language for Healthcare:   [x]English       []other:    Visit # Insurance Allowable Reporting Period    Needs auth after   Begin Date: 2019               End Date:      RECERT DUE BY: 12 weeks    SUBJECTIVE:  Patient reports she feels pretty good with knee, will keep working on HEP. Feels she has some other health issue going on, had to cancel last week due to illness, sees PCP Friday for this.     OBJECTIVE: See eval  Observation:  Palpation:     Test used Initial score Current Score   Pain Summary VAS 6 1-2   Functional questionnaire LEFS 86% 35%   ROM flexion 86 122 6/3/19    AROM 115 19     extension -6 0   Strength quad SLR mod assist SLR no lag    ABD  4+    flexion          RESTRICTIONS/PRECAUTIONS: none    Exercises/Interventions: Therapy held 5/15/19     Therapeutic Ex Sets/reps Notes HEP will demonstrate increased AROM to 0-120 to allow for proper joint functioning as indicated by patients Functional Deficits. MET  3. Patient will demonstrate an increase in Strength to good proximal hip strength and control, within 5lb HHD in LE to allow for proper functional mobility as indicated by patients Functional Deficits. MET  4. Patient will return to functional activities without increased symptoms or restriction. Progressing  5. Patient will be able to ascend/descend stairs with reciprocal pattern for return to normal ADLs. Progressing    New or Updated Goals (if applicable):  [x] No change to goals established upon initial eval/last progress note:  New Goals:    Progression Towards Functional goals:   [] Patient is progressing as expected towards functional goals listed. [x] Progression is slowed due to complexities listed. [] Progression has been slowed due to co-morbidities. [] Plan just implemented, too soon to assess goals progression  [] Other:     ASSESSMENT:    [] Improvement noted relative to goals:  [] No Improvement noted related to goals:  Summary/Patient's response to treatment: Continues to have active tightness in quad and knee flexion limiting her ability to perform stairs and transfers. Patient unable to perform leg lifts with full extension. Quinyx AB      Treatment/Activity Tolerance:  [x] Patient tolerated treatment well [] Patient limited by fatique  [] Patient limited by pain  [] Patient limited by other medical complications  [] Other:     Prognosis: [x] Good [] Fair  [] Poor    PLAN: See eval  [] Continue per plan of care [] Alter current plan (see comments)  [] Plan of care initiated [] Hold pending MD visit [x] Discharge    Electronically signed by: Amparo Young, [address] [facility], DPT [id number]

## 2019-07-31 NOTE — DISCHARGE SUMMARY
[facility],  [address] [place]  Phone: [phone]  Fax [phone]       Physical Therapy Discharge  Date: 2019        Patient Name:  Jaskaran Rader    :  1960  MRN: [id number]  · Diagnosis: M 25.561 R knee pain; M17.11 R knee OA  · Treatment Diagnosis: M25.561 R knee pain; M25.661 right knee stiffness   [] Conservative / [x] Surgical - DOS: 3/29/19  Therapy Diagnosis/Practice Pattern:Pattern H Joint arthroplasty      Number of Comorbidities:  []0     [x]1-2    []3+  Total number of visits: 22   Reporting Period:   Beginning Date:19   End Date:19     Test used Initial score Current Score   Pain Summary VAS 6 1-2   Functional questionnaire LEFS 86% 35%   ROM flexion 86 122 6/3/19    AROM 115 19     extension -6 0   Strength quad SLR mod assist SLR no lag    ABD  4+    flexion       Functional Limitation G-Code (if applicable):           LEFS 35%  Test/tests used to determine % limitation: LEFS 35%  Actual Score used to drive % limitation:    Treatment to date:  [x] Therapeutic Exercise    [x] Modalities:  [x] Therapeutic Activity             []Ultrasound            []Electrical Stimulation  [x] Gait Training     []Cervical Traction    [] Lumbar Traction  [x] Neuromuscular Re-education [x] Cold/hotpack         []Iontophoresis  [x] Instruction in HEP      Other:  [x] Manual Therapy                   []                       ?           []   Assessment:  [x] All Goals were achieved.   [] The following goals were achieved (#'s):  [] The following goals were not achieved for the following reasons:/assessmen of improvement as it relates to each goal:    Plan of Care:  [x] Discharge from Therapy Services due to:    Reason for Discharge:   [x] All goals achieved    [] Patient having surgery  [] Physician discontinued therapy  [] Insurance/Financial Limitations [] Patient did not return for follow up visits [] Home program/1 visit only   [] No subjective or objective improvement [] Plateaued   [] Patient was unable to adhere to the plan of care established at evaluation. [] Referred back to physician for re-evaluation and did not return.      [] Other:?       Electronically signed by:  Camille Olsen, [facility], DPT [id number]

## 2019-08-02 ENCOUNTER — OFFICE VISIT (OUTPATIENT)
Dept: FAMILY MEDICINE CLINIC | Age: 59
End: 2019-08-02
Payer: COMMERCIAL

## 2019-08-02 VITALS
OXYGEN SATURATION: 98 % | WEIGHT: 191.4 LBS | RESPIRATION RATE: 16 BRPM | HEART RATE: 91 BPM | SYSTOLIC BLOOD PRESSURE: 116 MMHG | BODY MASS INDEX: 30.76 KG/M2 | HEIGHT: 66 IN | DIASTOLIC BLOOD PRESSURE: 86 MMHG

## 2019-08-02 DIAGNOSIS — E78.00 PURE HYPERCHOLESTEROLEMIA: ICD-10-CM

## 2019-08-02 DIAGNOSIS — Z00.00 ANNUAL PHYSICAL EXAM: Primary | ICD-10-CM

## 2019-08-02 DIAGNOSIS — Z79.4 TYPE 2 DIABETES MELLITUS WITHOUT COMPLICATION, WITH LONG-TERM CURRENT USE OF INSULIN (HCC): ICD-10-CM

## 2019-08-02 DIAGNOSIS — M65.9 TENOSYNOVITIS: ICD-10-CM

## 2019-08-02 DIAGNOSIS — R53.83 FATIGUE, UNSPECIFIED TYPE: ICD-10-CM

## 2019-08-02 DIAGNOSIS — E11.9 TYPE 2 DIABETES MELLITUS WITHOUT COMPLICATION, WITH LONG-TERM CURRENT USE OF INSULIN (HCC): ICD-10-CM

## 2019-08-02 DIAGNOSIS — F33.1 MODERATE EPISODE OF RECURRENT MAJOR DEPRESSIVE DISORDER (HCC): ICD-10-CM

## 2019-08-02 LAB
ALBUMIN SERPL-MCNC: 4.7 G/DL (ref 3.4–5)
ALP BLD-CCNC: 100 U/L (ref 40–129)
ALT SERPL-CCNC: 13 U/L (ref 10–40)
ANION GAP SERPL CALCULATED.3IONS-SCNC: 11 MMOL/L (ref 3–16)
AST SERPL-CCNC: 11 U/L (ref 15–37)
BILIRUB SERPL-MCNC: 0.5 MG/DL (ref 0–1)
BILIRUBIN DIRECT: <0.2 MG/DL (ref 0–0.3)
BILIRUBIN, INDIRECT: ABNORMAL MG/DL (ref 0–1)
BILIRUBIN, POC: 0
BLOOD URINE, POC: 0
BUN BLDV-MCNC: 15 MG/DL (ref 7–20)
C-REACTIVE PROTEIN: 0.7 MG/L (ref 0–5.1)
CALCIUM SERPL-MCNC: 10.1 MG/DL (ref 8.3–10.6)
CHLORIDE BLD-SCNC: 99 MMOL/L (ref 99–110)
CHOLESTEROL, TOTAL: 145 MG/DL (ref 0–199)
CLARITY, POC: CLEAR
CO2: 27 MMOL/L (ref 21–32)
COLOR, POC: YELLOW
CREAT SERPL-MCNC: 0.9 MG/DL (ref 0.6–1.1)
CREATININE URINE POCT: NORMAL
GFR AFRICAN AMERICAN: >60
GFR NON-AFRICAN AMERICAN: >60
GLUCOSE BLD-MCNC: 133 MG/DL (ref 70–99)
GLUCOSE URINE, POC: 500
HCT VFR BLD CALC: 45.4 % (ref 36–48)
HDLC SERPL-MCNC: 64 MG/DL (ref 40–60)
HEMOGLOBIN: 15 G/DL (ref 12–16)
KETONES, POC: 0
LDL CHOLESTEROL CALCULATED: 66 MG/DL
LEUKOCYTE EST, POC: 0
MCH RBC QN AUTO: 31.3 PG (ref 26–34)
MCHC RBC AUTO-ENTMCNC: 33.1 G/DL (ref 31–36)
MCV RBC AUTO: 94.7 FL (ref 80–100)
MICROALBUMIN/CREAT 24H UR: 0 MG/G{CREAT}
MICROALBUMIN/CREAT UR-RTO: NORMAL
NITRITE, POC: 0
PDW BLD-RTO: 13.3 % (ref 12.4–15.4)
PH, POC: 5
PHOSPHORUS: 3.4 MG/DL (ref 2.5–4.9)
PLATELET # BLD: 241 K/UL (ref 135–450)
PLATELET SLIDE REVIEW: NORMAL
PMV BLD AUTO: 9.5 FL (ref 5–10.5)
POTASSIUM SERPL-SCNC: 4.7 MMOL/L (ref 3.5–5.1)
PROTEIN, POC: 0
RBC # BLD: 4.79 M/UL (ref 4–5.2)
RHEUMATOID FACTOR: <10 IU/ML
SEDIMENTATION RATE, ERYTHROCYTE: 5 MM/HR (ref 0–30)
SLIDE REVIEW: NORMAL
SODIUM BLD-SCNC: 137 MMOL/L (ref 136–145)
SPECIFIC GRAVITY, POC: 1.02
TOTAL PROTEIN: 6.7 G/DL (ref 6.4–8.2)
TRIGL SERPL-MCNC: 76 MG/DL (ref 0–150)
TSH SERPL DL<=0.05 MIU/L-ACNC: 0.62 UIU/ML (ref 0.27–4.2)
UROBILINOGEN, POC: 0.2
VLDLC SERPL CALC-MCNC: 15 MG/DL
WBC # BLD: 8.5 K/UL (ref 4–11)

## 2019-08-02 PROCEDURE — 99396 PREV VISIT EST AGE 40-64: CPT | Performed by: INTERNAL MEDICINE

## 2019-08-02 PROCEDURE — 36415 COLL VENOUS BLD VENIPUNCTURE: CPT | Performed by: INTERNAL MEDICINE

## 2019-08-02 PROCEDURE — 82044 UR ALBUMIN SEMIQUANTITATIVE: CPT | Performed by: INTERNAL MEDICINE

## 2019-08-02 PROCEDURE — 81002 URINALYSIS NONAUTO W/O SCOPE: CPT | Performed by: INTERNAL MEDICINE

## 2019-08-02 ASSESSMENT — ENCOUNTER SYMPTOMS
RESPIRATORY NEGATIVE: 1
ALLERGIC/IMMUNOLOGIC NEGATIVE: 1
EYES NEGATIVE: 1
GASTROINTESTINAL NEGATIVE: 1

## 2019-08-02 NOTE — PROGRESS NOTES
MOUTH THREE TIMES A DAY FOR 30 DAYS 90 capsule 0    INVOKANA 100 MG TABS tablet TAKE ONE TABLET BY MOUTH EVERY MORNING BEFORE BREAKFAST 30 tablet 5    ondansetron (ZOFRAN) 4 MG tablet Take 4 mg by mouth every 8 hours as needed for Nausea or Vomiting      lamoTRIgine (LAMICTAL) 200 MG tablet Take 200 mg by mouth daily      atorvastatin (LIPITOR) 10 MG tablet TAKE ONE TABLET BY MOUTH DAILY 30 tablet 5    lisinopril (PRINIVIL;ZESTRIL) 10 MG tablet Take 1 tablet by mouth daily 30 tablet 5    blood glucose test strips (ASCENSIA AUTODISC VI;ONE TOUCH ULTRA TEST VI) strip Patient has One Touch Ultra 2, tests daily 100 strip 5    buPROPion (WELLBUTRIN XL) 300 MG extended release tablet Take 300 mg by mouth every morning      Blood Glucose Monitoring Suppl (ONE TOUCH ULTRA 2) w/Device KIT 1 kit by Does not apply route daily 1 kit 0    blood glucose monitor strips Test once dialy for diabetes e11.9 100 strip 5    canagliflozin (INVOKANA) 100 MG TABS tablet Take 1 tablet by mouth every morning (before breakfast) 30 tablet 5    Lancets MISC Test once daily for diabetes e11.9 100 each 3    pioglitazone (ACTOS) 15 MG tablet Take 1 tablet by mouth daily 30 tablet 5    Blood Glucose Monitoring Suppl CONCETTA Test once daily for diabetes e11.9 1 Device 0    traZODone (DESYREL) 100 MG tablet Take 200 mg by mouth nightly       escitalopram (LEXAPRO) 20 MG tablet Take 10 mg by mouth daily        No current facility-administered medications for this visit. Allergies   Allergen Reactions    Morphine Hives     Social History     Tobacco Use    Smoking status: Never Smoker    Smokeless tobacco: Never Used   Substance Use Topics    Alcohol use: No     Family History   Problem Relation Age of Onset    Diabetes Mother     Arthritis Mother     Diabetes Father     Heart Disease Father     Cancer Father         colon    Dementia Father        Review of Systems   Constitutional: Positive for fatigue. HENT: Negative. present. No thyromegaly present. Cardiovascular: Normal rate, regular rhythm, normal heart sounds, intact distal pulses and normal pulses. No extrasystoles are present. PMI is not displaced. Exam reveals no gallop, no friction rub and no decreased pulses. No murmur heard. Pulses:       Carotid pulses are 2+ on the right side, and 2+ on the left side. Radial pulses are 2+ on the right side, and 2+ on the left side. Femoral pulses are 2+ on the right side, and 2+ on the left side. Popliteal pulses are 2+ on the right side, and 2+ on the left side. Dorsalis pedis pulses are 2+ on the right side, and 2+ on the left side. Posterior tibial pulses are 2+ on the right side, and 2+ on the left side. Pulmonary/Chest: Effort normal and breath sounds normal. No stridor. No respiratory distress. She has no decreased breath sounds. She has no wheezes. She has no rhonchi. She has no rales. She exhibits no tenderness. No breast tenderness, discharge or bleeding. Abdominal: Soft. Normal appearance, normal aorta and bowel sounds are normal. She exhibits no shifting dullness, no distension, no pulsatile liver, no fluid wave, no abdominal bruit, no ascites, no pulsatile midline mass and no mass. There is no hepatosplenomegaly. There is no tenderness. There is no rebound, no guarding and no CVA tenderness. No hernia. Hernia confirmed negative in the ventral area. Genitourinary: No breast tenderness, discharge or bleeding. Pelvic exam was performed with patient supine. Genitourinary Comments: NE to Gyn. Musculoskeletal: Normal range of motion. She exhibits tenderness (s/p R TKR. still some swelling and decreased ROM. ). She exhibits no edema. Lymphadenopathy:        Head (right side): No submental, no submandibular, no tonsillar, no preauricular, no posterior auricular and no occipital adenopathy present.         Head (left side): No submental, no submandibular, no tonsillar, no

## 2019-08-02 NOTE — PATIENT INSTRUCTIONS
All above problems reviewed and the found to be unchanged except for the following :     Annual Physical Exam. Flu shot in Oct/Nov.     Moderate Episode of Recurrent Major Depressive Disorder (Hcc). To PSych and therapist. Call if new c/o. Pure Hypercholesterolemia. Will do labs and adjust med if needed. Improve diet and lose weight. Increase exercise as tolerated. Type 2 Diabetes Mellitus Without Complication, With Long-Term Current Use of Insulin (Hcc). Will do labs and adjust meds as needed. To improve diet and lose some weight. Status Post Total Right Knee Replacement. Now w/ tenosynovitis. Wrists, knees, hip pain. Will do labs. Mammo: 5/2019. Then MRI. rechx 10/2019  Pap: past due. S/p hysterectomy oophorectomy. Colonoscopy: 5/2009. Due 5/2019. DEXA: 8/2018. rechx age 73  Eye: 2/4/2018. Hearing: decreased. Immunnization: Flu shot in Oct/Nov. Gave Rx for Shingrix.    MMSE: na  Get Up and Go: na  Tob: nonsmoker/never  ETOH: none  Caffeine: low  Cardiac Risk Assessment: on statin  Living will:  yes  Medical power of : yes

## 2019-08-03 LAB
ANTI-NUCLEAR ANTIBODY (ANA): NEGATIVE
CYCLIC CITRULLINATED PEPTIDE ANTIBODY IGG: <0.5 U/ML (ref 0–2.9)
ESTIMATED AVERAGE GLUCOSE: 154.2 MG/DL
HBA1C MFR BLD: 7 %

## 2019-08-26 RX ORDER — BLOOD-GLUCOSE METER
1 EACH MISCELLANEOUS DAILY
Qty: 1 KIT | Refills: 0 | Status: SHIPPED | OUTPATIENT
Start: 2019-08-26

## 2019-08-26 RX ORDER — GLUCOSAMINE HCL/CHONDROITIN SU 500-400 MG
CAPSULE ORAL
Qty: 100 STRIP | Refills: 5 | Status: SHIPPED | OUTPATIENT
Start: 2019-08-26

## 2019-09-01 PROBLEM — Z00.00 ANNUAL PHYSICAL EXAM: Status: RESOLVED | Noted: 2018-07-10 | Resolved: 2019-09-01

## 2019-09-17 RX ORDER — GABAPENTIN 100 MG/1
CAPSULE ORAL
Qty: 90 CAPSULE | Refills: 0 | Status: SHIPPED | OUTPATIENT
Start: 2019-09-17 | End: 2019-10-17 | Stop reason: SDUPTHER

## 2019-10-17 RX ORDER — GABAPENTIN 100 MG/1
CAPSULE ORAL
Qty: 90 CAPSULE | Refills: 0 | Status: SHIPPED | OUTPATIENT
Start: 2019-10-17 | End: 2019-10-23 | Stop reason: ALTCHOICE

## 2019-10-21 RX ORDER — GABAPENTIN 100 MG/1
CAPSULE ORAL
Qty: 90 CAPSULE | Refills: 0 | Status: SHIPPED | OUTPATIENT
Start: 2019-10-21 | End: 2019-10-23 | Stop reason: ALTCHOICE

## 2019-10-23 ENCOUNTER — OFFICE VISIT (OUTPATIENT)
Dept: SURGERY | Age: 59
End: 2019-10-23
Payer: COMMERCIAL

## 2019-10-23 ENCOUNTER — HOSPITAL ENCOUNTER (OUTPATIENT)
Dept: ULTRASOUND IMAGING | Age: 59
Discharge: HOME | End: 2019-10-23
Payer: COMMERCIAL

## 2019-10-23 ENCOUNTER — HOSPITAL ENCOUNTER (OUTPATIENT)
Dept: MAMMOGRAPHY | Age: 59
Discharge: HOME | End: 2019-10-23
Payer: COMMERCIAL

## 2019-10-23 VITALS
DIASTOLIC BLOOD PRESSURE: 74 MMHG | HEART RATE: 85 BPM | WEIGHT: 197 LBS | BODY MASS INDEX: 32.28 KG/M2 | OXYGEN SATURATION: 99 % | SYSTOLIC BLOOD PRESSURE: 116 MMHG

## 2019-10-23 DIAGNOSIS — Z80.3 FAMILY HX-BREAST MALIGNANCY: ICD-10-CM

## 2019-10-23 DIAGNOSIS — Z12.39 BREAST CANCER SCREENING, HIGH RISK PATIENT: ICD-10-CM

## 2019-10-23 DIAGNOSIS — N63.0 BREAST MASS: ICD-10-CM

## 2019-10-23 DIAGNOSIS — R92.8 ABNORMAL MAMMOGRAM: ICD-10-CM

## 2019-10-23 DIAGNOSIS — N63.0 BREAST MASS: Primary | ICD-10-CM

## 2019-10-23 PROCEDURE — 76642 ULTRASOUND BREAST LIMITED: CPT

## 2019-10-23 PROCEDURE — 76641 ULTRASOUND BREAST COMPLETE: CPT

## 2019-10-23 PROCEDURE — G0279 TOMOSYNTHESIS, MAMMO: HCPCS

## 2019-10-23 PROCEDURE — 99213 OFFICE O/P EST LOW 20 MIN: CPT | Performed by: SURGERY

## 2019-10-23 ASSESSMENT — ENCOUNTER SYMPTOMS
COLOR CHANGE: 0
CHEST TIGHTNESS: 0
ABDOMINAL DISTENTION: 0
SHORTNESS OF BREATH: 0
ABDOMINAL PAIN: 0

## 2019-11-03 DIAGNOSIS — E78.00 PURE HYPERCHOLESTEROLEMIA: ICD-10-CM

## 2019-11-04 RX ORDER — ATORVASTATIN CALCIUM 10 MG/1
TABLET, FILM COATED ORAL
Qty: 30 TABLET | Refills: 5 | Status: SHIPPED | OUTPATIENT
Start: 2019-11-04 | End: 2020-05-04

## 2019-11-06 RX ORDER — LISINOPRIL 10 MG/1
TABLET ORAL
Qty: 30 TABLET | Refills: 4 | Status: SHIPPED | OUTPATIENT
Start: 2019-11-06 | End: 2020-04-03

## 2019-11-26 ENCOUNTER — HOSPITAL ENCOUNTER (OUTPATIENT)
Dept: GENERAL RADIOLOGY | Age: 59
Discharge: HOME | End: 2019-11-26
Payer: COMMERCIAL

## 2019-11-26 ENCOUNTER — HOSPITAL ENCOUNTER (OUTPATIENT)
Age: 59
Discharge: HOME | End: 2019-11-26
Payer: COMMERCIAL

## 2019-11-26 DIAGNOSIS — R19.7 DIARRHEA, UNSPECIFIED TYPE: ICD-10-CM

## 2019-11-26 PROCEDURE — 74018 RADEX ABDOMEN 1 VIEW: CPT

## 2019-12-03 ENCOUNTER — OFFICE VISIT (OUTPATIENT)
Dept: FAMILY MEDICINE CLINIC | Age: 59
End: 2019-12-03
Payer: COMMERCIAL

## 2019-12-03 VITALS
HEIGHT: 65 IN | DIASTOLIC BLOOD PRESSURE: 82 MMHG | RESPIRATION RATE: 16 BRPM | WEIGHT: 199.4 LBS | SYSTOLIC BLOOD PRESSURE: 132 MMHG | OXYGEN SATURATION: 99 % | HEART RATE: 91 BPM | BODY MASS INDEX: 33.22 KG/M2

## 2019-12-03 DIAGNOSIS — E78.00 PURE HYPERCHOLESTEROLEMIA: ICD-10-CM

## 2019-12-03 DIAGNOSIS — Z79.4 TYPE 2 DIABETES MELLITUS WITHOUT COMPLICATION, WITH LONG-TERM CURRENT USE OF INSULIN (HCC): Primary | ICD-10-CM

## 2019-12-03 DIAGNOSIS — F33.1 MODERATE EPISODE OF RECURRENT MAJOR DEPRESSIVE DISORDER (HCC): ICD-10-CM

## 2019-12-03 DIAGNOSIS — E11.9 TYPE 2 DIABETES MELLITUS WITHOUT COMPLICATION, WITH LONG-TERM CURRENT USE OF INSULIN (HCC): Primary | ICD-10-CM

## 2019-12-03 LAB
ANION GAP SERPL CALCULATED.3IONS-SCNC: 13 MMOL/L (ref 3–16)
BUN BLDV-MCNC: 16 MG/DL (ref 7–20)
CALCIUM SERPL-MCNC: 9.9 MG/DL (ref 8.3–10.6)
CHLORIDE BLD-SCNC: 101 MMOL/L (ref 99–110)
CO2: 26 MMOL/L (ref 21–32)
CREAT SERPL-MCNC: 0.9 MG/DL (ref 0.6–1.1)
GFR AFRICAN AMERICAN: >60
GFR NON-AFRICAN AMERICAN: >60
GLUCOSE BLD-MCNC: 181 MG/DL (ref 70–99)
POTASSIUM SERPL-SCNC: 4.8 MMOL/L (ref 3.5–5.1)
SODIUM BLD-SCNC: 140 MMOL/L (ref 136–145)

## 2019-12-03 PROCEDURE — G8482 FLU IMMUNIZE ORDER/ADMIN: HCPCS | Performed by: INTERNAL MEDICINE

## 2019-12-03 PROCEDURE — 90471 IMMUNIZATION ADMIN: CPT | Performed by: INTERNAL MEDICINE

## 2019-12-03 PROCEDURE — 2022F DILAT RTA XM EVC RTNOPTHY: CPT | Performed by: INTERNAL MEDICINE

## 2019-12-03 PROCEDURE — G8427 DOCREV CUR MEDS BY ELIG CLIN: HCPCS | Performed by: INTERNAL MEDICINE

## 2019-12-03 PROCEDURE — 1036F TOBACCO NON-USER: CPT | Performed by: INTERNAL MEDICINE

## 2019-12-03 PROCEDURE — 3051F HG A1C>EQUAL 7.0%<8.0%: CPT | Performed by: INTERNAL MEDICINE

## 2019-12-03 PROCEDURE — 36415 COLL VENOUS BLD VENIPUNCTURE: CPT | Performed by: INTERNAL MEDICINE

## 2019-12-03 PROCEDURE — G8417 CALC BMI ABV UP PARAM F/U: HCPCS | Performed by: INTERNAL MEDICINE

## 2019-12-03 PROCEDURE — 99214 OFFICE O/P EST MOD 30 MIN: CPT | Performed by: INTERNAL MEDICINE

## 2019-12-03 PROCEDURE — 3017F COLORECTAL CA SCREEN DOC REV: CPT | Performed by: INTERNAL MEDICINE

## 2019-12-03 PROCEDURE — 90686 IIV4 VACC NO PRSV 0.5 ML IM: CPT | Performed by: INTERNAL MEDICINE

## 2019-12-03 ASSESSMENT — ENCOUNTER SYMPTOMS
GASTROINTESTINAL NEGATIVE: 1
RESPIRATORY NEGATIVE: 1
ALLERGIC/IMMUNOLOGIC NEGATIVE: 1
EYES NEGATIVE: 1

## 2019-12-04 LAB
ESTIMATED AVERAGE GLUCOSE: 148.5 MG/DL
HBA1C MFR BLD: 6.8 %

## 2019-12-12 RX ORDER — CANAGLIFLOZIN 100 MG/1
TABLET, FILM COATED ORAL
Qty: 30 TABLET | Refills: 4 | Status: SHIPPED
Start: 2019-12-12 | End: 2020-11-12 | Stop reason: ALTCHOICE

## 2019-12-13 ENCOUNTER — TELEPHONE (OUTPATIENT)
Dept: FAMILY MEDICINE CLINIC | Age: 59
End: 2019-12-13

## 2020-04-03 RX ORDER — LISINOPRIL 10 MG/1
TABLET ORAL
Qty: 30 TABLET | Refills: 3 | Status: SHIPPED | OUTPATIENT
Start: 2020-04-03 | End: 2020-08-03

## 2020-05-04 RX ORDER — ATORVASTATIN CALCIUM 10 MG/1
TABLET, FILM COATED ORAL
Qty: 30 TABLET | Refills: 5 | Status: SHIPPED | OUTPATIENT
Start: 2020-05-04 | End: 2020-11-05

## 2020-06-22 RX ORDER — AMOXICILLIN AND CLAVULANATE POTASSIUM 875; 125 MG/1; MG/1
1 TABLET, FILM COATED ORAL 2 TIMES DAILY
Qty: 6 TABLET | Refills: 0 | Status: SHIPPED | OUTPATIENT
Start: 2020-06-22 | End: 2020-06-25 | Stop reason: ALTCHOICE

## 2020-06-25 ENCOUNTER — OFFICE VISIT (OUTPATIENT)
Dept: FAMILY MEDICINE CLINIC | Age: 60
End: 2020-06-25
Payer: COMMERCIAL

## 2020-06-25 VITALS
SYSTOLIC BLOOD PRESSURE: 114 MMHG | WEIGHT: 201.2 LBS | HEART RATE: 112 BPM | HEIGHT: 65 IN | BODY MASS INDEX: 33.52 KG/M2 | RESPIRATION RATE: 16 BRPM | OXYGEN SATURATION: 99 % | TEMPERATURE: 97.5 F | DIASTOLIC BLOOD PRESSURE: 80 MMHG

## 2020-06-25 PROCEDURE — 1036F TOBACCO NON-USER: CPT | Performed by: INTERNAL MEDICINE

## 2020-06-25 PROCEDURE — 3046F HEMOGLOBIN A1C LEVEL >9.0%: CPT | Performed by: INTERNAL MEDICINE

## 2020-06-25 PROCEDURE — G8417 CALC BMI ABV UP PARAM F/U: HCPCS | Performed by: INTERNAL MEDICINE

## 2020-06-25 PROCEDURE — 3017F COLORECTAL CA SCREEN DOC REV: CPT | Performed by: INTERNAL MEDICINE

## 2020-06-25 PROCEDURE — 99214 OFFICE O/P EST MOD 30 MIN: CPT | Performed by: INTERNAL MEDICINE

## 2020-06-25 PROCEDURE — 36415 COLL VENOUS BLD VENIPUNCTURE: CPT | Performed by: INTERNAL MEDICINE

## 2020-06-25 PROCEDURE — 2022F DILAT RTA XM EVC RTNOPTHY: CPT | Performed by: INTERNAL MEDICINE

## 2020-06-25 PROCEDURE — G8427 DOCREV CUR MEDS BY ELIG CLIN: HCPCS | Performed by: INTERNAL MEDICINE

## 2020-06-25 RX ORDER — PIOGLITAZONEHYDROCHLORIDE 15 MG/1
15 TABLET ORAL DAILY
Qty: 30 TABLET | Refills: 5 | Status: SHIPPED | OUTPATIENT
Start: 2020-06-25 | End: 2020-12-30

## 2020-06-25 RX ORDER — METHYLPHENIDATE HYDROCHLORIDE 54 MG/1
54 TABLET ORAL EVERY MORNING
COMMUNITY

## 2020-06-25 RX ORDER — CHOLECALCIFEROL (VITAMIN D3) 125 MCG
5 CAPSULE ORAL DAILY
COMMUNITY

## 2020-06-25 RX ORDER — SUCRALFATE 1 G/1
1 TABLET ORAL DAILY
COMMUNITY

## 2020-06-25 RX ORDER — CANAGLIFLOZIN 100 MG/1
100 TABLET, FILM COATED ORAL
Qty: 30 TABLET | Refills: 5 | Status: SHIPPED
Start: 2020-06-25 | End: 2020-11-12 | Stop reason: ALTCHOICE

## 2020-06-25 ASSESSMENT — ENCOUNTER SYMPTOMS
ALLERGIC/IMMUNOLOGIC NEGATIVE: 1
RESPIRATORY NEGATIVE: 1
ABDOMINAL PAIN: 1

## 2020-06-25 NOTE — ASSESSMENT & PLAN NOTE
Sugars and diet not as good. Wt is up 13 lbs. No c/o w/ meds but not taking Pioglitazone and Invokana. Last eye check > 1 yr w/ appt tomorrow.

## 2020-06-25 NOTE — PROGRESS NOTES
is not in acute distress. Appearance: Normal appearance. She is well-developed. She is obese. She is not ill-appearing, toxic-appearing or diaphoretic. HENT:      Head: Normocephalic and atraumatic. Eyes:      Extraocular Movements: Extraocular movements intact. Conjunctiva/sclera: Conjunctivae normal.      Pupils: Pupils are equal, round, and reactive to light. Neck:      Musculoskeletal: Full passive range of motion without pain, normal range of motion and neck supple. Normal range of motion. No edema, erythema, neck rigidity, spinous process tenderness or muscular tenderness. Thyroid: No thyroid mass or thyromegaly. Vascular: Normal carotid pulses. No carotid bruit, hepatojugular reflux or JVD. Trachea: Trachea normal. No tracheal deviation. Cardiovascular:      Rate and Rhythm: Normal rate and regular rhythm. No extrasystoles are present. Chest Wall: PMI is not displaced. Pulses: Normal pulses. No decreased pulses. Carotid pulses are 2+ on the right side and 2+ on the left side. Radial pulses are 2+ on the right side and 2+ on the left side. Femoral pulses are 2+ on the right side and 2+ on the left side. Popliteal pulses are 2+ on the right side and 2+ on the left side. Dorsalis pedis pulses are 2+ on the right side and 2+ on the left side. Posterior tibial pulses are 2+ on the right side and 2+ on the left side. Heart sounds: Normal heart sounds, S1 normal and S2 normal. Heart sounds not distant. No murmur. No friction rub. No gallop. No S3 or S4 sounds. Pulmonary:      Effort: Pulmonary effort is normal. No tachypnea, bradypnea, accessory muscle usage or respiratory distress. Breath sounds: Normal breath sounds. No decreased breath sounds, wheezing, rhonchi or rales. Chest:      Chest wall: No tenderness. Musculoskeletal: Normal range of motion. General: Tenderness (OA knees) present.  No swelling,

## 2020-06-26 LAB
ALBUMIN SERPL-MCNC: 4.9 G/DL (ref 3.4–5)
ALP BLD-CCNC: 114 U/L (ref 40–129)
ALT SERPL-CCNC: 26 U/L (ref 10–40)
ANION GAP SERPL CALCULATED.3IONS-SCNC: 11 MMOL/L (ref 3–16)
AST SERPL-CCNC: 22 U/L (ref 15–37)
BILIRUB SERPL-MCNC: 0.3 MG/DL (ref 0–1)
BILIRUBIN DIRECT: <0.2 MG/DL (ref 0–0.3)
BILIRUBIN, INDIRECT: NORMAL MG/DL (ref 0–1)
BUN BLDV-MCNC: 17 MG/DL (ref 7–20)
CALCIUM SERPL-MCNC: 10.4 MG/DL (ref 8.3–10.6)
CHLORIDE BLD-SCNC: 96 MMOL/L (ref 99–110)
CHOLESTEROL, TOTAL: 175 MG/DL (ref 0–199)
CO2: 26 MMOL/L (ref 21–32)
CREAT SERPL-MCNC: 1.1 MG/DL (ref 0.6–1.1)
ESTIMATED AVERAGE GLUCOSE: 171.4 MG/DL
GFR AFRICAN AMERICAN: >60
GFR NON-AFRICAN AMERICAN: 51
GLUCOSE BLD-MCNC: 202 MG/DL (ref 70–99)
HBA1C MFR BLD: 7.6 %
HDLC SERPL-MCNC: 66 MG/DL (ref 40–60)
LDL CHOLESTEROL CALCULATED: 90 MG/DL
POTASSIUM SERPL-SCNC: 4.5 MMOL/L (ref 3.5–5.1)
SODIUM BLD-SCNC: 133 MMOL/L (ref 136–145)
TOTAL PROTEIN: 7.1 G/DL (ref 6.4–8.2)
TRIGL SERPL-MCNC: 97 MG/DL (ref 0–150)
VLDLC SERPL CALC-MCNC: 19 MG/DL

## 2020-06-29 ENCOUNTER — TELEPHONE (OUTPATIENT)
Dept: FAMILY MEDICINE CLINIC | Age: 60
End: 2020-06-29

## 2020-07-30 ENCOUNTER — OFFICE VISIT (OUTPATIENT)
Dept: FAMILY MEDICINE CLINIC | Age: 60
End: 2020-07-30
Payer: COMMERCIAL

## 2020-07-30 ENCOUNTER — PATIENT MESSAGE (OUTPATIENT)
Dept: FAMILY MEDICINE CLINIC | Age: 60
End: 2020-07-30

## 2020-07-30 VITALS
BODY MASS INDEX: 33.95 KG/M2 | HEART RATE: 114 BPM | DIASTOLIC BLOOD PRESSURE: 80 MMHG | TEMPERATURE: 97.5 F | RESPIRATION RATE: 16 BRPM | OXYGEN SATURATION: 98 % | SYSTOLIC BLOOD PRESSURE: 122 MMHG | HEIGHT: 65 IN | WEIGHT: 203.8 LBS

## 2020-07-30 PROBLEM — M54.41 RIGHT-SIDED LOW BACK PAIN WITH RIGHT-SIDED SCIATICA: Status: ACTIVE | Noted: 2020-07-30

## 2020-07-30 PROCEDURE — 1036F TOBACCO NON-USER: CPT | Performed by: INTERNAL MEDICINE

## 2020-07-30 PROCEDURE — 90715 TDAP VACCINE 7 YRS/> IM: CPT | Performed by: INTERNAL MEDICINE

## 2020-07-30 PROCEDURE — G8428 CUR MEDS NOT DOCUMENT: HCPCS | Performed by: INTERNAL MEDICINE

## 2020-07-30 PROCEDURE — 99213 OFFICE O/P EST LOW 20 MIN: CPT | Performed by: INTERNAL MEDICINE

## 2020-07-30 PROCEDURE — G8417 CALC BMI ABV UP PARAM F/U: HCPCS | Performed by: INTERNAL MEDICINE

## 2020-07-30 PROCEDURE — 3017F COLORECTAL CA SCREEN DOC REV: CPT | Performed by: INTERNAL MEDICINE

## 2020-07-30 PROCEDURE — 90471 IMMUNIZATION ADMIN: CPT | Performed by: INTERNAL MEDICINE

## 2020-07-30 RX ORDER — CYCLOBENZAPRINE HCL 10 MG
10 TABLET ORAL NIGHTLY PRN
Qty: 10 TABLET | Refills: 0 | Status: SHIPPED | OUTPATIENT
Start: 2020-07-30 | End: 2020-08-09

## 2020-07-30 ASSESSMENT — ENCOUNTER SYMPTOMS
RESPIRATORY NEGATIVE: 1
GASTROINTESTINAL NEGATIVE: 1
BACK PAIN: 1

## 2020-07-30 NOTE — PATIENT INSTRUCTIONS
All above problems reviewed and the found to be unchanged except for the following :     Right-Sided Low Back Pain With Right-Sided Sciatica  - Will begin Flexeril three times a day as needed. - Aleve 2 pills twice daily w/ food for 2 weeks if tolerated. - Ice 15 min and heat for 30 min.

## 2020-07-30 NOTE — ASSESSMENT & PLAN NOTE
Acute pain down R leg from buttock and lower back after gettiing out of car. Very sharp electrical pain now followed by dull ache w/ increased pain w/ movement.

## 2020-07-30 NOTE — PROGRESS NOTES
TABLET BY MOUTH EVERY MORNING BEFORE BREAKFAST (Patient not taking: Reported on 7/30/2020) 30 tablet 4    blood glucose monitor strips Test once dialy for diabetes e11.9 100 strip 5    Blood Glucose Monitoring Suppl (ONE TOUCH ULTRA 2) w/Device KIT 1 kit by Does not apply route daily 1 kit 0    ondansetron (ZOFRAN) 4 MG tablet Take 4 mg by mouth every 8 hours as needed for Nausea or Vomiting      blood glucose test strips (ASCENSIA AUTODISC VI;ONE TOUCH ULTRA TEST VI) strip Patient has One Touch Ultra 2, tests daily 100 strip 5    Lancets MISC Test once daily for diabetes e11.9 100 each 3    Blood Glucose Monitoring Suppl CONCETTA Test once daily for diabetes e11.9 1 Device 0    traZODone (DESYREL) 100 MG tablet Take 200 mg by mouth nightly        No current facility-administered medications for this visit. Allergies   Allergen Reactions    Morphine Hives     Social History     Tobacco Use    Smoking status: Never Smoker    Smokeless tobacco: Never Used   Substance Use Topics    Alcohol use: No     Family History   Problem Relation Age of Onset    Diabetes Mother     Arthritis Mother     Diabetes Father     Heart Disease Father     Cancer Father         colon    Dementia Father        Review of Systems   Constitutional: Positive for fatigue. Respiratory: Negative. Cardiovascular: Negative. Gastrointestinal: Negative. Endocrine: Negative. Genitourinary: Negative. Musculoskeletal: Positive for arthralgias, back pain, gait problem and myalgias. Skin: Negative. Neurological: Positive for weakness. Objective:   Physical Exam  Constitutional:       General: She is not in acute distress. Appearance: Normal appearance. She is well-developed. She is obese. She is not ill-appearing, toxic-appearing or diaphoretic. HENT:      Head: Normocephalic and atraumatic. Eyes:      Conjunctiva/sclera: Conjunctivae normal.      Pupils: Pupils are equal, round, and reactive to light.

## 2020-07-31 NOTE — TELEPHONE ENCOUNTER
From: Shania Garcia  To: George Peng MD  Sent: 7/30/2020 6:03 PM EDT  Subject: Visit Earlis Angry    Dr. Richelle Mckeon,    I talked with [facility] today. I found out they do not have a formulary list for Invokana. The representative told me that both [place] or Mearl Specter are preferred. A 90 day supply of medications would be a $600.00 copay for me. Can you please contact [facility] and order one of these meds for me a 90 day supply and refills. The number is [phone] option one for a verbal request and option two for a fax. They said it would take a week for me to receive my medications by mail once ordered. Thank You for your help.   Timothy Crump

## 2020-08-03 RX ORDER — LISINOPRIL 10 MG/1
TABLET ORAL
Qty: 30 TABLET | Refills: 2 | Status: SHIPPED | OUTPATIENT
Start: 2020-08-03 | End: 2020-11-05

## 2020-08-03 NOTE — TELEPHONE ENCOUNTER
Refill Request LISINOPRIL 10 MG TABLET, UU     Last Seen: 7/30/2020    Last Written: 4/3/20 30 tablets with 3 refills    Next Appointment:   Future Appointments   Date Time Provider Melecio Cerrato   11/12/2020  9:00 AM JILL Olmedo MD [facility]             Requested Prescriptions     Pending Prescriptions Disp Refills    lisinopril (PRINIVIL;ZESTRIL) 10 MG tablet [Pharmacy Med Name: LISINOPRIL 10 MG TABLET, UU] 30 tablet 2     Sig: TAKE ONE TABLET BY MOUTH DAILY

## 2020-08-12 ENCOUNTER — OFFICE VISIT (OUTPATIENT)
Dept: PRIMARY CARE CLINIC | Age: 60
End: 2020-08-12
Payer: COMMERCIAL

## 2020-08-12 PROCEDURE — G8428 CUR MEDS NOT DOCUMENT: HCPCS | Performed by: NURSE PRACTITIONER

## 2020-08-12 PROCEDURE — G8417 CALC BMI ABV UP PARAM F/U: HCPCS | Performed by: NURSE PRACTITIONER

## 2020-08-12 PROCEDURE — 99211 OFF/OP EST MAY X REQ PHY/QHP: CPT | Performed by: NURSE PRACTITIONER

## 2020-08-12 NOTE — PROGRESS NOTES
Vahe Vega received a viral test for COVID-19. They were educated on isolation and quarantine as appropriate. For any symptoms, they were directed to seek care from their PCP, given contact information to establish with a doctor, directed to an urgent care or the emergency room.

## 2020-08-12 NOTE — PATIENT INSTRUCTIONS

## 2020-08-14 LAB
SARS-COV-2: NOT DETECTED
SOURCE: NORMAL

## 2020-11-05 RX ORDER — LISINOPRIL 10 MG/1
TABLET ORAL
Qty: 30 TABLET | Refills: 5 | Status: SHIPPED | OUTPATIENT
Start: 2020-11-05 | End: 2021-05-05

## 2020-11-05 RX ORDER — ATORVASTATIN CALCIUM 10 MG/1
TABLET, FILM COATED ORAL
Qty: 30 TABLET | Refills: 4 | Status: SHIPPED | OUTPATIENT
Start: 2020-11-05 | End: 2021-04-05

## 2020-11-12 ENCOUNTER — OFFICE VISIT (OUTPATIENT)
Dept: FAMILY MEDICINE CLINIC | Age: 60
End: 2020-11-12
Payer: COMMERCIAL

## 2020-11-12 VITALS
RESPIRATION RATE: 16 BRPM | OXYGEN SATURATION: 99 % | SYSTOLIC BLOOD PRESSURE: 122 MMHG | DIASTOLIC BLOOD PRESSURE: 78 MMHG | HEART RATE: 93 BPM | HEIGHT: 65 IN | BODY MASS INDEX: 33.49 KG/M2 | TEMPERATURE: 96.9 F | WEIGHT: 201 LBS

## 2020-11-12 LAB
ALBUMIN SERPL-MCNC: 4.8 G/DL (ref 3.4–5)
ALP BLD-CCNC: 128 U/L (ref 40–129)
ALT SERPL-CCNC: 22 U/L (ref 10–40)
ANION GAP SERPL CALCULATED.3IONS-SCNC: 10 MMOL/L (ref 3–16)
AST SERPL-CCNC: 17 U/L (ref 15–37)
BILIRUB SERPL-MCNC: 0.5 MG/DL (ref 0–1)
BILIRUBIN DIRECT: <0.2 MG/DL (ref 0–0.3)
BILIRUBIN, INDIRECT: NORMAL MG/DL (ref 0–1)
BUN BLDV-MCNC: 12 MG/DL (ref 7–20)
CALCIUM SERPL-MCNC: 10.1 MG/DL (ref 8.3–10.6)
CHLORIDE BLD-SCNC: 100 MMOL/L (ref 99–110)
CHOLESTEROL, TOTAL: 179 MG/DL (ref 0–199)
CO2: 28 MMOL/L (ref 21–32)
CREAT SERPL-MCNC: 0.9 MG/DL (ref 0.6–1.1)
CREATININE URINE: 145.8 MG/DL (ref 28–259)
GFR AFRICAN AMERICAN: >60
GFR NON-AFRICAN AMERICAN: >60
GLUCOSE BLD-MCNC: 197 MG/DL (ref 70–99)
HCT VFR BLD CALC: 47.1 % (ref 36–48)
HDLC SERPL-MCNC: 73 MG/DL (ref 40–60)
HEMOGLOBIN: 15.4 G/DL (ref 12–16)
LDL CHOLESTEROL CALCULATED: 86 MG/DL
MCH RBC QN AUTO: 30.7 PG (ref 26–34)
MCHC RBC AUTO-ENTMCNC: 32.7 G/DL (ref 31–36)
MCV RBC AUTO: 93.8 FL (ref 80–100)
MICROALBUMIN UR-MCNC: <1.2 MG/DL
MICROALBUMIN/CREAT UR-RTO: NORMAL MG/G (ref 0–30)
PDW BLD-RTO: 13.5 % (ref 12.4–15.4)
PHOSPHORUS: 3.1 MG/DL (ref 2.5–4.9)
PLATELET # BLD: 235 K/UL (ref 135–450)
PMV BLD AUTO: 9.4 FL (ref 5–10.5)
POTASSIUM SERPL-SCNC: 4.4 MMOL/L (ref 3.5–5.1)
RBC # BLD: 5.02 M/UL (ref 4–5.2)
SODIUM BLD-SCNC: 138 MMOL/L (ref 136–145)
TOTAL PROTEIN: 7.1 G/DL (ref 6.4–8.2)
TRIGL SERPL-MCNC: 100 MG/DL (ref 0–150)
TSH SERPL DL<=0.05 MIU/L-ACNC: 1.56 UIU/ML (ref 0.27–4.2)
VLDLC SERPL CALC-MCNC: 20 MG/DL
WBC # BLD: 8.6 K/UL (ref 4–11)

## 2020-11-12 PROCEDURE — 90686 IIV4 VACC NO PRSV 0.5 ML IM: CPT | Performed by: INTERNAL MEDICINE

## 2020-11-12 PROCEDURE — 90471 IMMUNIZATION ADMIN: CPT | Performed by: INTERNAL MEDICINE

## 2020-11-12 PROCEDURE — G8482 FLU IMMUNIZE ORDER/ADMIN: HCPCS | Performed by: INTERNAL MEDICINE

## 2020-11-12 PROCEDURE — 99396 PREV VISIT EST AGE 40-64: CPT | Performed by: INTERNAL MEDICINE

## 2020-11-12 PROCEDURE — 90472 IMMUNIZATION ADMIN EACH ADD: CPT | Performed by: INTERNAL MEDICINE

## 2020-11-12 PROCEDURE — 36415 COLL VENOUS BLD VENIPUNCTURE: CPT | Performed by: INTERNAL MEDICINE

## 2020-11-12 PROCEDURE — 90750 HZV VACC RECOMBINANT IM: CPT | Performed by: INTERNAL MEDICINE

## 2020-11-12 ASSESSMENT — ENCOUNTER SYMPTOMS
ALLERGIC/IMMUNOLOGIC NEGATIVE: 1
RESPIRATORY NEGATIVE: 1
EYES NEGATIVE: 1
GASTROINTESTINAL NEGATIVE: 1

## 2020-11-12 NOTE — PROGRESS NOTES
2020    Camille Marshall (:  1960) is a 62 y.o. female, here for evaluation of the following medical concerns:    HPI  Annual physical exam  Mammo: 10/2020. Past due  Pap:  S/p hysterectomy oophorectomy. Colonoscopy: 10/2019. rechx 5 yrs(father w/ Colon Ca)  DEXA: 2018. rechx age 73  Eye: 2020. Hearing: decreased. appt w/ Audiology. Immunnization: Flu shot and Shingrix #1 today. MMSE: na  Get Up and Go: na  Tob: nonsmoker/never  ETOH: none  Caffeine: low  Cardiac Risk Assessment: on statin  Living will:  yes  Medical power of : yes    Essential hypertension  No change in meds, no c/o with meds, no chest pain, SOB, palpatations, or syncope. Home bp was: not taken. Type 2 diabetes mellitus without complication, with long-term current use of insulin (HCC)  Sugars and diet not as good. Wt is up 3 lbs. No c/o w/ meds and now taking Pioglitazone, Farxega, and Metformin. Last eye check 2020. Moderate episode of recurrent major depressive disorder ([address]  Seeing Therapist and Psychiatrist. No recent change. Stable w/ meds. Pure hypercholesterolemia  Stable diet and wt. No c/o w/ meds. Review of Systems   Constitutional: Positive for fatigue. HENT: Negative. Eyes: Negative. Respiratory: Negative. Cardiovascular: Negative. Gastrointestinal: Negative. Endocrine: Negative. Genitourinary: Negative. Musculoskeletal: Positive for arthralgias. Skin: Negative. Allergic/Immunologic: Negative. Neurological: Positive for weakness. Hematological: Negative. Psychiatric/Behavioral: Negative. Prior to Visit Medications    Medication Sig Taking?  Authorizing Provider   lisinopril (PRINIVIL;ZESTRIL) 10 MG tablet TAKE ONE TABLET BY MOUTH DAILY Yes JILL Dial MD   atorvastatin (LIPITOR) 10 MG tablet TAKE ONE TABLET BY MOUTH DAILY Yes JILL Dial MD   dapagliflozin (FARXIGA) 10 MG tablet Take 1 tablet by mouth every morning Yes JILL Greene mention of complication, not stated as uncontrolled     Vision impairment     wears glasses and contacts       Past Surgical History:   Procedure Laterality Date    APPENDECTOMY      CHOLECYSTECTOMY  2001    COLONOSCOPY  1/2004    Due 2014    CYSTOSCOPY  7/9/10    CYSTOSCOPY  11/18/10    insertion of sparc mesh sling with cystoscopy    JOINT REPLACEMENT      KNEE ARTHROSCOPY Right     KNEE ARTHROSCOPY Left 10/13    partial medial menisectomy    KNEE SURGERY      PLANTAR FASCIA SURGERY      bilateral    BRANDI AND BSO  4/10    DUB    TOTAL KNEE ARTHROPLASTY Right 3/29/2019    RIGHT TOTAL KNEE MAKOPLASTY REPLACEMENT WITH ADDUCTOR CANAL BLOCK FOR PAIN CONTROL                 JOE HAKAN  CPT CODE - 76419  performed by Karon Garcia MD at [place] [address] History     Socioeconomic History    Marital status:      Spouse name: Not on file    Number of children: Not on file    Years of education: Not on file    Highest education level: Not on file   Occupational History    Not on file   Social Needs    Financial resource strain: Not on file    Food insecurity     Worry: Not on file     Inability: Not on file    Transportation needs     Medical: Not on file     Non-medical: Not on file   Tobacco Use    Smoking status: Never Smoker    Smokeless tobacco: Never Used   Substance and Sexual Activity    Alcohol use: No    Drug use: No    Sexual activity: Not on file   Lifestyle    Physical activity     Days per week: Not on file     Minutes per session: Not on file    Stress: Not on file   Relationships    Social connections     Talks on phone: Not on file     Gets together: Not on file     Attends Buddhism service: Not on file     Active member of club or organization: Not on file     Attends meetings of clubs or organizations: Not on file     Relationship status: Not on file    Intimate partner violence     Fear of current or ex partner: Not on file     Emotionally abused: Not on file Physically abused: Not on file     Forced sexual activity: Not on file   Other Topics Concern    Not on file   Social History Narrative    Not on file        Family History   Problem Relation Age of Onset    Diabetes Mother     Arthritis Mother     Diabetes Father     Heart Disease Father     Cancer Father         colon    Dementia Father        Vitals:    11/12/20 0856 11/12/20 0940   BP: 130/83 122/78   Pulse: 93    Resp: 16    Temp: 96.9 °F (36.1 °C)    SpO2: 99%    Weight: 201 lb (91.2 kg)    Height: 5' 5\" (1.651 m)      Estimated body mass index is 33.45 kg/m² as calculated from the following:    Height as of this encounter: 5' 5\" (1.651 m). Weight as of this encounter: 201 lb (91.2 kg). Physical Exam  Constitutional:       General: She is not in acute distress. Appearance: Normal appearance. She is well-developed. She is obese. She is not ill-appearing, toxic-appearing or diaphoretic. HENT:      Head: Normocephalic and atraumatic. Right Ear: Hearing, tympanic membrane, ear canal and external ear normal. There is no impacted cerumen. Left Ear: Hearing, tympanic membrane, ear canal and external ear normal. There is no impacted cerumen. Nose: Nose normal. No congestion or rhinorrhea. Right Sinus: No maxillary sinus tenderness or frontal sinus tenderness. Left Sinus: No maxillary sinus tenderness or frontal sinus tenderness. Mouth/Throat:      Mouth: Mucous membranes are moist.      Pharynx: Uvula midline. No oropharyngeal exudate or posterior oropharyngeal erythema. Eyes:      General: Lids are normal. No scleral icterus. Right eye: No discharge. Left eye: No discharge. Extraocular Movements: Extraocular movements intact. Conjunctiva/sclera: Conjunctivae normal.      Pupils: Pupils are equal, round, and reactive to light. Funduscopic exam:     Right eye: No hemorrhage, exudate, AV nicking, arteriolar narrowing or papilledema. Left eye: No hemorrhage, exudate, AV nicking, arteriolar narrowing or papilledema. Neck:      Musculoskeletal: Full passive range of motion without pain, normal range of motion and neck supple. No neck rigidity or muscular tenderness. Thyroid: No thyromegaly. Vascular: Normal carotid pulses. No carotid bruit, hepatojugular reflux or JVD. Trachea: Trachea and phonation normal. No tracheal deviation. Cardiovascular:      Rate and Rhythm: Normal rate and regular rhythm. No extrasystoles are present. Chest Wall: PMI is not displaced. Pulses: Normal pulses. No decreased pulses. Carotid pulses are 2+ on the right side and 2+ on the left side. Radial pulses are 2+ on the right side and 2+ on the left side. Femoral pulses are 2+ on the right side and 2+ on the left side. Popliteal pulses are 2+ on the right side and 2+ on the left side. Dorsalis pedis pulses are 2+ on the right side and 2+ on the left side. Posterior tibial pulses are 2+ on the right side and 2+ on the left side. Heart sounds: Normal heart sounds. No murmur. No friction rub. No gallop. Pulmonary:      Effort: Pulmonary effort is normal. No respiratory distress. Breath sounds: Normal breath sounds. No stridor. No decreased breath sounds, wheezing, rhonchi or rales. Comments: No Breast exam per pt. Chest:      Chest wall: No tenderness. Abdominal:      General: Bowel sounds are normal. There is no distension or abdominal bruit. Palpations: Abdomen is soft. There is no shifting dullness, fluid wave, mass or pulsatile mass. Tenderness: There is no abdominal tenderness. There is no right CVA tenderness, left CVA tenderness, guarding or rebound. Hernia: No hernia is present. There is no hernia in the ventral area. Genitourinary:     Exam position: Supine. Comments: NE  Musculoskeletal: Normal range of motion.          General: Tenderness (s/p R TKR, OA L knee.) present. No swelling, deformity or signs of injury. Right lower leg: No edema. Left lower leg: No edema. Lymphadenopathy:      Head:      Right side of head: No submental, submandibular, tonsillar, preauricular, posterior auricular or occipital adenopathy. Left side of head: No submental, submandibular, tonsillar, preauricular, posterior auricular or occipital adenopathy. Cervical: No cervical adenopathy. Upper Body:      Right upper body: No supraclavicular or epitrochlear adenopathy. Left upper body: No supraclavicular or epitrochlear adenopathy. Skin:     General: Skin is warm and dry. Capillary Refill: Capillary refill takes less than 2 seconds. Coloration: Skin is not jaundiced or pale. Findings: No abrasion, bruising, erythema, lesion or rash. Nails: There is no clubbing. Neurological:      General: No focal deficit present. Mental Status: She is alert and oriented to person, place, and time. Mental status is at baseline. She is not disoriented. Cranial Nerves: No cranial nerve deficit. Sensory: No sensory deficit. Motor: No weakness, tremor, atrophy, abnormal muscle tone or seizure activity. Coordination: Coordination normal.      Gait: Gait normal.      Deep Tendon Reflexes: Reflexes are normal and symmetric. Reflexes normal. Babinski sign absent on the right side. Babinski sign absent on the left side. Reflex Scores:       Tricep reflexes are 2+ on the right side and 2+ on the left side. Bicep reflexes are 2+ on the right side and 2+ on the left side. Brachioradialis reflexes are 2+ on the right side and 2+ on the left side. Patellar reflexes are 2+ on the right side and 2+ on the left side. Achilles reflexes are 2+ on the right side and 2+ on the left side. Psychiatric:         Mood and Affect: Mood is anxious and depressed.          Speech: Speech normal.         Behavior: Behavior normal.         Thought Content: Thought content normal.         Judgment: Judgment normal.      Comments: Depression better controlled. ASSESSMENT/PLAN:  1. Annual physical exam  Flu and Shingrix #1 given today. Shingrix #2 in 2-6 months. Must do Mammogram in the next few weeks. 2. Essential hypertension  Continue present meds. Home BP checks. Call if>140/90. Improve diet. Avoid caffeine and salt. Call if new c/o w/ meds. 3. Type 2 diabetes mellitus without complication, with long-term current use of insulin ([facility] [address]  Will do labs and adjust meds after results are evaluated. To continue to improve diet and lose weight. To follow Diabetic diet. If needs further help w/ Diabetic diet to call and will refer back to dietician. Home sugar checks. To call if sudden change up or down in sugars. To do regular foot checks. Call if new problems. 4. Moderate episode of recurrent major depressive disorder (HCC)  Doing well w/ present meds. 5. Pure hypercholesterolemia  Will do labs and adjust meds if needed. To improve diet and exercise. stable weight. Call if need further assistance. Call if new c/o w/ meds. Next lab is due soon    RTSM 4 months. An  electronic signature was used to authenticate this note.     --MD Ko on 11/12/2020 at 9:29 AM

## 2020-11-12 NOTE — PROGRESS NOTES
Vaccine Information Sheet, \"Influenza - Inactivated\"  given to Ana Laura Sanchez, or parent/legal guardian of  Ana Laura Sanchez and verbalized understanding. Patient responses:    Have you ever had a reaction to a flu vaccine? No  Do you have any current illness? No  Have you ever had Guillian Oreland Syndrome? No  Do you have a serious allergy to any of the follow: Neomycin, Polymyxin, Thimerosal, eggs or egg products? No    Flu vaccine given per order. Please see immunization tab. Risks and benefits explained. Current VIS given.

## 2020-11-12 NOTE — ASSESSMENT & PLAN NOTE
Sugars and diet not as good. Wt is up 3 lbs. No c/o w/ meds and now taking Pioglitazone, Farxega, and Metformin. Last eye check 6/2020.

## 2020-11-12 NOTE — ASSESSMENT & PLAN NOTE
Mammo: 10/2020. Past due  Pap:  S/p hysterectomy oophorectomy. Colonoscopy: 10/2019. rechx 5 yrs(father w/ Colon Ca)  DEXA: 8/2018. rechx age 73  Eye: 6/26/2020. Hearing: decreased. appt w/ Audiology. Immunnization: Flu shot and Shingrix #1 today.    MMSE: na  Get Up and Go: na  Tob: nonsmoker/never  ETOH: none  Caffeine: low  Cardiac Risk Assessment: on statin  Living will:  yes  Medical power of : yes

## 2020-11-13 LAB
ESTIMATED AVERAGE GLUCOSE: 154.2 MG/DL
HBA1C MFR BLD: 7 %

## 2020-12-08 ENCOUNTER — HOSPITAL ENCOUNTER (OUTPATIENT)
Dept: MRI IMAGING | Age: 60
Discharge: HOME | End: 2020-12-08
Payer: COMMERCIAL

## 2020-12-08 PROCEDURE — 77049 MRI BREAST C-+ W/CAD BI: CPT

## 2020-12-08 PROCEDURE — 6360000004 HC RX CONTRAST MEDICATION: Performed by: SURGERY

## 2020-12-08 PROCEDURE — A9579 GAD-BASE MR CONTRAST NOS,1ML: HCPCS | Performed by: SURGERY

## 2020-12-08 RX ADMIN — GADOTERIDOL 18 ML: 279.3 INJECTION, SOLUTION INTRAVENOUS at 11:55

## 2020-12-12 PROBLEM — Z00.00 ANNUAL PHYSICAL EXAM: Status: RESOLVED | Noted: 2018-07-10 | Resolved: 2020-12-12

## 2020-12-18 ENCOUNTER — OFFICE VISIT (OUTPATIENT)
Dept: SURGERY | Age: 60
End: 2020-12-18
Payer: COMMERCIAL

## 2020-12-18 ENCOUNTER — HOSPITAL ENCOUNTER (OUTPATIENT)
Dept: MAMMOGRAPHY | Age: 60
Discharge: HOME | End: 2020-12-18
Payer: COMMERCIAL

## 2020-12-18 VITALS
HEIGHT: 66 IN | TEMPERATURE: 97.7 F | SYSTOLIC BLOOD PRESSURE: 113 MMHG | DIASTOLIC BLOOD PRESSURE: 71 MMHG | WEIGHT: 205.4 LBS | BODY MASS INDEX: 33.01 KG/M2

## 2020-12-18 PROCEDURE — G8482 FLU IMMUNIZE ORDER/ADMIN: HCPCS | Performed by: SURGERY

## 2020-12-18 PROCEDURE — 3017F COLORECTAL CA SCREEN DOC REV: CPT | Performed by: SURGERY

## 2020-12-18 PROCEDURE — 1036F TOBACCO NON-USER: CPT | Performed by: SURGERY

## 2020-12-18 PROCEDURE — 77067 SCR MAMMO BI INCL CAD: CPT

## 2020-12-18 PROCEDURE — G8417 CALC BMI ABV UP PARAM F/U: HCPCS | Performed by: SURGERY

## 2020-12-18 PROCEDURE — 99213 OFFICE O/P EST LOW 20 MIN: CPT | Performed by: SURGERY

## 2020-12-18 PROCEDURE — G8427 DOCREV CUR MEDS BY ELIG CLIN: HCPCS | Performed by: SURGERY

## 2020-12-18 NOTE — PATIENT INSTRUCTIONS
MRI  results were discussed with patient today in office  Breast exam was unremarkable for any palpable masses  Continue with monthly self breast exams  Mammogram ordered today  MRI in a year and a half  Follow up in 6 months with CNP    Healthy Lifestyle Recommendations: healthy diet (decrease consumption of red meat, increase fresh fruits and vegetables), decreased alcohol consumption (less than 4 drinks/week), adequate sleep (goal 6-8 hours), routine exercise (goal 150 minutes/week or greater), weight control.

## 2020-12-18 NOTE — PROGRESS NOTES
1 tablet by mouth every morning 90 tablet 1    lisinopril (PRINIVIL;ZESTRIL) 10 MG tablet TAKE ONE TABLET BY MOUTH DAILY 30 tablet 5    atorvastatin (LIPITOR) 10 MG tablet TAKE ONE TABLET BY MOUTH DAILY 30 tablet 4    melatonin 5 MG TABS tablet Take 5 mg by mouth daily      methylphenidate (CONCERTA) 54 MG extended release tablet Take 54 mg by mouth every morning.  sucralfate (CARAFATE) 1 GM tablet Take 1 g by mouth 4 times daily      pioglitazone (ACTOS) 15 MG tablet Take 1 tablet by mouth daily 30 tablet 5    metFORMIN (GLUCOPHAGE) 1000 MG tablet Take 1 tablet by mouth daily (with breakfast) 30 tablet 5    blood glucose monitor strips Test once dialy for diabetes e11.9 100 strip 5    Blood Glucose Monitoring Suppl (ONE TOUCH ULTRA 2) w/Device KIT 1 kit by Does not apply route daily 1 kit 0    ondansetron (ZOFRAN) 4 MG tablet Take 4 mg by mouth every 8 hours as needed for Nausea or Vomiting      lamoTRIgine (LAMICTAL) 200 MG tablet Take 200 mg by mouth daily      blood glucose test strips (ASCENSIA AUTODISC VI;ONE TOUCH ULTRA TEST VI) strip Patient has One Touch Ultra 2, tests daily 100 strip 5    Lancets MISC Test once daily for diabetes e11.9 100 each 3    Blood Glucose Monitoring Suppl CONCETTA Test once daily for diabetes e11.9 1 Device 0    traZODone (DESYREL) 100 MG tablet Take 200 mg by mouth nightly       escitalopram (LEXAPRO) 20 MG tablet Take 10 mg by mouth daily        No current facility-administered medications for this visit.         Social History     Socioeconomic History    Marital status:      Spouse name: Not on file    Number of children: Not on file    Years of education: Not on file    Highest education level: Not on file   Occupational History    Not on file   Social Needs    Financial resource strain: Not on file    Food insecurity     Worry: Not on file     Inability: Not on file    Transportation needs     Medical: Not on file     Non-medical: Not on file Tobacco Use    Smoking status: Never Smoker    Smokeless tobacco: Never Used   Substance and Sexual Activity    Alcohol use: No    Drug use: No    Sexual activity: Not on file   Lifestyle    Physical activity     Days per week: Not on file     Minutes per session: Not on file    Stress: Not on file   Relationships    Social connections     Talks on phone: Not on file     Gets together: Not on file     Attends Voodoo service: Not on file     Active member of club or organization: Not on file     Attends meetings of clubs or organizations: Not on file     Relationship status: Not on file    Intimate partner violence     Fear of current or ex partner: Not on file     Emotionally abused: Not on file     Physically abused: Not on file     Forced sexual activity: Not on file   Other Topics Concern    Not on file   Social History Narrative    Not on file       ROS  Constitutional: no weight loss, fever, night sweats   Skin: negative  Cardiovascular: no chest pain or palpitations   Pulmonary: No cough, sputum, or hemoptysis   GI:No abdominal pain  Breast: see above  All other systems were reviewed and are negative    Objective:   Physical Exam  Vitals signs reviewed. Exam conducted with a chaperone present. Constitutional:       General: She is not in acute distress. Appearance: Normal appearance. She is well-developed. She is not diaphoretic. HENT:      Head: Normocephalic and atraumatic. Nose: Nose normal.      Mouth/Throat:      Mouth: Mucous membranes are moist.      Pharynx: Oropharynx is clear. Neck:      Musculoskeletal: Normal range of motion. No muscular tenderness. Trachea: No tracheal deviation. Cardiovascular:      Rate and Rhythm: Normal rate. Pulmonary:      Effort: Pulmonary effort is normal. No respiratory distress. Breath sounds: No wheezing. Abdominal:      General: There is no distension. Palpations: Abdomen is soft.    Musculoskeletal: Normal range of motion. General: No tenderness. Lymphadenopathy:      Cervical: No cervical adenopathy. Upper Body:      Right upper body: No axillary adenopathy. Left upper body: No axillary adenopathy. Skin:     General: Skin is warm and dry. Coloration: Skin is not pale. Findings: No erythema or rash. Neurological:      Mental Status: She is alert and oriented to person, place, and time. Cranial Nerves: No cranial nerve deficit. Coordination: Coordination normal.   Psychiatric:         Behavior: Behavior normal.         Thought Content: Thought content normal.         Judgment: Judgment normal.     left breast - 14 mm simple cyst at 3:00 o'clock, 4.5 cm from the nipple, no other masses, no nipple or slin changes  Right breast - no masses, no nipple or skin changes    Assessment:       Diagnosis Orders   1. At high risk for breast cancer     2. Family history of breast cancer             Plan:      Mammogram was ordered for today. At the present time, there is no concern for breast cancer. Healthy lifestyle modifications were reviewed with the patient. Continue monthly self breast exam, f/u with Javier parkinson in 6 months. Will plan on next MRI in 18 months.              Geovanna Dean MD

## 2020-12-22 ENCOUNTER — TELEPHONE (OUTPATIENT)
Dept: SURGERY | Age: 60
End: 2020-12-22

## 2021-02-01 RX ORDER — PIOGLITAZONEHYDROCHLORIDE 15 MG/1
TABLET ORAL
Qty: 90 TABLET | Refills: 0 | Status: SHIPPED | OUTPATIENT
Start: 2021-02-01 | End: 2021-02-11

## 2021-02-11 RX ORDER — PIOGLITAZONEHYDROCHLORIDE 15 MG/1
TABLET ORAL
Qty: 30 TABLET | Refills: 5 | Status: SHIPPED | OUTPATIENT
Start: 2021-02-11 | End: 2021-10-04

## 2021-03-12 ENCOUNTER — OFFICE VISIT (OUTPATIENT)
Dept: FAMILY MEDICINE CLINIC | Age: 61
End: 2021-03-12
Payer: COMMERCIAL

## 2021-03-12 VITALS
OXYGEN SATURATION: 98 % | WEIGHT: 203.2 LBS | HEIGHT: 66 IN | DIASTOLIC BLOOD PRESSURE: 82 MMHG | SYSTOLIC BLOOD PRESSURE: 122 MMHG | RESPIRATION RATE: 16 BRPM | BODY MASS INDEX: 32.66 KG/M2 | HEART RATE: 108 BPM | TEMPERATURE: 97.3 F

## 2021-03-12 DIAGNOSIS — E78.00 PURE HYPERCHOLESTEROLEMIA: ICD-10-CM

## 2021-03-12 DIAGNOSIS — I10 ESSENTIAL HYPERTENSION: ICD-10-CM

## 2021-03-12 DIAGNOSIS — F33.1 MODERATE EPISODE OF RECURRENT MAJOR DEPRESSIVE DISORDER (HCC): ICD-10-CM

## 2021-03-12 DIAGNOSIS — E11.9 TYPE 2 DIABETES MELLITUS WITHOUT COMPLICATION, WITH LONG-TERM CURRENT USE OF INSULIN (HCC): Primary | ICD-10-CM

## 2021-03-12 DIAGNOSIS — Z79.4 TYPE 2 DIABETES MELLITUS WITHOUT COMPLICATION, WITH LONG-TERM CURRENT USE OF INSULIN (HCC): Primary | ICD-10-CM

## 2021-03-12 LAB — HBA1C MFR BLD: 7.2 %

## 2021-03-12 PROCEDURE — 2022F DILAT RTA XM EVC RTNOPTHY: CPT | Performed by: INTERNAL MEDICINE

## 2021-03-12 PROCEDURE — 3051F HG A1C>EQUAL 7.0%<8.0%: CPT | Performed by: INTERNAL MEDICINE

## 2021-03-12 PROCEDURE — 99214 OFFICE O/P EST MOD 30 MIN: CPT | Performed by: INTERNAL MEDICINE

## 2021-03-12 PROCEDURE — 83036 HEMOGLOBIN GLYCOSYLATED A1C: CPT | Performed by: INTERNAL MEDICINE

## 2021-03-12 PROCEDURE — G8482 FLU IMMUNIZE ORDER/ADMIN: HCPCS | Performed by: INTERNAL MEDICINE

## 2021-03-12 PROCEDURE — 3017F COLORECTAL CA SCREEN DOC REV: CPT | Performed by: INTERNAL MEDICINE

## 2021-03-12 PROCEDURE — 1036F TOBACCO NON-USER: CPT | Performed by: INTERNAL MEDICINE

## 2021-03-12 PROCEDURE — G8427 DOCREV CUR MEDS BY ELIG CLIN: HCPCS | Performed by: INTERNAL MEDICINE

## 2021-03-12 PROCEDURE — G8417 CALC BMI ABV UP PARAM F/U: HCPCS | Performed by: INTERNAL MEDICINE

## 2021-03-12 RX ORDER — LAMOTRIGINE 25 MG/1
50 TABLET ORAL EVERY MORNING
COMMUNITY
Start: 2021-03-06 | End: 2021-10-19

## 2021-03-12 ASSESSMENT — ENCOUNTER SYMPTOMS
GASTROINTESTINAL NEGATIVE: 1
ALLERGIC/IMMUNOLOGIC NEGATIVE: 1
RESPIRATORY NEGATIVE: 1

## 2021-03-12 NOTE — ASSESSMENT & PLAN NOTE
Sees Psychiatrist and counselor regularly. Had increased Lamictal dose recently and doing some better. Sleep improved w/ Trazodone and Melatonin. Just restarted Concerta when went back to work.

## 2021-03-12 NOTE — ASSESSMENT & PLAN NOTE
Sugars are some lower(100-200). Wt is up 3 lbs. No c/o w/ meds and now taking Pioglitazone, Farxega, and Metformin. Last eye check 6/2020.

## 2021-03-12 NOTE — ASSESSMENT & PLAN NOTE
No change in meds, no c/o with meds, no chest pain, SOB, palpatations, or syncope. Home bp was: 120-130s/70-80s.

## 2021-03-12 NOTE — PROGRESS NOTES
Subjective:      Patient ID: Yue Salcedo is a 62 y.o. female. HPI  Type 2 diabetes mellitus without complication, with long-term current use of insulin ([facility])  Sugars are some lower(100-200). Wt is up 3 lbs. No c/o w/ meds and now taking Pioglitazone, Farxega, and Metformin. Last eye check 6/2020. Essential hypertension  No change in meds, no c/o with meds, no chest pain, SOB, palpatations, or syncope. Home bp was: 120-130s/70-80s. Pure hypercholesterolemia  Stable diet and wt. No c/o w/ meds. Moderate episode of recurrent major depressive disorder [facility])  Sees Psychiatrist and counselor regularly. Had increased Lamictal dose recently and doing some better. Sleep improved w/ Trazodone and Melatonin. Just restarted Concerta when went back to work. Review of Systems   Constitutional: Positive for fatigue. Respiratory: Negative. Cardiovascular: Negative. Gastrointestinal: Negative. Endocrine: Negative. Genitourinary: Negative. Musculoskeletal: Negative. Skin: Negative. Allergic/Immunologic: Negative. Neurological: Negative. Hematological: Negative. Psychiatric/Behavioral: Positive for dysphoric mood. The patient is nervous/anxious. Vitals:    03/12/21 0932 03/12/21 0957   BP: 122/76 122/82   Pulse: 108    Resp: 16    Temp: 97.3 °F (36.3 °C)    SpO2: 98%    Weight: 203 lb 3.2 oz (92.2 kg)    Height: 5' 5.5\" (1.664 m)      Objective:   Physical Exam  Constitutional:       General: She is not in acute distress. Appearance: Normal appearance. She is well-developed. She is obese. She is not ill-appearing, toxic-appearing or diaphoretic. HENT:      Head: Normocephalic and atraumatic. Eyes:      Extraocular Movements: Extraocular movements intact. Conjunctiva/sclera: Conjunctivae normal.      Pupils: Pupils are equal, round, and reactive to light. Neck:      Musculoskeletal: Full passive range of motion without pain, normal range of motion and neck supple.

## 2021-04-03 DIAGNOSIS — E78.00 PURE HYPERCHOLESTEROLEMIA: ICD-10-CM

## 2021-04-05 RX ORDER — ATORVASTATIN CALCIUM 10 MG/1
TABLET, FILM COATED ORAL
Qty: 30 TABLET | Refills: 5 | Status: SHIPPED | OUTPATIENT
Start: 2021-04-05 | End: 2021-10-04

## 2021-04-14 ENCOUNTER — TELEPHONE (OUTPATIENT)
Dept: FAMILY MEDICINE CLINIC | Age: 61
End: 2021-04-14

## 2021-05-05 RX ORDER — LISINOPRIL 10 MG/1
TABLET ORAL
Qty: 30 TABLET | Refills: 4 | Status: SHIPPED | OUTPATIENT
Start: 2021-05-05 | End: 2021-10-04

## 2021-06-21 NOTE — TELEPHONE ENCOUNTER
Destiny Mejia [phone] (home)    is requesting refill(s) of medication dapagliflozin (FARXIGA) 10 MG tablet to preferred pharmacy [address], [place] [address] 62 3/12/21 (pertaining to medication)   Last refill 11/12/20 (per medication requested)  Next office visit scheduled or attempted Yes  Date 9/21/21  If No, reason made.

## 2021-06-22 ENCOUNTER — NURSE ONLY (OUTPATIENT)
Dept: FAMILY MEDICINE CLINIC | Age: 61
End: 2021-06-22
Payer: COMMERCIAL

## 2021-06-22 DIAGNOSIS — Z23 NEED FOR SHINGLES VACCINE: Primary | ICD-10-CM

## 2021-06-22 PROCEDURE — 90750 HZV VACC RECOMBINANT IM: CPT | Performed by: INTERNAL MEDICINE

## 2021-06-22 PROCEDURE — 90471 IMMUNIZATION ADMIN: CPT | Performed by: INTERNAL MEDICINE

## 2021-08-09 NOTE — TELEPHONE ENCOUNTER
Dr. Eyal Chance is requesting the lab results of 3-12-21 and 11-12-20. I faxed to [phone]. Hydroxyzine Counseling: Patient advised that the medication is sedating and not to drive a car after taking this medication.  Patient informed of potential adverse effects including but not limited to dry mouth, urinary retention, and blurry vision.  The patient verbalized understanding of the proper use and possible adverse effects of hydroxyzine.  All of the patient's questions and concerns were addressed.

## 2021-08-10 ENCOUNTER — HOSPITAL ENCOUNTER (OUTPATIENT)
Age: 61
Discharge: HOME | End: 2021-08-10
Payer: COMMERCIAL

## 2021-08-10 ENCOUNTER — HOSPITAL ENCOUNTER (OUTPATIENT)
Dept: CT IMAGING | Age: 61
Discharge: HOME | End: 2021-08-10
Payer: COMMERCIAL

## 2021-08-10 DIAGNOSIS — R10.30 ABDOMINAL WALL PAIN IN SUPRAPUBIC REGION: ICD-10-CM

## 2021-08-10 LAB
ALBUMIN SERPL-MCNC: 4.6 G/DL (ref 3.4–5)
ANION GAP SERPL CALCULATED.3IONS-SCNC: 13 MMOL/L (ref 3–16)
BUN BLDV-MCNC: 17 MG/DL (ref 7–20)
CALCIUM SERPL-MCNC: 9.8 MG/DL (ref 8.3–10.6)
CHLORIDE BLD-SCNC: 98 MMOL/L (ref 99–110)
CO2: 26 MMOL/L (ref 21–32)
CREAT SERPL-MCNC: 1 MG/DL (ref 0.6–1.1)
GFR AFRICAN AMERICAN: >60
GFR NON-AFRICAN AMERICAN: 57
GLUCOSE BLD-MCNC: 224 MG/DL (ref 70–99)
PHOSPHORUS: 3.6 MG/DL (ref 2.5–4.9)
POTASSIUM SERPL-SCNC: 3.9 MMOL/L (ref 3.5–5.1)
SODIUM BLD-SCNC: 137 MMOL/L (ref 136–145)

## 2021-08-10 PROCEDURE — 74177 CT ABD & PELVIS W/CONTRAST: CPT

## 2021-08-10 PROCEDURE — 6360000004 HC RX CONTRAST MEDICATION: Performed by: NURSE PRACTITIONER

## 2021-08-10 PROCEDURE — 80069 RENAL FUNCTION PANEL: CPT

## 2021-08-10 PROCEDURE — 36415 COLL VENOUS BLD VENIPUNCTURE: CPT

## 2021-08-10 RX ADMIN — IOPAMIDOL 75 ML: 755 INJECTION, SOLUTION INTRAVENOUS at 17:19

## 2021-08-10 RX ADMIN — IOHEXOL 50 ML: 240 INJECTION, SOLUTION INTRATHECAL; INTRAVASCULAR; INTRAVENOUS; ORAL at 17:19

## 2021-10-04 DIAGNOSIS — E78.00 PURE HYPERCHOLESTEROLEMIA: ICD-10-CM

## 2021-10-04 RX ORDER — LISINOPRIL 10 MG/1
TABLET ORAL
Qty: 30 TABLET | Refills: 4 | Status: SHIPPED | OUTPATIENT
Start: 2021-10-04 | End: 2021-12-14

## 2021-10-04 RX ORDER — ATORVASTATIN CALCIUM 10 MG/1
TABLET, FILM COATED ORAL
Qty: 30 TABLET | Refills: 5 | Status: SHIPPED | OUTPATIENT
Start: 2021-10-04 | End: 2022-02-22 | Stop reason: SDUPTHER

## 2021-10-04 RX ORDER — PIOGLITAZONEHYDROCHLORIDE 15 MG/1
TABLET ORAL
Qty: 30 TABLET | Refills: 5 | Status: SHIPPED | OUTPATIENT
Start: 2021-10-04 | End: 2022-02-22 | Stop reason: SDUPTHER

## 2021-10-11 ENCOUNTER — PATIENT MESSAGE (OUTPATIENT)
Dept: FAMILY MEDICINE CLINIC | Age: 61
End: 2021-10-11

## 2021-10-11 NOTE — TELEPHONE ENCOUNTER
From: Ector Daniels  To: Shukri Odell MD  Sent: 10/11/2021 10:14 AM EDT  Subject: Prescription Question    Dr. Aleida Gloria,  I would like to request a refill of my Farxiga medication. Please ask staff to fax order to [facility] as completed in the past for 90 day supply.    Thank Felix Romo

## 2021-10-19 ENCOUNTER — TELEPHONE (OUTPATIENT)
Dept: FAMILY MEDICINE CLINIC | Age: 61
End: 2021-10-19

## 2021-10-19 ENCOUNTER — OFFICE VISIT (OUTPATIENT)
Dept: FAMILY MEDICINE CLINIC | Age: 61
End: 2021-10-19
Payer: COMMERCIAL

## 2021-10-19 VITALS
BODY MASS INDEX: 31.85 KG/M2 | RESPIRATION RATE: 16 BRPM | HEART RATE: 87 BPM | OXYGEN SATURATION: 98 % | DIASTOLIC BLOOD PRESSURE: 78 MMHG | WEIGHT: 198.2 LBS | SYSTOLIC BLOOD PRESSURE: 122 MMHG | HEIGHT: 66 IN

## 2021-10-19 DIAGNOSIS — Z79.4 TYPE 2 DIABETES MELLITUS WITHOUT COMPLICATION, WITH LONG-TERM CURRENT USE OF INSULIN (HCC): Primary | ICD-10-CM

## 2021-10-19 DIAGNOSIS — E11.9 TYPE 2 DIABETES MELLITUS WITHOUT COMPLICATION, WITH LONG-TERM CURRENT USE OF INSULIN (HCC): Primary | ICD-10-CM

## 2021-10-19 DIAGNOSIS — R53.82 CHRONIC FATIGUE: ICD-10-CM

## 2021-10-19 DIAGNOSIS — I10 ESSENTIAL HYPERTENSION: ICD-10-CM

## 2021-10-19 DIAGNOSIS — E78.00 PURE HYPERCHOLESTEROLEMIA: ICD-10-CM

## 2021-10-19 DIAGNOSIS — K57.92 DIVERTICULITIS: ICD-10-CM

## 2021-10-19 DIAGNOSIS — F33.1 MODERATE EPISODE OF RECURRENT MAJOR DEPRESSIVE DISORDER (HCC): ICD-10-CM

## 2021-10-19 PROBLEM — N17.9 PRERENAL ACUTE RENAL FAILURE (HCC): Status: RESOLVED | Noted: 2019-05-03 | Resolved: 2021-10-19

## 2021-10-19 LAB
ALBUMIN SERPL-MCNC: 4.7 G/DL (ref 3.4–5)
ALP BLD-CCNC: 102 U/L (ref 40–129)
ALT SERPL-CCNC: 34 U/L (ref 10–40)
ANION GAP SERPL CALCULATED.3IONS-SCNC: 15 MMOL/L (ref 3–16)
AST SERPL-CCNC: 24 U/L (ref 15–37)
BILIRUB SERPL-MCNC: 0.8 MG/DL (ref 0–1)
BILIRUBIN DIRECT: <0.2 MG/DL (ref 0–0.3)
BILIRUBIN, INDIRECT: NORMAL MG/DL (ref 0–1)
BUN BLDV-MCNC: 13 MG/DL (ref 7–20)
CALCIUM SERPL-MCNC: 10.2 MG/DL (ref 8.3–10.6)
CHLORIDE BLD-SCNC: 95 MMOL/L (ref 99–110)
CHOLESTEROL, TOTAL: 172 MG/DL (ref 0–199)
CO2: 26 MMOL/L (ref 21–32)
CREAT SERPL-MCNC: 1 MG/DL (ref 0.6–1.2)
GFR AFRICAN AMERICAN: >60
GFR NON-AFRICAN AMERICAN: 57
GLUCOSE BLD-MCNC: 158 MG/DL (ref 70–99)
HCT VFR BLD CALC: 44 % (ref 36–48)
HDLC SERPL-MCNC: 65 MG/DL (ref 40–60)
HEMOGLOBIN: 15 G/DL (ref 12–16)
LDL CHOLESTEROL CALCULATED: 90 MG/DL
MCH RBC QN AUTO: 31.4 PG (ref 26–34)
MCHC RBC AUTO-ENTMCNC: 34.2 G/DL (ref 31–36)
MCV RBC AUTO: 92.1 FL (ref 80–100)
PDW BLD-RTO: 13.4 % (ref 12.4–15.4)
PHOSPHORUS: 3.9 MG/DL (ref 2.5–4.9)
PLATELET # BLD: 212 K/UL (ref 135–450)
PMV BLD AUTO: 8.9 FL (ref 5–10.5)
POTASSIUM SERPL-SCNC: 4.1 MMOL/L (ref 3.5–5.1)
RBC # BLD: 4.78 M/UL (ref 4–5.2)
SODIUM BLD-SCNC: 136 MMOL/L (ref 136–145)
TOTAL PROTEIN: 7 G/DL (ref 6.4–8.2)
TRIGL SERPL-MCNC: 86 MG/DL (ref 0–150)
TSH SERPL DL<=0.05 MIU/L-ACNC: 1.23 UIU/ML (ref 0.27–4.2)
VLDLC SERPL CALC-MCNC: 17 MG/DL
WBC # BLD: 8.3 K/UL (ref 4–11)

## 2021-10-19 PROCEDURE — 3044F HG A1C LEVEL LT 7.0%: CPT | Performed by: INTERNAL MEDICINE

## 2021-10-19 PROCEDURE — 36415 COLL VENOUS BLD VENIPUNCTURE: CPT | Performed by: INTERNAL MEDICINE

## 2021-10-19 PROCEDURE — 99214 OFFICE O/P EST MOD 30 MIN: CPT | Performed by: INTERNAL MEDICINE

## 2021-10-19 PROCEDURE — 3017F COLORECTAL CA SCREEN DOC REV: CPT | Performed by: INTERNAL MEDICINE

## 2021-10-19 PROCEDURE — 90686 IIV4 VACC NO PRSV 0.5 ML IM: CPT | Performed by: INTERNAL MEDICINE

## 2021-10-19 PROCEDURE — 1036F TOBACCO NON-USER: CPT | Performed by: INTERNAL MEDICINE

## 2021-10-19 PROCEDURE — 2022F DILAT RTA XM EVC RTNOPTHY: CPT | Performed by: INTERNAL MEDICINE

## 2021-10-19 PROCEDURE — 90471 IMMUNIZATION ADMIN: CPT | Performed by: INTERNAL MEDICINE

## 2021-10-19 PROCEDURE — G8417 CALC BMI ABV UP PARAM F/U: HCPCS | Performed by: INTERNAL MEDICINE

## 2021-10-19 PROCEDURE — G8482 FLU IMMUNIZE ORDER/ADMIN: HCPCS | Performed by: INTERNAL MEDICINE

## 2021-10-19 PROCEDURE — G8427 DOCREV CUR MEDS BY ELIG CLIN: HCPCS | Performed by: INTERNAL MEDICINE

## 2021-10-19 ASSESSMENT — ENCOUNTER SYMPTOMS
GASTROINTESTINAL NEGATIVE: 1
RESPIRATORY NEGATIVE: 1
ALLERGIC/IMMUNOLOGIC NEGATIVE: 1

## 2021-10-19 NOTE — PROGRESS NOTES
Raymond Lund presents today for   Chief Complaint   Patient presents with    Annual Exam        Type 2 diabetes mellitus without complication, with long-term current use of insulin ([address]  Sugars are some lower(100-200 w/ last HbA1c 7.2 in 3/2021). Wt is up 3 lbs. No c/o w/ meds and now taking Pioglitazone, Farxega, and Metformin. Last eye check 6/2021. Essential hypertension  No change in meds, no c/o with meds, no chest pain, SOB, palpatations, or syncope. Home bp was: 120-130s/70-80s. Pure hypercholesterolemia  Stable diet and wt. No c/o w/ meds. Moderate episode of recurrent major depressive disorder [facility])  Sees Psychiatrist and counselor regularly. Had increased Lamictal dose recently and doing some better. Sleep improved w/ Trazodone and Melatonin. started Concerta when went back to work and doing well. Diverticulitis   2 episodes of Diverticulitis. Sees Dr Shanti Márquez. Been on Antibiotics twice. Colonoscopy soon. No new c/o. Review of Systems   Constitutional: Positive for fatigue. HENT: Negative. Respiratory: Negative. Cardiovascular: Negative. Gastrointestinal: Negative. Endocrine: Negative. Genitourinary: Negative. Musculoskeletal: Positive for arthralgias, gait problem, joint swelling and myalgias. Skin: Negative. Allergic/Immunologic: Negative. Hematological: Negative. Psychiatric/Behavioral: Negative. Vitals:    10/19/21 1004 10/19/21 1031   BP: 118/78 122/78   Pulse: 87    Resp: 16    SpO2: 98%    Weight: 198 lb 3.2 oz (89.9 kg)    Height: 5' 5.5\" (1.664 m)          Physical Exam  Constitutional:       General: She is not in acute distress. Appearance: Normal appearance. She is well-developed. She is obese. She is not ill-appearing, toxic-appearing or diaphoretic. HENT:      Head: Normocephalic and atraumatic. Eyes:      Extraocular Movements: Extraocular movements intact.       Conjunctiva/sclera: Conjunctivae normal.      Pupils: Pupils are equal, round, and reactive to light. Neck:      Thyroid: No thyroid mass or thyromegaly. Vascular: Normal carotid pulses. No carotid bruit, hepatojugular reflux or JVD. Trachea: Trachea normal. No tracheal deviation. Cardiovascular:      Rate and Rhythm: Normal rate and regular rhythm. No extrasystoles are present. Chest Wall: PMI is not displaced. Pulses: Normal pulses. No decreased pulses. Carotid pulses are 2+ on the right side and 2+ on the left side. Radial pulses are 2+ on the right side and 2+ on the left side. Femoral pulses are 2+ on the right side and 2+ on the left side. Popliteal pulses are 2+ on the right side and 2+ on the left side. Dorsalis pedis pulses are 2+ on the right side and 2+ on the left side. Posterior tibial pulses are 2+ on the right side and 2+ on the left side. Heart sounds: Normal heart sounds, S1 normal and S2 normal. Heart sounds not distant. No murmur heard. No friction rub. No gallop. No S3 or S4 sounds. Pulmonary:      Effort: Pulmonary effort is normal. No tachypnea, bradypnea, accessory muscle usage or respiratory distress. Breath sounds: Normal breath sounds. No decreased breath sounds, wheezing, rhonchi or rales. Chest:      Chest wall: No tenderness. Musculoskeletal:         General: Tenderness (OA knees.) present. Normal range of motion. Cervical back: Full passive range of motion without pain, normal range of motion and neck supple. No edema, erythema, rigidity or tenderness. No spinous process tenderness or muscular tenderness. Normal range of motion. Right lower leg: No edema. Left lower leg: No edema. Lymphadenopathy:      Cervical: No cervical adenopathy. Skin:     General: Skin is warm and dry. Capillary Refill: Capillary refill takes less than 2 seconds. Coloration: Skin is not jaundiced or pale.       Findings: No abrasion, bruising, erythema, lesion or rash. Nails: There is no clubbing. Neurological:      General: No focal deficit present. Mental Status: She is alert and oriented to person, place, and time. Mental status is at baseline. She is not disoriented. Cranial Nerves: No cranial nerve deficit. Sensory: No sensory deficit. Motor: No weakness, tremor, atrophy or abnormal muscle tone. Coordination: Coordination normal.      Gait: Gait normal.      Deep Tendon Reflexes: Reflexes are normal and symmetric. Psychiatric:         Mood and Affect: Mood normal.         Speech: Speech normal.         Behavior: Behavior normal.         Thought Content: Thought content normal.         Judgment: Judgment normal.          A/P:     Moderate Episode of Recurrent Major Depressive Disorder ([facility]). Continue meds. Call if new c/o. Pure Hypercholesterolemia. Will do labs      Essential Hypertension. Continue present meds. Home BP checks. Call if>140/90. Improve diet. Avoid caffeine and salt. Call if new c/o w/ meds. Type 2 Diabetes Mellitus Without Complication, With Long-Term Current Use of Insulin ([facility]). Will do labs and adjust meds after results are evaluated. To continue to improve diet and lose weight. To follow Diabetic diet. If needs further help w/ Diabetic diet to call and will refer back to dietician. Home sugar checks. To call if sudden change up or down in sugars. To do regular foot checks. Call if new problems. Diverticulitis. To GI as scheduled.      Concetta Marsh MD

## 2021-10-19 NOTE — PATIENT INSTRUCTIONS
A/P:     Moderate Episode of Recurrent Major Depressive Disorder ([facility]). Continue meds. Call if new c/o. Pure Hypercholesterolemia. Will do labs      Essential Hypertension. Continue present meds. Home BP checks. Call if>140/90. Improve diet. Avoid caffeine and salt. Call if new c/o w/ meds. Type 2 Diabetes Mellitus Without Complication, With Long-Term Current Use of Insulin ([facility]). Will do labs and adjust meds after results are evaluated. To continue to improve diet and lose weight. To follow Diabetic diet. If needs further help w/ Diabetic diet to call and will refer back to dietician. Home sugar checks. To call if sudden change up or down in sugars. To do regular foot checks. Call if new problems. Diverticulitis. To GI as scheduled.      Chana Huffman MD

## 2021-10-19 NOTE — ASSESSMENT & PLAN NOTE
2 episodes of Diverticulitis. Sees Dr Vane Eng. Been on Antibiotics twice. Colonoscopy soon. No new c/o.

## 2021-10-19 NOTE — PROGRESS NOTES
Vaccine Information Sheet, \"Influenza - Inactivated\"  given to Bertha Bryant, or parent/legal guardian of  Bertha Bryant and verbalized understanding. Patient responses:    Have you ever had a reaction to a flu vaccine? No  Do you have any current illness? No  Have you ever had Guillian [place] Syndrome? No  Do you have a serious allergy to any of the follow: Neomycin, Polymyxin, Thimerosal, eggs or egg products? No    Flu vaccine given per order. Please see immunization tab. Risks and benefits explained. Current VIS given.

## 2021-10-19 NOTE — ASSESSMENT & PLAN NOTE
Sees Psychiatrist and counselor regularly. Had increased Lamictal dose recently and doing some better. Sleep improved w/ Trazodone and Melatonin. started Concerta when went back to work and doing well.

## 2021-10-20 LAB
ESTIMATED AVERAGE GLUCOSE: 148.5 MG/DL
HBA1C MFR BLD: 6.8 %

## 2021-10-25 ENCOUNTER — APPOINTMENT (OUTPATIENT)
Dept: CT IMAGING | Age: 61
End: 2021-10-25
Payer: COMMERCIAL

## 2021-10-25 ENCOUNTER — OFFICE VISIT (OUTPATIENT)
Dept: FAMILY MEDICINE CLINIC | Age: 61
End: 2021-10-25
Payer: COMMERCIAL

## 2021-10-25 ENCOUNTER — APPOINTMENT (OUTPATIENT)
Dept: GENERAL RADIOLOGY | Age: 61
End: 2021-10-25
Payer: COMMERCIAL

## 2021-10-25 ENCOUNTER — HOSPITAL ENCOUNTER (EMERGENCY)
Age: 61
Discharge: HOME | End: 2021-10-26
Attending: EMERGENCY MEDICINE
Payer: COMMERCIAL

## 2021-10-25 VITALS
WEIGHT: 203 LBS | BODY MASS INDEX: 32.62 KG/M2 | OXYGEN SATURATION: 98 % | DIASTOLIC BLOOD PRESSURE: 50 MMHG | SYSTOLIC BLOOD PRESSURE: 78 MMHG | HEIGHT: 66 IN | HEART RATE: 132 BPM

## 2021-10-25 DIAGNOSIS — R73.9 HYPERGLYCEMIA: ICD-10-CM

## 2021-10-25 DIAGNOSIS — R00.0 TACHYCARDIA: ICD-10-CM

## 2021-10-25 DIAGNOSIS — I95.9 HYPOTENSIVE EPISODE: Primary | ICD-10-CM

## 2021-10-25 DIAGNOSIS — M54.31 SCIATICA OF RIGHT SIDE: ICD-10-CM

## 2021-10-25 DIAGNOSIS — E86.0 DEHYDRATION: Primary | ICD-10-CM

## 2021-10-25 PROBLEM — K57.92 DIVERTICULITIS: Status: RESOLVED | Noted: 2021-10-19 | Resolved: 2021-10-25

## 2021-10-25 PROBLEM — N30.01 ACUTE CYSTITIS WITH HEMATURIA: Status: RESOLVED | Noted: 2018-02-05 | Resolved: 2021-10-25

## 2021-10-25 PROBLEM — M17.11 PRIMARY LOCALIZED OSTEOARTHRITIS OF RIGHT KNEE: Status: RESOLVED | Noted: 2019-03-29 | Resolved: 2021-10-25

## 2021-10-25 PROBLEM — Z96.651 STATUS POST TOTAL RIGHT KNEE REPLACEMENT: Status: RESOLVED | Noted: 2019-03-29 | Resolved: 2021-10-25

## 2021-10-25 LAB
A/G RATIO: 1.6 (ref 1.1–2.2)
ALBUMIN SERPL-MCNC: 4.4 G/DL (ref 3.4–5)
ALP BLD-CCNC: 106 U/L (ref 40–129)
ALT SERPL-CCNC: 31 U/L (ref 10–40)
ANION GAP SERPL CALCULATED.3IONS-SCNC: 12 MMOL/L (ref 3–16)
AST SERPL-CCNC: 24 U/L (ref 15–37)
BASOPHILS ABSOLUTE: 0.1 K/UL (ref 0–0.2)
BASOPHILS RELATIVE PERCENT: 0.8 %
BILIRUB SERPL-MCNC: 0.3 MG/DL (ref 0–1)
BILIRUBIN URINE: NEGATIVE
BLOOD, URINE: NEGATIVE
BUN BLDV-MCNC: 13 MG/DL (ref 7–20)
CALCIUM SERPL-MCNC: 10.2 MG/DL (ref 8.3–10.6)
CHLORIDE BLD-SCNC: 98 MMOL/L (ref 99–110)
CLARITY: CLEAR
CO2: 25 MMOL/L (ref 21–32)
COLOR: YELLOW
CREAT SERPL-MCNC: 1.1 MG/DL (ref 0.6–1.2)
EKG ATRIAL RATE: 100 BPM
EKG DIAGNOSIS: NORMAL
EKG P AXIS: 53 DEGREES
EKG P-R INTERVAL: 144 MS
EKG Q-T INTERVAL: 346 MS
EKG QRS DURATION: 82 MS
EKG QTC CALCULATION (BAZETT): 446 MS
EKG R AXIS: 51 DEGREES
EKG T AXIS: 64 DEGREES
EKG VENTRICULAR RATE: 100 BPM
EOSINOPHILS ABSOLUTE: 0.1 K/UL (ref 0–0.6)
EOSINOPHILS RELATIVE PERCENT: 0.6 %
GFR AFRICAN AMERICAN: >60
GFR NON-AFRICAN AMERICAN: 51
GLOBULIN: 2.7 G/DL
GLUCOSE BLD-MCNC: 248 MG/DL (ref 70–99)
GLUCOSE URINE: >=1000 MG/DL
HCT VFR BLD CALC: 45.4 % (ref 36–48)
HEMOGLOBIN: 15.1 G/DL (ref 12–16)
KETONES, URINE: NEGATIVE MG/DL
LACTIC ACID: 1.6 MMOL/L (ref 0.4–2)
LEUKOCYTE ESTERASE, URINE: NEGATIVE
LYMPHOCYTES ABSOLUTE: 1.7 K/UL (ref 1–5.1)
LYMPHOCYTES RELATIVE PERCENT: 12.1 %
MCH RBC QN AUTO: 30.9 PG (ref 26–34)
MCHC RBC AUTO-ENTMCNC: 33.3 G/DL (ref 31–36)
MCV RBC AUTO: 92.8 FL (ref 80–100)
MICROSCOPIC EXAMINATION: ABNORMAL
MONOCYTES ABSOLUTE: 0.7 K/UL (ref 0–1.3)
MONOCYTES RELATIVE PERCENT: 5 %
NEUTROPHILS ABSOLUTE: 11.2 K/UL (ref 1.7–7.7)
NEUTROPHILS RELATIVE PERCENT: 81.5 %
NITRITE, URINE: NEGATIVE
PDW BLD-RTO: 13.5 % (ref 12.4–15.4)
PH UA: 5 (ref 5–8)
PLATELET # BLD: 265 K/UL (ref 135–450)
PMV BLD AUTO: 8.8 FL (ref 5–10.5)
POTASSIUM SERPL-SCNC: 4.6 MMOL/L (ref 3.5–5.1)
PROTEIN UA: NEGATIVE MG/DL
RBC # BLD: 4.89 M/UL (ref 4–5.2)
SARS-COV-2, NAAT: NOT DETECTED
SODIUM BLD-SCNC: 135 MMOL/L (ref 136–145)
SPECIFIC GRAVITY UA: <=1.005 (ref 1–1.03)
TOTAL PROTEIN: 7.1 G/DL (ref 6.4–8.2)
TROPONIN: <0.01 NG/ML
URINE TYPE: ABNORMAL
UROBILINOGEN, URINE: 0.2 E.U./DL
WBC # BLD: 13.8 K/UL (ref 4–11)

## 2021-10-25 PROCEDURE — 84484 ASSAY OF TROPONIN QUANT: CPT

## 2021-10-25 PROCEDURE — 96374 THER/PROPH/DIAG INJ IV PUSH: CPT

## 2021-10-25 PROCEDURE — 93010 ELECTROCARDIOGRAM REPORT: CPT | Performed by: INTERNAL MEDICINE

## 2021-10-25 PROCEDURE — G8417 CALC BMI ABV UP PARAM F/U: HCPCS | Performed by: PHYSICIAN ASSISTANT

## 2021-10-25 PROCEDURE — 6360000004 HC RX CONTRAST MEDICATION: Performed by: PHYSICIAN ASSISTANT

## 2021-10-25 PROCEDURE — 71045 X-RAY EXAM CHEST 1 VIEW: CPT

## 2021-10-25 PROCEDURE — G8427 DOCREV CUR MEDS BY ELIG CLIN: HCPCS | Performed by: PHYSICIAN ASSISTANT

## 2021-10-25 PROCEDURE — 3017F COLORECTAL CA SCREEN DOC REV: CPT | Performed by: PHYSICIAN ASSISTANT

## 2021-10-25 PROCEDURE — 2580000003 HC RX 258: Performed by: PHYSICIAN ASSISTANT

## 2021-10-25 PROCEDURE — 2580000003 HC RX 258: Performed by: EMERGENCY MEDICINE

## 2021-10-25 PROCEDURE — 6370000000 HC RX 637 (ALT 250 FOR IP): Performed by: EMERGENCY MEDICINE

## 2021-10-25 PROCEDURE — 74177 CT ABD & PELVIS W/CONTRAST: CPT

## 2021-10-25 PROCEDURE — 99213 OFFICE O/P EST LOW 20 MIN: CPT | Performed by: PHYSICIAN ASSISTANT

## 2021-10-25 PROCEDURE — 87040 BLOOD CULTURE FOR BACTERIA: CPT

## 2021-10-25 PROCEDURE — 85025 COMPLETE CBC W/AUTO DIFF WBC: CPT

## 2021-10-25 PROCEDURE — 80053 COMPREHEN METABOLIC PANEL: CPT

## 2021-10-25 PROCEDURE — 1036F TOBACCO NON-USER: CPT | Performed by: PHYSICIAN ASSISTANT

## 2021-10-25 PROCEDURE — 81003 URINALYSIS AUTO W/O SCOPE: CPT

## 2021-10-25 PROCEDURE — 93005 ELECTROCARDIOGRAM TRACING: CPT | Performed by: PHYSICIAN ASSISTANT

## 2021-10-25 PROCEDURE — 83605 ASSAY OF LACTIC ACID: CPT

## 2021-10-25 PROCEDURE — 6360000002 HC RX W HCPCS: Performed by: EMERGENCY MEDICINE

## 2021-10-25 PROCEDURE — 96361 HYDRATE IV INFUSION ADD-ON: CPT

## 2021-10-25 PROCEDURE — 87635 SARS-COV-2 COVID-19 AMP PRB: CPT

## 2021-10-25 PROCEDURE — G8482 FLU IMMUNIZE ORDER/ADMIN: HCPCS | Performed by: PHYSICIAN ASSISTANT

## 2021-10-25 PROCEDURE — 99284 EMERGENCY DEPT VISIT MOD MDM: CPT

## 2021-10-25 PROCEDURE — 71260 CT THORAX DX C+: CPT

## 2021-10-25 RX ORDER — 0.9 % SODIUM CHLORIDE 0.9 %
1000 INTRAVENOUS SOLUTION INTRAVENOUS ONCE
Status: COMPLETED | OUTPATIENT
Start: 2021-10-25 | End: 2021-10-26

## 2021-10-25 RX ORDER — KETOROLAC TROMETHAMINE 30 MG/ML
15 INJECTION, SOLUTION INTRAMUSCULAR; INTRAVENOUS ONCE
Status: COMPLETED | OUTPATIENT
Start: 2021-10-25 | End: 2021-10-25

## 2021-10-25 RX ORDER — PREDNISONE 20 MG/1
20 TABLET ORAL ONCE
Status: COMPLETED | OUTPATIENT
Start: 2021-10-25 | End: 2021-10-25

## 2021-10-25 RX ORDER — 0.9 % SODIUM CHLORIDE 0.9 %
1000 INTRAVENOUS SOLUTION INTRAVENOUS ONCE
Status: COMPLETED | OUTPATIENT
Start: 2021-10-25 | End: 2021-10-25

## 2021-10-25 RX ADMIN — SODIUM CHLORIDE 1000 ML: 9 INJECTION, SOLUTION INTRAVENOUS at 22:56

## 2021-10-25 RX ADMIN — IOPAMIDOL 75 ML: 755 INJECTION, SOLUTION INTRAVENOUS at 18:53

## 2021-10-25 RX ADMIN — PREDNISONE 20 MG: 20 TABLET ORAL at 23:37

## 2021-10-25 RX ADMIN — SODIUM CHLORIDE 1000 ML: 9 INJECTION, SOLUTION INTRAVENOUS at 18:56

## 2021-10-25 RX ADMIN — KETOROLAC TROMETHAMINE 15 MG: 30 INJECTION, SOLUTION INTRAMUSCULAR at 23:37

## 2021-10-25 ASSESSMENT — PAIN SCALES - GENERAL
PAINLEVEL_OUTOF10: 4
PAINLEVEL_OUTOF10: 5

## 2021-10-25 NOTE — PROGRESS NOTES
Subjective:      Patient ID: Danae Mercado is a 62 y.o. female. HPI     Patient presents with right let pain that started Wednesday of last week. It starts in the groin. Also feels on the inside of the thigh and knee and across the ankle. Posterior leg pain also. No injury. Has been more sedentary than normal due to diverticulitis flare since 10/11. Seeing GI tomorrow for follow up. No recent travel. Is using a walker, feels like the leg will give out on her. Patient color is very pale today, diaphoretic, tachycardia, and hypotensive. Review of Systems   Constitutional: Positive for appetite change. HENT: Negative. Respiratory: Positive for shortness of breath. Gastrointestinal: Positive for abdominal pain. Genitourinary: Negative. Musculoskeletal:        Leg pain       Objective:   Physical Exam  Constitutional:       Appearance: She is ill-appearing and diaphoretic. Cardiovascular:      Rate and Rhythm: Regular rhythm. Tachycardia present. Heart sounds: Normal heart sounds. Pulmonary:      Breath sounds: Normal breath sounds. Abdominal:      General: Abdomen is flat. Tenderness: There is abdominal tenderness. Skin:     General: Skin is warm. Neurological:      Mental Status: She is oriented to person, place, and time. Assessment / Plan:          Diagnosis Orders   1. Hypotensive episode     2. Tachycardia       Patient taken to the ER by staff for evaluation.

## 2021-10-26 VITALS
HEART RATE: 85 BPM | DIASTOLIC BLOOD PRESSURE: 85 MMHG | HEIGHT: 66 IN | SYSTOLIC BLOOD PRESSURE: 144 MMHG | OXYGEN SATURATION: 100 % | TEMPERATURE: 98.7 F | BODY MASS INDEX: 32.62 KG/M2 | RESPIRATION RATE: 18 BRPM | WEIGHT: 203 LBS

## 2021-10-26 RX ORDER — PREDNISONE 20 MG/1
20 TABLET ORAL DAILY
Qty: 5 TABLET | Refills: 0 | Status: SHIPPED | OUTPATIENT
Start: 2021-10-26 | End: 2021-10-31

## 2021-10-26 RX ORDER — CYCLOBENZAPRINE HCL 5 MG
5 TABLET ORAL 2 TIMES DAILY PRN
Qty: 10 TABLET | Refills: 0 | Status: SHIPPED | OUTPATIENT
Start: 2021-10-26 | End: 2021-11-05

## 2021-10-26 ASSESSMENT — ENCOUNTER SYMPTOMS
SHORTNESS OF BREATH: 1
ABDOMINAL PAIN: 1

## 2021-10-26 NOTE — ED PROVIDER NOTES
I independently examined and evaluated Tiago Amaya. All diagnostic, treatment, and disposition decisions were made by myself in conjunction with the [facility], Belkis Pimentel. For all further details of the patient's emergency department visit, please see their documentation. 26-year-old female with a history of diabetes and recent diagnosis of diverticulitis presents with right leg pain. She states she is having pain that starts in the right buttock and radiates down the back of her right leg. It is positional.  She denies any fevers or weakness/numbness of the leg. She saw her primary care provider today but while she was there her heart rate was elevated and her blood pressure was low. She states she has been recently being treated by her GI doctor for her second bout of diverticulitis. She actually has an appointment to see them tomorrow she has been having diarrhea. She denies abdominal pain or fever. No chest pain or shortness of breath. She states she just feels weak and fatigued. Vitals:    10/25/21 2339   BP: (!) 144/85   Pulse: 85   Resp:    Temp:    SpO2: 100%       On my exam her pulses are intact in bilateral feet. She has full range of motion of the right leg with no calf swelling. Neurologically is intact with no weakness of the right leg, strength and sensation intact bilaterally. No midline C, T or L-spine tenderness. Abdomen is soft and nontender to patient.     Results for orders placed or performed during the hospital encounter of 10/25/21   COVID-19, Rapid    Specimen: Nasopharyngeal Swab; Nasal   Result Value Ref Range    SARS-CoV-2, NAAT Not Detected Not Detected   CBC auto differential   Result Value Ref Range    WBC 13.8 (H) 4.0 - 11.0 K/uL    RBC 4.89 4.00 - 5.20 M/uL    Hemoglobin 15.1 12.0 - 16.0 g/dL    Hematocrit 45.4 36.0 - 48.0 %    MCV 92.8 80.0 - 100.0 fL    MCH 30.9 26.0 - 34.0 pg    MCHC 33.3 31.0 - 36.0 g/dL    RDW 13.5 12.4 - 15.4 %    Platelets 934 924 - 869 K/uL    MPV 8.8 5.0 - 10.5 fL    Neutrophils % 81.5 %    Lymphocytes % 12.1 %    Monocytes % 5.0 %    Eosinophils % 0.6 %    Basophils % 0.8 %    Neutrophils Absolute 11.2 (H) 1.7 - 7.7 K/uL    Lymphocytes Absolute 1.7 1.0 - 5.1 K/uL    Monocytes Absolute 0.7 0.0 - 1.3 K/uL    Eosinophils Absolute 0.1 0.0 - 0.6 K/uL    Basophils Absolute 0.1 0.0 - 0.2 K/uL   Comprehensive metabolic panel   Result Value Ref Range    Sodium 135 (L) 136 - 145 mmol/L    Potassium 4.6 3.5 - 5.1 mmol/L    Chloride 98 (L) 99 - 110 mmol/L    CO2 25 21 - 32 mmol/L    Anion Gap 12 3 - 16    Glucose 248 (H) 70 - 99 mg/dL    BUN 13 7 - 20 mg/dL    CREATININE 1.1 0.6 - 1.2 mg/dL    GFR Non- 51 (A) >60    GFR African American >60 >60    Calcium 10.2 8.3 - 10.6 mg/dL    Total Protein 7.1 6.4 - 8.2 g/dL    Albumin 4.4 3.4 - 5.0 g/dL    Albumin/Globulin Ratio 1.6 1.1 - 2.2    Total Bilirubin 0.3 0.0 - 1.0 mg/dL    Alkaline Phosphatase 106 40 - 129 U/L    ALT 31 10 - 40 U/L    AST 24 15 - 37 U/L    Globulin 2.7 Not Established g/dL   Troponin   Result Value Ref Range    Troponin <0.01 <0.01 ng/mL   Lactic acid, plasma   Result Value Ref Range    Lactic Acid 1.6 0.4 - 2.0 mmol/L   Urinalysis   Result Value Ref Range    Color, UA Yellow Straw/Yellow    Clarity, UA Clear Clear    Glucose, Ur >=1000 (A) Negative mg/dL    Bilirubin Urine Negative Negative    Ketones, Urine Negative Negative mg/dL    Specific Gravity, UA <=1.005 1.005 - 1.030    Blood, Urine Negative Negative    pH, UA 5.0 5.0 - 8.0    Protein, UA Negative Negative mg/dL    Urobilinogen, Urine 0.2 <2.0 E.U./dL    Nitrite, Urine Negative Negative    Leukocyte Esterase, Urine Negative Negative    Microscopic Examination Not Indicated     Urine Type NotGiven    EKG 12 Lead   Result Value Ref Range    Ventricular Rate 100 BPM    Atrial Rate 100 BPM    P-R Interval 144 ms    QRS Duration 82 ms    Q-T Interval 346 ms    QTc Calculation (Bazett) 446 ms    P Axis 53 degrees    R Axis 51 degrees    T Axis 64 degrees    Diagnosis       Baseline artifactNormal sinus rhythmNormal ECGNo previous ECGs availableConfirmed by Priscilla Meade MD, Nate Washington ([id number]) on 10/25/2021 6:56:13 PM         CT CHEST PULMONARY EMBOLISM W CONTRAST   Final Result   1. No evidence of pulmonary embolic disease. 2. No acute pulmonary findings. 3. Mild cardiomegaly. CT ABDOMEN PELVIS W IV CONTRAST Additional Contrast? None   Final Result   No acute findings of the abdomen and pelvis. The         XR CHEST PORTABLE   Final Result   No acute cardiopulmonary disease. The patient was tachycardic initially on arrival.  She had a CT of the chest to evaluate for PE and a CT abdomen pelvis. Both are unremarkable. Her lab work is reassuring. She is slightly hyperglycemic here and I did notify her of this. She was persistently tachycardic here even after 1 L of IV fluids. I think she is likely dehydrated from the diarrhea she has been having. I gave her a second liter of IV fluids and her heart rate has normalized and is now 85. She feels much better and would like to go home. She has a follow-up with her GI doctor tomorrow. I will start her on steroids and muscle relaxers for her leg pain which I think is quite consistent with sciatica. She has no red flags for back pain to suggest cauda equina syndrome. No weakness or numbness of extremities, no saddle anesthesia no bowel or bladder symptoms. Strict return precautions given.        Kelin Mann MD  10/26/21 0013      Visit Diagnoses       Codes    Dehydration    -  Primary E86.0    Hyperglycemia     R73.9    Sciatica of right side     M54.31           Kelin Mann MD  10/29/21 0028

## 2021-10-26 NOTE — ED PROVIDER NOTES
[facility] Emergency Department    CHIEF COMPLAINT  Leg Pain (pt to ED with c/o right leg and groin pain starting last wednesday. pt also has diverticulitis with diarrhea. pt was at PCP today and sent over here for hypotension and tachycardia. pt denies any chest pain)      SHARED SERVICE VISIT  I have seen and evaluated this patient with my supervising physician, Dr. Brisa Ball. HISTORY OF PRESENT ILLNESS  Tiago Amaya is a 62 y.o. female who presents to the ED complaining of 1 week history of right leg/hip pain, as well as some diarrhea. Was brought in by squad from PCPs office. She reported there today for evaluation of leg pain and diarrhea. Does have history of diverticulitis. No abdominal pain. Has had some nausea. No vomiting. Decreased appetite. States that she just feels unwell. Did have episode of low blood pressure near syncopal episode today. No headaches or confusion. Denies neck or back pain. No chest pain shortness of breath. Denies fevers chills. No urinary symptoms. Denies black bloody stools. No other complaints, modifying factors or associated symptoms. Nursing notes reviewed.    Past Medical History:   Diagnosis Date    Anxiety     Depression     Diverticulitis 10/19/2021    DJD (degenerative joint disease)     hands/knees/ankles    Fatty liver     Hyperlipidemia     Hypertension     Irritable bowel syndrome     Mood disorder (HCC)     NOS    Narcotic addiction ([address]     ANGEL (obstructive sleep apnea)     no CPAP machine - not recommended per sleep study    Primary localized osteoarthritis of right knee 3/29/2019    Restless leg syndrome     Status post total right knee replacement 3/29/2019    Type II or unspecified type diabetes mellitus without mention of complication, not stated as uncontrolled     Vision impairment     wears glasses and contacts     Past Surgical History:   Procedure Laterality Date    APPENDECTOMY      CHOLECYSTECTOMY  2001    COLONOSCOPY  1/2004    Due 2014    CYSTOSCOPY  7/9/10    CYSTOSCOPY  11/18/10    insertion of sparc mesh sling with cystoscopy    JOINT REPLACEMENT      KNEE ARTHROSCOPY Right     KNEE ARTHROSCOPY Left 10/13    partial medial menisectomy    KNEE SURGERY      PLANTAR FASCIA SURGERY      bilateral    BRANDI AND BSO  4/10    DUB    TOTAL KNEE ARTHROPLASTY Right 3/29/2019    RIGHT TOTAL KNEE MAKOPLASTY REPLACEMENT WITH ADDUCTOR CANAL BLOCK FOR PAIN CONTROL                 JOE MCCLENDON  CPT CODE - 82042  performed by Vashti Candelario MD at [facility] History   Problem Relation Age of Onset    Diabetes Mother    Kami Peggy Arthritis Mother     Diabetes Father     Heart Disease Father     Cancer Father         colon    Dementia Father      Social History     Socioeconomic History    Marital status:      Spouse name: Not on file    Number of children: Not on file    Years of education: Not on file    Highest education level: Not on file   Occupational History    Not on file   Tobacco Use    Smoking status: Never Smoker    Smokeless tobacco: Never Used   Vaping Use    Vaping Use: Never used   Substance and Sexual Activity    Alcohol use: No    Drug use: No    Sexual activity: Not on file   Other Topics Concern    Not on file   Social History Narrative    Not on file     Social Determinants of Health     Financial Resource Strain:     Difficulty of Paying Living Expenses:    Food Insecurity:     Worried About Running Out of Food in the Last Year:     Ran Out of Food in the Last Year:    Transportation Needs:     Lack of Transportation (Medical):      Lack of Transportation (Non-Medical):    Physical Activity:     Days of Exercise per Week:     Minutes of Exercise per Session:    Stress:     Feeling of Stress :    Social Connections:     Frequency of Communication with Friends and Family:     Frequency of Social Gatherings with Friends and Family:     Attends [employer] Services:     Active Member of Clubs or Organizations:     Attends Club or Organization Meetings:     Marital Status:    Intimate Partner Violence:     Fear of Current or Ex-Partner:     Emotionally Abused:     Physically Abused:     Sexually Abused:      No current facility-administered medications for this encounter. Current Outpatient Medications   Medication Sig Dispense Refill    cyclobenzaprine (FLEXERIL) 5 MG tablet Take 1 tablet by mouth 2 times daily as needed for Muscle spasms 10 tablet 0    predniSONE (DELTASONE) 20 MG tablet Take 1 tablet by mouth daily for 5 days 5 tablet 0    dapagliflozin (FARXIGA) 10 MG tablet Take 1 tablet by mouth every morning 90 tablet 1    pioglitazone (ACTOS) 15 MG tablet TAKE ONE TABLET BY MOUTH DAILY 30 tablet 5    atorvastatin (LIPITOR) 10 MG tablet TAKE ONE TABLET BY MOUTH DAILY 30 tablet 5    lisinopril (PRINIVIL;ZESTRIL) 10 MG tablet TAKE ONE TABLET BY MOUTH DAILY 30 tablet 4    metFORMIN (GLUCOPHAGE) 1000 MG tablet TAKE ONE TABLET BY MOUTH DAILY WITH BREAKFAST 90 tablet 1    melatonin 5 MG TABS tablet Take 5 mg by mouth daily      methylphenidate (CONCERTA) 54 MG extended release tablet Take 54 mg by mouth every morning.       sucralfate (CARAFATE) 1 GM tablet Take 1 g by mouth 4 times daily      blood glucose monitor strips Test once dialy for diabetes e11.9 100 strip 5    Blood Glucose Monitoring Suppl (ONE TOUCH ULTRA 2) w/Device KIT 1 kit by Does not apply route daily 1 kit 0    ondansetron (ZOFRAN) 4 MG tablet Take 4 mg by mouth every 8 hours as needed for Nausea or Vomiting      lamoTRIgine (LAMICTAL) 200 MG tablet Take 200 mg by mouth daily      blood glucose test strips (ASCENSIA AUTODISC VI;ONE TOUCH ULTRA TEST VI) strip Patient has One Touch Ultra 2, tests daily 100 strip 5    Lancets MISC Test once daily for diabetes e11.9 100 each 3    Blood Glucose Monitoring Suppl CONCETTA Test once daily for diabetes e11.9 1 Device 0    traZODone (DESYREL) 100 MG tablet Take 200 mg by mouth nightly       escitalopram (LEXAPRO) 20 MG tablet Take 10 mg by mouth daily        Allergies   Allergen Reactions    Morphine Hives       REVIEW OF SYSTEMS  10 systems reviewed, pertinent positives per HPI otherwise noted to be negative    PHYSICAL EXAM  BP (!) 144/85   Pulse 85   Temp 98.7 °F (37.1 °C) (Oral)   Resp 18   Ht 5' 5.5\" (1.664 m)   Wt 203 lb (92.1 kg)   SpO2 100%   BMI 33.27 kg/m²   GENERAL APPEARANCE: Awake and alert. Cooperative. No acute distress. HEAD: Normocephalic. Atraumatic. EYES: PERRL. EOM's grossly intact. ENT: Mucous membranes are moist.   NECK: Supple. No JVD. No tracheal tenderness or deviation. No crepitus. HEART: RRR. No murmurs. LUNGS: Respirations unlabored. CTAB. Good air exchange. Speaking comfortably in full sentences. ABDOMEN: Soft. Non-distended. Non-tender. No guarding or rebound. Negative Banks's, McBurney's and Rovsing's. No fluids or ascites. No hernias or masses. Bowel sounds normal in all quadrants. No CVA tenderness. BACK: On exam of thoracic and lumbar spine, there is no swelling, bruising, or color change noted. There is no midline bony tenderness, without crepitus, deformity, or step off. Patient exhibits tenderness of paraspinal musculature to right or left of midline. There is mild point tenderness over the right SI Joint. EXTREMITIES: No peripheral edema. No reproducible tenderness to right leg. No calf pain or swelling. No redness or warmth. No palpable cords or masses. Moves all extremities equally. All extremities neurovascularly intact. SKIN: Warm and dry. No acute rashes. NEUROLOGICAL: Alert and oriented. CN's 2-12 intact. No gross facial drooping. Strength 5/5, sensation intact. PSYCHIATRIC: Normal mood and affect.     RADIOLOGY  CT ABDOMEN PELVIS W IV CONTRAST Additional Contrast? None    Result Date: 10/25/2021  EXAMINATION: CT OF THE ABDOMEN AND PELVIS WITH CONTRAST 10/25/2021 6:44 pm TECHNIQUE: CT of the abdomen and pelvis was performed with the administration of intravenous contrast. Multiplanar reformatted images are provided for review. Dose modulation, iterative reconstruction, and/or weight based adjustment of the mA/kV was utilized to reduce the radiation dose to as low as reasonably achievable. COMPARISON: None. HISTORY: ORDERING SYSTEM PROVIDED HISTORY: abd pain, n/v TECHNOLOGIST PROVIDED HISTORY: Reason for exam:->abd pain, n/v Additional Contrast?->None Decision Support Exception - unselect if not a suspected or confirmed emergency medical condition->Emergency Medical Condition (MA) Reason for Exam: pt states she went to pcp today for pain in right leg, and states they couldnt get a pulse or blood pressure so they sent her here. Acuity: Acute Type of Exam: Initial Relevant Medical/Surgical History: see hx in chart, extensive FINDINGS: Lower Chest: No focal consolidation at the lung bases. The heart is not enlarged. No pericardial effusion. Organs: Liver: Normal appearance of the liver. Gallbladder: The gallbladder is surgically absent. Spleen: Unremarkable spleen. Pancreas: No peripancreatic inflammatory changes. Adrenal Glands: No focal adrenal abnormalities identified. Kidneys: No hydronephrosis. GI/Bowel: Stomach: The stomach is nondistended. Small bowel: No evidence of small bowel obstruction. Colon: Colonic diverticulosis without acute diverticulitis. Appendix: Appendix not visualized, but there are no secondary findings of acute appendicitis. Pelvis: No free fluid in the pelvis. Unremarkable pelvic organs. Peritoneum/Retroperitoneum: Normal caliber abdominal aorta. No retroperitoneal lymphadenopathy by CT criteria. Bones/Soft Tissues: No acute findings within the soft tissues or osseous structures. No acute findings of the abdomen and pelvis.   The     XR CHEST PORTABLE    Result Date: 10/25/2021  EXAMINATION: ONE XRAY VIEW OF THE CHEST 10/25/2021 4:52 pm COMPARISON: 07/10/2013 HISTORY: ORDERING SYSTEM PROVIDED HISTORY: n/v TECHNOLOGIST PROVIDED HISTORY: Reason for exam:->n/v Reason for Exam: pt to ED with c/o right leg and groin pain starting last wednesday. pt also has diverticulitis with diarrhea. pt was at PCP today and sent over here for hypotension and tachycardia. pt denies any chest pain Acuity: Acute Type of Exam: Initial FINDINGS: Heart size is within normal limits and the lungs are clear. No pneumothorax or pleural fluid. No acute bone finding. No acute cardiopulmonary disease. CT CHEST PULMONARY EMBOLISM W CONTRAST    Result Date: 10/25/2021  EXAMINATION: CTA OF THE CHEST 10/25/2021 6:44 pm TECHNIQUE: CTA of the chest was performed after the administration of intravenous contrast.  Multiplanar reformatted images are provided for review. MIP images are provided for review. Dose modulation, iterative reconstruction, and/or weight based adjustment of the mA/kV was utilized to reduce the radiation dose to as low as reasonably achievable. COMPARISON: None. HISTORY: ORDERING SYSTEM PROVIDED HISTORY: tachy/leg pain TECHNOLOGIST PROVIDED HISTORY: Reason for exam:->tachy/leg pain Decision Support Exception - unselect if not a suspected or confirmed emergency medical condition->Emergency Medical Condition (MA) Reason for Exam: pt states she went to pcp today for pain in right leg, and states they couldnt get a pulse or blood pressure so they sent her here. Acuity: Acute Type of Exam: Initial Relevant Medical/Surgical History: see hx in chart FINDINGS: Pulmonary Arteries: Pulmonary arteries are adequately opacified for evaluation. No evidence of intraluminal filling defect to suggest pulmonary embolism. Main pulmonary artery is normal in caliber. Mediastinum: The thoracic aorta is normal in course and caliber. Mild cardiomegaly with no pericardial effusion. There is no pathologic hilar or mediastinal adenopathy.   Heterogeneity of the thyroid gland is noted with no discrete nodule. The visualized esophagus is unremarkable. Lungs/pleura: No acute pulmonary infiltrate, consolidation or edema. There is no pleural fluid or pneumothorax. The central airways are patent. Mild dependent atelectasis in the lingula. Upper Abdomen: Status post cholecystectomy. The visualized upper abdominal viscera are otherwise unremarkable. Soft Tissues/Bones: No acute bone or soft tissue abnormality. 1. No evidence of pulmonary embolic disease. 2. No acute pulmonary findings. 3. Mild cardiomegaly. ED COURSE/MDM/DISPOSITION  Patient received Toradol and prednisone for pain, with good relief. Triage vitals showing tachycardia pulse rate 115. Otherwise stable. Given a 1 L IV fluid bolus. Urinalysis without evidence for infectious process. CBC mild leukocytosis at 13.8 without left shift or bandemia. Lactic acid was negative. Blood cultures x2 are pending. CMP suggestive of some slight dehydration. Troponin less than 0.01. Rapid Covid negative. Urinalysis without evidence for infectious process. Stable portable chest x-ray. EKG sinus rhythm at a rate of 100. No evidence for ischemic change. CT of the abdomen and pelvis without evidence for acute pathologic process. CT chest demonstrating no acute pulmonary embolus or other pathologic findings. Patient has remained tachycardic. Given an additional 1 L IV fluid bolus. Please refer to Dr. Lin Edouard documentation regarding ED course, evaluation, treatment, medical decision making, and disposition for this patient. CRITICAL CARE TIME  30 Minutes of critical care time spent not including separately billable procedures. Final Impression  1. Dehydration    2. Hyperglycemia    3.  Sciatica of right side      [place], [place]  10/26/21 [address] [place] [address]  10/29/21 [facility]

## 2021-10-29 LAB
BLOOD CULTURE, ROUTINE: NORMAL
CULTURE, BLOOD 2: NORMAL

## 2021-11-12 ENCOUNTER — OFFICE VISIT (OUTPATIENT)
Dept: FAMILY MEDICINE CLINIC | Age: 61
End: 2021-11-12
Payer: COMMERCIAL

## 2021-11-12 VITALS
SYSTOLIC BLOOD PRESSURE: 124 MMHG | BODY MASS INDEX: 32.17 KG/M2 | HEART RATE: 130 BPM | OXYGEN SATURATION: 98 % | HEIGHT: 66 IN | WEIGHT: 200.2 LBS | DIASTOLIC BLOOD PRESSURE: 82 MMHG

## 2021-11-12 DIAGNOSIS — R00.0 TACHYCARDIA: Primary | ICD-10-CM

## 2021-11-12 DIAGNOSIS — R42 DIZZINESS: ICD-10-CM

## 2021-11-12 PROCEDURE — 1036F TOBACCO NON-USER: CPT | Performed by: PHYSICIAN ASSISTANT

## 2021-11-12 PROCEDURE — G8482 FLU IMMUNIZE ORDER/ADMIN: HCPCS | Performed by: PHYSICIAN ASSISTANT

## 2021-11-12 PROCEDURE — G8427 DOCREV CUR MEDS BY ELIG CLIN: HCPCS | Performed by: PHYSICIAN ASSISTANT

## 2021-11-12 PROCEDURE — G8417 CALC BMI ABV UP PARAM F/U: HCPCS | Performed by: PHYSICIAN ASSISTANT

## 2021-11-12 PROCEDURE — 99213 OFFICE O/P EST LOW 20 MIN: CPT | Performed by: PHYSICIAN ASSISTANT

## 2021-11-12 PROCEDURE — 3017F COLORECTAL CA SCREEN DOC REV: CPT | Performed by: PHYSICIAN ASSISTANT

## 2021-11-12 ASSESSMENT — ENCOUNTER SYMPTOMS
RESPIRATORY NEGATIVE: 1
GASTROINTESTINAL NEGATIVE: 1

## 2021-11-15 ENCOUNTER — TELEPHONE (OUTPATIENT)
Dept: CARDIOLOGY CLINIC | Age: 61
End: 2021-11-15

## 2021-11-15 ENCOUNTER — OFFICE VISIT (OUTPATIENT)
Dept: CARDIOLOGY CLINIC | Age: 61
End: 2021-11-15
Payer: COMMERCIAL

## 2021-11-15 VITALS
BODY MASS INDEX: 32.47 KG/M2 | SYSTOLIC BLOOD PRESSURE: 132 MMHG | HEART RATE: 146 BPM | WEIGHT: 202 LBS | OXYGEN SATURATION: 98 % | HEIGHT: 66 IN | DIASTOLIC BLOOD PRESSURE: 84 MMHG

## 2021-11-15 DIAGNOSIS — R00.2 PALPITATION: Primary | ICD-10-CM

## 2021-11-15 DIAGNOSIS — I10 ESSENTIAL HYPERTENSION: ICD-10-CM

## 2021-11-15 DIAGNOSIS — R00.2 PALPITATIONS: ICD-10-CM

## 2021-11-15 DIAGNOSIS — E78.00 PURE HYPERCHOLESTEROLEMIA: ICD-10-CM

## 2021-11-15 DIAGNOSIS — R07.2 PRECORDIAL PAIN: ICD-10-CM

## 2021-11-15 PROCEDURE — G8482 FLU IMMUNIZE ORDER/ADMIN: HCPCS | Performed by: INTERNAL MEDICINE

## 2021-11-15 PROCEDURE — 93000 ELECTROCARDIOGRAM COMPLETE: CPT | Performed by: INTERNAL MEDICINE

## 2021-11-15 PROCEDURE — G8417 CALC BMI ABV UP PARAM F/U: HCPCS | Performed by: INTERNAL MEDICINE

## 2021-11-15 PROCEDURE — G8427 DOCREV CUR MEDS BY ELIG CLIN: HCPCS | Performed by: INTERNAL MEDICINE

## 2021-11-15 PROCEDURE — 99244 OFF/OP CNSLTJ NEW/EST MOD 40: CPT | Performed by: INTERNAL MEDICINE

## 2021-11-15 RX ORDER — METOPROLOL SUCCINATE 50 MG/1
50 TABLET, EXTENDED RELEASE ORAL DAILY
Qty: 30 TABLET | Refills: 5 | Status: SHIPPED | OUTPATIENT
Start: 2021-11-15 | End: 2022-03-01 | Stop reason: SDUPTHER

## 2021-11-15 NOTE — PATIENT INSTRUCTIONS
Plan:  Start Aspirin 81 mg daily   Labs- magnesium level    Stop Lisinopril to allow more blood pressure room for Toprol   Start Toprol XL 50 mg daily- for the first 2 days take half a pill and then increase to full pill  Stress test due to chest pain- Lexiscan Myoview   Echocardiogram to evaluate heart function   2 week cardiac event monitor   Cardiac medications reviewed including indications and pertinent side effects    Check blood pressure and heart rate at home a few times per week- keep a log with dates and times and bring to office visit   Regular exercise and following a healthy diet encouraged   Follow up with me in 5 weeks to review testing   Would hold off on colonoscopy and COVID booster for now   Your provider has ordered testing for further evaluation. An order/prescription has been included in your paper work.  To schedule outpatient testing, contact Central Scheduling by calling [address] [place], [place] [address]Y ([phone]).

## 2021-11-15 NOTE — LETTER
Keily Traore    1960    [facility]   Cardiac Consultation    Referring Provider:  PAULA Roldan     Chief Complaint   Patient presents with    New Patient    Chest Pain     comes and goes, started In Oct 2021.  Shortness of Breath     at times    Dizziness     comes and goes    Palpitations     flutters, she can feel it      Keily Traore   1960    History of Present Illness: Keily Traore is a 62 y.o. female who is here today as a new patient consult for cardiology, referred by PAULA Roldan for dizziness, and hypertension. She has a past medical history of hypertension, hyperlipidemia, sleep apnea, and diabetes mellitus. Previous testing includes a stress test from 2013 which showed no large areas of reversibility to suggest ischemia. CT chest 10/25/2021 showed Cardiomegaly. Today she states she has been having dizziness, fatigue, and a fast heart rate. She has noted having palpitations which feels like a fast heart rate and a pounding over the last 3 weeks. With activity her heart rate at home is running 140's with activity and then 80's at rest. She has associated dizziness with her palpitations. This last for several minutes while she is active and then decreases as she stops and rest. She has a sperate issue of chest pain which feels like a squeezing sensation in the center of her chest which does not radiate. She states she has SOB related to her chest pain. She has had issues with low blood pressure in the past while being dehydrated. Blood pressure at home has been low. Patient currently denies any weight gain, edema, and syncope.           Past Medical History:   has a past medical history of Anxiety, Depression, Diverticulitis, DJD (degenerative joint disease), Fatty liver, Hyperlipidemia, Hypertension, Irritable bowel syndrome, Mood disorder ([facility] [address] Narcotic addiction ([facility] [address] ANGEL (obstructive sleep apnea), Primary localized osteoarthritis of right knee, Restless leg syndrome, Status post total right knee replacement, Type II or unspecified type diabetes mellitus without mention of complication, not stated as uncontrolled, and Vision impairment. Surgical History:   has a past surgical history that includes ele and bso (cervix removed) (4/10); Knee arthroscopy (Right); Plantar fascia surgery; Appendectomy; Cholecystectomy (2001); Cystoscopy (7/9/10); Cystoscopy (11/18/10); Colonoscopy (1/2004); Knee arthroscopy (Left, 10/13); Total knee arthroplasty (Right, 3/29/2019); joint replacement; and knee surgery. Social History:   reports that she has never smoked. She has never used smokeless tobacco. She reports that she does not drink alcohol and does not use drugs. Family History:  family history includes Arthritis in her mother; Cancer in her father; Dementia in her father; Diabetes in her father and mother; Heart Disease in her father. Home Medications:  Prior to Admission medications    Medication Sig Start Date End Date Taking? Authorizing Provider   dapagliflozin (FARXIGA) 10 MG tablet Take 1 tablet by mouth every morning 10/11/21  Yes Suzette Osei MD   pioglitazone (ACTOS) 15 MG tablet TAKE ONE TABLET BY MOUTH DAILY 10/4/21  Yes Suzette Osei MD   atorvastatin (LIPITOR) 10 MG tablet TAKE ONE TABLET BY MOUTH DAILY 10/4/21  Yes Suzette Osei MD   lisinopril (PRINIVIL;ZESTRIL) 10 MG tablet TAKE ONE TABLET BY MOUTH DAILY 10/4/21  Yes Suzette Osei MD   metFORMIN (GLUCOPHAGE) 1000 MG tablet TAKE ONE TABLET BY MOUTH DAILY WITH BREAKFAST 8/2/21  Yes Suzette Osei MD   melatonin 5 MG TABS tablet Take 5 mg by mouth daily   Yes Historical Provider, MD   methylphenidate (CONCERTA) 54 MG extended release tablet Take 54 mg by mouth every morning.    Yes Historical Provider, MD   sucralfate (CARAFATE) 1 GM tablet Take 1 g by mouth daily    Yes Historical Provider, MD   blood glucose monitor strips Test once dialy for diabetes e11.9 8/26/19  Yes JILL Valles Narciso Burch MD   Blood Glucose Monitoring Suppl (ONE TOUCH ULTRA 2) w/Device KIT 1 kit by Does not apply route daily 8/26/19  Yes Darlene Bhat MD   ondansetron (ZOFRAN) 4 MG tablet Take 4 mg by mouth every 8 hours as needed for Nausea or Vomiting   Yes Historical Provider, MD   lamoTRIgine (LAMICTAL) 200 MG tablet Take 200 mg by mouth daily   Yes Historical Provider, MD   Lancets MISC Test once daily for diabetes e11.9 7/10/18  Yes Darlene Bhat MD   Blood Glucose Monitoring Suppl CONCETTA Test once daily for diabetes e11.9 2/5/18  Yes Darlene Bhat MD   traZODone (DESYREL) 100 MG tablet Take 200 mg by mouth nightly  7/16/17  Yes Historical Provider, MD   escitalopram (LEXAPRO) 20 MG tablet Take 10 mg by mouth daily    Yes Historical Provider, MD   blood glucose test strips (ASCENSIA AUTODISC VI;ONE TOUCH ULTRA TEST VI) strip Patient has One Touch Ultra 2, tests daily 4/28/19 4/22/20  Rosy Harris MD        Allergies:  Morphine     Review of Systems:   · Constitutional: there has been no unanticipated weight loss. There's been no change in energy level, sleep pattern, or activity level. · Eyes: No visual changes or diplopia. No scleral icterus. · ENT: No Headaches, hearing loss or vertigo. No mouth sores or sore throat. · Cardiovascular: Reviewed in HPI  · Respiratory: No cough or wheezing, no sputum production. No hematemesis. · Gastrointestinal: No abdominal pain, appetite loss, blood in stools. No change in bowel or bladder habits. · Genitourinary: No dysuria, trouble voiding, or hematuria. · Musculoskeletal:  No gait disturbance, weakness or joint complaints. · Integumentary: No rash or pruritis. · Neurological: No headache, diplopia, change in muscle strength, numbness or tingling. No change in gait, balance, coordination, mood, affect, memory, mentation, behavior. · Psychiatric: No anxiety, no depression. · Endocrine: No malaise, fatigue or temperature intolerance.  No excessive thirst, fluid intake, or urination. No tremor. · Hematologic/Lymphatic: No abnormal bruising or bleeding, blood clots or swollen lymph nodes. · Allergic/Immunologic: No nasal congestion or hives. Physical Examination:    Vitals:    11/15/21 1303   BP: 132/84   Pulse: 146   SpO2: 98%        Constitutional and General Appearance: NAD   Respiratory:  · Normal excursion and expansion without use of accessory muscles  · Resp Auscultation: Normal breath sounds without dullness  Cardiovascular:  · The apical impulses not displaced  · Heart tones are crisp and normal  · Cervical veins are not engorged  · The carotid upstroke is normal in amplitude and contour without delay or bruit  · Normal S1S2, No S3, No Murmur  · Peripheral pulses are symmetrical and full  · There is no clubbing, cyanosis of the extremities. · No edema  · Femoral Arteries: 2+ and equal  · Pedal Pulses: 2+ and equal   Abdomen:  · No masses or tenderness  · Liver/Spleen: No Abnormalities Noted  Neurological/Psychiatric:  · Alert and oriented in all spheres  · Moves all extremities well  · Exhibits normal gait balance and coordination  · No abnormalities of mood, affect, memory, mentation, or behavior are noted      Stress test 7/11/2013  Impression: 1. No large areas of reversibility to suggest ischemia. 2. Left ventricular ejection fraction is approximately 56%. EKG 10/25/2021  Baseline artifact  Normal sinus rhythm    CT chest 10/25/2021  Mediastinum: The thoracic aorta is normal in course and caliber. Mild cardiomegaly with no pericardial effusion. There is no pathologic hilar or mediastinal adenopathy. Heterogeneity of the thyroid gland is noted with no discrete nodule. The visualized esophagus is unremarkable.      EKG today   Sinus tachycardia 132       Assessment:   Chest pain - typical/atypical   Palpitations due to tachycardia  Tachycardia - appears inappropriate sinus tach  Dizziness - likely low BP due to tachy   Cardiomegaly noted on CT chest 10/25/2021  Hypertension - well controlled   Hyperlipidemia  Sleep apnea   Diabetes Mellitus   Family history of heart diease in father   Fatigue - consider sleep apnea. Will further eval post cardiac issues addressed     Plan:  Start Aspirin 81 mg daily   Labs- magnesium level    Stop Lisinopril to allow more blood pressure room for Toprol   Start Toprol XL 50 mg daily- for the first 2 days take half a pill and then increase to full pill  Lexiscan Myoview   Echocardiogram   2 week cardiac event monitor   Cardiac medications reviewed including indications and pertinent side effects    Check blood pressure and heart rate at home a few times per week- keep a log with dates and times and bring to office visit   Regular exercise and following a healthy diet encouraged   Follow up with me in 5 weeks to review testing   Would hold off on colonoscopy and COVID booster for now     Scribe's attestation: This note was scribed in the presence of Dr. Randy Patel M.D. By Sanju Faustin RN    The scribes documentation has been prepared under my direction and personally reviewed by me in its entirety. I confirm that the note above accurately reflects all work, treatment, procedures, and medical decision making performed by me. Dr. Randy Patel MD    Thank you for allowing me to participate in the care of this individual.      Geraldo Jiang.  Esthela Nixon M.D., [address]

## 2021-11-15 NOTE — PROGRESS NOTES
[facility]   Cardiac Consultation    Referring Provider:  PAULA Farrell     Chief Complaint   Patient presents with    New Patient    Chest Pain     comes and goes, started In Oct 2021.  Shortness of Breath     at times    Dizziness     comes and goes    Palpitations     flutters, she can feel it      Alicia Campbell   1960    History of Present Illness: Alicia Campbell is a 62 y.o. female who is here today as a new patient consult for cardiology, referred by PAULA Farrell for dizziness, and hypertension. She has a past medical history of hypertension, hyperlipidemia, sleep apnea, and diabetes mellitus. Previous testing includes a stress test from 2013 which showed no large areas of reversibility to suggest ischemia. CT chest 10/25/2021 showed Cardiomegaly. Today she states she has been having dizziness, fatigue, and a fast heart rate. She has noted having palpitations which feels like a fast heart rate and a pounding over the last 3 weeks. With activity her heart rate at home is running 140's with activity and then 80's at rest. She has associated dizziness with her palpitations. This last for several minutes while she is active and then decreases as she stops and rest. She has a sperate issue of chest pain which feels like a squeezing sensation in the center of her chest which does not radiate. She states she has SOB related to her chest pain. She has had issues with low blood pressure in the past while being dehydrated. Blood pressure at home has been low. Patient currently denies any weight gain, edema, and syncope.           Past Medical History:   has a past medical history of Anxiety, Depression, Diverticulitis, DJD (degenerative joint disease), Fatty liver, Hyperlipidemia, Hypertension, Irritable bowel syndrome, Mood disorder ([address]arcotic addiction ([facility] [address] ANGEL (obstructive sleep apnea), Primary localized osteoarthritis of right knee, Restless leg syndrome, Status post total right knee replacement, Type II or unspecified type diabetes mellitus without mention of complication, not stated as uncontrolled, and Vision impairment. Surgical History:   has a past surgical history that includes ele and bso (cervix removed) (4/10); Knee arthroscopy (Right); Plantar fascia surgery; Appendectomy; Cholecystectomy (2001); Cystoscopy (7/9/10); Cystoscopy (11/18/10); Colonoscopy (1/2004); Knee arthroscopy (Left, 10/13); Total knee arthroplasty (Right, 3/29/2019); joint replacement; and knee surgery. Social History:   reports that she has never smoked. She has never used smokeless tobacco. She reports that she does not drink alcohol and does not use drugs. Family History:  family history includes Arthritis in her mother; Cancer in her father; Dementia in her father; Diabetes in her father and mother; Heart Disease in her father. Home Medications:  Prior to Admission medications    Medication Sig Start Date End Date Taking? Authorizing Provider   dapagliflozin (FARXIGA) 10 MG tablet Take 1 tablet by mouth every morning 10/11/21  Yes Osito Cantu MD   pioglitazone (ACTOS) 15 MG tablet TAKE ONE TABLET BY MOUTH DAILY 10/4/21  Yes Osito Cantu MD   atorvastatin (LIPITOR) 10 MG tablet TAKE ONE TABLET BY MOUTH DAILY 10/4/21  Yes Osito Cantu MD   lisinopril (PRINIVIL;ZESTRIL) 10 MG tablet TAKE ONE TABLET BY MOUTH DAILY 10/4/21  Yes Osito Cantu MD   metFORMIN (GLUCOPHAGE) 1000 MG tablet TAKE ONE TABLET BY MOUTH DAILY WITH BREAKFAST 8/2/21  Yes Osito Cantu MD   melatonin 5 MG TABS tablet Take 5 mg by mouth daily   Yes Historical Provider, MD   methylphenidate (CONCERTA) 54 MG extended release tablet Take 54 mg by mouth every morning.    Yes Historical Provider, MD   sucralfate (CARAFATE) 1 GM tablet Take 1 g by mouth daily    Yes Historical Provider, MD   blood glucose monitor strips Test once dialy for diabetes e11.9 8/26/19  Yes Osito Cantu MD   Blood Glucose Monitoring Suppl (ONE TOUCH ULTRA 2) w/Device KIT 1 kit by Does not apply route daily 8/26/19  Yes Sruthi Kc MD   ondansetron (ZOFRAN) 4 MG tablet Take 4 mg by mouth every 8 hours as needed for Nausea or Vomiting   Yes Historical Provider, MD   lamoTRIgine (LAMICTAL) 200 MG tablet Take 200 mg by mouth daily   Yes Historical Provider, MD   Lancets MISC Test once daily for diabetes e11.9 7/10/18  Yes Sruthi Kc MD   Blood Glucose Monitoring Suppl CONCETTA Test once daily for diabetes e11.9 2/5/18  Yes Sruthi Kc MD   traZODone (DESYREL) 100 MG tablet Take 200 mg by mouth nightly  7/16/17  Yes Historical Provider, MD   escitalopram (LEXAPRO) 20 MG tablet Take 10 mg by mouth daily    Yes Historical Provider, MD   blood glucose test strips (ASCENSIA AUTODISC VI;ONE TOUCH ULTRA TEST VI) strip Patient has One Touch Ultra 2, tests daily 4/28/19 4/22/20  Ming Martini MD        Allergies:  Morphine     Review of Systems:   · Constitutional: there has been no unanticipated weight loss. There's been no change in energy level, sleep pattern, or activity level. · Eyes: No visual changes or diplopia. No scleral icterus. · ENT: No Headaches, hearing loss or vertigo. No mouth sores or sore throat. · Cardiovascular: Reviewed in HPI  · Respiratory: No cough or wheezing, no sputum production. No hematemesis. · Gastrointestinal: No abdominal pain, appetite loss, blood in stools. No change in bowel or bladder habits. · Genitourinary: No dysuria, trouble voiding, or hematuria. · Musculoskeletal:  No gait disturbance, weakness or joint complaints. · Integumentary: No rash or pruritis. · Neurological: No headache, diplopia, change in muscle strength, numbness or tingling. No change in gait, balance, coordination, mood, affect, memory, mentation, behavior. · Psychiatric: No anxiety, no depression. · Endocrine: No malaise, fatigue or temperature intolerance. No excessive thirst, fluid intake, or urination. No tremor. · Hematologic/Lymphatic: No abnormal bruising or bleeding, blood clots or swollen lymph nodes. · Allergic/Immunologic: No nasal congestion or hives. Physical Examination:    Vitals:    11/15/21 1303   BP: 132/84   Pulse: 146   SpO2: 98%        Constitutional and General Appearance: NAD   Respiratory:  · Normal excursion and expansion without use of accessory muscles  · Resp Auscultation: Normal breath sounds without dullness  Cardiovascular:  · The apical impulses not displaced  · Heart tones are crisp and normal  · Cervical veins are not engorged  · The carotid upstroke is normal in amplitude and contour without delay or bruit  · Normal S1S2, No S3, No Murmur  · Peripheral pulses are symmetrical and full  · There is no clubbing, cyanosis of the extremities. · No edema  · Femoral Arteries: 2+ and equal  · Pedal Pulses: 2+ and equal   Abdomen:  · No masses or tenderness  · Liver/Spleen: No Abnormalities Noted  Neurological/Psychiatric:  · Alert and oriented in all spheres  · Moves all extremities well  · Exhibits normal gait balance and coordination  · No abnormalities of mood, affect, memory, mentation, or behavior are noted      Stress test 7/11/2013  Impression: 1. No large areas of reversibility to suggest ischemia. 2. Left ventricular ejection fraction is approximately 56%. EKG 10/25/2021  Baseline artifact  Normal sinus rhythm    CT chest 10/25/2021  Mediastinum: The thoracic aorta is normal in course and caliber. Mild cardiomegaly with no pericardial effusion. There is no pathologic hilar or mediastinal adenopathy. Heterogeneity of the thyroid gland is noted with no discrete nodule. The visualized esophagus is unremarkable.      EKG today   Sinus tachycardia 132       Assessment:   Chest pain - typical/atypical   Palpitations due to tachycardia  Tachycardia - appears inappropriate sinus tach  Dizziness - likely low BP due to tachy   Cardiomegaly noted on CT chest 10/25/2021  Hypertension - well controlled   Hyperlipidemia  Sleep apnea   Diabetes Mellitus   Family history of heart diease in father   Fatigue - consider sleep apnea. Will further eval post cardiac issues addressed     Plan:  Start Aspirin 81 mg daily   Labs- magnesium level    Stop Lisinopril to allow more blood pressure room for Toprol   Start Toprol XL 50 mg daily- for the first 2 days take half a pill and then increase to full pill  Lexiscan Myoview   Echocardiogram   2 week cardiac event monitor   Cardiac medications reviewed including indications and pertinent side effects    Check blood pressure and heart rate at home a few times per week- keep a log with dates and times and bring to office visit   Regular exercise and following a healthy diet encouraged   Follow up with me in 5 weeks to review testing   Would hold off on colonoscopy and COVID booster for now     Scribe's attestation: This note was scribed in the presence of Dr. Marvin Ambriz M.D. By Anusha Cuello RN    The scribes documentation has been prepared under my direction and personally reviewed by me in its entirety. I confirm that the note above accurately reflects all work, treatment, procedures, and medical decision making performed by me. Dr. Marvin Ambriz MD    Thank you for allowing me to participate in the care of this individual.      Brent Alicia.  Tatiana Altman M.D., Shantell Saucedo

## 2021-11-15 NOTE — TELEPHONE ENCOUNTER
Monitor placed by Evan Olguin [employer]  Length of monitor 14 day  Monitor ordered by [facility]  Serial number [id number]  Kit ID   Activation successful prior to pt leaving office?  Yes

## 2021-11-24 ENCOUNTER — TELEPHONE (OUTPATIENT)
Dept: CARDIOLOGY CLINIC | Age: 61
End: 2021-11-24

## 2021-11-24 ENCOUNTER — HOSPITAL ENCOUNTER (OUTPATIENT)
Age: 61
Discharge: HOME | End: 2021-11-24
Payer: COMMERCIAL

## 2021-11-24 ENCOUNTER — HOSPITAL ENCOUNTER (OUTPATIENT)
Dept: CARDIOLOGY | Age: 61
Discharge: HOME | End: 2021-11-24
Payer: COMMERCIAL

## 2021-11-24 DIAGNOSIS — I10 ESSENTIAL HYPERTENSION: ICD-10-CM

## 2021-11-24 DIAGNOSIS — R00.2 PALPITATIONS: ICD-10-CM

## 2021-11-24 DIAGNOSIS — R00.2 PALPITATION: ICD-10-CM

## 2021-11-24 DIAGNOSIS — R07.2 PRECORDIAL PAIN: ICD-10-CM

## 2021-11-24 LAB
LV EF: 55 %
LVEF MODALITY: NORMAL
MAGNESIUM: 1.8 MG/DL (ref 1.8–2.4)

## 2021-11-24 PROCEDURE — 93306 TTE W/DOPPLER COMPLETE: CPT

## 2021-11-24 PROCEDURE — 83735 ASSAY OF MAGNESIUM: CPT

## 2021-11-24 PROCEDURE — 36415 COLL VENOUS BLD VENIPUNCTURE: CPT

## 2021-11-24 NOTE — TELEPHONE ENCOUNTER
Created telephone encounter. Spoke with Nandini Ocasio relayed message per [address] regarding echo. Pt verbalized understanding.

## 2021-11-24 NOTE — TELEPHONE ENCOUNTER
----- Message from Kyler Galloway MD sent at 11/24/2021  1:29 PM EST -----  Call  Echo looks ok  Normal heart fxn

## 2021-11-30 NOTE — PATIENT INSTRUCTIONS
Moderate Episode of Recurrent Major Depressive Disorder (Hcc). Continue to see Psychiatrist. Med changes as needed. Call if new c/o. Pure Hypercholesterolemia. To continue to improve diet and lose some weight. Exercise as tolerated. Continue med. Essential Hypertension. Continue present meds. Home BP checks. Call if>140/90. Improve diet. Avoid caffeine and salt. Call if new c/o w/ meds. Type 2 Diabetes Mellitus Without Complication, With Long-Term Current Use of Insulin ([facility]). The HbA1c was 7.2% today. Will continue present meds. To improve diet and exercise as tolerated . To lose a few lbs. Call if needs further assistance.
day

## 2021-12-14 ENCOUNTER — HOSPITAL ENCOUNTER (OUTPATIENT)
Dept: MAMMOGRAPHY | Age: 61
Discharge: HOME | End: 2021-12-14
Payer: COMMERCIAL

## 2021-12-14 ENCOUNTER — OFFICE VISIT (OUTPATIENT)
Dept: SURGERY | Age: 61
End: 2021-12-14
Payer: COMMERCIAL

## 2021-12-14 VITALS
WEIGHT: 200 LBS | OXYGEN SATURATION: 97 % | HEART RATE: 77 BPM | BODY MASS INDEX: 32.14 KG/M2 | DIASTOLIC BLOOD PRESSURE: 89 MMHG | TEMPERATURE: 97.3 F | SYSTOLIC BLOOD PRESSURE: 146 MMHG | HEIGHT: 66 IN | RESPIRATION RATE: 16 BRPM

## 2021-12-14 DIAGNOSIS — Z12.31 VISIT FOR SCREENING MAMMOGRAM: ICD-10-CM

## 2021-12-14 DIAGNOSIS — Z91.89 AT HIGH RISK FOR BREAST CANCER: Primary | ICD-10-CM

## 2021-12-14 DIAGNOSIS — Z80.3 FAMILY HISTORY OF BREAST CANCER: ICD-10-CM

## 2021-12-14 PROCEDURE — 1036F TOBACCO NON-USER: CPT | Performed by: SURGERY

## 2021-12-14 PROCEDURE — G8417 CALC BMI ABV UP PARAM F/U: HCPCS | Performed by: SURGERY

## 2021-12-14 PROCEDURE — 99213 OFFICE O/P EST LOW 20 MIN: CPT | Performed by: SURGERY

## 2021-12-14 PROCEDURE — 3017F COLORECTAL CA SCREEN DOC REV: CPT | Performed by: SURGERY

## 2021-12-14 PROCEDURE — 77063 BREAST TOMOSYNTHESIS BI: CPT

## 2021-12-14 PROCEDURE — G8427 DOCREV CUR MEDS BY ELIG CLIN: HCPCS | Performed by: SURGERY

## 2021-12-14 PROCEDURE — G8482 FLU IMMUNIZE ORDER/ADMIN: HCPCS | Performed by: SURGERY

## 2021-12-14 NOTE — PROGRESS NOTES
Subjective:      Patient ID: Jeana Rivera is a 62 y.o. female. HPI   Chief Complaint   Patient presents with    6 Month Follow-Up     Patient denies breast concerns today     Patient is here for high risk for breast cancer follow up. Based on Tyrer-Cuzik 8.0 risk model, her lifetime risk of breast cancer is 24.2%. Her sister dies of metastatic breast cancer at age 46. Patient is s/p BRANDI/BSO at age 40. She has not felt any masses, no breast pain or nipple discharge. She had BRCA testing, which she reports was negative.        MRI 12/8/2020 BIRADS 2. Left breast retroareolar 2.0 cm simple cyst noted. Mammogram today BIRADS 2C.     Past Medical History:   Diagnosis Date    Anxiety     Depression     Diverticulitis 10/19/2021    DJD (degenerative joint disease)     hands/knees/ankles    Fatty liver     Hyperlipidemia     Hypertension     Irritable bowel syndrome     Mood disorder (HCC)     NOS    Narcotic addiction ([address]     ANGEL (obstructive sleep apnea)     no CPAP machine - not recommended per sleep study    Primary localized osteoarthritis of right knee 3/29/2019    Restless leg syndrome     Status post total right knee replacement 3/29/2019    Type II or unspecified type diabetes mellitus without mention of complication, not stated as uncontrolled     Vision impairment     wears glasses and contacts       Past Surgical History:   Procedure Laterality Date    APPENDECTOMY      CHOLECYSTECTOMY  2001    COLONOSCOPY  1/2004    Due 2014    CYSTOSCOPY  7/9/10    CYSTOSCOPY  11/18/10    insertion of sparc mesh sling with cystoscopy    HYSTERECTOMY      JOINT REPLACEMENT      KNEE ARTHROSCOPY Right     KNEE ARTHROSCOPY Left 10/13    partial medial menisectomy    KNEE SURGERY      PLANTAR FASCIA SURGERY      bilateral    BRANDI AND BSO  4/10    DUB    TOTAL KNEE ARTHROPLASTY Right 3/29/2019    RIGHT TOTAL KNEE MAKOPLASTY REPLACEMENT WITH ADDUCTOR CANAL BLOCK FOR PAIN CONTROL

## 2021-12-17 ENCOUNTER — HOSPITAL ENCOUNTER (OUTPATIENT)
Dept: NUCLEAR MEDICINE | Age: 61
Discharge: HOME | End: 2021-12-17
Payer: COMMERCIAL

## 2021-12-17 ENCOUNTER — HOSPITAL ENCOUNTER (OUTPATIENT)
Dept: NON INVASIVE DIAGNOSTICS | Age: 61
Discharge: HOME | End: 2021-12-17
Payer: COMMERCIAL

## 2021-12-17 DIAGNOSIS — R07.2 PRECORDIAL PAIN: ICD-10-CM

## 2021-12-17 DIAGNOSIS — R00.2 PALPITATION: ICD-10-CM

## 2021-12-17 LAB
LV EF: 60 %
LVEF MODALITY: NORMAL

## 2021-12-17 PROCEDURE — 78452 HT MUSCLE IMAGE SPECT MULT: CPT

## 2021-12-17 PROCEDURE — 3430000000 HC RX DIAGNOSTIC RADIOPHARMACEUTICAL: Performed by: INTERNAL MEDICINE

## 2021-12-17 PROCEDURE — 93272 ECG/REVIEW INTERPRET ONLY: CPT | Performed by: INTERNAL MEDICINE

## 2021-12-17 PROCEDURE — 6360000002 HC RX W HCPCS: Performed by: INTERNAL MEDICINE

## 2021-12-17 PROCEDURE — 93017 CV STRESS TEST TRACING ONLY: CPT

## 2021-12-17 PROCEDURE — A9502 TC99M TETROFOSMIN: HCPCS | Performed by: INTERNAL MEDICINE

## 2021-12-17 RX ADMIN — REGADENOSON 0.4 MG: 0.08 INJECTION, SOLUTION INTRAVENOUS at 10:34

## 2021-12-17 RX ADMIN — TETROFOSMIN 11.5 MILLICURIE: 1.38 INJECTION, POWDER, LYOPHILIZED, FOR SOLUTION INTRAVENOUS at 09:33

## 2021-12-17 RX ADMIN — TETROFOSMIN 32.5 MILLICURIE: 1.38 INJECTION, POWDER, LYOPHILIZED, FOR SOLUTION INTRAVENOUS at 10:33

## 2021-12-21 ENCOUNTER — OFFICE VISIT (OUTPATIENT)
Dept: CARDIOLOGY CLINIC | Age: 61
End: 2021-12-21
Payer: COMMERCIAL

## 2021-12-21 VITALS
SYSTOLIC BLOOD PRESSURE: 130 MMHG | WEIGHT: 208 LBS | HEIGHT: 66 IN | DIASTOLIC BLOOD PRESSURE: 80 MMHG | BODY MASS INDEX: 33.43 KG/M2 | OXYGEN SATURATION: 98 % | HEART RATE: 70 BPM

## 2021-12-21 DIAGNOSIS — R00.2 PALPITATIONS: ICD-10-CM

## 2021-12-21 DIAGNOSIS — G47.30 SLEEP APNEA, UNSPECIFIED TYPE: ICD-10-CM

## 2021-12-21 DIAGNOSIS — R06.02 SOB (SHORTNESS OF BREATH): ICD-10-CM

## 2021-12-21 DIAGNOSIS — E78.00 PURE HYPERCHOLESTEROLEMIA: ICD-10-CM

## 2021-12-21 DIAGNOSIS — I10 ESSENTIAL HYPERTENSION: Primary | ICD-10-CM

## 2021-12-21 DIAGNOSIS — R94.39 ABNORMAL STRESS TEST: ICD-10-CM

## 2021-12-21 PROCEDURE — 1036F TOBACCO NON-USER: CPT | Performed by: INTERNAL MEDICINE

## 2021-12-21 PROCEDURE — 99214 OFFICE O/P EST MOD 30 MIN: CPT | Performed by: INTERNAL MEDICINE

## 2021-12-21 PROCEDURE — G8417 CALC BMI ABV UP PARAM F/U: HCPCS | Performed by: INTERNAL MEDICINE

## 2021-12-21 PROCEDURE — G8427 DOCREV CUR MEDS BY ELIG CLIN: HCPCS | Performed by: INTERNAL MEDICINE

## 2021-12-21 PROCEDURE — G8482 FLU IMMUNIZE ORDER/ADMIN: HCPCS | Performed by: INTERNAL MEDICINE

## 2021-12-21 PROCEDURE — 3017F COLORECTAL CA SCREEN DOC REV: CPT | Performed by: INTERNAL MEDICINE

## 2021-12-21 NOTE — PROGRESS NOTES
[address]   Cardiac Consultation    Referring Provider:  APULA Rosenthal     Chief Complaint   Patient presents with    Follow-up     cardiac testing results      Zeina Doherty   1960    History of Present Illness: Zeina Doherty is a 62 y.o. female who is here today for follow up and to review testing. She was initially seen as a new patient consult for cardiology at the request of her PCP for dizziness and hypertension. She has a past medical history of hypertension, hyperlipidemia, sleep apnea, and diabetes mellitus. Previous testing includes a stress test from 2013 which showed no large areas of reversibility to suggest ischemia. CT chest 10/25/2021 showed Cardiomegaly. An echocardiogram from 11/24/221 showed an EF of 55%. Stress test 12/17/2021 showed a small inferoapical/anteroapical defect suggestive of ischemia. She wore a cardiac event monitor showed rare PVC's/PAC's. Today she states overall she is feeling well since her last visit since starting Toprol. She continues to have some fatigue. Blood pressure at home are running- 110-120's, one over 140. SOB and heart pounding with walking up stairs. Cotinines w/ occ chest pain - unchanged. Patient currently denies any weight gain, edema, shortness of breath, dizziness, and syncope. Past Medical History:   has a past medical history of Anxiety, Depression, Diverticulitis, DJD (degenerative joint disease), Fatty liver, Hyperlipidemia, Hypertension, Irritable bowel syndrome, Mood disorder ([address] Narcotic addiction ([address] ANGEL (obstructive sleep apnea), Primary localized osteoarthritis of right knee, Restless leg syndrome, Status post total right knee replacement, Type II or unspecified type diabetes mellitus without mention of complication, not stated as uncontrolled, and Vision impairment. Surgical History:   has a past surgical history that includes ele and bso (cervix removed) (4/10); Knee arthroscopy (Right);  Plantar fascia surgery; Appendectomy; Cholecystectomy (2001); Cystoscopy (7/9/10); Cystoscopy (11/18/10); Colonoscopy (1/2004); Knee arthroscopy (Left, 10/13); Total knee arthroplasty (Right, 3/29/2019); joint replacement; knee surgery; and Hysterectomy. Social History:   reports that she has never smoked. She has never used smokeless tobacco. She reports that she does not drink alcohol and does not use drugs. Family History:  family history includes Arthritis in her mother; Breast Cancer in her sister; Cancer in her father; Dementia in her father; Diabetes in her father and mother; Heart Disease in her father. Home Medications:  Prior to Admission medications    Medication Sig Start Date End Date Taking? Authorizing Provider   metoprolol succinate (TOPROL XL) 50 MG extended release tablet Take 1 tablet by mouth daily 11/15/21  Yes Carlos Clancy MD   dapagliflozin (FARXIGA) 10 MG tablet Take 1 tablet by mouth every morning 10/11/21  Yes Nestor Bates MD   pioglitazone (ACTOS) 15 MG tablet TAKE ONE TABLET BY MOUTH DAILY 10/4/21  Yes Nestor Bates MD   atorvastatin (LIPITOR) 10 MG tablet TAKE ONE TABLET BY MOUTH DAILY 10/4/21  Yes Nestor Bates MD   metFORMIN (GLUCOPHAGE) 1000 MG tablet TAKE ONE TABLET BY MOUTH DAILY WITH BREAKFAST 8/2/21  Yes Nestor Bates MD   melatonin 5 MG TABS tablet Take 5 mg by mouth daily   Yes Historical Provider, MD   methylphenidate (CONCERTA) 54 MG extended release tablet Take 54 mg by mouth every morning.    Yes Historical Provider, MD   sucralfate (CARAFATE) 1 GM tablet Take 1 g by mouth daily    Yes Historical Provider, MD   blood glucose monitor strips Test once dialy for diabetes e11.9 8/26/19  Yes Nestor Bates MD   Blood Glucose Monitoring Suppl (ONE TOUCH ULTRA 2) w/Device KIT 1 kit by Does not apply route daily 8/26/19  Yes Nestor Bates MD   ondansetron (ZOFRAN) 4 MG tablet Take 4 mg by mouth every 8 hours as needed for Nausea or Vomiting   Yes Historical Provider, MD   lamoTRIgine (LAMICTAL) 200 MG tablet Take 200 mg by mouth daily   Yes Historical Provider, MD   Lancets MISC Test once daily for diabetes e11.9 7/10/18  Yes Darlene Bhat MD   Blood Glucose Monitoring Suppl CONCETTA Test once daily for diabetes e11.9 2/5/18  Yes Darlene Bhat MD   traZODone (DESYREL) 100 MG tablet Take 200 mg by mouth nightly  7/16/17  Yes Historical Provider, MD   escitalopram (LEXAPRO) 20 MG tablet Take 10 mg by mouth daily    Yes Historical Provider, MD        Allergies:  Morphine     Review of Systems:   · Constitutional: there has been no unanticipated weight loss. There's been no change in energy level, sleep pattern, or activity level. · Eyes: No visual changes or diplopia. No scleral icterus. · ENT: No Headaches, hearing loss or vertigo. No mouth sores or sore throat. · Cardiovascular: Reviewed in HPI  · Respiratory: No cough or wheezing, no sputum production. No hematemesis. · Gastrointestinal: No abdominal pain, appetite loss, blood in stools. No change in bowel or bladder habits. · Genitourinary: No dysuria, trouble voiding, or hematuria. · Musculoskeletal:  No gait disturbance, weakness or joint complaints. · Integumentary: No rash or pruritis. · Neurological: No headache, diplopia, change in muscle strength, numbness or tingling. No change in gait, balance, coordination, mood, affect, memory, mentation, behavior. · Psychiatric: No anxiety, no depression. · Endocrine: No malaise, fatigue or temperature intolerance. No excessive thirst, fluid intake, or urination. No tremor. · Hematologic/Lymphatic: No abnormal bruising or bleeding, blood clots or swollen lymph nodes. · Allergic/Immunologic: No nasal congestion or hives.     Physical Examination:    Vitals:    12/21/21 0941   BP: 130/80   Pulse: 70   SpO2: 98%        Constitutional and General Appearance: NAD   Respiratory:  · Normal excursion and expansion without use of accessory muscles  · Resp Auscultation: Normal inferoapical/anteroapical defect suggestive of ischemia    Overall, this would be considered an abnormal, low to intermediate risk,  study         Cardiac event monitor 11/15-11/29/2021  Rare NSR with PAC's and PVC's     Assessment:   Abnormal stress test - discussed options for further testing including CTA vs cardiac cath vs med mgmt. R/B/A/E discussed. She wants to proceed w/ CTA. Chest pain - typical/atypical - unchanged. Palpitations due to tachycardia  Tachycardia - appears inappropriate sinus tach  Dizziness - likely low BP due to tachy   Cardiomegaly noted on CT chest 10/25/2021. Not present on echo  Hypertension - well controlled   Hyperlipidemia - stable  Sleep apnea   Diabetes Mellitus   Family history of heart diease in father   Fatigue - consider sleep apnea. Will further eval post cardiac issues addressed     Plan:  Referral for sleep study  Dicussed options due to abnormal stress test including a CT of the heart or proceeding with an angiogram. She would like to proceed with cardiac CTA- the morning of the test take an extra metoprolol   Cardiac medications reviewed including indications and pertinent side effects    Check blood pressure and heart rate at home a few times per week- keep a log with dates and times and bring to office visit   Regular exercise and following a healthy diet encouraged   Follow up with me in 2-3 months     The scribes documentation has been prepared under my direction and personally reviewed by me in its entirety. I confirm that the note above accurately reflects all work, treatment, procedures, and medical decision making performed by me. Dr. Isi Streeter MD    Scribe's attestation: This note was scribed in the presence of Dr. Isi Streeter M.D. By Marcus Vick RN    Thank you for allowing me to participate in the care of this individual.      Carlita Veloz.  Brandon Goodwin M.D., Corinne Martin

## 2021-12-21 NOTE — PATIENT INSTRUCTIONS
Plan:  Referral for sleep study  Dicussed options due to abnormal stress test including a CT of the heart or proceeding with an angiogram. She would like to proceed with cardiac CTA- the morning of the test take an extra metoprolol   Cardiac medications reviewed including indications and pertinent side effects    Check blood pressure and heart rate at home a few times per week- keep a log with dates and times and bring to office visit   Regular exercise and following a healthy diet encouraged   Follow up with me in 2-3 months   Your provider has ordered testing for further evaluation. An order/prescription has been included in your paper work.  To schedule outpatient testing, contact Central Scheduling by calling [employer] ([phone]).

## 2021-12-29 ENCOUNTER — TELEPHONE (OUTPATIENT)
Dept: CARDIOLOGY CLINIC | Age: 61
End: 2021-12-29

## 2021-12-29 DIAGNOSIS — R07.2 PRECORDIAL PAIN: Primary | ICD-10-CM

## 2021-12-30 ENCOUNTER — TELEPHONE (OUTPATIENT)
Dept: CARDIOLOGY CLINIC | Age: 61
End: 2021-12-30

## 2021-12-30 ENCOUNTER — HOSPITAL ENCOUNTER (OUTPATIENT)
Dept: CT IMAGING | Age: 61
Discharge: HOME | End: 2021-12-30
Payer: COMMERCIAL

## 2021-12-30 ENCOUNTER — HOSPITAL ENCOUNTER (OUTPATIENT)
Age: 61
Discharge: HOME | End: 2021-12-30
Payer: COMMERCIAL

## 2021-12-30 VITALS
OXYGEN SATURATION: 97 % | HEIGHT: 65 IN | RESPIRATION RATE: 16 BRPM | SYSTOLIC BLOOD PRESSURE: 135 MMHG | DIASTOLIC BLOOD PRESSURE: 65 MMHG | WEIGHT: 208 LBS | BODY MASS INDEX: 34.66 KG/M2 | HEART RATE: 62 BPM

## 2021-12-30 DIAGNOSIS — R06.02 SOB (SHORTNESS OF BREATH): ICD-10-CM

## 2021-12-30 DIAGNOSIS — R07.2 PRECORDIAL PAIN: ICD-10-CM

## 2021-12-30 DIAGNOSIS — R94.39 ABNORMAL STRESS TEST: ICD-10-CM

## 2021-12-30 LAB
CREAT SERPL-MCNC: 0.9 MG/DL (ref 0.6–1.2)
GFR AFRICAN AMERICAN: >60
GFR NON-AFRICAN AMERICAN: >60

## 2021-12-30 PROCEDURE — 36415 COLL VENOUS BLD VENIPUNCTURE: CPT

## 2021-12-30 PROCEDURE — 6360000004 HC RX CONTRAST MEDICATION: Performed by: INTERNAL MEDICINE

## 2021-12-30 PROCEDURE — 82565 ASSAY OF CREATININE: CPT

## 2021-12-30 PROCEDURE — 75574 CT ANGIO HRT W/3D IMAGE: CPT

## 2021-12-30 RX ADMIN — IOPAMIDOL 100 ML: 755 INJECTION, SOLUTION INTRAVENOUS at 08:29

## 2021-12-30 NOTE — PROGRESS NOTES
Patient arrived for Coronary CTA. History and medications were reviewed with patient. Patient denies questions or concerns at this time. Morphine Allergy    Past Medical History:   Diagnosis Date    Anxiety     Depression     Diverticulitis 10/19/2021    DJD (degenerative joint disease)     hands/knees/ankles    Fatty liver     Hyperlipidemia     Hypertension     Irritable bowel syndrome     Mood disorder (HCC)     NOS    [facility] ([facility] [address]     ANGEL (obstructive sleep apnea)     no CPAP machine - not recommended per sleep study    Primary localized osteoarthritis of right knee 3/29/2019    Restless leg syndrome     Status post total right knee replacement 3/29/2019    Type II or unspecified type diabetes mellitus without mention of complication, not stated as uncontrolled     Vision impairment     wears glasses and contacts       Prior to Admission medications    Medication Sig Start Date End Date Taking? Authorizing Provider   metoprolol succinate (TOPROL XL) 50 MG extended release tablet Take 1 tablet by mouth daily 11/15/21  Yes Javier Hamilton MD   metFORMIN (GLUCOPHAGE) 1000 MG tablet TAKE ONE TABLET BY MOUTH DAILY WITH BREAKFAST 8/2/21  Yes Duane Nuno MD   dapagliflozin (FARXIGA) 10 MG tablet Take 1 tablet by mouth every morning 10/11/21   C Kilo Sheridan MD   pioglitazone (ACTOS) 15 MG tablet TAKE ONE TABLET BY MOUTH DAILY 10/4/21   C Kilo Sheridan MD   atorvastatin (LIPITOR) 10 MG tablet TAKE ONE TABLET BY MOUTH DAILY 10/4/21   C Kilo Sheridan MD   melatonin 5 MG TABS tablet Take 5 mg by mouth daily    Historical Provider, MD   methylphenidate (CONCERTA) 54 MG extended release tablet Take 54 mg by mouth every morning.     Historical Provider, MD   sucralfate (CARAFATE) 1 GM tablet Take 1 g by mouth daily     Historical Provider, MD   blood glucose monitor strips Test once dialy for diabetes e11.9 8/26/19   C Kilo Sheridan MD   Blood Glucose Monitoring Suppl ([facility] ULTRA 2) w/Device KIT 1 kit by Does not apply route daily 8/26/19   JILL Kohli MD   ondansetron (ZOFRAN) 4 MG tablet Take 4 mg by mouth every 8 hours as needed for Nausea or Vomiting    Historical Provider, MD   lamoTRIgine (LAMICTAL) 200 MG tablet Take 200 mg by mouth daily    Historical Provider, MD   Lancets MISC Test once daily for diabetes e11.9 7/10/18   Gregorio Almonte MD   Blood Glucose Monitoring Suppl CONCETTA Test once daily for diabetes e11.9 2/5/18   Gregorio Almonte MD   traZODone (DESYREL) 100 MG tablet Take 200 mg by mouth nightly  7/16/17   Historical Provider, MD   escitalopram (LEXAPRO) 20 MG tablet Take 10 mg by mouth daily     Historical Provider, MD       Vitals:    12/30/21 0755   BP: 137/70   Pulse: 61   Resp: 17   SpO2: 96%       Patient took 50mg Metoprolol  last night: Yes   Patient took 100 mg of Metoprolol this morning: Yes    IV placed: 20G to RAC    Current heart rhythm: normal sinus

## 2021-12-30 NOTE — PROGRESS NOTES
Patient tolerated procedure well. Patient ambulated out of department with no issues. Discharge instructions reviewed with patient all questions answered. IV removed from RAC.     Vital Signs  Vitals:    12/30/21 0835   BP: 135/65   Pulse: 62   Resp: 16   SpO2: 97%    (vital signs in table format)

## 2022-01-06 ENCOUNTER — VIRTUAL VISIT (OUTPATIENT)
Dept: INTERNAL MEDICINE CLINIC | Age: 62
End: 2022-01-06
Payer: COMMERCIAL

## 2022-01-06 DIAGNOSIS — J01.90 ACUTE NON-RECURRENT SINUSITIS, UNSPECIFIED LOCATION: Primary | ICD-10-CM

## 2022-01-06 PROCEDURE — G8427 DOCREV CUR MEDS BY ELIG CLIN: HCPCS | Performed by: NURSE PRACTITIONER

## 2022-01-06 PROCEDURE — 99213 OFFICE O/P EST LOW 20 MIN: CPT | Performed by: NURSE PRACTITIONER

## 2022-01-06 PROCEDURE — 3017F COLORECTAL CA SCREEN DOC REV: CPT | Performed by: NURSE PRACTITIONER

## 2022-01-06 RX ORDER — AMOXICILLIN AND CLAVULANATE POTASSIUM 875; 125 MG/1; MG/1
1 TABLET, FILM COATED ORAL 2 TIMES DAILY
Qty: 14 TABLET | Refills: 0 | Status: SHIPPED | OUTPATIENT
Start: 2022-01-06 | End: 2022-01-13

## 2022-01-06 RX ORDER — ASPIRIN 81 MG/1
81 TABLET ORAL DAILY
COMMUNITY

## 2022-01-06 ASSESSMENT — PATIENT HEALTH QUESTIONNAIRE - PHQ9
1. LITTLE INTEREST OR PLEASURE IN DOING THINGS: 0
SUM OF ALL RESPONSES TO PHQ QUESTIONS 1-9: 0
7. TROUBLE CONCENTRATING ON THINGS, SUCH AS READING THE NEWSPAPER OR WATCHING TELEVISION: 0
SUM OF ALL RESPONSES TO PHQ QUESTIONS 1-9: 0
3. TROUBLE FALLING OR STAYING ASLEEP: 0
DEPRESSION UNABLE TO ASSESS: FUNCTIONAL CAPACITY MOTIVATION LIMITS ACCURACY
10. IF YOU CHECKED OFF ANY PROBLEMS, HOW DIFFICULT HAVE THESE PROBLEMS MADE IT FOR YOU TO DO YOUR WORK, TAKE CARE OF THINGS AT HOME, OR GET ALONG WITH OTHER PEOPLE: 0
4. FEELING TIRED OR HAVING LITTLE ENERGY: 0
6. FEELING BAD ABOUT YOURSELF - OR THAT YOU ARE A FAILURE OR HAVE LET YOURSELF OR YOUR FAMILY DOWN: 0
2. FEELING DOWN, DEPRESSED OR HOPELESS: 0
SUM OF ALL RESPONSES TO PHQ QUESTIONS 1-9: 0
8. MOVING OR SPEAKING SO SLOWLY THAT OTHER PEOPLE COULD HAVE NOTICED. OR THE OPPOSITE, BEING SO FIGETY OR RESTLESS THAT YOU HAVE BEEN MOVING AROUND A LOT MORE THAN USUAL: 0
5. POOR APPETITE OR OVEREATING: 0
9. THOUGHTS THAT YOU WOULD BE BETTER OFF DEAD, OR OF HURTING YOURSELF: 0
SUM OF ALL RESPONSES TO PHQ QUESTIONS 1-9: 0
SUM OF ALL RESPONSES TO PHQ9 QUESTIONS 1 & 2: 0

## 2022-01-06 ASSESSMENT — ENCOUNTER SYMPTOMS
WHEEZING: 0
RHINORRHEA: 1
VOMITING: 0
NAUSEA: 0
SORE THROAT: 1
SINUS PRESSURE: 1
VOICE CHANGE: 1
SHORTNESS OF BREATH: 0
COUGH: 1

## 2022-01-06 NOTE — PROGRESS NOTES
2022    TELEHEALTH EVALUATION -- Audio/Visual (During QTWBX-64 public health emergency)    HPI:    Jose Woods (:  1960) has requested an audio/video evaluation for the following concern(s):    Sinus Pain    Patient complains of facial pain, foul rhinorrhea, nasal congestion and sinus pressure. Onset of symptoms was 1 week ago. Symptoms have been gradually worsening since that time with new symptoms of dental pain, ear pain beginning within the past 48 hours. She is drinking plenty of fluids. States energy levels are lower than her usual, however she is able to do things around the house. No other household illness contacts.  is not ill currently. Past history is significant for occasional episodes of sinusitis/bronchitis. Patient is non-smoker. Has tried flonase, singulair which has not helped symptoms to date. Review of Systems   Constitutional: Positive for chills and fatigue. Negative for fever. HENT: Positive for congestion, postnasal drip, rhinorrhea, sinus pressure, sore throat and voice change. Respiratory: Positive for cough. Negative for shortness of breath and wheezing. Cardiovascular: Negative for chest pain and palpitations. Gastrointestinal: Negative for nausea and vomiting. Neurological: Negative for dizziness and light-headedness. Hematological: Negative for adenopathy. Prior to Visit Medications    Medication Sig Taking?  Authorizing Provider   aspirin EC 81 MG EC tablet Take 81 mg by mouth daily Yes Historical Provider, MD   amoxicillin-clavulanate (AUGMENTIN) 875-125 MG per tablet Take 1 tablet by mouth 2 times daily for 7 days Yes ISAEL Beck - CNP   metoprolol succinate (TOPROL XL) 50 MG extended release tablet Take 1 tablet by mouth daily Yes Dung Obrien MD   dapagliflozin (FARXIGA) 10 MG tablet Take 1 tablet by mouth every morning Yes JILL Gordon MD   pioglitazone (ACTOS) 15 MG tablet TAKE ONE TABLET BY MOUTH DAILY Yes JILL Greene Mohinder Miller MD   atorvastatin (LIPITOR) 10 MG tablet TAKE ONE TABLET BY MOUTH DAILY Yes JILL Gordon MD   metFORMIN (GLUCOPHAGE) 1000 MG tablet TAKE ONE TABLET BY MOUTH DAILY WITH BREAKFAST Yes JILL Gordon MD   melatonin 5 MG TABS tablet Take 5 mg by mouth daily Yes Historical Provider, MD   methylphenidate (CONCERTA) 54 MG extended release tablet Take 54 mg by mouth every morning.  Yes Historical Provider, MD   sucralfate (CARAFATE) 1 GM tablet Take 1 g by mouth daily  Yes Historical Provider, MD   blood glucose monitor strips Test once dialy for diabetes e11.9 Yes Colonel Primo MD   Blood Glucose Monitoring Suppl (ONE TOUCH ULTRA 2) w/Device KIT 1 kit by Does not apply route daily Yes Colonel Primo MD   ondansetron (ZOFRAN) 4 MG tablet Take 4 mg by mouth every 8 hours as needed for Nausea or Vomiting Yes Historical Provider, MD   lamoTRIgine (LAMICTAL) 200 MG tablet Take 200 mg by mouth daily Yes Historical Provider, MD   Lancets MISC Test once daily for diabetes e11.9 Yes Colonel Primo MD   Blood Glucose Monitoring Suppl CONCETTA Test once daily for diabetes e11.9 Yes Colonel Primo MD   traZODone (DESYREL) 100 MG tablet Take 200 mg by mouth nightly  Yes Historical Provider, MD   escitalopram (LEXAPRO) 20 MG tablet Take 10 mg by mouth daily  Yes Historical Provider, MD       Social History     Tobacco Use    Smoking status: Never Smoker    Smokeless tobacco: Never Used   Vaping Use    Vaping Use: Never used   Substance Use Topics    Alcohol use: No    Drug use: No        Allergies   Allergen Reactions    Morphine Hives   ,   Past Medical History:   Diagnosis Date    Anxiety     Depression     Diverticulitis 10/19/2021    DJD (degenerative joint disease)     hands/knees/ankles    Fatty liver     Hyperlipidemia     Hypertension     Irritable bowel syndrome     Mood disorder ([facility] [address]     NOS    Narcotic addiction ([facility] [address]     ANGEL (obstructive sleep apnea)     no CPAP machine - not recommended per sleep study    Primary localized osteoarthritis of right knee 3/29/2019    Restless leg syndrome     Status post total right knee replacement 3/29/2019    Type II or unspecified type diabetes mellitus without mention of complication, not stated as uncontrolled     Vision impairment     wears glasses and contacts   ,   Past Surgical History:   Procedure Laterality Date    APPENDECTOMY      CHOLECYSTECTOMY  2001    COLONOSCOPY  1/2004    Due 2014    CYSTOSCOPY  7/9/10    CYSTOSCOPY  11/18/10    insertion of sparc mesh sling with cystoscopy    HYSTERECTOMY      JOINT REPLACEMENT      KNEE ARTHROSCOPY Right     KNEE ARTHROSCOPY Left 10/13    partial medial menisectomy    KNEE SURGERY      PLANTAR FASCIA SURGERY      bilateral    BRANDI AND BSO  4/10    DUB    TOTAL KNEE ARTHROPLASTY Right 3/29/2019    RIGHT TOTAL KNEE MAKOPLASTY REPLACEMENT WITH ADDUCTOR CANAL BLOCK FOR PAIN CONTROL                 JOE HAKAN  CPT CODE - 49200  performed by Sade Lobo MD at [place] [address]   ,   Social History     Tobacco Use    Smoking status: Never Smoker    Smokeless tobacco: Never Used   Vaping Use    Vaping Use: Never used   Substance Use Topics    Alcohol use: No    Drug use: No   ,   Family History   Problem Relation Age of Onset    Diabetes Mother     Arthritis Mother     Diabetes Father     Heart Disease Father     Cancer Father         colon    Dementia Father     Breast Cancer Sister    ,   Immunization History   Administered Date(s) Administered    COVID-19, Marino Peter, PF, 30mcg/0.3mL 03/18/2021, 04/08/2021, 12/21/2021    DTaP 1960, 01/01/1962, 02/01/1962, 04/01/1963, 04/01/1965    Hepatitis A 04/01/1967    Hepatitis B 03/27/1987, 04/25/1987, 09/25/1987    Influenza 11/11/2013    Influenza A ([id number]) Vaccine PF IM 11/25/2009    Influenza Vaccine, unspecified formulation 11/01/2012, 11/11/2013, 01/26/2016, 10/26/2016, 11/02/2017, 10/11/2018    Influenza Virus Vaccine 11/01/2012    Influenza Whole 10/28/2010    Influenza, Intradermal, Preservative free 01/26/2016    Influenza, Quadv, IM, PF (6 mo and older Fluzone, Flulaval, Fluarix, and 3 yrs and older Afluria) 10/26/2016, 11/02/2017, 10/11/2018, 12/03/2019, 11/12/2020, 10/19/2021    MMR 05/01/1970    Measles 04/01/1963    Meningococcal MCV4P (Menactra) 06/01/1968    Pneumococcal Polysaccharide ([id number]) 11/16/2017    Polio IPV (IPOL) 1960, 01/01/1962, 02/01/1962, 04/01/1963    Tdap (Boostrix, Adacel) 02/10/2014, 07/30/2020    Tetanus 07/20/2000    Zoster Recombinant (Shingrix) 11/12/2020, 06/22/2021   ,   Health Maintenance   Topic Date Due    Hepatitis A vaccine (2 of 2 - 2-dose series) 10/01/1967    Depression Monitoring  Never done    HIV screen  Never done    Diabetic microalbuminuria test  11/12/2021    Diabetic retinal exam  08/25/2022    Diabetic foot exam  10/19/2022    A1C test (Diabetic or Prediabetic)  10/19/2022    Lipid screen  10/19/2022    Potassium monitoring  10/25/2022    Breast cancer screen  12/14/2022    Creatinine monitoring  12/30/2022    Pneumococcal 0-64 years Vaccine (2 of 2 - PPSV23) 10/08/2026    Colon cancer screen colonoscopy  10/02/2029    DTaP/Tdap/Td vaccine (7 - Td or Tdap) 07/30/2030    Flu vaccine  Completed    Shingles Vaccine  Completed    COVID-19 Vaccine  Completed    Hepatitis C screen  Completed    Hib vaccine  Aged Out    Meningococcal (ACWY) vaccine  Aged Out         PHYSICAL EXAMINATION:  [ INSTRUCTIONS:  \"[x]\" Indicates a positive item  \"[]\" Indicates a negative item  -- DELETE ALL ITEMS NOT EXAMINED]  Vital Signs: (As obtained by patient/caregiver or practitioner observation)    Blood pressure-  Heart rate-    Respiratory rate-    Temperature-  Pulse oximetry-     Constitutional: [x] Appears well-developed and well-nourished [x] No apparent distress      [] Abnormal-   Mental status  [x] Alert and awake  [x] Oriented to person/place/time [x]Able to follow commands      Eyes:  EOM    [x]  Normal  [] Abnormal-  Sclera  [x]  Normal  [] Abnormal -         Discharge [x]  None visible  [] Abnormal -    HENT:   [x] Normocephalic, atraumatic. [] Abnormal   [x] Mouth/Throat: Mucous membranes are moist.     External Ears [x] Normal  [] Abnormal-     Neck: [x] No visualized mass     Pulmonary/Chest: [x] Respiratory effort normal.  [x] No visualized signs of difficulty breathing or respiratory distress        [] Abnormal-      Musculoskeletal:   [x] Normal gait with no signs of ataxia         [x] Normal range of motion of neck        [] Abnormal-       Neurological:        [x] No Facial Asymmetry (Cranial nerve 7 motor function) (limited exam to video visit)          [] No gaze palsy        [] Abnormal-         Skin:        [x] No significant exanthematous lesions or discoloration noted on facial skin         [] Abnormal-            Psychiatric:       [x] Normal Affect [x] No Hallucinations        [] Abnormal-     Other pertinent observable physical exam findings-       ASSESSMENT/PLAN:    1. Acute non-recurrent sinusitis, unspecified location        Randy Kim was seen today for other, sinusitis, cough and congestion. Diagnoses and all orders for this visit:    Acute non-recurrent sinusitis, unspecified location  -     amoxicillin-clavulanate (AUGMENTIN) 875-125 MG per tablet; Take 1 tablet by mouth 2 times daily for 7 days    Seeing recent change of symptoms, appropriate for antibiotic. Continue symptomatic management including addition of mucinex to help with decongestion. Return if symptoms worsen or fail to improve. Sam Martell is a 62 y.o. female being evaluated by a Virtual Visit (video visit) encounter to address concerns as mentioned above. A caregiver was present when appropriate.  Due to this being a TeleHealth encounter ([facility]), evaluation of the following organ systems was limited: Vitals/Constitutional/EENT/Resp/CV/GI//MS/Neuro/Skin/Heme-Lymph-Imm. Pursuant to the emergency declaration under the [address] [place] [address], [address] [place] [address] and the [employer] Act, this Virtual Visit was conducted with patient's (and/or legal guardian's) consent, to reduce the patient's risk of exposure to COVID-19 and provide necessary medical care. The patient (and/or legal guardian) has also been advised to contact this office for worsening conditions or problems, and seek emergency medical treatment and/or call 911 if deemed necessary. Services were provided through a phone discussion virtually to substitute for in-person clinic visit. Patient and provider were located at their individual homes. Consent:  She and/or health care decision maker is aware that that she may receive a bill for this telephone service, depending on her insurance coverage, and has provided verbal consent to proceed: Yes    Total Time: minutes: 11-20 minutes    --ISAEL Wu CNP on 1/6/2022 at 3:19 PM    An electronic signature was used to authenticate this note.

## 2022-02-22 ENCOUNTER — OFFICE VISIT (OUTPATIENT)
Dept: FAMILY MEDICINE CLINIC | Age: 62
End: 2022-02-22
Payer: COMMERCIAL

## 2022-02-22 VITALS
HEIGHT: 65 IN | DIASTOLIC BLOOD PRESSURE: 70 MMHG | OXYGEN SATURATION: 98 % | BODY MASS INDEX: 34.22 KG/M2 | SYSTOLIC BLOOD PRESSURE: 110 MMHG | HEART RATE: 79 BPM | WEIGHT: 205.4 LBS

## 2022-02-22 DIAGNOSIS — Z79.4 TYPE 2 DIABETES MELLITUS WITHOUT COMPLICATION, WITH LONG-TERM CURRENT USE OF INSULIN (HCC): ICD-10-CM

## 2022-02-22 DIAGNOSIS — I10 ESSENTIAL HYPERTENSION: ICD-10-CM

## 2022-02-22 DIAGNOSIS — E11.9 TYPE 2 DIABETES MELLITUS WITHOUT COMPLICATION, WITH LONG-TERM CURRENT USE OF INSULIN (HCC): ICD-10-CM

## 2022-02-22 DIAGNOSIS — Z00.00 ANNUAL PHYSICAL EXAM: Primary | ICD-10-CM

## 2022-02-22 DIAGNOSIS — E78.00 PURE HYPERCHOLESTEROLEMIA: ICD-10-CM

## 2022-02-22 PROBLEM — M54.41 RIGHT-SIDED LOW BACK PAIN WITH RIGHT-SIDED SCIATICA: Status: RESOLVED | Noted: 2020-07-30 | Resolved: 2022-02-22

## 2022-02-22 PROBLEM — R10.13 EPIGASTRIC PAIN: Status: RESOLVED | Noted: 2019-05-31 | Resolved: 2022-02-22

## 2022-02-22 PROBLEM — R00.2 PALPITATIONS: Status: RESOLVED | Noted: 2021-11-15 | Resolved: 2022-02-22

## 2022-02-22 LAB
CREATININE URINE: 104.4 MG/DL (ref 28–259)
HBA1C MFR BLD: 7 %
MICROALBUMIN UR-MCNC: <1.2 MG/DL
MICROALBUMIN/CREAT UR-RTO: NORMAL MG/G (ref 0–30)

## 2022-02-22 PROCEDURE — G8482 FLU IMMUNIZE ORDER/ADMIN: HCPCS | Performed by: PHYSICIAN ASSISTANT

## 2022-02-22 PROCEDURE — 99396 PREV VISIT EST AGE 40-64: CPT | Performed by: PHYSICIAN ASSISTANT

## 2022-02-22 PROCEDURE — 83036 HEMOGLOBIN GLYCOSYLATED A1C: CPT | Performed by: PHYSICIAN ASSISTANT

## 2022-02-22 RX ORDER — PIOGLITAZONEHYDROCHLORIDE 15 MG/1
TABLET ORAL
Qty: 30 TABLET | Refills: 5 | Status: SHIPPED | OUTPATIENT
Start: 2022-02-22 | End: 2022-07-05 | Stop reason: SDUPTHER

## 2022-02-22 RX ORDER — ATORVASTATIN CALCIUM 10 MG/1
TABLET, FILM COATED ORAL
Qty: 30 TABLET | Refills: 5 | Status: SHIPPED | OUTPATIENT
Start: 2022-02-22 | End: 2022-03-01 | Stop reason: SDUPTHER

## 2022-02-22 NOTE — PROGRESS NOTES
History and Physical      Viviane Wilhelm  YOB: 1960    Date of Service:  2/22/2022    Chief Complaint:   Viviane Wilhelm is a 62 y.o. female who presents for complete physical examination. HPI: Overall feeling fairly tired. She is in the middle of cardiac work up. Had abnormal stress test.     Glucose checked at home typically 150-200.      Wt Readings from Last 3 Encounters:   02/22/22 205 lb 6.4 oz (93.2 kg)   12/30/21 208 lb (94.3 kg)   12/21/21 208 lb (94.3 kg)     BP Readings from Last 3 Encounters:   02/22/22 110/70   12/30/21 135/65   12/21/21 130/80       Patient Active Problem List   Diagnosis    Essential hypertension    Anxiety    Precordial pain    Localized osteoarthrosis, lower leg    Tear of medial cartilage or meniscus of knee, current    Moderate episode of recurrent major depressive disorder ([facility] [address]    Pure hypercholesterolemia    Breast nodule    Epigastric pain    Right-sided low back pain with right-sided sciatica    Palpitations    Abnormal stress test       Preventive Care:  Health Maintenance   Topic Date Due    Hepatitis A vaccine (2 of 2 - 2-dose series) 10/01/1967    Diabetic microalbuminuria test  11/12/2021    Diabetic retinal exam  08/25/2022    Diabetic foot exam  10/19/2022    A1C test (Diabetic or Prediabetic)  10/19/2022    Lipid screen  10/19/2022    Potassium monitoring  10/25/2022    Breast cancer screen  12/14/2022    Creatinine monitoring  12/30/2022    Depression Monitoring  01/06/2023    Pneumococcal 0-64 years Vaccine (2 of 2 - PPSV23) 10/08/2026    Colorectal Cancer Screen  10/02/2029    DTaP/Tdap/Td vaccine (7 - Td or Tdap) 07/30/2030    Flu vaccine  Completed    Shingles Vaccine  Completed    COVID-19 Vaccine  Completed    Hepatitis C screen  Completed    Hib vaccine  Aged Out    Meningococcal (ACWY) vaccine  Aged Out    HIV screen  Discontinued     Hx abnormal PAP: no  Sexual activity: none  Self-breast exams: no  Last DexA scan: normal recheck @ 65  Last eye exam:  Exercise: 2021  Seatbelt use: yes  Calcium/vitamin D supplement: no  Lipid panel:   Lab Results   Component Value Date    TRIG 86 10/19/2021    HDL 65 10/19/2021    HDL 45 09/11/2010    LDLCALC 90 10/19/2021      Living will:  yes,   copy requested    Immunization History   Administered Date(s) Administered    COVID-19, Pfizer Purple top, DILUTE for use, 12+ yrs, 30mcg/0.3mL dose 03/18/2021, 04/08/2021, 12/21/2021    DTaP 1960, 01/01/1962, 02/01/1962, 04/01/1963, 04/01/1965    Hepatitis A 04/01/1967    Hepatitis B 03/27/1987, 04/25/1987, 09/25/1987    Influenza 11/11/2013    Influenza A ([id number]) Vaccine PF IM 11/25/2009    Influenza Vaccine, unspecified formulation 11/01/2012, 11/11/2013, 01/26/2016, 10/26/2016, 11/02/2017, 10/11/2018    Influenza Virus Vaccine 11/01/2012    Influenza Whole 10/28/2010    Influenza, Intradermal, Preservative free 01/26/2016    Influenza, Quadv, IM, PF (6 mo and older Fluzone, Flulaval, Fluarix, and 3 yrs and older Afluria) 10/26/2016, 11/02/2017, 10/11/2018, 12/03/2019, 11/12/2020, 10/19/2021    MMR 05/01/1970    Measles 04/01/1963    Meningococcal MCV4P (Menactra) 06/01/1968    Pneumococcal Polysaccharide ([id number]) 11/16/2017    Polio IPV (IPOL) 1960, 01/01/1962, 02/01/1962, 04/01/1963    Tdap (Boostrix, Adacel) 02/10/2014, 07/30/2020    Tetanus 07/20/2000    Zoster Recombinant (Shingrix) 11/12/2020, 06/22/2021       Allergies   Allergen Reactions    Morphine Hives     Current Outpatient Medications   Medication Sig Dispense Refill    atorvastatin (LIPITOR) 10 MG tablet TAKE ONE TABLET BY MOUTH DAILY 30 tablet 5    pioglitazone (ACTOS) 15 MG tablet TAKE ONE TABLET BY MOUTH DAILY 30 tablet 5    metFORMIN (GLUCOPHAGE) 1000 MG tablet TAKE ONE TABLET BY MOUTH DAILY WITH BREAKFAST 90 tablet 1    aspirin EC 81 MG EC tablet Take 81 mg by mouth daily      metoprolol succinate (TOPROL XL) 50 MG extended release tablet Take 1 tablet by mouth daily 30 tablet 5    dapagliflozin (FARXIGA) 10 MG tablet Take 1 tablet by mouth every morning 90 tablet 1    melatonin 5 MG TABS tablet Take 5 mg by mouth daily      methylphenidate (CONCERTA) 54 MG extended release tablet Take 54 mg by mouth every morning.  sucralfate (CARAFATE) 1 GM tablet Take 1 g by mouth daily       blood glucose monitor strips Test once dialy for diabetes e11.9 100 strip 5    Blood Glucose Monitoring Suppl (ONE TOUCH ULTRA 2) w/Device KIT 1 kit by Does not apply route daily 1 kit 0    lamoTRIgine (LAMICTAL) 200 MG tablet Take 200 mg by mouth daily      traZODone (DESYREL) 100 MG tablet Take 200 mg by mouth nightly       escitalopram (LEXAPRO) 20 MG tablet Take 10 mg by mouth daily       ondansetron (ZOFRAN) 4 MG tablet Take 4 mg by mouth every 8 hours as needed for Nausea or Vomiting (Patient not taking: Reported on 2/22/2022)      Lancets MISC Test once daily for diabetes e11.9 (Patient not taking: Reported on 2/22/2022) 100 each 3    Blood Glucose Monitoring Suppl CONCETTA Test once daily for diabetes e11.9 (Patient not taking: Reported on 2/22/2022) 1 Device 0     No current facility-administered medications for this visit.        Past Medical History:   Diagnosis Date    Anxiety     Depression     Diverticulitis 10/19/2021    DJD (degenerative joint disease)     hands/knees/ankles    Fatty liver     Hyperlipidemia     Hypertension     Irritable bowel syndrome     Mood disorder (HCC)     NOS    Narcotic addiction ([facility] [address]     ANGEL (obstructive sleep apnea)     no CPAP machine - not recommended per sleep study    Primary localized osteoarthritis of right knee 3/29/2019    Restless leg syndrome     Status post total right knee replacement 3/29/2019    Type II or unspecified type diabetes mellitus without mention of complication, not stated as uncontrolled     Vision impairment     wears glasses and contacts     Past Surgical History:   Procedure Laterality Date    APPENDECTOMY      CHOLECYSTECTOMY  2001    COLONOSCOPY  1/2004    Due 2014    CYSTOSCOPY  7/9/10    CYSTOSCOPY  11/18/10    insertion of sparc mesh sling with cystoscopy    HYSTERECTOMY      JOINT REPLACEMENT      KNEE ARTHROSCOPY Right     KNEE ARTHROSCOPY Left 10/13    partial medial menisectomy    KNEE SURGERY      PLANTAR FASCIA SURGERY      bilateral    BRANDI AND BSO  4/10    DUB    TOTAL KNEE ARTHROPLASTY Right 3/29/2019    RIGHT TOTAL KNEE MAKOPLASTY REPLACEMENT WITH ADDUCTOR CANAL BLOCK FOR PAIN CONTROL                 JOE MCCLENDON  CPT CODE - 71278  performed by Ad Lieberman MD at [facility] History   Problem Relation Age of Onset    Diabetes Mother    Wilhelminia Blazing Arthritis Mother     Diabetes Father     Heart Disease Father     Cancer Father         colon    Dementia Father     Breast Cancer Sister      Social History     Socioeconomic History    Marital status:      Spouse name: Not on file    Number of children: Not on file    Years of education: Not on file    Highest education level: Not on file   Occupational History    Not on file   Tobacco Use    Smoking status: Never Smoker    Smokeless tobacco: Never Used   Vaping Use    Vaping Use: Never used   Substance and Sexual Activity    Alcohol use: No    Drug use: No    Sexual activity: Not on file   Other Topics Concern    Not on file   Social History Narrative    Not on file     Social Determinants of Health     Financial Resource Strain:     Difficulty of Paying Living Expenses: Not on file   Food Insecurity:     Worried About Running Out of Food in the Last Year: Not on file    Suzie of Food in the Last Year: Not on file   Transportation Needs:     Lack of Transportation (Medical): Not on file    Lack of Transportation (Non-Medical):  Not on file   Physical Activity:     Days of Exercise per Week: Not on file    Minutes of Exercise per Session: Not on file   Stress:  Feeling of Stress : Not on file   Social Connections:     Frequency of Communication with Friends and Family: Not on file    Frequency of Social Gatherings with Friends and Family: Not on file    Attends Congregational Services: Not on file    Active Member of Clubs or Organizations: Not on file    Attends Club or Organization Meetings: Not on file    Marital Status: Not on file   Intimate Partner Violence:     Fear of Current or Ex-Partner: Not on file    Emotionally Abused: Not on file    Physically Abused: Not on file    Sexually Abused: Not on file   Housing Stability:     Unable to Pay for Housing in the Last Year: Not on file    Number of [place] in the Last Year: Not on file    Unstable Housing in the Last Year: Not on file       Review of Systems:  A comprehensive review of systems was negative except for what was noted in the HPI. Physical Exam:   Vitals:    02/22/22 1004   BP: 110/70   Site: Right Upper Arm   Position: Sitting   Pulse: 79   SpO2: 98%   Weight: 205 lb 6.4 oz (93.2 kg)   Height: 5' 5\" (1.651 m)     Body mass index is 34.18 kg/m². Constitutional: She is oriented to person, place, and time. She appears well-developed and well-nourished. No distress. HEENT:   Head: Normocephalic and atraumatic. Right Ear: Tympanic membrane, external ear and ear canal normal.   Left Ear: Tympanic membrane, external ear and ear canal normal.   Nose: Nose normal.   Mouth/Throat: Oropharynx is clear and moist, and mucous membranes are normal.  There is no cervical adenopathy. Eyes: Conjunctivae and extraocular motions are normal. Pupils are equal, round, and reactive to light. Neck: Neck supple. No JVD present. Carotid bruit is not present. No mass and no thyromegaly present. Cardiovascular: Normal rate, regular rhythm, normal heart sounds and intact distal pulses. Exam reveals no gallop and no friction rub. No murmur heard.   Pulmonary/Chest: Effort normal and breath sounds normal. No respiratory distress. She has no wheezes, rhonchi or rales. Abdominal: Soft, non-tender. Bowel sounds and aorta are normal. She exhibits no organomegaly, mass or bruit. Musculoskeletal: Normal range of motion, no synovitis. She exhibits no edema. Neurological: She is alert and oriented to person, place, and time. She has normal reflexes. No cranial nerve deficit. Coordination normal.   Skin: Skin is warm and dry. There is no rash or erythema. No suspicious lesions noted. Psychiatric: She has a normal mood and affect. Her speech is normal and behavior is normal. Judgment, cognition and memory are normal.           Assessment/Plan:     Diagnosis Orders   1. Annual physical exam     2. Pure hypercholesterolemia  atorvastatin (LIPITOR) 10 MG tablet   3. Type 2 diabetes mellitus without complication, with long-term current use of insulin ([facility])  POCT glycosylated hemoglobin (Hb A1C)    Microalbumin / Creatinine Urine Ratio   4. Essential hypertension         A1c 7 today. No changes in medication. To work on diet and exercise with weight loss. F/u 4 months.

## 2022-03-01 ENCOUNTER — OFFICE VISIT (OUTPATIENT)
Dept: CARDIOLOGY CLINIC | Age: 62
End: 2022-03-01
Payer: COMMERCIAL

## 2022-03-01 VITALS
HEIGHT: 65 IN | BODY MASS INDEX: 33.82 KG/M2 | OXYGEN SATURATION: 97 % | HEART RATE: 94 BPM | SYSTOLIC BLOOD PRESSURE: 136 MMHG | DIASTOLIC BLOOD PRESSURE: 78 MMHG | WEIGHT: 203 LBS

## 2022-03-01 DIAGNOSIS — R94.39 ABNORMAL STRESS TEST: Primary | ICD-10-CM

## 2022-03-01 DIAGNOSIS — I25.10 CORONARY ARTERY DISEASE INVOLVING NATIVE CORONARY ARTERY OF NATIVE HEART WITHOUT ANGINA PECTORIS: ICD-10-CM

## 2022-03-01 DIAGNOSIS — I10 ESSENTIAL HYPERTENSION: ICD-10-CM

## 2022-03-01 DIAGNOSIS — E78.00 PURE HYPERCHOLESTEROLEMIA: ICD-10-CM

## 2022-03-01 PROCEDURE — 1036F TOBACCO NON-USER: CPT | Performed by: INTERNAL MEDICINE

## 2022-03-01 PROCEDURE — G8417 CALC BMI ABV UP PARAM F/U: HCPCS | Performed by: INTERNAL MEDICINE

## 2022-03-01 PROCEDURE — G8482 FLU IMMUNIZE ORDER/ADMIN: HCPCS | Performed by: INTERNAL MEDICINE

## 2022-03-01 PROCEDURE — G8427 DOCREV CUR MEDS BY ELIG CLIN: HCPCS | Performed by: INTERNAL MEDICINE

## 2022-03-01 PROCEDURE — 3017F COLORECTAL CA SCREEN DOC REV: CPT | Performed by: INTERNAL MEDICINE

## 2022-03-01 PROCEDURE — 99214 OFFICE O/P EST MOD 30 MIN: CPT | Performed by: INTERNAL MEDICINE

## 2022-03-01 RX ORDER — ATORVASTATIN CALCIUM 40 MG/1
TABLET, FILM COATED ORAL
Qty: 90 TABLET | Refills: 3 | Status: SHIPPED | OUTPATIENT
Start: 2022-03-01 | End: 2022-04-27

## 2022-03-01 RX ORDER — METOPROLOL SUCCINATE 50 MG/1
50 TABLET, EXTENDED RELEASE ORAL DAILY
Qty: 90 TABLET | Refills: 3 | Status: SHIPPED | OUTPATIENT
Start: 2022-03-01

## 2022-03-01 NOTE — PATIENT INSTRUCTIONS
Plan:  Increase Lipitor to 40 mg daily- call with an increase in muscle aches   Agree with sleep study   Fasting lipids in 3 months   Cardiac medications reviewed including indications and pertinent side effects    Check blood pressure and heart rate at home a few times per week- keep a log with dates and times and bring to office visit   Regular exercise and following a healthy diet encouraged- increase activity, 30-45 minutes 3 days per week   Follow up with me in 1 year

## 2022-03-01 NOTE — PROGRESS NOTES
[facility]   Cardiac Consultation    Referring Provider:  PAULA Danielle     Chief Complaint   Patient presents with    Follow-up    Chest Pain    Shortness of Breath     with exertion      Carolyn Randle   1960    History of Present Illness: Carolyn Randle is a 62 y.o. female who is here today for follow up and to review testing. She was initially seen as a new patient consult for cardiology at the request of her PCP for dizziness and hypertension. She has a past medical history of hypertension, hyperlipidemia, sleep apnea, and diabetes mellitus. Previous testing includes a stress test from 2013 which showed no large areas of reversibility to suggest ischemia. CT chest 10/25/2021 showed Cardiomegaly. An echocardiogram from 11/24/221 showed an EF of 55%. Stress test 12/17/2021 showed a small inferoapical/anteroapical defect suggestive of ischemia. She wore a cardiac event monitor showed rare PVC's/PAC's. She underwent a cardiac CTA which showed a CT calcium score of 22, Right coronary artery is a tiny vessel, which may simply be due to the left coronary artery dominance rather than chronic narrowing of the right coronary artery. Mild thickening of the left ventricular apex. Today she states she continues to have issues with SOB and fatigue with stairs. No chest pains. She has not been very active due to her  being recently sick and in the hospital. She is tolerating her medications and taking them as prescribed. She has some muscle aches that she thinks is related to her arthritis. Patient currently denies any weight gain, edema, palpitations, chest pain, dizziness, and syncope. Blood pressure at home runs 120/80's, heart rate 70's. She is scheduled for a sleep study soon.      Past Medical History:   has a past medical history of Anxiety, Depression, Diverticulitis, DJD (degenerative joint disease), Fatty liver, Hyperlipidemia, Hypertension, Irritable bowel syndrome, Mood disorder ([facility] [address] Narcotic addiction ([facility] [address] ANGEL (obstructive sleep apnea), Primary localized osteoarthritis of right knee, Restless leg syndrome, Status post total right knee replacement, Type II or unspecified type diabetes mellitus without mention of complication, not stated as uncontrolled, and Vision impairment. Surgical History:   has a past surgical history that includes ele and bso (cervix removed) (4/10); Knee arthroscopy (Right); Plantar fascia surgery; Appendectomy; Cholecystectomy (2001); Cystoscopy (7/9/10); Cystoscopy (11/18/10); Colonoscopy (1/2004); Knee arthroscopy (Left, 10/13); Total knee arthroplasty (Right, 3/29/2019); joint replacement; knee surgery; and Hysterectomy. Social History:   reports that she has never smoked. She has never used smokeless tobacco. She reports that she does not drink alcohol and does not use drugs. Family History:  family history includes Arthritis in her mother; Breast Cancer in her sister; Cancer in her father; Dementia in her father; Diabetes in her father and mother; Heart Disease in her father. Home Medications:  Prior to Admission medications    Medication Sig Start Date End Date Taking?  Authorizing Provider   atorvastatin (LIPITOR) 10 MG tablet TAKE ONE TABLET BY MOUTH DAILY 2/22/22  Yes PAULA Montero   pioglitazone (ACTOS) 15 MG tablet TAKE ONE TABLET BY MOUTH DAILY 2/22/22  Yes PAULA Montero   metFORMIN (GLUCOPHAGE) 1000 MG tablet TAKE ONE TABLET BY MOUTH DAILY WITH BREAKFAST 2/1/22  Yes Pamela Lambert,    aspirin EC 81 MG EC tablet Take 81 mg by mouth daily   Yes Historical Provider, MD   metoprolol succinate (TOPROL XL) 50 MG extended release tablet Take 1 tablet by mouth daily 11/15/21  Yes Lyndsey Shah MD   dapagliflozin (FARXIGA) 10 MG tablet Take 1 tablet by mouth every morning 10/11/21  Yes Too Faye MD   melatonin 5 MG TABS tablet Take 5 mg by mouth daily   Yes Historical Provider, MD   methylphenidate (CONCERTA) balance, coordination, mood, affect, memory, mentation, behavior. · Psychiatric: No anxiety, no depression. · Endocrine: No malaise, fatigue or temperature intolerance. No excessive thirst, fluid intake, or urination. No tremor. · Hematologic/Lymphatic: No abnormal bruising or bleeding, blood clots or swollen lymph nodes. · Allergic/Immunologic: No nasal congestion or hives. Physical Examination:    Vitals:    03/01/22 1239   BP: 136/78   Pulse: 94   SpO2: 97%        Constitutional and General Appearance: NAD   Respiratory:  · Normal excursion and expansion without use of accessory muscles  · Resp Auscultation: Normal breath sounds without dullness  Cardiovascular:  · The apical impulses not displaced  · Heart tones are crisp and normal  · Cervical veins are not engorged  · The carotid upstroke is normal in amplitude and contour without delay or bruit  · Normal S1S2, No S3, No Murmur  · Peripheral pulses are symmetrical and full  · There is no clubbing, cyanosis of the extremities. · No edema  · Femoral Arteries: 2+ and equal  · Pedal Pulses: 2+ and equal   Abdomen:  · No masses or tenderness  · Liver/Spleen: No Abnormalities Noted  Neurological/Psychiatric:  · Alert and oriented in all spheres  · Moves all extremities well  · Exhibits normal gait balance and coordination  · No abnormalities of mood, affect, memory, mentation, or behavior are noted      Stress test 7/11/2013  Impression: 1. No large areas of reversibility to suggest ischemia. 2. Left ventricular ejection fraction is approximately 56%. EKG 10/25/2021  Baseline artifact  Normal sinus rhythm    CT chest 10/25/2021  Mediastinum: The thoracic aorta is normal in course and caliber. Mild cardiomegaly with no pericardial effusion. There is no pathologic hilar or mediastinal adenopathy. Heterogeneity of the thyroid gland is noted with no discrete nodule. The visualized esophagus is unremarkable.      EKG today   Sinus tachycardia 132 Echocardiogram 11/24/221   Summary   Normal left ventricle systolic function with an estimated ejection fraction   of 55%. No regional wall motion abnormalities are seen. Normal left ventricular diastolic filling pressures. Mild mitral regurgitation. Inadequate tricuspid regurgitation to estimate systolic pulmonary artery   pressure. IVC is normal in size (< 2.1 cm) and collapses > 50% with respiration   consistent with normal right atrial pressure (3 mmHg). Lipomatous hypertrophy of the interatrial septum. Stress test 12/17/2021  Conclusions    Summary  Normal LVEF >60%  Normal wall motion  Small inferoapical/anteroapical defect suggestive of ischemia    Overall, this would be considered an abnormal, low to intermediate risk,  study         Cardiac event monitor 11/15-11/29/2021  Rare NSR with PAC's and PVC's       Cardiac CTA 12/30/2021  Impression Mildly limited by phase misregistration artifact from irregular heart rate during the study. No significant plaque or flow limiting stenosis noted in the circumflex or LAD. Right coronary artery is a tiny vessel, which may simply be due to the left coronary artery dominance rather than chronic narrowing of the right coronary artery   Mild thickening of the left ventricular apex   Calcium score is 22, with calcium seen in the circumflex only. Assessment:   Grimes - due to chronic deconditioning. No sig cardiac component    Coronary artery disease- nonobstructive by CTA. stable  Abnormal stress test - appears to be false positive   Chest pain - typical/atypical - no recent. Palpitations due to tachycardia w/ activity   Tachycardia - appears inappropriate sinus tach  Cardiomegaly noted on CT chest 10/25/2021. Not present on echo  Hypertension - well controlled   Hyperlipidemia - suboptimal   Sleep apnea   Diabetes Mellitus   Family history of heart diease in father   Fatigue - consider sleep apnea.  Has sleep eval scheduled     Plan:  Increase Lipitor to 40 mg daily- call with an increase in muscle aches   Agree with sleep study   Fasting lipids in 3 months   Cardiac medications reviewed including indications and pertinent side effects    Check blood pressure and heart rate at home a few times per week- keep a log with dates and times and bring to office visit   Regular exercise and following a healthy diet encouraged- increase activity, 30-45 minutes 3 days per week   Follow up with me in 1 year     Scribe's attestation: This note was scribed in the presence of Dr. Tasia Kuhn M.D. By J Luis Silvestre RN    The scribes documentation has been prepared under my direction and personally reviewed by me in its entirety. I confirm that the note above accurately reflects all work, treatment, procedures, and medical decision making performed by me. Dr. Tasia Kuhn MD      Thank you for allowing me to participate in the care of this individual.      Connie Rondon.  Felisa Gonzales M.D., [facility]

## 2022-03-17 ENCOUNTER — OFFICE VISIT (OUTPATIENT)
Dept: SLEEP MEDICINE | Age: 62
End: 2022-03-17
Payer: COMMERCIAL

## 2022-03-17 VITALS
RESPIRATION RATE: 16 BRPM | BODY MASS INDEX: 32.62 KG/M2 | SYSTOLIC BLOOD PRESSURE: 129 MMHG | WEIGHT: 203 LBS | TEMPERATURE: 96.8 F | OXYGEN SATURATION: 97 % | HEIGHT: 66 IN | HEART RATE: 89 BPM | DIASTOLIC BLOOD PRESSURE: 80 MMHG

## 2022-03-17 DIAGNOSIS — R53.83 OTHER FATIGUE: ICD-10-CM

## 2022-03-17 DIAGNOSIS — F51.04 PSYCHOPHYSIOLOGICAL INSOMNIA: ICD-10-CM

## 2022-03-17 DIAGNOSIS — R06.83 SNORING: Primary | ICD-10-CM

## 2022-03-17 DIAGNOSIS — Z72.821 POOR SLEEP HYGIENE: ICD-10-CM

## 2022-03-17 DIAGNOSIS — G47.30 OBSERVED SLEEP APNEA: ICD-10-CM

## 2022-03-17 DIAGNOSIS — G47.10 HYPERSOMNIA: ICD-10-CM

## 2022-03-17 DIAGNOSIS — I10 ESSENTIAL HYPERTENSION: ICD-10-CM

## 2022-03-17 DIAGNOSIS — E66.9 OBESITY (BMI 30-39.9): ICD-10-CM

## 2022-03-17 PROCEDURE — G8482 FLU IMMUNIZE ORDER/ADMIN: HCPCS | Performed by: NURSE PRACTITIONER

## 2022-03-17 PROCEDURE — G8417 CALC BMI ABV UP PARAM F/U: HCPCS | Performed by: NURSE PRACTITIONER

## 2022-03-17 PROCEDURE — G8427 DOCREV CUR MEDS BY ELIG CLIN: HCPCS | Performed by: NURSE PRACTITIONER

## 2022-03-17 PROCEDURE — 99244 OFF/OP CNSLTJ NEW/EST MOD 40: CPT | Performed by: NURSE PRACTITIONER

## 2022-03-17 ASSESSMENT — SLEEP AND FATIGUE QUESTIONNAIRES
HOW LIKELY ARE YOU TO NOD OFF OR FALL ASLEEP WHILE WATCHING TV: 2
HOW LIKELY ARE YOU TO NOD OFF OR FALL ASLEEP WHILE SITTING AND READING: 1
HOW LIKELY ARE YOU TO NOD OFF OR FALL ASLEEP IN A CAR, WHILE STOPPED FOR A FEW MINUTES IN TRAFFIC: 0
HOW LIKELY ARE YOU TO NOD OFF OR FALL ASLEEP WHILE SITTING QUIETLY AFTER LUNCH WITHOUT ALCOHOL: 0
HOW LIKELY ARE YOU TO NOD OFF OR FALL ASLEEP WHILE SITTING INACTIVE IN A PUBLIC PLACE: 0
HOW LIKELY ARE YOU TO NOD OFF OR FALL ASLEEP WHILE LYING DOWN TO REST IN THE AFTERNOON WHEN CIRCUMSTANCES PERMIT: 2
HOW LIKELY ARE YOU TO NOD OFF OR FALL ASLEEP WHILE SITTING AND TALKING TO SOMEONE: 0
ESS TOTAL SCORE: 5
NECK CIRCUMFERENCE (INCHES): 15
HOW LIKELY ARE YOU TO NOD OFF OR FALL ASLEEP WHEN YOU ARE A PASSENGER IN A CAR FOR AN HOUR WITHOUT A BREAK: 0

## 2022-03-17 NOTE — PATIENT INSTRUCTIONS
Sleep Hygiene. .. Tips for better sleep. .. Avoid naps. This will ensure you are sleepy at bedtime. If you have to take a nap, sleep less than 1 hour, before 3 pm.  Sleep only when sleepy; this reduces the time you are awake in bed. Regular exercise is recommended to help you deepen your sleep, but not within 4-6 hours of your bedtime. Timing of exercise is important, aim to exercise early in the morning or early afternoon. A light snack may help you fall asleep. Warm milk and foods high in the amino acid tryptophan, such as bananas, may help you to sleep  Be sure to avoid heavy, spicy or sugary foods 4-6 hours before bedtime and avoid at snack time. Stay away from stimulants such as caffeine and nicotine for at least 4-6 hours before bed. Stimulants can interfere with your ability to fall asleep. Caffeine is found in tea, cola, coffee, cocoa and chocolate and is best avoided at bedtime. Nicotine is found in tobacco products. Avoid alcohol 4-6 hours before bedtime. Alcohol has an immediate sleep-inducing effect, after a few hours when alcohol levels fall there is a stimulant or wake-up effect and will cause fragmented sleep. Sleep rituals are important. Give your body clues it is time to slow down and sleep. Examples include; yoga, deep breathing, listen to relaxing music, a hot bath or a few minutes of reading. Have a fixed bedtime and awakening time, Even on weekends! You will feel better keeping a regular sleep cycle, even if you are retired or not working. Get into your favorite sleep position. If not asleep in 30 minutes, get up and do something boring until you feel sleepy. Remember not to expose yourself to bright lights such as TV, phone or tablet screens. Only use your bed for sleeping. Do not use your bed as an office, workroom or recreation room. Use comfortable bedding. Uncomfortable bedding can prevent good sleep. Ensure your bedroom is quiet and comfortable.  A cooler room along with enough blankets to stay warm is recommended. If your room is too noisy, try a white noise machine. If too bright, try black out shades or an eye mask. Dont take worries to bed. Leave worries about work, school etc. behind you when you go to bed. Some people find it helpful to assign a worry period in the evening or late afternoon to write down your worries and get them out of your system. Address to Sleep Center: [facility] [address] [place] [address], [address] [place] [address]            Phone: [phone] Fax: [phone]    If you should need to cancel or reschedule your appointment, please call the Sleep Center at [phone] as soon as possible. We ask that you please phone the [facility] Patient Pre-Services ([phone]) at least 3-5 days prior to your sleep study to pre-register. Failing to pre-register may ultimately cause your insurance to not pay for this procedure. Please refrain from or reduce the use of caffeine and/or alcohol prior to your sleep study and avoid napping the day of your study as these will affect the accuracy of your test results.

## 2022-03-17 NOTE — PROGRESS NOTES
Patient ID: Elizabeth Russell is a 62 y.o. female who is being seen today for   Chief Complaint   Patient presents with    New Patient     Sleep eval ref by Dr. Tita Underwood     Referring: Dr. Siddharth Jean    HPI:   Elizabeth Russell is a 62 y.o. female in office for sleep apnea evaluation. Patient reports snoring at night for the past 20 years. Doesn't sleep in supine position. Has witnessed apnea. No restorative sleep. +dry mouth upon awakening. Fatigue and tiredness during the day. No history of sleep apnea. Bedtime 10 pm and rise time is 1030-11 am. It takes 45-60 minutes to fall asleep- watches TV. States takes trazodone, melatonin nightly states it helps. 2 nocturia. Wakes up 2 times at night. It takes few- 45 minutes to fall back a sleep. Takes 1 nap during the day (60 minutes). No headache in am. No car wrecks or near wrecks because of the sleepiness. No nodding off while driving. Gained 20 pounds in the past 2 years. +forgetfulness and decreased concentration. Drinks 1 caffinated beverages per day. No significant alcohol. +restless feelings in legs at night.  +teeth grinding. +nightmares. No sleep walking. No night time panic attacks. No narcotics. No drug abuse. +history of depression. +history of anxiety see psychiatry. No history of atrial fibrillation. +history of DM. +history of HTN. No history of ischemic heart disease. No history of stroke. ESS is 5. No smoking. No known FH for or narcolepsy. +FH for RLS mom.   +FH for ANGEL- brother    Sleep Medicine 3/17/2022   Sitting and reading 1   Watching TV 2   Sitting, inactive in a public place (e.g. a theatre or a meeting) 0   As a passenger in a car for an hour without a break 0   Lying down to rest in the afternoon when circumstances permit 2   Sitting and talking to someone 0   Sitting quietly after a lunch without alcohol 0   In a car, while stopped for a few minutes in traffic 0   Total score 5   Neck circumference (Inches) 15       Past Medical History:  Past Medical History:   Diagnosis Date    Anxiety     Depression     Diverticulitis 10/19/2021    DJD (degenerative joint disease)     hands/knees/ankles    Fatty liver     Hyperlipidemia     Hypertension     Irritable bowel syndrome     Mood disorder ([facility] [address]     NOS    Narcotic addiction ([facility] [address]     ANGEL (obstructive sleep apnea)     no CPAP machine - not recommended per sleep study    Primary localized osteoarthritis of right knee 3/29/2019    Restless leg syndrome     Status post total right knee replacement 3/29/2019    Type II or unspecified type diabetes mellitus without mention of complication, not stated as uncontrolled     Vision impairment     wears glasses and contacts       Past Surgical History:        Procedure Laterality Date    APPENDECTOMY      CHOLECYSTECTOMY  2001    COLONOSCOPY  1/2004    Due 2014    CYSTOSCOPY  7/9/10    CYSTOSCOPY  11/18/10    insertion of sparc mesh sling with cystoscopy    HYSTERECTOMY      JOINT REPLACEMENT      KNEE ARTHROSCOPY Right     KNEE ARTHROSCOPY Left 10/13    partial medial menisectomy    KNEE SURGERY      PLANTAR FASCIA SURGERY      bilateral    BRANDI AND BSO  4/10    DUB    TOTAL KNEE ARTHROPLASTY Right 3/29/2019    RIGHT TOTAL KNEE MAKOPLASTY REPLACEMENT WITH ADDUCTOR CANAL BLOCK FOR PAIN CONTROL                 JOE HAKAN  CPT CODE - 82716  performed by Geena Mei MD at [address] [place] [address] Ave:  is allergic to morphine. Social History:    TOBACCO:   reports that she has never smoked. She has never used smokeless tobacco.  ETOH:   reports no history of alcohol use.     Family History:       Problem Relation Age of Onset    Diabetes Mother     Arthritis Mother     Diabetes Father     Heart Disease Father     Cancer Father         colon    Dementia Father     Breast Cancer Sister        Current Medications:    Current Outpatient Medications:     atorvastatin (LIPITOR) 40 MG tablet, TAKE ONE TABLET BY MOUTH DAILY, Disp: 90 tablet, Rfl: 3    metoprolol succinate (TOPROL XL) 50 MG extended release tablet, Take 1 tablet by mouth daily, Disp: 90 tablet, Rfl: 3    pioglitazone (ACTOS) 15 MG tablet, TAKE ONE TABLET BY MOUTH DAILY, Disp: 30 tablet, Rfl: 5    metFORMIN (GLUCOPHAGE) 1000 MG tablet, TAKE ONE TABLET BY MOUTH DAILY WITH BREAKFAST, Disp: 90 tablet, Rfl: 1    aspirin EC 81 MG EC tablet, Take 81 mg by mouth daily, Disp: , Rfl:     dapagliflozin (FARXIGA) 10 MG tablet, Take 1 tablet by mouth every morning, Disp: 90 tablet, Rfl: 1    melatonin 5 MG TABS tablet, Take 5 mg by mouth daily, Disp: , Rfl:     methylphenidate (CONCERTA) 54 MG extended release tablet, Take 54 mg by mouth every morning., Disp: , Rfl:     sucralfate (CARAFATE) 1 GM tablet, Take 1 g by mouth daily , Disp: , Rfl:     blood glucose monitor strips, Test once dialy for diabetes e11.9, Disp: 100 strip, Rfl: 5    Blood Glucose Monitoring Suppl (ONE TOUCH ULTRA 2) w/Device KIT, 1 kit by Does not apply route daily, Disp: 1 kit, Rfl: 0    ondansetron (ZOFRAN) 4 MG tablet, Take 4 mg by mouth every 8 hours as needed for Nausea or Vomiting , Disp: , Rfl:     lamoTRIgine (LAMICTAL) 200 MG tablet, Take 200 mg by mouth daily, Disp: , Rfl:     Lancets MISC, Test once daily for diabetes e11.9, Disp: 100 each, Rfl: 3    Blood Glucose Monitoring Suppl CONCETTA, Test once daily for diabetes e11.9, Disp: 1 Device, Rfl: 0    traZODone (DESYREL) 100 MG tablet, Take 200 mg by mouth nightly , Disp: , Rfl:     escitalopram (LEXAPRO) 20 MG tablet, Take 10 mg by mouth daily , Disp: , Rfl:       Review of Systems  Constitutional: Negative for fever  HENT: Negative for sore throat  Eyes: Negative for redness   Respiratory: Negative for dyspnea, cough  Cardiovascular: Negative for chest pain  Gastrointestinal: Negative for vomiting, diarrhea   Genitourinary: Negative for hematuria   Musculoskeletal: Negative forarthralgias   Skin: Negative for rash  Neurological: Negative for syncope  Hematological: Negative for adenopathy  Psychiatric/Behavorial: Negative for anxiety      Objective:   PHYSICAL EXAM:  /80   Pulse 89   Temp 96.8 °F (36 °C)   Resp 16   Ht 5' 5.5\" (1.664 m)   Wt 203 lb (92.1 kg)   SpO2 97% Comment: RA  BMI 33.27 kg/m²     Physical Exam  Gen: No acute distress. Obese. BMI of 33.27  Eyes: PERRL. No sclera icterus. No conjunctival injection. ENT: No discharge. Pharynx clear. Mallampati class IV. Neck: Trachea midline. No obvious mass. Neck circumference 15\"  Resp: No accessory muscle use. Nocrackles. No wheezes. No rhonchi. CV: Regular rate. Regular rhythm. No murmur or rub. Skin: Warm and dry. M/S: No cyanosis. No obvious joint deformity. Neuro: Awake. Alert. Moves all four extremities. Psych: Oriented x 3. No anxiety. DATA:   11/24/2021 Echo EF 55%    Assessment:       · Snoring  · Observed sleep apnea   · Hypersomnia   · Sleep onset and maintenance insomnia-psychophysiological hyperarousal, poor sleep hygiene, probable ANGEL likely contributing-taking trazodone and melatonin  · Obesity  · Depression and anxiety-followed by psychiatry  · Hypertension and palpitations-followed by cardiology        Plan:      · HST to evaluate forsleep related breathing disorder. · Treatment options were discussed with patient if HST reveals ANGEL, including CPAP therapy, oral appliances and upper airway surgery. · Sleep hygiene  · Cognitive behavioral therapy was discussed with patient including stimulus control and sleep restriction   · Avoidsedatives, alcohol and caffeinated drinks at bed time. · No driving motorized vehicles or operating heavy machinery while fatigue, drowsy or sleepy. · Weight loss is also recommended as a long-term intervention. · Complications of ANGEL if not treated were discussed with patient patient to include systemic hypertension, pulmonary hypertension, cardiovascular morbidities, car accidents and all cause mortality.   Discussed pathophysiology of ANGEL with patient today  Patient education handout provided regarding sleep tips

## 2022-03-17 NOTE — LETTER
[facility]  [address]  [address]  Phone: [phone]  Fax: [phone]           ISAEL Siegel CNP      March 17, 2022     Patient: Ceasar Alarcon   MR Number: [id number]   YOB: 1960   Date of Visit: 3/17/2022       Dear Dr. Marcelo Cramer ref. provider found: Thank you for referring Marilou Baptiste to me for evaluation/treatment. Below are the relevant portions of my assessment and plan of care. Assessment:       · Snoring  · Observed sleep apnea   · Hypersomnia   · Sleep onset and maintenance insomniapsychophysiological hyperarousal, poor sleep hygiene, probable ANGEL likely contributing-taking trazodone and melatonin  · Obesity  · Depression and anxietyfollowed by psychiatry  · Hypertension and palpitationsfollowed by cardiology        Plan:      · HST to evaluate forsleep related breathing disorder. · Treatment options were discussed with patient if HST reveals ANGEL, including CPAP therapy, oral appliances and upper airway surgery. · Sleep hygiene  · Cognitive behavioral therapy was discussed with patient including stimulus control and sleep restriction   · Avoidsedatives, alcohol and caffeinated drinks at bed time. · No driving motorized vehicles or operating heavy machinery while fatigue, drowsy or sleepy. · Weight loss is also recommended as a long-term intervention. · Complications of ANGEL if not treated were discussed with patient patient to include systemic hypertension, pulmonary hypertension, cardiovascular morbidities, car accidents and all cause mortality. Discussed pathophysiology of ANGEL with patient today  Patient education handout provided regarding sleep tips        If you have questions, please do not hesitate to call me. I look forward to following Michael Strickland along with you.     Sincerely,        ISAEL Siegel CNP    CC providers:    No Recipients

## 2022-04-12 ENCOUNTER — HOSPITAL ENCOUNTER (OUTPATIENT)
Dept: SLEEP CENTER | Age: 62
Discharge: HOME | End: 2022-04-14
Payer: COMMERCIAL

## 2022-04-12 ENCOUNTER — HOSPITAL ENCOUNTER (OUTPATIENT)
Age: 62
Discharge: HOME | End: 2022-04-12
Payer: COMMERCIAL

## 2022-04-12 DIAGNOSIS — I10 ESSENTIAL HYPERTENSION: ICD-10-CM

## 2022-04-12 DIAGNOSIS — F51.04 PSYCHOPHYSIOLOGICAL INSOMNIA: ICD-10-CM

## 2022-04-12 DIAGNOSIS — R06.83 SNORING: ICD-10-CM

## 2022-04-12 DIAGNOSIS — Z72.821 POOR SLEEP HYGIENE: ICD-10-CM

## 2022-04-12 DIAGNOSIS — G47.10 HYPERSOMNIA: ICD-10-CM

## 2022-04-12 DIAGNOSIS — R53.83 OTHER FATIGUE: ICD-10-CM

## 2022-04-12 DIAGNOSIS — G47.30 OBSERVED SLEEP APNEA: ICD-10-CM

## 2022-04-12 DIAGNOSIS — E66.9 OBESITY (BMI 30-39.9): ICD-10-CM

## 2022-04-12 LAB
ALBUMIN SERPL-MCNC: 4.7 G/DL (ref 3.4–5)
ALP BLD-CCNC: 95 U/L (ref 40–129)
ALT SERPL-CCNC: 17 U/L (ref 10–40)
ANION GAP SERPL CALCULATED.3IONS-SCNC: 13 MMOL/L (ref 3–16)
AST SERPL-CCNC: 19 U/L (ref 15–37)
BASOPHILS ABSOLUTE: 0 K/UL (ref 0–0.2)
BASOPHILS RELATIVE PERCENT: 0.5 %
BILIRUB SERPL-MCNC: 0.3 MG/DL (ref 0–1)
BILIRUBIN DIRECT: <0.2 MG/DL (ref 0–0.3)
BILIRUBIN, INDIRECT: NORMAL MG/DL (ref 0–1)
BUN BLDV-MCNC: 13 MG/DL (ref 7–20)
CALCIUM SERPL-MCNC: 9.7 MG/DL (ref 8.3–10.6)
CHLORIDE BLD-SCNC: 96 MMOL/L (ref 99–110)
CO2: 27 MMOL/L (ref 21–32)
CREAT SERPL-MCNC: 1.1 MG/DL (ref 0.6–1.2)
EOSINOPHILS ABSOLUTE: 0.1 K/UL (ref 0–0.6)
EOSINOPHILS RELATIVE PERCENT: 1.7 %
GFR AFRICAN AMERICAN: >60
GFR NON-AFRICAN AMERICAN: 51
GLUCOSE BLD-MCNC: 92 MG/DL (ref 70–99)
HCT VFR BLD CALC: 43.4 % (ref 36–48)
HEMOGLOBIN: 14.6 G/DL (ref 12–16)
LIPASE: 31 U/L (ref 13–60)
LYMPHOCYTES ABSOLUTE: 2 K/UL (ref 1–5.1)
LYMPHOCYTES RELATIVE PERCENT: 23.2 %
MCH RBC QN AUTO: 31.1 PG (ref 26–34)
MCHC RBC AUTO-ENTMCNC: 33.7 G/DL (ref 31–36)
MCV RBC AUTO: 92.3 FL (ref 80–100)
MONOCYTES ABSOLUTE: 0.6 K/UL (ref 0–1.3)
MONOCYTES RELATIVE PERCENT: 7 %
NEUTROPHILS ABSOLUTE: 5.7 K/UL (ref 1.7–7.7)
NEUTROPHILS RELATIVE PERCENT: 67.6 %
PDW BLD-RTO: 13.8 % (ref 12.4–15.4)
PHOSPHORUS: 4.4 MG/DL (ref 2.5–4.9)
PLATELET # BLD: 232 K/UL (ref 135–450)
PMV BLD AUTO: 8.9 FL (ref 5–10.5)
POTASSIUM SERPL-SCNC: 4.4 MMOL/L (ref 3.5–5.1)
RBC # BLD: 4.71 M/UL (ref 4–5.2)
SODIUM BLD-SCNC: 136 MMOL/L (ref 136–145)
TOTAL PROTEIN: 6.8 G/DL (ref 6.4–8.2)
WBC # BLD: 8.4 K/UL (ref 4–11)

## 2022-04-12 PROCEDURE — 80069 RENAL FUNCTION PANEL: CPT

## 2022-04-12 PROCEDURE — 80076 HEPATIC FUNCTION PANEL: CPT

## 2022-04-12 PROCEDURE — 83690 ASSAY OF LIPASE: CPT

## 2022-04-12 PROCEDURE — 36415 COLL VENOUS BLD VENIPUNCTURE: CPT

## 2022-04-12 PROCEDURE — 85025 COMPLETE CBC W/AUTO DIFF WBC: CPT

## 2022-04-12 PROCEDURE — 95806 SLEEP STUDY UNATT&RESP EFFT: CPT

## 2022-04-18 ENCOUNTER — TELEPHONE (OUTPATIENT)
Dept: PULMONOLOGY | Age: 62
End: 2022-04-18

## 2022-04-18 DIAGNOSIS — G47.33 OSA (OBSTRUCTIVE SLEEP APNEA): Primary | ICD-10-CM

## 2022-04-19 NOTE — TELEPHONE ENCOUNTER
Chart/order reviewed and signed
Cpap Titration.
Titration [facility] 5/17/22.
yes

## 2022-04-27 ENCOUNTER — PATIENT MESSAGE (OUTPATIENT)
Dept: FAMILY MEDICINE CLINIC | Age: 62
End: 2022-04-27

## 2022-04-27 ENCOUNTER — PATIENT MESSAGE (OUTPATIENT)
Dept: CARDIOLOGY CLINIC | Age: 62
End: 2022-04-27

## 2022-04-27 DIAGNOSIS — E78.00 PURE HYPERCHOLESTEROLEMIA: ICD-10-CM

## 2022-04-27 RX ORDER — ATORVASTATIN CALCIUM 10 MG/1
TABLET, FILM COATED ORAL
Qty: 90 TABLET | Refills: 3 | Status: SHIPPED | OUTPATIENT
Start: 2022-04-27

## 2022-04-27 NOTE — TELEPHONE ENCOUNTER
From: Fredrick Moreno  To: Dr. Rudolph Lino: 4/27/2022 1:15 PM EDT  Subject: Atorvastatin    Hello Dr Scott Adams ,    Since my last visit in March I have been having stomach pain starting the day I started to increase my Atorvastatin to twenty milligrams a day. My plan was to increase to 36 msg however I was experiencing severe stomach pain. I had a colonoscopy which showed diverticulitis. I have had a follow up with gastro due to severe stomach pain and I had several labs. The labs did not show a cause for the pain so it was recommended that I contact you to see if Atorvastatin might be involved in stomach pain. I have been on 10 mg for many years. 10 msg does not seem to bother me. Do you have any thoughts regarding my Atorvastatin?   Thank Felix Romo

## 2022-04-27 NOTE — TELEPHONE ENCOUNTER
From: Leela Ball  To: Amee Gillis  Sent: 4/27/2022 1:01 PM EDT  Subject: [facility],  Just want to check with you regarding my Farxiga medication. Did you have any luck with [facility] Resendiz approving the medication? If so did they give you a price?   Thank Magdaleno [phone]

## 2022-04-28 ENCOUNTER — TELEPHONE (OUTPATIENT)
Dept: ADMINISTRATIVE | Age: 62
End: 2022-04-28

## 2022-04-28 NOTE — TELEPHONE ENCOUNTER
Submitted PA for Farxiga 10MG tablets, Key: [id number]. Per plan - NO PA REQUIRED. Please notify patient. Thank you.

## 2022-05-09 ENCOUNTER — TELEPHONE (OUTPATIENT)
Dept: BREAST CENTER | Age: 62
End: 2022-05-09

## 2022-05-09 NOTE — TELEPHONE ENCOUNTER
Jaspreet Boyce 11 days ago     [facility] PA for Farxiga 10MG tablets, Key: [id number]. Per plan - NO PA REQUIRED.      Please notify patient. Thank you.                Pt was informed

## 2022-05-09 NOTE — TELEPHONE ENCOUNTER
Called patient left message on voice mail to reschedule appointment with mammogram for 12/20/22. Dr Steph Bellamy is out of the office   Please reschedule mammogram and appointment when patient calls back.

## 2022-05-17 ENCOUNTER — HOSPITAL ENCOUNTER (OUTPATIENT)
Dept: SLEEP CENTER | Age: 62
Discharge: HOME | End: 2022-05-19
Payer: COMMERCIAL

## 2022-05-17 DIAGNOSIS — G47.33 OSA (OBSTRUCTIVE SLEEP APNEA): ICD-10-CM

## 2022-05-17 PROCEDURE — 95811 POLYSOM 6/>YRS CPAP 4/> PARM: CPT

## 2022-05-25 ENCOUNTER — TELEPHONE (OUTPATIENT)
Dept: PULMONOLOGY | Age: 62
End: 2022-05-25

## 2022-05-25 DIAGNOSIS — R53.83 OTHER FATIGUE: ICD-10-CM

## 2022-05-25 DIAGNOSIS — G47.33 OSA (OBSTRUCTIVE SLEEP APNEA): Primary | ICD-10-CM

## 2022-05-25 DIAGNOSIS — R06.83 SNORING: ICD-10-CM

## 2022-05-25 NOTE — TELEPHONE ENCOUNTER
PAP orders. Need faxed, with testing/OV note/demographics/insurance, once signed, to [employer]. Also, 31-90 day appt needs pushed out, Patient called with message left for patient to call back to office.

## 2022-05-25 NOTE — TELEPHONE ENCOUNTER
Pt returned call. Pt understood why appt needed to be pushed out. Pt requested that she call back after she has been setup on machine.

## 2022-06-15 NOTE — TELEPHONE ENCOUNTER
Spoke to [facility] and they said that the patient is scheduled for set up on 6/16/22.  Will make sure patient gets set up

## 2022-06-17 NOTE — TELEPHONE ENCOUNTER
Per resmed patient was set up 6/16/22    Need a 31-90d     Patient called with message left for patient to call back to office.

## 2022-06-20 ENCOUNTER — TELEPHONE (OUTPATIENT)
Dept: PULMONOLOGY | Age: 62
End: 2022-06-20

## 2022-06-20 NOTE — TELEPHONE ENCOUNTER
Called and informed patient. She will contact her DME company. Patient is currently sitting with mask on watching tv. She will call back if she needs anything.

## 2022-06-20 NOTE — TELEPHONE ENCOUNTER
Pt called stating that she got her PAP machine last Thursday and she is having trouble using it. Pt states that she is having trouble getting a good fit with her mask, this morning a screen came off the mask and its blowing air. Pt notes that she is having trouble with the pressure, exhaling against it. Pt also states that she feels like she is suffocating with the mask on. Pt had spouse get on the phone to help explain the issue: Spouse notes that pt moves around a lot, and the head gear is not working. Spouse stated that he has came up the last couple mornings and pt had taken the mask off and then slept until noon, as the pt didn't sleep well all night long. Spouse has nasal pillow mask and was going to have the pt try that mask. Pt currently has a pillow mask that the headgear goes over the top of the head and hose hooks on top of the pt head. Compliance download scanned for review. BRENDAN Gama    OV 3/17/22:    Assessment:       · Snoring  · Observed sleep apnea   · Hypersomnia   · Sleep onset and maintenance insomnia-psychophysiological hyperarousal, poor sleep hygiene, probable ANGEL likely contributing-taking trazodone and melatonin  · Obesity  · Depression and anxiety-followed by psychiatry  · Hypertension and palpitations-followed by cardiology         Plan:      · HST to evaluate forsleep related breathing disorder. · Treatment options were discussed with patient if HST reveals ANGEL, including CPAP therapy, oral appliances and upper airway surgery. · Sleep hygiene  · Cognitive behavioral therapy was discussed with patient including stimulus control and sleep restriction   · Avoidsedatives, alcohol and caffeinated drinks at bed time. · No driving motorized vehicles or operating heavy machinery while fatigue, drowsy or sleepy. · Weight loss is also recommended as a long-term intervention.     · Complications of ANGEL if not treated were discussed with patient patient to include systemic hypertension, pulmonary hypertension, cardiovascular morbidities, car accidents and all cause mortality.   · Discussed pathophysiology of ANGEL with patient today  · Patient education handout provided regarding sleep tips

## 2022-07-05 ENCOUNTER — OFFICE VISIT (OUTPATIENT)
Dept: FAMILY MEDICINE CLINIC | Age: 62
End: 2022-07-05
Payer: COMMERCIAL

## 2022-07-05 VITALS
DIASTOLIC BLOOD PRESSURE: 80 MMHG | BODY MASS INDEX: 32.85 KG/M2 | HEIGHT: 66 IN | HEART RATE: 83 BPM | OXYGEN SATURATION: 97 % | SYSTOLIC BLOOD PRESSURE: 126 MMHG | WEIGHT: 204.4 LBS

## 2022-07-05 DIAGNOSIS — E78.00 PURE HYPERCHOLESTEROLEMIA: ICD-10-CM

## 2022-07-05 DIAGNOSIS — I10 ESSENTIAL HYPERTENSION: ICD-10-CM

## 2022-07-05 DIAGNOSIS — Z79.4 TYPE 2 DIABETES MELLITUS WITHOUT COMPLICATION, WITH LONG-TERM CURRENT USE OF INSULIN (HCC): Primary | ICD-10-CM

## 2022-07-05 DIAGNOSIS — E11.9 TYPE 2 DIABETES MELLITUS WITHOUT COMPLICATION, WITH LONG-TERM CURRENT USE OF INSULIN (HCC): Primary | ICD-10-CM

## 2022-07-05 LAB
A/G RATIO: 2.1 (ref 1.1–2.2)
ALBUMIN SERPL-MCNC: 4.7 G/DL (ref 3.4–5)
ALP BLD-CCNC: 127 U/L (ref 40–129)
ALT SERPL-CCNC: 14 U/L (ref 10–40)
ANION GAP SERPL CALCULATED.3IONS-SCNC: 14 MMOL/L (ref 3–16)
AST SERPL-CCNC: 14 U/L (ref 15–37)
BILIRUB SERPL-MCNC: 0.5 MG/DL (ref 0–1)
BUN BLDV-MCNC: 16 MG/DL (ref 7–20)
CALCIUM SERPL-MCNC: 10 MG/DL (ref 8.3–10.6)
CHLORIDE BLD-SCNC: 100 MMOL/L (ref 99–110)
CHOLESTEROL, TOTAL: 170 MG/DL (ref 0–199)
CO2: 25 MMOL/L (ref 21–32)
CREAT SERPL-MCNC: 0.9 MG/DL (ref 0.6–1.2)
GFR AFRICAN AMERICAN: >60
GFR NON-AFRICAN AMERICAN: >60
GLUCOSE BLD-MCNC: 196 MG/DL (ref 70–99)
HBA1C MFR BLD: 6.7 %
HDLC SERPL-MCNC: 67 MG/DL (ref 40–60)
LDL CHOLESTEROL CALCULATED: 87 MG/DL
POTASSIUM SERPL-SCNC: 4.7 MMOL/L (ref 3.5–5.1)
SODIUM BLD-SCNC: 139 MMOL/L (ref 136–145)
TOTAL PROTEIN: 6.9 G/DL (ref 6.4–8.2)
TRIGL SERPL-MCNC: 81 MG/DL (ref 0–150)
VLDLC SERPL CALC-MCNC: 16 MG/DL

## 2022-07-05 PROCEDURE — 2022F DILAT RTA XM EVC RTNOPTHY: CPT | Performed by: PHYSICIAN ASSISTANT

## 2022-07-05 PROCEDURE — 1036F TOBACCO NON-USER: CPT | Performed by: PHYSICIAN ASSISTANT

## 2022-07-05 PROCEDURE — 3051F HG A1C>EQUAL 7.0%<8.0%: CPT | Performed by: PHYSICIAN ASSISTANT

## 2022-07-05 PROCEDURE — 3017F COLORECTAL CA SCREEN DOC REV: CPT | Performed by: PHYSICIAN ASSISTANT

## 2022-07-05 PROCEDURE — G8427 DOCREV CUR MEDS BY ELIG CLIN: HCPCS | Performed by: PHYSICIAN ASSISTANT

## 2022-07-05 PROCEDURE — 99213 OFFICE O/P EST LOW 20 MIN: CPT | Performed by: PHYSICIAN ASSISTANT

## 2022-07-05 PROCEDURE — 83036 HEMOGLOBIN GLYCOSYLATED A1C: CPT | Performed by: PHYSICIAN ASSISTANT

## 2022-07-05 PROCEDURE — G8417 CALC BMI ABV UP PARAM F/U: HCPCS | Performed by: PHYSICIAN ASSISTANT

## 2022-07-05 PROCEDURE — 36415 COLL VENOUS BLD VENIPUNCTURE: CPT | Performed by: PHYSICIAN ASSISTANT

## 2022-07-05 RX ORDER — PIOGLITAZONEHYDROCHLORIDE 15 MG/1
TABLET ORAL
Qty: 30 TABLET | Refills: 5 | Status: SHIPPED | OUTPATIENT
Start: 2022-07-05

## 2022-07-05 NOTE — PROGRESS NOTES
SUBJECTIVE:  62 y.o. female for follow up of diabetes, htn, hld. Diabetic Review of Systems - medication compliance: compliant all of the time, diabetic diet compliance: noncompliant some of the time, home glucose monitoring: is performed sporadically. Other symptoms and concerns: none today. Current Outpatient Medications   Medication Sig Dispense Refill    dapagliflozin (FARXIGA) 10 MG tablet Take 1 tablet by mouth every morning 90 tablet 1    atorvastatin (LIPITOR) 10 MG tablet TAKE ONE TABLET BY MOUTH DAILY 90 tablet 3    metoprolol succinate (TOPROL XL) 50 MG extended release tablet Take 1 tablet by mouth daily 90 tablet 3    pioglitazone (ACTOS) 15 MG tablet TAKE ONE TABLET BY MOUTH DAILY 30 tablet 5    metFORMIN (GLUCOPHAGE) 1000 MG tablet TAKE ONE TABLET BY MOUTH DAILY WITH BREAKFAST 90 tablet 1    aspirin EC 81 MG EC tablet Take 81 mg by mouth daily      melatonin 5 MG TABS tablet Take 5 mg by mouth daily      methylphenidate (CONCERTA) 54 MG extended release tablet Take 54 mg by mouth every morning.  sucralfate (CARAFATE) 1 GM tablet Take 1 g by mouth daily       blood glucose monitor strips Test once dialy for diabetes e11.9 100 strip 5    Blood Glucose Monitoring Suppl (ONE TOUCH ULTRA 2) w/Device KIT 1 kit by Does not apply route daily 1 kit 0    ondansetron (ZOFRAN) 4 MG tablet Take 4 mg by mouth every 8 hours as needed for Nausea or Vomiting       lamoTRIgine (LAMICTAL) 200 MG tablet Take 200 mg by mouth daily      Lancets MISC Test once daily for diabetes e11.9 100 each 3    Blood Glucose Monitoring Suppl CONCETTA Test once daily for diabetes e11.9 1 Device 0    traZODone (DESYREL) 100 MG tablet Take 200 mg by mouth nightly       escitalopram (LEXAPRO) 20 MG tablet Take 10 mg by mouth daily        No current facility-administered medications for this visit.        OBJECTIVE:  Appearance: alert, well appearing, and in no distress, oriented to person, place, and time and overweight. /80 (Site: Right Upper Arm, Position: Sitting)   Pulse 83   Ht 5' 5.5\" (1.664 m)   Wt 204 lb 6.4 oz (92.7 kg)   SpO2 97%   BMI 33.50 kg/m²     Exam: heart sounds normal rate, regular rhythm, normal S1, S2, no murmurs, rubs, clicks or gallops, chest clear, no hepatosplenomegaly, no carotid bruits, feet: warm, good capillary refill and normal DP and PT pulses    ASSESSMENT:   Diagnosis Orders   1. Type 2 diabetes mellitus without complication, with long-term current use of insulin (HCC)  POCT glycosylated hemoglobin (Hb A1C)   2. Pure hypercholesterolemia  Lipid Panel    Comprehensive Metabolic Panel   3. Essential hypertension           PLAN:  See orders for this visit as documented in the electronic medical record. Issues reviewed with her: low cholesterol diet, weight control and daily exercise discussed and home glucose monitoring emphasized.

## 2022-08-22 ENCOUNTER — OFFICE VISIT (OUTPATIENT)
Dept: FAMILY MEDICINE CLINIC | Age: 62
End: 2022-08-22
Payer: COMMERCIAL

## 2022-08-22 VITALS
SYSTOLIC BLOOD PRESSURE: 122 MMHG | BODY MASS INDEX: 32.98 KG/M2 | OXYGEN SATURATION: 97 % | DIASTOLIC BLOOD PRESSURE: 84 MMHG | HEIGHT: 66 IN | WEIGHT: 205.2 LBS | HEART RATE: 119 BPM

## 2022-08-22 DIAGNOSIS — S39.012A STRAIN OF LUMBAR REGION, INITIAL ENCOUNTER: Primary | ICD-10-CM

## 2022-08-22 PROCEDURE — 3017F COLORECTAL CA SCREEN DOC REV: CPT | Performed by: PHYSICIAN ASSISTANT

## 2022-08-22 PROCEDURE — 1036F TOBACCO NON-USER: CPT | Performed by: PHYSICIAN ASSISTANT

## 2022-08-22 PROCEDURE — G8427 DOCREV CUR MEDS BY ELIG CLIN: HCPCS | Performed by: PHYSICIAN ASSISTANT

## 2022-08-22 PROCEDURE — 99213 OFFICE O/P EST LOW 20 MIN: CPT | Performed by: PHYSICIAN ASSISTANT

## 2022-08-22 PROCEDURE — G8417 CALC BMI ABV UP PARAM F/U: HCPCS | Performed by: PHYSICIAN ASSISTANT

## 2022-08-22 RX ORDER — TIZANIDINE 2 MG/1
2 TABLET ORAL EVERY 8 HOURS PRN
Qty: 20 TABLET | Refills: 0 | Status: SHIPPED | OUTPATIENT
Start: 2022-08-22

## 2022-08-22 RX ORDER — NAPROXEN 500 MG/1
500 TABLET ORAL 2 TIMES DAILY PRN
Qty: 30 TABLET | Refills: 0 | Status: SHIPPED | OUTPATIENT
Start: 2022-08-22 | End: 2022-09-06

## 2022-08-22 ASSESSMENT — ENCOUNTER SYMPTOMS
BACK PAIN: 1
RESPIRATORY NEGATIVE: 1

## 2022-08-22 NOTE — PROGRESS NOTES
Subjective:      Patient ID: Teetee Vicente is a 62 y.o. female. HPI    Patient presents with right sided mid to low back pain with radiation to the right groin and leg, stops mid calf. Has tried ice and tylenol and stretching with no relief. 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     Review of Systems   Constitutional: Negative. Respiratory: Negative. Cardiovascular: Negative. Musculoskeletal:  Positive for back pain. Objective:   Physical Exam  Constitutional:       Appearance: Normal appearance. Cardiovascular:      Rate and Rhythm: Normal rate and regular rhythm. Heart sounds: Normal heart sounds. Pulmonary:      Effort: Pulmonary effort is normal.      Breath sounds: Normal breath sounds. Musculoskeletal:      Lumbar back: Spasms and tenderness present. Decreased range of motion. Back:    Neurological:      Mental Status: She is alert. Assessment / Plan:          Diagnosis Orders   1. Strain of lumbar region, initial encounter  Will start zanaflex 2mg tid prn and naproxen 500mgbid         Patient is to call if no improvement or signs and symptoms worsen.

## 2022-09-06 RX ORDER — NAPROXEN 500 MG/1
TABLET ORAL
Qty: 30 TABLET | Refills: 0 | Status: SHIPPED | OUTPATIENT
Start: 2022-09-06

## 2022-09-06 NOTE — TELEPHONE ENCOUNTER
Juno Choi [phone] (home)    is requesting refill(s) of medication [id number] (pertaining to medication)   Last refill 8/22/22 (per medication requested)  Next office visit scheduled or attempted Yes  Date 1/6/23  If No, reason

## 2022-09-07 ENCOUNTER — OFFICE VISIT (OUTPATIENT)
Dept: SLEEP MEDICINE | Age: 62
End: 2022-09-07
Payer: MEDICAID

## 2022-09-07 VITALS
OXYGEN SATURATION: 97 % | SYSTOLIC BLOOD PRESSURE: 118 MMHG | HEART RATE: 97 BPM | WEIGHT: 206.6 LBS | RESPIRATION RATE: 16 BRPM | BODY MASS INDEX: 33.2 KG/M2 | DIASTOLIC BLOOD PRESSURE: 80 MMHG | HEIGHT: 66 IN

## 2022-09-07 DIAGNOSIS — E66.9 OBESITY (BMI 30-39.9): ICD-10-CM

## 2022-09-07 DIAGNOSIS — G25.81 RLS (RESTLESS LEGS SYNDROME): ICD-10-CM

## 2022-09-07 DIAGNOSIS — Z72.821 POOR SLEEP HYGIENE: ICD-10-CM

## 2022-09-07 DIAGNOSIS — R53.83 OTHER FATIGUE: ICD-10-CM

## 2022-09-07 DIAGNOSIS — F51.04 PSYCHOPHYSIOLOGICAL INSOMNIA: ICD-10-CM

## 2022-09-07 DIAGNOSIS — G47.33 MILD OBSTRUCTIVE SLEEP APNEA: Primary | ICD-10-CM

## 2022-09-07 DIAGNOSIS — Z71.89 CPAP USE COUNSELING: ICD-10-CM

## 2022-09-07 PROCEDURE — 99214 OFFICE O/P EST MOD 30 MIN: CPT | Performed by: NURSE PRACTITIONER

## 2022-09-07 ASSESSMENT — SLEEP AND FATIGUE QUESTIONNAIRES
NECK CIRCUMFERENCE (INCHES): 15
HOW LIKELY ARE YOU TO NOD OFF OR FALL ASLEEP WHILE SITTING QUIETLY AFTER LUNCH WITHOUT ALCOHOL: 0
HOW LIKELY ARE YOU TO NOD OFF OR FALL ASLEEP WHILE SITTING AND READING: 0
HOW LIKELY ARE YOU TO NOD OFF OR FALL ASLEEP WHEN YOU ARE A PASSENGER IN A CAR FOR AN HOUR WITHOUT A BREAK: 1
HOW LIKELY ARE YOU TO NOD OFF OR FALL ASLEEP IN A CAR, WHILE STOPPED FOR A FEW MINUTES IN TRAFFIC: 0
HOW LIKELY ARE YOU TO NOD OFF OR FALL ASLEEP WHILE SITTING AND TALKING TO SOMEONE: 0
HOW LIKELY ARE YOU TO NOD OFF OR FALL ASLEEP WHILE WATCHING TV: 1
ESS TOTAL SCORE: 4
HOW LIKELY ARE YOU TO NOD OFF OR FALL ASLEEP WHILE SITTING INACTIVE IN A PUBLIC PLACE: 0
HOW LIKELY ARE YOU TO NOD OFF OR FALL ASLEEP WHILE LYING DOWN TO REST IN THE AFTERNOON WHEN CIRCUMSTANCES PERMIT: 2

## 2022-09-07 NOTE — PROGRESS NOTES
Patient ID: Jamey Lindsay is a 62 y.o. female who is being seen today for   Chief Complaint   Patient presents with    Sleep Apnea     31-90d      Referring: Dr. Champ Diaz    HPI:     Jamey Lindsay is a 62 y.o. female in office with  for ANGEL follow up. HST and CPAP titration were reviewed by me and noted below. HST showed mild ANGEL and patient started CPAP after testing. Results were dicussed with patient and multiple good questions were answered. States pressure was too high and  decreased the pressure to 6 cm H2O. States pressure feels better since lower.  has tried a few different masks. States wakes up without CPAP at times. States she is trying to get used to using it. Patient is using CPAP  4-6 hrs/night. Using humidifier. No snoring on CPAP. The pressure is well tolerated with decrease. The mask is comfortable- nasal mask. No mask leak. No significant daytime sleepiness. No nodding off when driving. No dry nose or throat. +fatigue. Bedtime is 10-11 pm and rise time is 10 am. Sleep onset is 60 minutes. Wakes up 3-4 times at night total. 3-4 nocturia. It takes few minutes to fall back a sleep. Occasional naps during the day. No headache in am. No weight gain. 2-3 caffienated beverages during the day. No alcohol. ESS is 4    PLMS on sleep study, +RLS symptoms states she has taken Requip in the past,  states unsure why she stopped. Initial HPI 3/17/22  Jamey Lindsay is a 62 y.o. female in office for sleep apnea evaluation. Patient reports snoring at night for the past 20 years. Doesn't sleep in supine position. Has witnessed apnea. No restorative sleep. +dry mouth upon awakening. Fatigue and tiredness during the day. No history of sleep apnea. Bedtime 10 pm and rise time is 1030-11 am. It takes 45-60 minutes to fall asleep- watches TV. States takes trazodone, melatonin nightly states it helps. 2 nocturia. Wakes up 2 times at night.  It takes few- 45 minutes to Past Surgical History:        Procedure Laterality Date    APPENDECTOMY      CHOLECYSTECTOMY  2001    COLONOSCOPY  1/2004    Due 2014    CYSTOSCOPY  7/9/10    CYSTOSCOPY  11/18/10    insertion of sparc mesh sling with cystoscopy    HYSTERECTOMY (CERVIX STATUS UNKNOWN)      JOINT REPLACEMENT      KNEE ARTHROSCOPY Right     KNEE ARTHROSCOPY Left 10/13    partial medial menisectomy    KNEE SURGERY      PLANTAR FASCIA SURGERY      bilateral    BRANDI AND BSO (CERVIX REMOVED)  4/10    DUB    TOTAL KNEE ARTHROPLASTY Right 3/29/2019    RIGHT TOTAL KNEE MAKOPLASTY REPLACEMENT WITH ADDUCTOR CANAL BLOCK FOR PAIN CONTROL                 JOE MCCLENDON  CPT CODE - 50040  performed by Luz Elena Borrero MD at [address] [place] [address] Ave:  is allergic to lipitor [atorvastatin] and morphine. Social History:    TOBACCO:   reports that she has never smoked. She has never used smokeless tobacco.  ETOH:   reports no history of alcohol use.     Family History:       Problem Relation Age of Onset    Diabetes Mother     Arthritis Mother     Diabetes Father     Heart Disease Father     Cancer Father         colon    Dementia Father     Breast Cancer Sister        Current Medications:    Current Outpatient Medications:     naproxen (NAPROSYN) 500 MG tablet, TAKE ONE TABLET BY MOUTH TWICE A DAY AS NEEDED FOR PAIN, Disp: 30 tablet, Rfl: 0    tiZANidine (ZANAFLEX) 2 MG tablet, Take 1 tablet by mouth every 8 hours as needed (muscle spasms), Disp: 20 tablet, Rfl: 0    metFORMIN (GLUCOPHAGE) 1000 MG tablet, TAKE ONE TABLET BY MOUTH DAILY WITH BREAKFAST, Disp: 90 tablet, Rfl: 1    pioglitazone (ACTOS) 15 MG tablet, TAKE ONE TABLET BY MOUTH DAILY, Disp: 30 tablet, Rfl: 5    dapagliflozin (FARXIGA) 10 MG tablet, Take 1 tablet by mouth every morning, Disp: 90 tablet, Rfl: 1    atorvastatin (LIPITOR) 10 MG tablet, TAKE ONE TABLET BY MOUTH DAILY, Disp: 90 tablet, Rfl: 3    metoprolol succinate (TOPROL XL) 50 MG extended release tablet, Take 1 tablet by mouth daily, Disp: 90 tablet, Rfl: 3    aspirin EC 81 MG EC tablet, Take 81 mg by mouth daily, Disp: , Rfl:     melatonin 5 MG TABS tablet, Take 5 mg by mouth daily, Disp: , Rfl:     methylphenidate (CONCERTA) 54 MG extended release tablet, Take 54 mg by mouth every morning., Disp: , Rfl:     sucralfate (CARAFATE) 1 GM tablet, Take 1 g by mouth daily , Disp: , Rfl:     blood glucose monitor strips, Test once dialy for diabetes e11.9, Disp: 100 strip, Rfl: 5    Blood Glucose Monitoring Suppl (ONE TOUCH ULTRA 2) w/Device KIT, 1 kit by Does not apply route daily, Disp: 1 kit, Rfl: 0    ondansetron (ZOFRAN) 4 MG tablet, Take 4 mg by mouth every 8 hours as needed for Nausea or Vomiting, Disp: , Rfl:     lamoTRIgine (LAMICTAL) 200 MG tablet, Take 200 mg by mouth daily, Disp: , Rfl:     Lancets MISC, Test once daily for diabetes e11.9, Disp: 100 each, Rfl: 3    Blood Glucose Monitoring Suppl CONCETTA, Test once daily for diabetes e11.9, Disp: 1 Device, Rfl: 0    traZODone (DESYREL) 100 MG tablet, Take 200 mg by mouth nightly , Disp: , Rfl:     escitalopram (LEXAPRO) 20 MG tablet, Take 10 mg by mouth daily , Disp: , Rfl:     naproxen (NAPROSYN) 500 MG tablet, Take 1 tablet by mouth 2 times daily (with meals) for 30 days. , Disp: 60 tablet, Rfl: 0      Review of Systems        Objective:   PHYSICAL EXAM:  /80   Pulse 97   Resp 16   Ht 5' 5.5\" (1.664 m)   Wt 206 lb 9.6 oz (93.7 kg)   SpO2 97% Comment: RA  BMI 33.86 kg/m²     Physical Exam  Gen: No acute distress. Obese. BMI of 33.86  Eyes: PERRL. No sclera icterus. No conjunctival injection. ENT: No discharge. Pharynx clear. Mallampati class IV. Neck: Trachea midline. No obvious mass. Neck circumference 15\"  Resp: No accessory muscle use. Nocrackles. No wheezes. No rhonchi. CV: Regular rate. Regular rhythm. No murmur or rub. Skin: Warm and dry. M/S: No cyanosis. No obvious joint deformity. Neuro: Awake. Alert. Moves all four extremities. Psych: Oriented x 3.  No improvement with above. Avoidsedatives, alcohol and caffeinated drinks at bed time. No driving motorized vehicles or operating heavy machinery while fatigue, drowsy or sleepy. Weight loss is also recommended as a long-term intervention. Complications of ANGEL if not treated were discussed with patient patient to include systemic hypertension, pulmonary hypertension, cardiovascular morbidities, car accidents and all cause mortality.   Discussed pathophysiology of ANGEL with patient today  Patient education handout provided regarding sleep tips     Follow-up: 2-3 months, sooner if needed

## 2022-09-07 NOTE — PATIENT INSTRUCTIONS

## 2022-11-01 ENCOUNTER — OFFICE VISIT (OUTPATIENT)
Dept: FAMILY MEDICINE CLINIC | Age: 62
End: 2022-11-01
Payer: COMMERCIAL

## 2022-11-01 VITALS
HEART RATE: 103 BPM | HEIGHT: 66 IN | DIASTOLIC BLOOD PRESSURE: 84 MMHG | WEIGHT: 205.2 LBS | BODY MASS INDEX: 32.98 KG/M2 | OXYGEN SATURATION: 96 % | SYSTOLIC BLOOD PRESSURE: 126 MMHG

## 2022-11-01 DIAGNOSIS — H61.22 IMPACTED CERUMEN OF LEFT EAR: ICD-10-CM

## 2022-11-01 DIAGNOSIS — H69.82 DYSFUNCTION OF LEFT EUSTACHIAN TUBE: Primary | ICD-10-CM

## 2022-11-01 PROCEDURE — G8427 DOCREV CUR MEDS BY ELIG CLIN: HCPCS | Performed by: PHYSICIAN ASSISTANT

## 2022-11-01 PROCEDURE — 3074F SYST BP LT 130 MM HG: CPT | Performed by: PHYSICIAN ASSISTANT

## 2022-11-01 PROCEDURE — 69210 REMOVE IMPACTED EAR WAX UNI: CPT | Performed by: PHYSICIAN ASSISTANT

## 2022-11-01 PROCEDURE — 99213 OFFICE O/P EST LOW 20 MIN: CPT | Performed by: PHYSICIAN ASSISTANT

## 2022-11-01 PROCEDURE — G8417 CALC BMI ABV UP PARAM F/U: HCPCS | Performed by: PHYSICIAN ASSISTANT

## 2022-11-01 PROCEDURE — 3078F DIAST BP <80 MM HG: CPT | Performed by: PHYSICIAN ASSISTANT

## 2022-11-01 PROCEDURE — 1036F TOBACCO NON-USER: CPT | Performed by: PHYSICIAN ASSISTANT

## 2022-11-01 PROCEDURE — G8482 FLU IMMUNIZE ORDER/ADMIN: HCPCS | Performed by: PHYSICIAN ASSISTANT

## 2022-11-01 PROCEDURE — 3017F COLORECTAL CA SCREEN DOC REV: CPT | Performed by: PHYSICIAN ASSISTANT

## 2022-11-01 RX ORDER — LAMOTRIGINE 150 MG/1
TABLET ORAL
COMMUNITY
Start: 2022-10-12

## 2022-11-01 RX ORDER — METHYLPREDNISOLONE 4 MG/1
TABLET ORAL
Qty: 1 KIT | Refills: 0 | Status: SHIPPED
Start: 2022-11-01 | End: 2022-11-01

## 2022-11-01 RX ORDER — PREDNISONE 10 MG/1
5 TABLET ORAL 2 TIMES DAILY
Qty: 5 TABLET | Refills: 0 | Status: SHIPPED | OUTPATIENT
Start: 2022-11-01 | End: 2022-11-06

## 2022-11-01 ASSESSMENT — ENCOUNTER SYMPTOMS: RESPIRATORY NEGATIVE: 1

## 2022-11-01 NOTE — PROGRESS NOTES
Subjective:      Patient ID: Gabriela Hines is a 65 y.o. female. HPI  Patient presents with left ear pain for 6 days. Has tried antihistamine, debrox with no relief. It is keeping her awake at night. Can no equalize the pressure in her ears. Review of Systems   Constitutional: Negative. HENT:  Positive for ear pain. Negative for congestion. Left   Respiratory: Negative. Cardiovascular: Negative. Objective:   Physical Exam  Constitutional:       Appearance: Normal appearance. HENT:      Right Ear: Ear canal normal.      Left Ear: Ear canal normal.      Ears:      Comments: Cerumen impaction left- clear after irrigation  Small canals  Pulmonary:      Effort: Pulmonary effort is normal.   Neurological:      General: No focal deficit present. Mental Status: She is alert and oriented to person, place, and time. Assessment / Plan:          Diagnosis Orders   1. Dysfunction of left eustachian tube  Trial of 1-2 doses of sudafed and flonse. If not resolved prednisone. 2. Impacted cerumen of left ear  18870 - [place] REMOVE IMPACTED EAR WAX        Patient is to call if no improvement or signs and symptoms worsen.

## 2022-11-24 RX ORDER — DAPAGLIFLOZIN 10 MG/1
TABLET, FILM COATED ORAL
Qty: 90 TABLET | Refills: 0 | Status: SHIPPED | OUTPATIENT
Start: 2022-11-24

## 2022-11-28 ENCOUNTER — HOSPITAL ENCOUNTER (OUTPATIENT)
Age: 62
Discharge: HOME | End: 2022-11-28
Payer: COMMERCIAL

## 2022-11-28 DIAGNOSIS — E66.9 OBESITY (BMI 30-39.9): ICD-10-CM

## 2022-11-28 DIAGNOSIS — Z72.821 POOR SLEEP HYGIENE: ICD-10-CM

## 2022-11-28 DIAGNOSIS — G25.81 RLS (RESTLESS LEGS SYNDROME): ICD-10-CM

## 2022-11-28 DIAGNOSIS — G47.33 MILD OBSTRUCTIVE SLEEP APNEA: ICD-10-CM

## 2022-11-28 DIAGNOSIS — F51.04 PSYCHOPHYSIOLOGICAL INSOMNIA: ICD-10-CM

## 2022-11-28 DIAGNOSIS — R53.83 OTHER FATIGUE: ICD-10-CM

## 2022-11-28 LAB — FERRITIN: 157.2 NG/ML (ref 15–150)

## 2022-11-28 PROCEDURE — 36415 COLL VENOUS BLD VENIPUNCTURE: CPT

## 2022-11-28 PROCEDURE — 82728 ASSAY OF FERRITIN: CPT

## 2022-12-07 ENCOUNTER — OFFICE VISIT (OUTPATIENT)
Dept: SLEEP MEDICINE | Age: 62
End: 2022-12-07
Payer: COMMERCIAL

## 2022-12-07 VITALS
HEART RATE: 100 BPM | RESPIRATION RATE: 17 BRPM | TEMPERATURE: 97.3 F | SYSTOLIC BLOOD PRESSURE: 115 MMHG | HEIGHT: 66 IN | DIASTOLIC BLOOD PRESSURE: 73 MMHG | WEIGHT: 207.2 LBS | BODY MASS INDEX: 33.3 KG/M2 | OXYGEN SATURATION: 98 %

## 2022-12-07 DIAGNOSIS — G25.81 RLS (RESTLESS LEGS SYNDROME): ICD-10-CM

## 2022-12-07 DIAGNOSIS — I10 ESSENTIAL HYPERTENSION: ICD-10-CM

## 2022-12-07 DIAGNOSIS — G47.33 MILD OBSTRUCTIVE SLEEP APNEA: Primary | ICD-10-CM

## 2022-12-07 DIAGNOSIS — E66.9 OBESITY (BMI 30-39.9): ICD-10-CM

## 2022-12-07 DIAGNOSIS — Z71.89 CPAP USE COUNSELING: ICD-10-CM

## 2022-12-07 PROCEDURE — 99214 OFFICE O/P EST MOD 30 MIN: CPT | Performed by: NURSE PRACTITIONER

## 2022-12-07 PROCEDURE — G8417 CALC BMI ABV UP PARAM F/U: HCPCS | Performed by: NURSE PRACTITIONER

## 2022-12-07 PROCEDURE — G8482 FLU IMMUNIZE ORDER/ADMIN: HCPCS | Performed by: NURSE PRACTITIONER

## 2022-12-07 PROCEDURE — G8427 DOCREV CUR MEDS BY ELIG CLIN: HCPCS | Performed by: NURSE PRACTITIONER

## 2022-12-07 PROCEDURE — 1036F TOBACCO NON-USER: CPT | Performed by: NURSE PRACTITIONER

## 2022-12-07 PROCEDURE — 3017F COLORECTAL CA SCREEN DOC REV: CPT | Performed by: NURSE PRACTITIONER

## 2022-12-07 PROCEDURE — 3078F DIAST BP <80 MM HG: CPT | Performed by: NURSE PRACTITIONER

## 2022-12-07 PROCEDURE — 3074F SYST BP LT 130 MM HG: CPT | Performed by: NURSE PRACTITIONER

## 2022-12-07 RX ORDER — ROPINIROLE 0.5 MG/1
0.5 TABLET, FILM COATED ORAL NIGHTLY
Qty: 30 TABLET | Refills: 1 | Status: SHIPPED | OUTPATIENT
Start: 2022-12-07

## 2022-12-07 ASSESSMENT — SLEEP AND FATIGUE QUESTIONNAIRES
HOW LIKELY ARE YOU TO NOD OFF OR FALL ASLEEP WHILE SITTING QUIETLY AFTER LUNCH WITHOUT ALCOHOL: 0
HOW LIKELY ARE YOU TO NOD OFF OR FALL ASLEEP WHILE SITTING INACTIVE IN A PUBLIC PLACE: 0
HOW LIKELY ARE YOU TO NOD OFF OR FALL ASLEEP WHILE SITTING AND TALKING TO SOMEONE: 0
HOW LIKELY ARE YOU TO NOD OFF OR FALL ASLEEP WHILE WATCHING TV: 0
HOW LIKELY ARE YOU TO NOD OFF OR FALL ASLEEP WHEN YOU ARE A PASSENGER IN A CAR FOR AN HOUR WITHOUT A BREAK: 0
ESS TOTAL SCORE: 1
NECK CIRCUMFERENCE (INCHES): 15
HOW LIKELY ARE YOU TO NOD OFF OR FALL ASLEEP WHILE SITTING AND READING: 0
HOW LIKELY ARE YOU TO NOD OFF OR FALL ASLEEP WHILE LYING DOWN TO REST IN THE AFTERNOON WHEN CIRCUMSTANCES PERMIT: 1
HOW LIKELY ARE YOU TO NOD OFF OR FALL ASLEEP IN A CAR, WHILE STOPPED FOR A FEW MINUTES IN TRAFFIC: 0

## 2022-12-07 NOTE — PROGRESS NOTES
Patient ID: Aura Mcclure is a 65 y.o. female who is being seen today for   Chief Complaint   Patient presents with    Sleep Apnea     2-3 month sleep      Referring: Dr. Eduardo Bone    HPI:       Aura Mcclure is a 65 y.o. female in office for ANGEL follow up. States she is doing well with CPAP. Feels she \"is finally getting it\". Patient is using CPAP   7-8hrs/night. Using humidifier. No snoring on CPAP. The pressure is well tolerated. The mask is comfortable- nasal mask. No mask leak. No significant daytime sleepiness. No nodding off when driving. No dry nose or throat. No fatigue. Bedtime is 10 pm and rise time is 9 am. Sleep onset is 60 minutes. Wakes up 1-2 times at night total. 1-2 nocturia. It takes few minutes to fall back a sleep. No naps during the day. No headache in am. No weight gain. 1 caffienated beverages during the day. No alcohol. ESS is 1. RLS symptoms 3-4 times a week, can keep her up at night.  has tried requip in the past unsure why she stopped, does not remember if it helped. States she would like to try it again. Ferritin level was not low        Previous HPI 9/7/22  Aura Mcclure is a 65 y.o. female in office with  for ANGEL follow up. HST and CPAP titration were reviewed by me and noted below. HST showed mild ANGEL and patient started CPAP after testing. Results were dicussed with patient and multiple good questions were answered. States pressure was too high and  decreased the pressure to 6 cm H2O. States pressure feels better since lower.  has tried a few different masks. States wakes up without CPAP at times. States she is trying to get used to using it. Patient is using CPAP  4-6 hrs/night. Using humidifier. No snoring on CPAP. The pressure is well tolerated with decrease. The mask is comfortable- nasal mask. No mask leak. No significant daytime sleepiness. No nodding off when driving. No dry nose or throat. +fatigue.  Bedtime is 10-11 pm and rise time is 10 am. Sleep onset is 60 minutes. Wakes up 3-4 times at night total. 3-4 nocturia. It takes few minutes to fall back a sleep. Occasional naps during the day. No headache in am. No weight gain. 2-3 caffienated beverages during the day. No alcohol. ESS is 4    PLMS on sleep study, +RLS symptoms states she has taken Requip in the past,  states unsure why she stopped. Initial HPI 3/17/22  Gabriela Hines is a 65 y.o. female in office for sleep apnea evaluation. Patient reports snoring at night for the past 20 years. Doesn't sleep in supine position. Has witnessed apnea. No restorative sleep. +dry mouth upon awakening. Fatigue and tiredness during the day. No history of sleep apnea. Bedtime 10 pm and rise time is 1030-11 am. It takes 45-60 minutes to fall asleep- watches TV. States takes trazodone, melatonin nightly states it helps. 2 nocturia. Wakes up 2 times at night. It takes few- 45 minutes to fall back a sleep. Takes 1 nap during the day (60 minutes). No headache in am. No car wrecks or near wrecks because of the sleepiness. No nodding off while driving. Gained 20 pounds in the past 2 years. +forgetfulness and decreased concentration. Drinks 1 caffinated beverages per day. No significant alcohol. +restless feelings in legs at night.  +teeth grinding. +nightmares. No sleep walking. No night time panic attacks. No narcotics. No drug abuse. +history of depression. +history of anxiety see psychiatry. No history of atrial fibrillation. +history of DM. +history of HTN. No history of ischemic heart disease. No history of stroke. ESS is 5. No smoking. No known FH for or narcolepsy. +FH for RLS mom.   +FH for ANGEL- brother    Sleep Medicine 12/7/2022 9/7/2022 3/17/2022   Sitting and reading 0 0 1   Watching TV 0 1 2   Sitting, inactive in a public place (e.g. a theatre or a meeting) 0 0 0   As a passenger in a car for an hour without a break 0 1 0   Lying down to rest in the afternoon when circumstances permit 1 2 2   Sitting and talking to someone 0 0 0   Sitting quietly after a lunch without alcohol 0 0 0   In a car, while stopped for a few minutes in traffic 0 0 0   [place] Sleepiness Score 1 4 5   Neck circumference (Inches) 15 15 15       Past Medical History:  Past Medical History:   Diagnosis Date    Anxiety     Depression     Diverticulitis 10/19/2021    DJD (degenerative joint disease)     hands/knees/ankles    Fatty liver     Hyperlipidemia     Hypertension     Irritable bowel syndrome     Mood disorder (HCC)     NOS    Narcotic addiction (HCC)     ANGEL (obstructive sleep apnea)     no CPAP machine - not recommended per sleep study    Primary localized osteoarthritis of right knee 3/29/2019    Restless leg syndrome     Status post total right knee replacement 3/29/2019    Type II or unspecified type diabetes mellitus without mention of complication, not stated as uncontrolled     Vision impairment     wears glasses and contacts       Past Surgical History:        Procedure Laterality Date    APPENDECTOMY      CHOLECYSTECTOMY  2001    COLONOSCOPY  1/2004    Due 2014    CYSTOSCOPY  7/9/10    CYSTOSCOPY  11/18/10    insertion of sparc mesh sling with cystoscopy    HYSTERECTOMY (CERVIX STATUS UNKNOWN)      JOINT REPLACEMENT      KNEE ARTHROSCOPY Right     KNEE ARTHROSCOPY Left 10/13    partial medial menisectomy    KNEE SURGERY      PLANTAR FASCIA SURGERY      bilateral    BRANDI AND BSO (CERVIX REMOVED)  4/10    DUB    TOTAL KNEE ARTHROPLASTY Right 3/29/2019    RIGHT TOTAL KNEE MAKOPLASTY REPLACEMENT WITH ADDUCTOR CANAL BLOCK FOR PAIN CONTROL                 JOE HAKAN  CPT CODE - 24747  performed by Amor Bay MD at [facility] OR       Allergies:  is allergic to lipitor [atorvastatin] and morphine. Social History:    TOBACCO:   reports that she has never smoked. She has never used smokeless tobacco.  ETOH:   reports no history of alcohol use.     Family History:       Problem Relation Age of Onset Diabetes Mother     Arthritis Mother     Diabetes Father     Heart Disease Father     Cancer Father         colon    Dementia Father     Breast Cancer Sister        Current Medications:    Current Outpatient Medications:     FARXIGA 10 MG tablet, TAKE ONE TABLET BY MOUTH EVERY MORNING, Disp: 90 tablet, Rfl: 0    lamoTRIgine (LAMICTAL) 150 MG tablet, Every other day, Disp: , Rfl:     naproxen (NAPROSYN) 500 MG tablet, TAKE ONE TABLET BY MOUTH TWICE A DAY AS NEEDED FOR PAIN, Disp: 30 tablet, Rfl: 0    tiZANidine (ZANAFLEX) 2 MG tablet, Take 1 tablet by mouth every 8 hours as needed (muscle spasms), Disp: 20 tablet, Rfl: 0    metFORMIN (GLUCOPHAGE) 1000 MG tablet, TAKE ONE TABLET BY MOUTH DAILY WITH BREAKFAST, Disp: 90 tablet, Rfl: 1    pioglitazone (ACTOS) 15 MG tablet, TAKE ONE TABLET BY MOUTH DAILY, Disp: 30 tablet, Rfl: 5    atorvastatin (LIPITOR) 10 MG tablet, TAKE ONE TABLET BY MOUTH DAILY, Disp: 90 tablet, Rfl: 3    metoprolol succinate (TOPROL XL) 50 MG extended release tablet, Take 1 tablet by mouth daily, Disp: 90 tablet, Rfl: 3    aspirin EC 81 MG EC tablet, Take 81 mg by mouth daily, Disp: , Rfl:     melatonin 5 MG TABS tablet, Take 5 mg by mouth daily, Disp: , Rfl:     methylphenidate (CONCERTA) 54 MG extended release tablet, Take 54 mg by mouth every morning., Disp: , Rfl:     sucralfate (CARAFATE) 1 GM tablet, Take 1 g by mouth daily , Disp: , Rfl:     blood glucose monitor strips, Test once dialy for diabetes e11.9, Disp: 100 strip, Rfl: 5    Blood Glucose Monitoring Suppl (ONE TOUCH ULTRA 2) w/Device KIT, 1 kit by Does not apply route daily, Disp: 1 kit, Rfl: 0    ondansetron (ZOFRAN) 4 MG tablet, Take 4 mg by mouth every 8 hours as needed for Nausea or Vomiting, Disp: , Rfl:     Lancets MISC, Test once daily for diabetes e11.9, Disp: 100 each, Rfl: 3    Blood Glucose Monitoring Suppl CONCETTA, Test once daily for diabetes e11.9, Disp: 1 Device, Rfl: 0    traZODone (DESYREL) 100 MG tablet, Take 200 mg by mouth nightly , Disp: , Rfl:     escitalopram (LEXAPRO) 20 MG tablet, Take 10 mg by mouth daily , Disp: , Rfl:     lamoTRIgine (LAMICTAL) 200 MG tablet, Take 200 mg by mouth daily Every other day. One day 200 mg and 150mg the next day (Patient not taking: Reported on 12/7/2022), Disp: , Rfl:     naproxen (NAPROSYN) 500 MG tablet, Take 1 tablet by mouth 2 times daily (with meals) for 30 days. (Patient not taking: No sig reported), Disp: 60 tablet, Rfl: 0      Review of Systems        Objective:   PHYSICAL EXAM:  /73   Pulse 100   Temp 97.3 °F (36.3 °C)   Resp 17   Ht 5' 5.5\" (1.664 m)   Wt 207 lb 3.2 oz (94 kg)   SpO2 98% Comment: RA  BMI 33.96 kg/m²     Physical Exam  Gen: No acute distress. Obese. BMI of 33.96  Eyes: PERRL. No sclera icterus. No conjunctival injection. ENT: No discharge. Pharynx clear. Mallampati class IV. Neck: Trachea midline. No obvious mass. Neck circumference 15\"  Resp: No accessory muscle use. Nocrackles. No wheezes. No rhonchi. CV: Regular rate. Regular rhythm. No murmur or rub. Skin: Warm and dry. M/S: No cyanosis. No obvious joint deformity. Neuro: Awake. Alert. Moves all four extremities. Psych: Oriented x 3. No anxiety. DATA:   11/24/2021 Echo EF 55%  4/12/2022 PSG AHI 11.4, low SPO2 86%  5/17/2022 titration improved ANGEL with CPAP, PLMS 31.3, recommendation CPAP 8 cm H2O  11/28/2022 ferritin 157.2    CPAP download data:  Compliance download report from 8/1/22 to 8/30/22 reviewed today by me and showed patient is using machine 6:18 hrs/night with 83% compliance and AHI 3.9 within this time frame. 25/30days with greater than 4 hours of machine use. CPAP 6cm H20    Compliance download report from 11/1/22 to 11/30/22 reviewed today by me and showed patient is using machine 7:28 hrs/night with 83% compliance and AHI 3.4 within this time frame. 25/30days with greater than 4 hours of machine use. CPAP 6 cm H20     Assessment:       Mild ANGEL. CPAP 6 cm H2O. Optimal compliance and efficacy on review today  Snoring-resolved on CPAP  Observed sleep apnea -resolved on CPAP  Hypersomnia -improving  Sleep onset and maintenance insomnia-psychophysiological hyperarousal, poor sleep hygiene, RLS, comorbid conditions, ANGEL likely contributing-taking trazodone and melatonin  Obesity  Depression and anxiety-followed by psychiatry  Hypertension and palpitations-followed by cardiology  RLS        Plan:      Continue CPAP 6 cm H2O   Advised to use CPAP 6-8 hrs at night and during naps. Replacement of mask, tubing, head straps every 3-6 months or sooner if damaged. Patient instructed to contact [employer] for any mask, tubing or machine trouble shooting if problems arise. Sleep hygiene  Cognitive behavioral therapy was discussed with patient including stimulus control and sleep restriction   Recommend decrease time in bed  Avoidsedatives, alcohol and caffeinated drinks at bed time. No driving motorized vehicles or operating heavy machinery while fatigue, drowsy or sleepy. Weight loss is also recommended as a long-term intervention. Complications of ANGEL if not treated were discussed with patient patient to include systemic hypertension, pulmonary hypertension, cardiovascular morbidities, car accidents and all cause mortality. Discussed pathophysiology of ANGEL with patient today  Patient education handout provided regarding sleep tips       RLS:  Trial Requip 0.5 mg. The side effects of ropinirole have been discussed with the patient and or family, including but not limited to compulsive behaviors, mental status changes, sleep attacks and cardiovascular side effects. As with all medications, patient was instructed to read package insert & to call with any concerning side effects.    D/W patient non pharmacological therapy for RLS including avoiding aggravating factors (sleep deprivation, mental alerting activities at time of rest, antihistamines), moderate regular exercise, leg message and heating pads/hot shower.     Ferritin level is not low      Follow-up: 4-6 weeks, sooner if needed

## 2022-12-07 NOTE — PATIENT INSTRUCTIONS

## 2023-01-06 ENCOUNTER — OFFICE VISIT (OUTPATIENT)
Dept: FAMILY MEDICINE CLINIC | Age: 63
End: 2023-01-06
Payer: COMMERCIAL

## 2023-01-06 ENCOUNTER — TELEPHONE (OUTPATIENT)
Dept: ADMINISTRATIVE | Age: 63
End: 2023-01-06

## 2023-01-06 VITALS
DIASTOLIC BLOOD PRESSURE: 70 MMHG | OXYGEN SATURATION: 98 % | WEIGHT: 203.2 LBS | SYSTOLIC BLOOD PRESSURE: 110 MMHG | HEIGHT: 66 IN | BODY MASS INDEX: 32.66 KG/M2 | HEART RATE: 73 BPM

## 2023-01-06 DIAGNOSIS — Z79.4 TYPE 2 DIABETES MELLITUS WITHOUT COMPLICATION, WITH LONG-TERM CURRENT USE OF INSULIN (HCC): Primary | ICD-10-CM

## 2023-01-06 DIAGNOSIS — I10 ESSENTIAL HYPERTENSION: ICD-10-CM

## 2023-01-06 DIAGNOSIS — E78.00 PURE HYPERCHOLESTEROLEMIA: ICD-10-CM

## 2023-01-06 DIAGNOSIS — E11.9 TYPE 2 DIABETES MELLITUS WITHOUT COMPLICATION, WITH LONG-TERM CURRENT USE OF INSULIN (HCC): Primary | ICD-10-CM

## 2023-01-06 LAB
A/G RATIO: 1.9 (ref 1.1–2.2)
ALBUMIN SERPL-MCNC: 4.7 G/DL (ref 3.4–5)
ALP BLD-CCNC: 100 U/L (ref 40–129)
ALT SERPL-CCNC: 15 U/L (ref 10–40)
ANION GAP SERPL CALCULATED.3IONS-SCNC: 13 MMOL/L (ref 3–16)
AST SERPL-CCNC: 14 U/L (ref 15–37)
BILIRUB SERPL-MCNC: 0.5 MG/DL (ref 0–1)
BUN BLDV-MCNC: 18 MG/DL (ref 7–20)
CALCIUM SERPL-MCNC: 10.1 MG/DL (ref 8.3–10.6)
CHLORIDE BLD-SCNC: 99 MMOL/L (ref 99–110)
CHOLESTEROL, TOTAL: 160 MG/DL (ref 0–199)
CO2: 27 MMOL/L (ref 21–32)
CREAT SERPL-MCNC: 0.9 MG/DL (ref 0.6–1.2)
GFR SERPL CREATININE-BSD FRML MDRD: >60 ML/MIN/{1.73_M2}
GLUCOSE BLD-MCNC: 155 MG/DL (ref 70–99)
HBA1C MFR BLD: 6.9 %
HDLC SERPL-MCNC: 63 MG/DL (ref 40–60)
LDL CHOLESTEROL CALCULATED: 79 MG/DL
POTASSIUM SERPL-SCNC: 4.7 MMOL/L (ref 3.5–5.1)
SODIUM BLD-SCNC: 139 MMOL/L (ref 136–145)
TOTAL PROTEIN: 7.2 G/DL (ref 6.4–8.2)
TRIGL SERPL-MCNC: 88 MG/DL (ref 0–150)
VLDLC SERPL CALC-MCNC: 18 MG/DL

## 2023-01-06 PROCEDURE — 3044F HG A1C LEVEL LT 7.0%: CPT | Performed by: PHYSICIAN ASSISTANT

## 2023-01-06 PROCEDURE — 3074F SYST BP LT 130 MM HG: CPT | Performed by: PHYSICIAN ASSISTANT

## 2023-01-06 PROCEDURE — 3078F DIAST BP <80 MM HG: CPT | Performed by: PHYSICIAN ASSISTANT

## 2023-01-06 PROCEDURE — 36415 COLL VENOUS BLD VENIPUNCTURE: CPT | Performed by: PHYSICIAN ASSISTANT

## 2023-01-06 PROCEDURE — 83036 HEMOGLOBIN GLYCOSYLATED A1C: CPT | Performed by: PHYSICIAN ASSISTANT

## 2023-01-06 PROCEDURE — 99214 OFFICE O/P EST MOD 30 MIN: CPT | Performed by: PHYSICIAN ASSISTANT

## 2023-01-06 RX ORDER — SEMAGLUTIDE 1.34 MG/ML
0.25 INJECTION, SOLUTION SUBCUTANEOUS WEEKLY
Qty: 1 ADJUSTABLE DOSE PRE-FILLED PEN SYRINGE | Refills: 2 | Status: SHIPPED | OUTPATIENT
Start: 2023-01-06

## 2023-01-06 ASSESSMENT — PATIENT HEALTH QUESTIONNAIRE - PHQ9
SUM OF ALL RESPONSES TO PHQ QUESTIONS 1-9: 0
SUM OF ALL RESPONSES TO PHQ QUESTIONS 1-9: 0
SUM OF ALL RESPONSES TO PHQ9 QUESTIONS 1 & 2: 0
7. TROUBLE CONCENTRATING ON THINGS, SUCH AS READING THE NEWSPAPER OR WATCHING TELEVISION: 0
SUM OF ALL RESPONSES TO PHQ QUESTIONS 1-9: 0
4. FEELING TIRED OR HAVING LITTLE ENERGY: 0
9. THOUGHTS THAT YOU WOULD BE BETTER OFF DEAD, OR OF HURTING YOURSELF: 0
6. FEELING BAD ABOUT YOURSELF - OR THAT YOU ARE A FAILURE OR HAVE LET YOURSELF OR YOUR FAMILY DOWN: 0
SUM OF ALL RESPONSES TO PHQ QUESTIONS 1-9: 0
2. FEELING DOWN, DEPRESSED OR HOPELESS: 0
5. POOR APPETITE OR OVEREATING: 0
10. IF YOU CHECKED OFF ANY PROBLEMS, HOW DIFFICULT HAVE THESE PROBLEMS MADE IT FOR YOU TO DO YOUR WORK, TAKE CARE OF THINGS AT HOME, OR GET ALONG WITH OTHER PEOPLE: 0
1. LITTLE INTEREST OR PLEASURE IN DOING THINGS: 0
8. MOVING OR SPEAKING SO SLOWLY THAT OTHER PEOPLE COULD HAVE NOTICED. OR THE OPPOSITE, BEING SO FIGETY OR RESTLESS THAT YOU HAVE BEEN MOVING AROUND A LOT MORE THAN USUAL: 0
3. TROUBLE FALLING OR STAYING ASLEEP: 0

## 2023-01-06 NOTE — PROGRESS NOTES
Jasmin Maxwell presents today for   Chief Complaint   Patient presents with    Diabetes    Hypertension     Pt is here for 6 month follow up and FBW           ASSESSMENT/PLAN:   Diabetes Mellitus, today's A1C is 6.9     Diagnosis Orders   1. Type 2 diabetes mellitus without complication, with long-term current use of insulin ([facility])   DIABETES FOOT EXAM- will stop farxiga due to cost and stop actos. Will start ozempic 0.25 ng weekly for a month then 0.5 mg weekly. OV 3 months. BS check 4-5 times weekly and call with elevation    Comprehensive Metabolic Panel    POCT glycosylated hemoglobin (Hb A1C)      2. Pure hypercholesterolemia  Lipid Panel- continue lipitor 10mg daily    Comprehensive Metabolic Panel      3. Essential hypertension  Comprehensive Metabolic Panel- continue metoprolol 50mg daily          Discussed need for mammogram and colonoscopy              SUBJECTIVE:  Jasmin Maxwell is a 65 y.o. female who presents for evaluation of hypertension, Diabetes Mellitus and hyperlipidemia. She indicates that she is feeling well and denies any symptoms referable to her elevated blood pressure, diabetes or hyperlipidemia. Specifically denies chest pain, , dyspnea, orthopnea, PND,peripheral edema , or neuro sx. No anorexia, arthralgia, or leg cramps noted. No episodes of hypoglycemia. Current medication regimen is as listed below. She denies any side effects of medication, and has been taking it regularly. Blood Pressure:   Blood pressures are not being checked at home. Diabetes: The last A1C: 6.7. The Blood sugars range at home have been: 110-130. Last Eye Exam : 2021- will schedule. The last microalbumin:    Lab Results   Component Value Date    LABMICR <1.20 02/22/2022       she is taking aspirin.       Social History     Tobacco Use   Smoking Status Never   Smokeless Tobacco Never       Current Outpatient Medications   Medication Sig Dispense Refill    blood glucose test strips (ASCENSIA AUTODISC VI;ONE TOUCH ULTRA TEST VI) strip 1 each by In Vitro route daily As needed. 100 each 3    rOPINIRole (REQUIP) 0.5 MG tablet Take 1 tablet by mouth nightly 30 tablet 1    FARXIGA 10 MG tablet TAKE ONE TABLET BY MOUTH EVERY MORNING 90 tablet 0    naproxen (NAPROSYN) 500 MG tablet TAKE ONE TABLET BY MOUTH TWICE A DAY AS NEEDED FOR PAIN 30 tablet 0    tiZANidine (ZANAFLEX) 2 MG tablet Take 1 tablet by mouth every 8 hours as needed (muscle spasms) 20 tablet 0    metFORMIN (GLUCOPHAGE) 1000 MG tablet TAKE ONE TABLET BY MOUTH DAILY WITH BREAKFAST 90 tablet 1    pioglitazone (ACTOS) 15 MG tablet TAKE ONE TABLET BY MOUTH DAILY 30 tablet 5    atorvastatin (LIPITOR) 10 MG tablet TAKE ONE TABLET BY MOUTH DAILY 90 tablet 3    metoprolol succinate (TOPROL XL) 50 MG extended release tablet Take 1 tablet by mouth daily 90 tablet 3    aspirin EC 81 MG EC tablet Take 81 mg by mouth daily      melatonin 5 MG TABS tablet Take 5 mg by mouth daily      methylphenidate (CONCERTA) 54 MG extended release tablet Take 54 mg by mouth every morning. sucralfate (CARAFATE) 1 GM tablet Take 1 g by mouth daily       Blood Glucose Monitoring Suppl (ONE TOUCH ULTRA 2) w/Device KIT 1 kit by Does not apply route daily 1 kit 0    Lancets MISC Test once daily for diabetes e11.9 100 each 3    traZODone (DESYREL) 100 MG tablet Take 200 mg by mouth nightly       escitalopram (LEXAPRO) 20 MG tablet Take 10 mg by mouth daily       lamoTRIgine (LAMICTAL) 150 MG tablet Every other day (Patient not taking: Reported on 1/6/2023)      blood glucose monitor strips Test once dialy for diabetes e11.9 (Patient not taking: Reported on 1/6/2023) 100 strip 5    ondansetron (ZOFRAN) 4 MG tablet Take 4 mg by mouth every 8 hours as needed for Nausea or Vomiting (Patient not taking: Reported on 1/6/2023)      lamoTRIgine (LAMICTAL) 200 MG tablet Take 200 mg by mouth daily Every other day.  One day 200 mg and 150mg the next day (Patient not taking: No sig reported) Blood Glucose Monitoring Suppl CONCETTA Test once daily for diabetes e11.9 (Patient not taking: Reported on 1/6/2023) 1 Device 0    naproxen (NAPROSYN) 500 MG tablet Take 1 tablet by mouth 2 times daily (with meals) for 30 days. (Patient not taking: No sig reported) 60 tablet 0     No current facility-administered medications for this visit. OBJECTIVE:  /70 (Site: Right Upper Arm, Position: Sitting)   Pulse 73   Ht 5' 5.5\" (1.664 m)   Wt 203 lb 3.2 oz (92.2 kg)   SpO2 98%   BMI 33.30 kg/m²   General: NAD, non-toxic  Neck: no JVD or bruits  S1 and S2 normal, no murmurs, clicks, gallops or rubs. Regular rate and rhythm. Chest is clear; no wheezes or rales. No edema or JVD.   Vascular : DP 1-2+=  Neuro: alert, no CN or motor deficits, monofilament normal BL  Psych: normal mood, thought and judgement

## 2023-01-06 NOTE — TELEPHONE ENCOUNTER
Submitted PA for Ozempic (0.25 or 0.5 MG/DOSE) 2MG/1.5ML pen-injectors, Key: [id number]. Medication has been DENIED. Denial letter attached. Please notify patient. Thank you. Home

## 2023-01-10 NOTE — TELEPHONE ENCOUNTER
Patient was advised of new script for jardiance, she will discontinue the ozempic and has a 3 month follow up scheduled with Judit Mcdowell in April

## 2023-01-10 NOTE — TELEPHONE ENCOUNTER
Patient was advised and asked if Bekah Arauz wanted to start her on anything else like jardiance which she says they have talked about before

## 2023-01-12 ENCOUNTER — PATIENT MESSAGE (OUTPATIENT)
Dept: FAMILY MEDICINE CLINIC | Age: 63
End: 2023-01-12

## 2023-01-13 NOTE — TELEPHONE ENCOUNTER
From: Thien De Leon  To: Arti Smalls  Sent: 1/12/2023 6:29 PM EST  Subject: Question regarding COMPREHENSIVE METABOLIC PANEL    Wilder Oropeza,  The nurse at your office called me on Tuesday and informed me that Ana Rosa Luong was not authorized. The nurse was going to order Jardiance. I called pharmacy and they do not have an order for Jardiance. I believe that Jardiance also requires autorization. Should I go back on Actos? Can you please check on this for me.   Thank [id number]

## 2023-01-26 ENCOUNTER — PATIENT MESSAGE (OUTPATIENT)
Dept: FAMILY MEDICINE CLINIC | Age: 63
End: 2023-01-26

## 2023-01-26 NOTE — TELEPHONE ENCOUNTER
Riri Wong is requesting refill(s) metformin  Last OV 1/6/23 (pertaining to medication)  LR 7/5/22 (per medication requested)  Next office visit scheduled or attempted Yes   If no, reason:  4/14/23

## 2023-01-27 NOTE — TELEPHONE ENCOUNTER
From: Theresa Nogueira  To: Collins Chiang  Sent: 1/26/2023 5:02 PM EST  Subject: Jenniferleon Kumar Medication    Hi Marshall Pacheco,  I have 45 days of Farxiga left. Can I finish this before starting Jardiance? If yes do we need to adjust my next appointment for April 14th. Thank You.

## 2023-02-02 ENCOUNTER — OFFICE VISIT (OUTPATIENT)
Dept: SLEEP MEDICINE | Age: 63
End: 2023-02-02
Payer: COMMERCIAL

## 2023-02-02 VITALS
RESPIRATION RATE: 16 BRPM | WEIGHT: 202.2 LBS | SYSTOLIC BLOOD PRESSURE: 122 MMHG | HEIGHT: 66 IN | BODY MASS INDEX: 32.5 KG/M2 | HEART RATE: 84 BPM | TEMPERATURE: 97.2 F | OXYGEN SATURATION: 97 % | DIASTOLIC BLOOD PRESSURE: 80 MMHG

## 2023-02-02 DIAGNOSIS — Z71.89 CPAP USE COUNSELING: ICD-10-CM

## 2023-02-02 DIAGNOSIS — E66.9 OBESITY (BMI 30-39.9): ICD-10-CM

## 2023-02-02 DIAGNOSIS — I10 ESSENTIAL HYPERTENSION: ICD-10-CM

## 2023-02-02 DIAGNOSIS — G25.81 RLS (RESTLESS LEGS SYNDROME): ICD-10-CM

## 2023-02-02 DIAGNOSIS — G47.33 MILD OBSTRUCTIVE SLEEP APNEA: Primary | ICD-10-CM

## 2023-02-02 PROCEDURE — 99214 OFFICE O/P EST MOD 30 MIN: CPT | Performed by: NURSE PRACTITIONER

## 2023-02-02 PROCEDURE — 3079F DIAST BP 80-89 MM HG: CPT | Performed by: NURSE PRACTITIONER

## 2023-02-02 PROCEDURE — 3074F SYST BP LT 130 MM HG: CPT | Performed by: NURSE PRACTITIONER

## 2023-02-02 RX ORDER — ROPINIROLE 0.5 MG/1
0.5 TABLET, FILM COATED ORAL NIGHTLY
Qty: 30 TABLET | Refills: 5 | Status: SHIPPED | OUTPATIENT
Start: 2023-02-02

## 2023-02-02 ASSESSMENT — SLEEP AND FATIGUE QUESTIONNAIRES
HOW LIKELY ARE YOU TO NOD OFF OR FALL ASLEEP WHILE SITTING INACTIVE IN A PUBLIC PLACE: 0
HOW LIKELY ARE YOU TO NOD OFF OR FALL ASLEEP WHEN YOU ARE A PASSENGER IN A CAR FOR AN HOUR WITHOUT A BREAK: 0
HOW LIKELY ARE YOU TO NOD OFF OR FALL ASLEEP WHILE LYING DOWN TO REST IN THE AFTERNOON WHEN CIRCUMSTANCES PERMIT: 0
HOW LIKELY ARE YOU TO NOD OFF OR FALL ASLEEP WHILE SITTING AND TALKING TO SOMEONE: 0
ESS TOTAL SCORE: 0
HOW LIKELY ARE YOU TO NOD OFF OR FALL ASLEEP WHILE WATCHING TV: 0
NECK CIRCUMFERENCE (INCHES): 15
HOW LIKELY ARE YOU TO NOD OFF OR FALL ASLEEP WHILE SITTING QUIETLY AFTER LUNCH WITHOUT ALCOHOL: 0
HOW LIKELY ARE YOU TO NOD OFF OR FALL ASLEEP WHILE SITTING AND READING: 0
HOW LIKELY ARE YOU TO NOD OFF OR FALL ASLEEP IN A CAR, WHILE STOPPED FOR A FEW MINUTES IN TRAFFIC: 0

## 2023-02-02 NOTE — PATIENT INSTRUCTIONS

## 2023-02-02 NOTE — PROGRESS NOTES
Patient ID: Maya Downing is a 65 y.o. female who is being seen today for   Chief Complaint   Patient presents with    Sleep Apnea     4-6 weel sleep/RLS     Referring: Dr. Romayne Sicard    HPI:     Maya Downing is a 65 y.o. female in office with   for ANGEL/RLS follow up. Patient is using CPAP  7-8 hrs/night. Using humidifier. No snoring on CPAP. The pressure is well tolerated. The mask is comfortable-nasal mask. No mask leak. No significant daytime sleepiness. No nodding off when driving. No dry nose or throat. No fatigue. Bedtime is 10 pm and rise time is 8 am. Sleep onset is few minutes. Wakes up 1-2 times at night total. 1-2 nocturia. It takes usually few minutes to fall back a sleep. No naps during the day. No headache in am. No weight gain. 1 caffienated beverages during the day. No alcohol. ESS is 0  . Just started Requip after last visit for her RLS symptoms. States she takes it nightly around 9 PM.  States it is working well for RLS symptoms. She denies negative side effects from medication. She would like to continue medication        Previous HPI 12/7/22  Maya Downing is a 65 y.o. female in office for ANGEL follow up. States she is doing well with CPAP. Feels she \"is finally getting it\". Patient is using CPAP   7-8hrs/night. Using humidifier. No snoring on CPAP. The pressure is well tolerated. The mask is comfortable- nasal mask. No mask leak. No significant daytime sleepiness. No nodding off when driving. No dry nose or throat. No fatigue. Bedtime is 10 pm and rise time is 9 am. Sleep onset is 60 minutes. Wakes up 1-2 times at night total. 1-2 nocturia. It takes few minutes to fall back a sleep. No naps during the day. No headache in am. No weight gain. 1 caffienated beverages during the day. No alcohol. ESS is 1. RLS symptoms 3-4 times a week, can keep her up at night. States has tried requip in the past unsure why she stopped, does not remember if it helped.   States she would like to try it again. Ferritin level was not low        Previous HPI 9/7/22  Benito Du is a 65 y.o. female in office with  for ANGEL follow up. HST and CPAP titration were reviewed by me and noted below. HST showed mild ANGEL and patient started CPAP after testing. Results were dicussed with patient and multiple good questions were answered. States pressure was too high and  decreased the pressure to 6 cm H2O. States pressure feels better since lower. States has tried a few different masks. States wakes up without CPAP at times. States she is trying to get used to using it. Patient is using CPAP  4-6 hrs/night. Using humidifier. No snoring on CPAP. The pressure is well tolerated with decrease. The mask is comfortable- nasal mask. No mask leak. No significant daytime sleepiness. No nodding off when driving. No dry nose or throat. +fatigue. Bedtime is 10-11 pm and rise time is 10 am. Sleep onset is 60 minutes. Wakes up 3-4 times at night total. 3-4 nocturia. It takes few minutes to fall back a sleep. Occasional naps during the day. No headache in am. No weight gain. 2-3 caffienated beverages during the day. No alcohol. ESS is 4    PLMS on sleep study, +RLS symptoms states she has taken Requip in the past,  states unsure why she stopped. Initial HPI 3/17/22  Benito Du is a 65 y.o. female in office for sleep apnea evaluation. Patient reports snoring at night for the past 20 years. Doesn't sleep in supine position. Has witnessed apnea. No restorative sleep. +dry mouth upon awakening. Fatigue and tiredness during the day. No history of sleep apnea. Bedtime 10 pm and rise time is 1030-11 am. It takes 45-60 minutes to fall asleep- watches TV. States takes trazodone, melatonin nightly states it helps. 2 nocturia. Wakes up 2 times at night. It takes few- 45 minutes to fall back a sleep. Takes 1 nap during the day (60 minutes).  No headache in am. No car wrecks or near [facility] because of the sleepiness. No nodding off while driving. Gained 20 pounds in the past 2 years. +forgetfulness and decreased concentration. Drinks 1 caffinated beverages per day. No significant alcohol. +restless feelings in legs at night.  +teeth grinding. +nightmares. No sleep walking. No night time panic attacks. No narcotics. No drug abuse. +history of depression. +history of anxiety see psychiatry. No history of atrial fibrillation. +history of DM. +history of HTN. No history of ischemic heart disease. No history of stroke. ESS is 5. No smoking. No known FH for or narcolepsy. +FH for RLS mom.   +FH for ANGEL- brother    Sleep Medicine 2/2/2023 12/7/2022 9/7/2022 3/17/2022   Sitting and reading 0 0 0 1   Watching TV 0 0 1 2   Sitting, inactive in a public place (e.g. a theatre or a meeting) 0 0 0 0   As a passenger in a car for an hour without a break 0 0 1 0   Lying down to rest in the afternoon when circumstances permit 0 1 2 2   Sitting and talking to someone 0 0 0 0   Sitting quietly after a lunch without alcohol 0 0 0 0   In a car, while stopped for a few minutes in traffic 0 0 0 0   [place] Sleepiness Score 0 1 4 5   Neck circumference (Inches) 15 15 15 15       Past Medical History:  Past Medical History:   Diagnosis Date    Anxiety     Depression     Diverticulitis 10/19/2021    DJD (degenerative joint disease)     hands/knees/ankles    Fatty liver     Hyperlipidemia     Hypertension     Irritable bowel syndrome     Mood disorder (HCC)     NOS    Narcotic addiction (HCC)     ANGEL (obstructive sleep apnea)     no CPAP machine - not recommended per sleep study    Primary localized osteoarthritis of right knee 3/29/2019    Restless leg syndrome     Status post total right knee replacement 3/29/2019    Type II or unspecified type diabetes mellitus without mention of complication, not stated as uncontrolled     Vision impairment     wears glasses and contacts       Past Surgical History:        Procedure Laterality Date    APPENDECTOMY      CHOLECYSTECTOMY  2001    COLONOSCOPY  1/2004    Due 2014    CYSTOSCOPY  7/9/10    CYSTOSCOPY  11/18/10    insertion of sparc mesh sling with cystoscopy    HYSTERECTOMY (CERVIX STATUS UNKNOWN)      JOINT REPLACEMENT      KNEE ARTHROSCOPY Right     KNEE ARTHROSCOPY Left 10/13    partial medial menisectomy    KNEE SURGERY      PLANTAR FASCIA SURGERY      bilateral    BRANDI AND BSO (CERVIX REMOVED)  4/10    DUB    TOTAL KNEE ARTHROPLASTY Right 3/29/2019    RIGHT TOTAL KNEE MAKOPLASTY REPLACEMENT WITH ADDUCTOR CANAL BLOCK FOR PAIN CONTROL                 JOE MCCLENDON  CPT CODE - 15511  performed by Lester Morales MD at [facility] OR       Allergies:  is allergic to lipitor [atorvastatin] and morphine.  Social History:    TOBACCO:   reports that she has never smoked. She has never used smokeless tobacco.  ETOH:   reports no history of alcohol use.    Family History:       Problem Relation Age of Onset    Diabetes Mother     Arthritis Mother     Diabetes Father     Heart Disease Father     Cancer Father         colon    Dementia Father     Breast Cancer Sister        Current Medications:    Current Outpatient Medications:     metFORMIN (GLUCOPHAGE) 1000 MG tablet, TAKE ONE TABLET BY MOUTH DAILY WITH BREAKFAST, Disp: 30 tablet, Rfl: 5    blood glucose test strips (ASCENSIA AUTODISC VI;ONE TOUCH ULTRA TEST VI) strip, 1 each by In Vitro route daily As needed., Disp: 100 each, Rfl: 3    rOPINIRole (REQUIP) 0.5 MG tablet, Take 1 tablet by mouth nightly, Disp: 30 tablet, Rfl: 1    lamoTRIgine (LAMICTAL) 150 MG tablet, Every other day, Disp: , Rfl:     naproxen (NAPROSYN) 500 MG tablet, TAKE ONE TABLET BY MOUTH TWICE A DAY AS NEEDED FOR PAIN, Disp: 30 tablet, Rfl: 0    tiZANidine (ZANAFLEX) 2 MG tablet, Take 1 tablet by mouth every 8 hours as needed (muscle spasms), Disp: 20 tablet, Rfl: 0    atorvastatin (LIPITOR) 10 MG tablet, TAKE ONE TABLET BY MOUTH DAILY, Disp: 90 tablet, Rfl: 3    metoprolol  succinate (TOPROL XL) 50 MG extended release tablet, Take 1 tablet by mouth daily, Disp: 90 tablet, Rfl: 3    aspirin EC 81 MG EC tablet, Take 81 mg by mouth daily, Disp: , Rfl:     melatonin 5 MG TABS tablet, Take 5 mg by mouth daily, Disp: , Rfl:     methylphenidate (CONCERTA) 54 MG extended release tablet, Take 54 mg by mouth every morning., Disp: , Rfl:     sucralfate (CARAFATE) 1 GM tablet, Take 1 g by mouth daily , Disp: , Rfl:     blood glucose monitor strips, Test once dialy for diabetes e11.9, Disp: 100 strip, Rfl: 5    Blood Glucose Monitoring Suppl (ONE TOUCH ULTRA 2) w/Device KIT, 1 kit by Does not apply route daily, Disp: 1 kit, Rfl: 0    ondansetron (ZOFRAN) 4 MG tablet, Take 4 mg by mouth every 8 hours as needed for Nausea or Vomiting, Disp: , Rfl:     Lancets MISC, Test once daily for diabetes e11.9, Disp: 100 each, Rfl: 3    Blood Glucose Monitoring Suppl CONCETTA, Test once daily for diabetes e11.9, Disp: 1 Device, Rfl: 0    traZODone (DESYREL) 100 MG tablet, Take 200 mg by mouth nightly , Disp: , Rfl:     escitalopram (LEXAPRO) 20 MG tablet, Take 10 mg by mouth daily , Disp: , Rfl:     empagliflozin (JARDIANCE) 10 MG tablet, Take 1 tablet by mouth daily (Patient not taking: Reported on 2/2/2023), Disp: 90 tablet, Rfl: 0    lamoTRIgine (LAMICTAL) 200 MG tablet, Take 200 mg by mouth daily Every other day. One day 200 mg and 150mg the next day (Patient not taking: No sig reported), Disp: , Rfl:     naproxen (NAPROSYN) 500 MG tablet, Take 1 tablet by mouth 2 times daily (with meals) for 30 days. (Patient not taking: No sig reported), Disp: 60 tablet, Rfl: 0      Review of Systems        Objective:   PHYSICAL EXAM:  /80   Pulse 84   Temp 97.2 °F (36.2 °C)   Resp 16   Ht 5' 5.5\" (1.664 m)   Wt 202 lb 3.2 oz (91.7 kg)   SpO2 97% Comment: RA  BMI 33.14 kg/m²     Physical Exam  Gen: No acute distress. Obese. BMI of 33.14  Eyes: PERRL. No sclera icterus. No conjunctival injection.    ENT: No discharge. Pharynx clear. Mallampati class IV. Neck: Trachea midline. No obvious mass. Neck circumference 15\"  Resp: No accessory muscle use. Nocrackles. No wheezes. No rhonchi. CV: Regular rate. Regular rhythm. No murmur or rub. Skin: Warm and dry. M/S: No cyanosis. No obvious joint deformity. Neuro: Awake. Alert. Moves all four extremities. Psych: Oriented x 3. No anxiety. DATA:   11/24/2021 Echo EF 55%  4/12/2022 PSG AHI 11.4, low SPO2 86%  5/17/2022 titration improved ANGEL with CPAP, PLMS 31.3, recommendation CPAP 8 cm H2O  11/28/2022 ferritin 157.2    CPAP download data:  Compliance download report from 8/1/22 to 8/30/22 reviewed today by me and showed patient is using machine 6:18 hrs/night with 83% compliance and AHI 3.9 within this time frame. 25/30days with greater than 4 hours of machine use. CPAP 6cm H20    Compliance download report from 11/1/22 to 11/30/22 reviewed today by me and showed patient is using machine 7:28 hrs/night with 83% compliance and AHI 3.4 within this time frame. 25/30days with greater than 4 hours of machine use. CPAP 6 cm H20     Compliance download report from 1/2/23 to 1/31/23 reviewed today by me and showed patient is using machine 8:08 hrs/night with 90% compliance and AHI 2.5 within this time frame. 27/30days with greater than 4 hours of machine use. CPAP 6 cm H20         Assessment:       Mild ANGEL. CPAP 6 cm H2O. Optimal compliance and efficacy on review today  Snoring-resolved on CPAP  Observed sleep apnea -resolved on CPAP  Hypersomnia -improving  Sleep onset and maintenance insomnia-psychophysiological hyperarousal, poor sleep hygiene, RLS, comorbid conditions, ANGEL likely contributing-taking trazodone and melatonin  Obesity  Depression and anxiety-followed by psychiatry  Hypertension and palpitations-followed by cardiology  RLS        Plan:      Continue CPAP 6 cm H2O   Advised to use CPAP 6-8 hrs at night and during naps.   Replacement of mask, tubing, head straps every 3-6 months or sooner if damaged. Patient instructed to contact [employer] company for any mask, tubing or machine trouble shooting if problems arise. Sleep hygiene  Cognitive behavioral therapy was discussed with patient including stimulus control and sleep restriction   Recommend decrease time in bed  Avoidsedatives, alcohol and caffeinated drinks at bed time. No driving motorized vehicles or operating heavy machinery while fatigue, drowsy or sleepy. Weight loss is also recommended as a long-term intervention. Complications of ANGEL if not treated were discussed with patient patient to include systemic hypertension, pulmonary hypertension, cardiovascular morbidities, car accidents and all cause mortality. Discussed pathophysiology of ANGEL with patient today  Patient education handout provided regarding sleep tips       RLS:  Continue Requip 0.5 mg. Refill sent today for 6 months. The side effects of ropinirole have been discussed with the patient and or family, including but not limited to compulsive behaviors, mental status changes, sleep attacks and cardiovascular side effects. As with all medications, patient was instructed to read package insert & to call with any concerning side effects. D/W patient non pharmacological therapy for RLS including avoiding aggravating factors (sleep deprivation, mental alerting activities at time of rest, antihistamines), moderate regular exercise, leg message and heating pads/hot shower.     Ferritin level is not low      Follow-up: 6 month, sooner if needed

## 2023-04-17 RX ORDER — METOPROLOL SUCCINATE 50 MG/1
50 TABLET, EXTENDED RELEASE ORAL DAILY
Qty: 30 TABLET | Refills: 0 | Status: SHIPPED | OUTPATIENT
Start: 2023-04-17

## 2023-04-17 NOTE — TELEPHONE ENCOUNTER
Please Contact pt for yearly appt.        Last OV 3/1/22 [facility]   No Upcoming OV

## 2023-04-25 ENCOUNTER — HOSPITAL ENCOUNTER (OUTPATIENT)
Dept: MAMMOGRAPHY | Age: 63
Discharge: HOME | End: 2023-04-25
Payer: COMMERCIAL

## 2023-04-25 ENCOUNTER — OFFICE VISIT (OUTPATIENT)
Dept: SURGERY | Age: 63
End: 2023-04-25
Payer: COMMERCIAL

## 2023-04-25 VITALS — DIASTOLIC BLOOD PRESSURE: 88 MMHG | HEART RATE: 84 BPM | SYSTOLIC BLOOD PRESSURE: 146 MMHG

## 2023-04-25 VITALS — BODY MASS INDEX: 33.32 KG/M2 | WEIGHT: 200 LBS | HEIGHT: 65 IN

## 2023-04-25 DIAGNOSIS — R92.2 DENSE BREAST TISSUE ON MAMMOGRAM: ICD-10-CM

## 2023-04-25 DIAGNOSIS — E78.00 PURE HYPERCHOLESTEROLEMIA: ICD-10-CM

## 2023-04-25 DIAGNOSIS — Z12.31 VISIT FOR SCREENING MAMMOGRAM: ICD-10-CM

## 2023-04-25 DIAGNOSIS — Z80.3 FAMILY HISTORY OF BREAST CANCER: ICD-10-CM

## 2023-04-25 DIAGNOSIS — Z91.89 AT HIGH RISK FOR BREAST CANCER: Primary | ICD-10-CM

## 2023-04-25 PROCEDURE — 99213 OFFICE O/P EST LOW 20 MIN: CPT | Performed by: SURGERY

## 2023-04-25 PROCEDURE — 3077F SYST BP >= 140 MM HG: CPT | Performed by: SURGERY

## 2023-04-25 PROCEDURE — 77063 BREAST TOMOSYNTHESIS BI: CPT

## 2023-04-25 PROCEDURE — 3079F DIAST BP 80-89 MM HG: CPT | Performed by: SURGERY

## 2023-04-25 RX ORDER — ATORVASTATIN CALCIUM 10 MG/1
TABLET, FILM COATED ORAL
Qty: 30 TABLET | Refills: 0 | Status: SHIPPED | OUTPATIENT
Start: 2023-04-25

## 2023-04-25 NOTE — PATIENT INSTRUCTIONS
Mammogram reviewed, no concerning findings  Breast exam performed, no palpable masses. Discussed right axillary discomfort    Continue self breast exams    Healthy Lifestyle Recommendations: healthy diet (decrease consumption of red meat, increase fresh fruits and vegetables), decreased alcohol consumption (less than 4 drinks/week), adequate sleep (goal 6-8 hours), routine exercise (goal 150 minutes/week or greater), weight control. Return:    Continue to work on MRI authorization, medically necessary due to dense breast tissue.  Co-pay and deductible will still apply    Follow up in 1 yr with bilateral screening mammogram and breast check

## 2023-05-02 ENCOUNTER — OFFICE VISIT (OUTPATIENT)
Dept: FAMILY MEDICINE CLINIC | Age: 63
End: 2023-05-02
Payer: COMMERCIAL

## 2023-05-02 VITALS
BODY MASS INDEX: 33.39 KG/M2 | HEART RATE: 85 BPM | SYSTOLIC BLOOD PRESSURE: 122 MMHG | WEIGHT: 200.4 LBS | DIASTOLIC BLOOD PRESSURE: 80 MMHG | OXYGEN SATURATION: 95 % | HEIGHT: 65 IN

## 2023-05-02 DIAGNOSIS — H69.82 DYSFUNCTION OF LEFT EUSTACHIAN TUBE: Primary | ICD-10-CM

## 2023-05-02 PROCEDURE — 3074F SYST BP LT 130 MM HG: CPT | Performed by: PHYSICIAN ASSISTANT

## 2023-05-02 PROCEDURE — 99213 OFFICE O/P EST LOW 20 MIN: CPT | Performed by: PHYSICIAN ASSISTANT

## 2023-05-02 PROCEDURE — 3079F DIAST BP 80-89 MM HG: CPT | Performed by: PHYSICIAN ASSISTANT

## 2023-05-02 RX ORDER — LAMOTRIGINE 100 MG/1
TABLET ORAL
COMMUNITY
Start: 2023-04-26

## 2023-05-02 RX ORDER — METHYLPREDNISOLONE 4 MG/1
TABLET ORAL
Qty: 1 KIT | Refills: 0 | Status: SHIPPED | OUTPATIENT
Start: 2023-05-02 | End: 2023-05-08

## 2023-05-02 SDOH — ECONOMIC STABILITY: FOOD INSECURITY: WITHIN THE PAST 12 MONTHS, YOU WORRIED THAT YOUR FOOD WOULD RUN OUT BEFORE YOU GOT MONEY TO BUY MORE.: NEVER TRUE

## 2023-05-02 SDOH — ECONOMIC STABILITY: FOOD INSECURITY: WITHIN THE PAST 12 MONTHS, THE FOOD YOU BOUGHT JUST DIDN'T LAST AND YOU DIDN'T HAVE MONEY TO GET MORE.: NEVER TRUE

## 2023-05-02 SDOH — ECONOMIC STABILITY: INCOME INSECURITY: HOW HARD IS IT FOR YOU TO PAY FOR THE VERY BASICS LIKE FOOD, HOUSING, MEDICAL CARE, AND HEATING?: NOT HARD AT ALL

## 2023-05-02 SDOH — ECONOMIC STABILITY: HOUSING INSECURITY
IN THE LAST 12 MONTHS, WAS THERE A TIME WHEN YOU DID NOT HAVE A STEADY PLACE TO SLEEP OR SLEPT IN A SHELTER (INCLUDING NOW)?: NO

## 2023-05-02 ASSESSMENT — ENCOUNTER SYMPTOMS: RESPIRATORY NEGATIVE: 1

## 2023-05-02 NOTE — PROGRESS NOTES
Subjective:      Patient ID: Ana Lilia Pena is a 65 y.o. female. Otalgia       Patient presents with left ear pain and pressure for a few days. Has tried debrox with no change. She can not equalize pressure in the ear. Has tried sudafed with no relief and it makes her jittery. No fevers. Review of Systems   Constitutional: Negative. HENT:  Positive for ear pain. Respiratory: Negative. Cardiovascular: Negative. Objective:   Physical Exam  Constitutional:       Appearance: Normal appearance. HENT:      Left Ear: Ear canal normal. There is no impacted cerumen. Ears:      Comments: Left TM cloudy, no erythema  Pulmonary:      Effort: Pulmonary effort is normal.   Neurological:      General: No focal deficit present. Mental Status: She is alert and oriented to person, place, and time. Assessment / Plan:       Diagnosis Orders   1. Dysfunction of left eustachian tube  Trial of flonase and medrol dose pack. Patient is to call if no improvement or signs and symptoms worsen.

## 2023-05-15 NOTE — PROGRESS NOTES
[address]   Cardiac Consultation    Referring Provider:  PAULA Olivera     No chief complaint on file. Tunde Waldron   1960    History of Present Illness: Tunde Waldron is a 65 y.o. female who is here today for follow up and to review testing. She was initially seen as a new patient consult for cardiology at the request of her PCP for dizziness and hypertension. She has a past medical history of hypertension, hyperlipidemia, sleep apnea, and diabetes mellitus. Previous testing includes a stress test from 2013 which showed no large areas of reversibility to suggest ischemia. CT chest 10/25/2021 showed Cardiomegaly. An echocardiogram from 11/24/221 showed an EF of 55%. Stress test 12/17/2021 showed a small inferoapical/anteroapical defect suggestive of ischemia. She wore a cardiac event monitor showed rare PVC's/PAC's. She underwent a cardiac CTA which showed a CT calcium score of 22, Right coronary artery is a tiny vessel, which may simply be due to the left coronary artery dominance rather than chronic narrowing of the right coronary artery. Mild thickening of the left ventricular apex. On 03/01/2022 she stated she continued to have issues with SOB and fatigue with stairs. No chest pains. She had not been very active due to her  being recently sick and in the hospital. She has some muscle aches that she thinks is related to her arthritis. Blood pressure at home runs 120/80's, heart rate 70's. She is scheduled for a sleep study soon. At the conclusion of her last office visit on 03/01/2022 Lipitor was increased to 40 mg daily. In 05/2022 she underwent a sleep study that showed mild obstructive sleep apnea. She was prescribed CPAP therapy. Today, 05/16/2023, she reports that she feels well overall. She tries to remain active but she has knee pain that hinders her. Patient denies current edema, chest pain, sob, palpitations, dizziness or syncope.  Patient is taking all

## 2023-05-16 ENCOUNTER — OFFICE VISIT (OUTPATIENT)
Dept: CARDIOLOGY CLINIC | Age: 63
End: 2023-05-16
Payer: COMMERCIAL

## 2023-05-16 VITALS
RESPIRATION RATE: 16 BRPM | HEART RATE: 97 BPM | WEIGHT: 196.31 LBS | BODY MASS INDEX: 32.71 KG/M2 | OXYGEN SATURATION: 97 % | SYSTOLIC BLOOD PRESSURE: 128 MMHG | DIASTOLIC BLOOD PRESSURE: 68 MMHG | HEIGHT: 65 IN

## 2023-05-16 DIAGNOSIS — E78.00 PURE HYPERCHOLESTEROLEMIA: ICD-10-CM

## 2023-05-16 DIAGNOSIS — I25.10 CORONARY ARTERY DISEASE INVOLVING NATIVE CORONARY ARTERY OF NATIVE HEART WITHOUT ANGINA PECTORIS: Primary | ICD-10-CM

## 2023-05-16 PROCEDURE — 3074F SYST BP LT 130 MM HG: CPT | Performed by: INTERNAL MEDICINE

## 2023-05-16 PROCEDURE — 93000 ELECTROCARDIOGRAM COMPLETE: CPT | Performed by: INTERNAL MEDICINE

## 2023-05-16 PROCEDURE — 99214 OFFICE O/P EST MOD 30 MIN: CPT | Performed by: INTERNAL MEDICINE

## 2023-05-16 PROCEDURE — 3078F DIAST BP <80 MM HG: CPT | Performed by: INTERNAL MEDICINE

## 2023-05-16 RX ORDER — ATORVASTATIN CALCIUM 10 MG/1
10 TABLET, FILM COATED ORAL DAILY
Qty: 90 TABLET | Refills: 3 | Status: SHIPPED | OUTPATIENT
Start: 2023-05-16

## 2023-05-16 RX ORDER — METOPROLOL SUCCINATE 50 MG/1
50 TABLET, EXTENDED RELEASE ORAL DAILY
Qty: 90 TABLET | Refills: 3 | Status: SHIPPED | OUTPATIENT
Start: 2023-05-16

## 2023-05-16 NOTE — PATIENT INSTRUCTIONS
Plan:  Continue taking Atorvastatin 10 mg daily   Cardiac medications reviewed including indications and pertinent side effects    Check blood pressure and heart rate at home a few times per week- keep a log with dates and times and bring to office visit   Regular exercise and following a healthy diet encouraged- increase activity, 30-45 minutes 3 days per week   Follow up with me in 1 year

## 2023-07-13 RX ORDER — EMPAGLIFLOZIN 10 MG/1
TABLET, FILM COATED ORAL
Qty: 90 TABLET | Refills: 1 | Status: SHIPPED | OUTPATIENT
Start: 2023-07-13

## 2023-07-13 NOTE — TELEPHONE ENCOUNTER
Heidi Police is requesting refill(s) jardiance  Last OV 4/14/23 (pertaining to medication)  LR 4/14/23 (per medication requested)  Next office visit scheduled or attempted Yes   If no, reason:  7/24/23

## 2023-07-24 ENCOUNTER — HOSPITAL ENCOUNTER (OUTPATIENT)
Dept: MRI IMAGING | Age: 63
Discharge: HOME | End: 2023-07-24
Payer: COMMERCIAL

## 2023-07-24 ENCOUNTER — OFFICE VISIT (OUTPATIENT)
Dept: FAMILY MEDICINE CLINIC | Age: 63
End: 2023-07-24
Payer: COMMERCIAL

## 2023-07-24 VITALS
WEIGHT: 198 LBS | BODY MASS INDEX: 32.99 KG/M2 | HEIGHT: 65 IN | OXYGEN SATURATION: 96 % | HEART RATE: 73 BPM | DIASTOLIC BLOOD PRESSURE: 80 MMHG | RESPIRATION RATE: 16 BRPM | SYSTOLIC BLOOD PRESSURE: 122 MMHG

## 2023-07-24 DIAGNOSIS — F33.1 MODERATE EPISODE OF RECURRENT MAJOR DEPRESSIVE DISORDER (HCC): ICD-10-CM

## 2023-07-24 DIAGNOSIS — Z80.3 FAMILY HISTORY OF BREAST CANCER: ICD-10-CM

## 2023-07-24 DIAGNOSIS — Z91.89 AT HIGH RISK FOR BREAST CANCER: ICD-10-CM

## 2023-07-24 DIAGNOSIS — R53.83 OTHER FATIGUE: ICD-10-CM

## 2023-07-24 DIAGNOSIS — E78.00 PURE HYPERCHOLESTEROLEMIA: Primary | ICD-10-CM

## 2023-07-24 DIAGNOSIS — E11.69 TYPE 2 DIABETES MELLITUS WITH HYPERLIPIDEMIA (HCC): ICD-10-CM

## 2023-07-24 DIAGNOSIS — R92.2 DENSE BREAST TISSUE ON MAMMOGRAM: ICD-10-CM

## 2023-07-24 DIAGNOSIS — I10 ESSENTIAL HYPERTENSION: ICD-10-CM

## 2023-07-24 DIAGNOSIS — E78.5 TYPE 2 DIABETES MELLITUS WITH HYPERLIPIDEMIA (HCC): ICD-10-CM

## 2023-07-24 LAB
ALBUMIN SERPL-MCNC: 4.9 G/DL (ref 3.4–5)
ALBUMIN/GLOB SERPL: 2.1 {RATIO} (ref 1.1–2.2)
ALP SERPL-CCNC: 141 U/L (ref 40–129)
ALT SERPL-CCNC: 28 U/L (ref 10–40)
ANION GAP SERPL CALCULATED.3IONS-SCNC: 15 MMOL/L (ref 3–16)
AST SERPL-CCNC: 19 U/L (ref 15–37)
BILIRUB SERPL-MCNC: 0.4 MG/DL (ref 0–1)
BUN SERPL-MCNC: 17 MG/DL (ref 7–20)
CALCIUM SERPL-MCNC: 10.4 MG/DL (ref 8.3–10.6)
CHLORIDE SERPL-SCNC: 98 MMOL/L (ref 99–110)
CHOLEST SERPL-MCNC: 179 MG/DL (ref 0–199)
CO2 SERPL-SCNC: 27 MMOL/L (ref 21–32)
CREAT SERPL-MCNC: 1 MG/DL (ref 0.6–1.2)
DEPRECATED RDW RBC AUTO: 13.7 % (ref 12.4–15.4)
GFR SERPLBLD CREATININE-BSD FMLA CKD-EPI: >60 ML/MIN/{1.73_M2}
GLUCOSE SERPL-MCNC: 240 MG/DL (ref 70–99)
HBA1C MFR BLD: 8.6 %
HCT VFR BLD AUTO: 45.8 % (ref 36–48)
HDLC SERPL-MCNC: 72 MG/DL (ref 40–60)
HGB BLD-MCNC: 15.7 G/DL (ref 12–16)
LDLC SERPL CALC-MCNC: 82 MG/DL
MCH RBC QN AUTO: 31.4 PG (ref 26–34)
MCHC RBC AUTO-ENTMCNC: 34.3 G/DL (ref 31–36)
MCV RBC AUTO: 91.4 FL (ref 80–100)
PERFORMED ON: NORMAL
PLATELET # BLD AUTO: 239 K/UL (ref 135–450)
PMV BLD AUTO: 9.3 FL (ref 5–10.5)
POC CREATININE: 1 MG/DL (ref 0.6–1.2)
POC SAMPLE TYPE: NORMAL
POTASSIUM SERPL-SCNC: 4.9 MMOL/L (ref 3.5–5.1)
PROT SERPL-MCNC: 7.2 G/DL (ref 6.4–8.2)
RBC # BLD AUTO: 5.01 M/UL (ref 4–5.2)
SODIUM SERPL-SCNC: 140 MMOL/L (ref 136–145)
TRIGL SERPL-MCNC: 123 MG/DL (ref 0–150)
TSH SERPL DL<=0.005 MIU/L-ACNC: 1.65 UIU/ML (ref 0.27–4.2)
VLDLC SERPL CALC-MCNC: 25 MG/DL
WBC # BLD AUTO: 9.4 K/UL (ref 4–11)

## 2023-07-24 PROCEDURE — 36415 COLL VENOUS BLD VENIPUNCTURE: CPT | Performed by: PHYSICIAN ASSISTANT

## 2023-07-24 PROCEDURE — A9579 GAD-BASE MR CONTRAST NOS,1ML: HCPCS | Performed by: SURGERY

## 2023-07-24 PROCEDURE — 83037 HB GLYCOSYLATED A1C HOME DEV: CPT | Performed by: PHYSICIAN ASSISTANT

## 2023-07-24 PROCEDURE — 99214 OFFICE O/P EST MOD 30 MIN: CPT | Performed by: PHYSICIAN ASSISTANT

## 2023-07-24 PROCEDURE — 82565 ASSAY OF CREATININE: CPT

## 2023-07-24 PROCEDURE — C8908 MRI W/O FOL W/CONT, BREAST,: HCPCS

## 2023-07-24 PROCEDURE — 3074F SYST BP LT 130 MM HG: CPT | Performed by: PHYSICIAN ASSISTANT

## 2023-07-24 PROCEDURE — 3052F HG A1C>EQUAL 8.0%<EQUAL 9.0%: CPT | Performed by: PHYSICIAN ASSISTANT

## 2023-07-24 PROCEDURE — 3079F DIAST BP 80-89 MM HG: CPT | Performed by: PHYSICIAN ASSISTANT

## 2023-07-24 PROCEDURE — 6360000004 HC RX CONTRAST MEDICATION: Performed by: SURGERY

## 2023-07-24 RX ORDER — SEMAGLUTIDE 1.34 MG/ML
0.25 INJECTION, SOLUTION SUBCUTANEOUS WEEKLY
Qty: 1 ADJUSTABLE DOSE PRE-FILLED PEN SYRINGE | Refills: 1 | Status: SHIPPED | OUTPATIENT
Start: 2023-07-24

## 2023-07-24 RX ADMIN — GADOTERIDOL 18 ML: 279.3 INJECTION, SOLUTION INTRAVENOUS at 14:23

## 2023-07-24 NOTE — PROGRESS NOTES
Adin Mercado presents today for   Chief Complaint   Patient presents with    Diabetes     3 months follow up for Diabetes. Pt fasted for bw. ASSESSMENT/Plan:       Diagnosis Orders   1. Pure hypercholesterolemia  Lipid Panel- well controlled in lipitor    Comprehensive Metabolic Panel      2. Essential hypertension  Comprehensive Metabolic Panel  Well controlled in metoprolol      3. Moderate episode of recurrent major depressive disorder [facility])  External Referral to Psychiatry      4. Other fatigue  CBC    TSH      5. Type 2 diabetes mellitus with hyperlipidemia (HCC)  POCT glycosylated hemoglobin (Hb A1C)  Will add on ozempic due to poor control, continue jardiance and metformin                      SUBJECTIVE:  Adin Mercado is a 65 y.o. female who presents for evaluation of hypertension, Diabetes Mellitus and hyperlipidemia. She indicates that she is feeling well and denies any symptoms referable to her elevated blood pressure, diabetes or hyperlipidemia. Specifically denies chest pain, , dyspnea, orthopnea, PND,peripheral edema , or neuro sx. No anorexia, arthralgia, or leg cramps noted. No episodes of hypoglycemia. Current medication regimen is as listed below. She denies any side effects of medication, and has been taking it regularly. Is having a lot of fatigue, Lost her mother in May so some increased depression. Her Psych is retiring so will need to find new provider. Blood Pressure:   Blood pressures are periodically being checked at home. Ranges have been : 120's/80's    Diabetes: The last A1C: 7.2. Diabetic complications are: HLD. The Blood sugars range at home have been: 150-160. Last Eye Exam : 7/23. The last microalbumin:  4/23      she is taking aspirin.       Social History     Tobacco Use   Smoking Status Never   Smokeless Tobacco Never       Current Outpatient Medications   Medication Sig Dispense Refill    JARDIANCE 10 MG tablet TAKE ONE TABLET BY MOUTH DAILY

## 2023-07-26 ENCOUNTER — TELEPHONE (OUTPATIENT)
Dept: ADMINISTRATIVE | Age: 63
End: 2023-07-26

## 2023-07-26 NOTE — TELEPHONE ENCOUNTER
Submitted PA for Ozempic (0.25 or 0.5 MG/DOSE) 2MG/3ML pen-injectors  Via [facility] Key: [id number] STATUS: Approved. This drug has been approved. Approved quantity: 3 units per 28 day(s). The drug has been approved from 07/12/2023 to 07/25/2024. Please call the pharmacy to process your prescription claim. Generic or biosimilar substitution may be required when available and preferred on the formulary. Please notify patient. Thank you.

## 2023-08-03 ENCOUNTER — OFFICE VISIT (OUTPATIENT)
Dept: SLEEP MEDICINE | Age: 63
End: 2023-08-03
Payer: COMMERCIAL

## 2023-08-03 VITALS
HEART RATE: 72 BPM | RESPIRATION RATE: 16 BRPM | WEIGHT: 200 LBS | OXYGEN SATURATION: 98 % | SYSTOLIC BLOOD PRESSURE: 125 MMHG | DIASTOLIC BLOOD PRESSURE: 80 MMHG | BODY MASS INDEX: 33.32 KG/M2 | HEIGHT: 65 IN

## 2023-08-03 DIAGNOSIS — E66.9 OBESITY (BMI 30-39.9): ICD-10-CM

## 2023-08-03 DIAGNOSIS — G47.33 MILD OBSTRUCTIVE SLEEP APNEA: Primary | ICD-10-CM

## 2023-08-03 DIAGNOSIS — G25.81 RLS (RESTLESS LEGS SYNDROME): ICD-10-CM

## 2023-08-03 DIAGNOSIS — I10 ESSENTIAL HYPERTENSION: ICD-10-CM

## 2023-08-03 PROCEDURE — 3074F SYST BP LT 130 MM HG: CPT | Performed by: NURSE PRACTITIONER

## 2023-08-03 PROCEDURE — 99214 OFFICE O/P EST MOD 30 MIN: CPT | Performed by: NURSE PRACTITIONER

## 2023-08-03 PROCEDURE — 3079F DIAST BP 80-89 MM HG: CPT | Performed by: NURSE PRACTITIONER

## 2023-08-03 RX ORDER — ROPINIROLE 0.5 MG/1
0.5 TABLET, FILM COATED ORAL NIGHTLY
Qty: 30 TABLET | Refills: 5 | Status: SHIPPED | OUTPATIENT
Start: 2023-08-03

## 2023-08-03 ASSESSMENT — SLEEP AND FATIGUE QUESTIONNAIRES
HOW LIKELY ARE YOU TO NOD OFF OR FALL ASLEEP WHILE SITTING AND TALKING TO SOMEONE: 0
NECK CIRCUMFERENCE (INCHES): 14
HOW LIKELY ARE YOU TO NOD OFF OR FALL ASLEEP WHILE LYING DOWN TO REST IN THE AFTERNOON WHEN CIRCUMSTANCES PERMIT: 1
HOW LIKELY ARE YOU TO NOD OFF OR FALL ASLEEP WHILE SITTING QUIETLY AFTER LUNCH WITHOUT ALCOHOL: 0
HOW LIKELY ARE YOU TO NOD OFF OR FALL ASLEEP WHILE SITTING INACTIVE IN A PUBLIC PLACE: 0
ESS TOTAL SCORE: 3
HOW LIKELY ARE YOU TO NOD OFF OR FALL ASLEEP WHILE WATCHING TV: 1
HOW LIKELY ARE YOU TO NOD OFF OR FALL ASLEEP WHILE SITTING AND READING: 1
HOW LIKELY ARE YOU TO NOD OFF OR FALL ASLEEP WHEN YOU ARE A PASSENGER IN A CAR FOR AN HOUR WITHOUT A BREAK: 0
HOW LIKELY ARE YOU TO NOD OFF OR FALL ASLEEP IN A CAR, WHILE STOPPED FOR A FEW MINUTES IN TRAFFIC: 0

## 2023-08-03 NOTE — PROGRESS NOTES
0 0 0 0 0   Sitting quietly after a lunch without alcohol 0 0 0 0 0   In a car, while stopped for a few minutes in traffic 0 0 0 0 0   [place] Sleepiness Score 3 0 1 4 5   Neck circumference (Inches) 14 15 15 15 15       Past Medical History:  Past Medical History:   Diagnosis Date    ADHD (attention deficit hyperactivity disorder)     Anxiety     Bipolar disorder ([address])     Depression     Diverticulitis 10/19/2021    DJD (degenerative joint disease)     hands/knees/ankles    Fatty liver     GERD (gastroesophageal reflux disease)     Hyperlipidemia     Hypertension     Irritable bowel syndrome     Mood disorder ([address])     NOS    Narcotic addiction ([address])     Obesity     ANGEL (obstructive sleep apnea)     no CPAP machine - not recommended per sleep study    Primary localized osteoarthritis of right knee 03/29/2019    Restless leg syndrome     Status post total right knee replacement 03/29/2019    Type 2 diabetes mellitus without complication ([address])     Type II or unspecified type diabetes mellitus without mention of complication, not stated as uncontrolled     Vision impairment     wears glasses and contacts       Past Surgical History:        Procedure Laterality Date    APPENDECTOMY      CHOLECYSTECTOMY  2001    COLONOSCOPY  01/2004    Due 2014    CYSTOSCOPY  07/09/2010    CYSTOSCOPY  11/18/2010    insertion of sparc mesh sling with cystoscopy    HYSTERECTOMY (CERVIX STATUS UNKNOWN)      HYSTERECTOMY, TOTAL ABDOMINAL (CERVIX REMOVED)      JOINT REPLACEMENT      KNEE ARTHROPLASTY      KNEE ARTHROSCOPY Right     KNEE ARTHROSCOPY Left 10/2013    partial medial menisectomy    KNEE SURGERY      PLANTAR FASCIA SURGERY      bilateral    BRANDI AND BSO (CERVIX REMOVED)  04/2010    DUB    TOTAL KNEE ARTHROPLASTY Right 03/29/2019    RIGHT TOTAL KNEE MAKOPLASTY REPLACEMENT WITH ADDUCTOR CANAL BLOCK FOR PAIN CONTROL                 JOE AYALAO  CPT CODE - 63893  performed by Cheyanne Jung MD at [address] [place] [address]

## 2023-08-04 RX ORDER — ROPINIROLE 0.5 MG/1
TABLET, FILM COATED ORAL
Qty: 30 TABLET | Refills: 5 | OUTPATIENT
Start: 2023-08-04

## 2023-09-05 RX ORDER — SEMAGLUTIDE 1.34 MG/ML
0.5 INJECTION, SOLUTION SUBCUTANEOUS WEEKLY
Qty: 1 ADJUSTABLE DOSE PRE-FILLED PEN SYRINGE | Refills: 1 | Status: SHIPPED | OUTPATIENT
Start: 2023-09-05

## 2023-09-05 NOTE — TELEPHONE ENCOUNTER
Pt was prescribed Ozempic 0.25 mg weekly on 01/06/2. Pt was again prescribed Ozempic 0.25 mg weekly on 07/24/23. [facility] was wondering if Ozempic dose was supposed to be increased to 0.5 mg weekly? Ozempic 0.5 mg weekly is pended. Call back at [phone] for [address]  and hit 0 to talk with pharmacy staff directly.

## 2023-10-31 ENCOUNTER — TELEPHONE (OUTPATIENT)
Dept: ADMINISTRATIVE | Age: 63
End: 2023-10-31

## 2023-10-31 ENCOUNTER — OFFICE VISIT (OUTPATIENT)
Dept: FAMILY MEDICINE CLINIC | Age: 63
End: 2023-10-31
Payer: COMMERCIAL

## 2023-10-31 VITALS
DIASTOLIC BLOOD PRESSURE: 80 MMHG | RESPIRATION RATE: 16 BRPM | HEIGHT: 65 IN | HEART RATE: 95 BPM | OXYGEN SATURATION: 99 % | BODY MASS INDEX: 33.36 KG/M2 | WEIGHT: 200.2 LBS | SYSTOLIC BLOOD PRESSURE: 118 MMHG

## 2023-10-31 DIAGNOSIS — R74.8 ELEVATED ALKALINE PHOSPHATASE LEVEL: ICD-10-CM

## 2023-10-31 DIAGNOSIS — Z79.4 TYPE 2 DIABETES MELLITUS WITHOUT COMPLICATION, WITH LONG-TERM CURRENT USE OF INSULIN (HCC): Primary | ICD-10-CM

## 2023-10-31 DIAGNOSIS — E11.9 TYPE 2 DIABETES MELLITUS WITHOUT COMPLICATION, WITH LONG-TERM CURRENT USE OF INSULIN (HCC): Primary | ICD-10-CM

## 2023-10-31 LAB
ALP SERPL-CCNC: 124 U/L (ref 40–129)
GGT SERPL-CCNC: 23 U/L (ref 5–36)
HBA1C MFR BLD: 8.1 %

## 2023-10-31 PROCEDURE — 36415 COLL VENOUS BLD VENIPUNCTURE: CPT | Performed by: PHYSICIAN ASSISTANT

## 2023-10-31 PROCEDURE — 99214 OFFICE O/P EST MOD 30 MIN: CPT | Performed by: PHYSICIAN ASSISTANT

## 2023-10-31 PROCEDURE — 3074F SYST BP LT 130 MM HG: CPT | Performed by: PHYSICIAN ASSISTANT

## 2023-10-31 PROCEDURE — 83036 HEMOGLOBIN GLYCOSYLATED A1C: CPT | Performed by: PHYSICIAN ASSISTANT

## 2023-10-31 PROCEDURE — 3052F HG A1C>EQUAL 8.0%<EQUAL 9.0%: CPT | Performed by: PHYSICIAN ASSISTANT

## 2023-10-31 PROCEDURE — 3079F DIAST BP 80-89 MM HG: CPT | Performed by: PHYSICIAN ASSISTANT

## 2023-10-31 RX ORDER — CELECOXIB 200 MG/1
200 CAPSULE ORAL DAILY
Qty: 30 CAPSULE | Refills: 1 | Status: SHIPPED | OUTPATIENT
Start: 2023-10-31

## 2023-10-31 RX ORDER — SEMAGLUTIDE 1.34 MG/ML
1 INJECTION, SOLUTION SUBCUTANEOUS
Qty: 3 ML | Refills: 0 | Status: SHIPPED | OUTPATIENT
Start: 2023-10-31

## 2023-10-31 ASSESSMENT — COLUMBIA-SUICIDE SEVERITY RATING SCALE - C-SSRS
4. HAVE YOU HAD THESE THOUGHTS AND HAD SOME INTENTION OF ACTING ON THEM?: NO
5. HAVE YOU STARTED TO WORK OUT OR WORKED OUT THE DETAILS OF HOW TO KILL YOURSELF? DO YOU INTEND TO CARRY OUT THIS PLAN?: NO
7. DID THIS OCCUR IN THE LAST THREE MONTHS: NO
3. HAVE YOU BEEN THINKING ABOUT HOW YOU MIGHT KILL YOURSELF?: NO

## 2023-10-31 ASSESSMENT — PATIENT HEALTH QUESTIONNAIRE - PHQ9
1. LITTLE INTEREST OR PLEASURE IN DOING THINGS: 3
SUM OF ALL RESPONSES TO PHQ QUESTIONS 1-9: 9
5. POOR APPETITE OR OVEREATING: 0
7. TROUBLE CONCENTRATING ON THINGS, SUCH AS READING THE NEWSPAPER OR WATCHING TELEVISION: 0
3. TROUBLE FALLING OR STAYING ASLEEP: 1
2. FEELING DOWN, DEPRESSED OR HOPELESS: 2
6. FEELING BAD ABOUT YOURSELF - OR THAT YOU ARE A FAILURE OR HAVE LET YOURSELF OR YOUR FAMILY DOWN: 0
10. IF YOU CHECKED OFF ANY PROBLEMS, HOW DIFFICULT HAVE THESE PROBLEMS MADE IT FOR YOU TO DO YOUR WORK, TAKE CARE OF THINGS AT HOME, OR GET ALONG WITH OTHER PEOPLE: 0
SUM OF ALL RESPONSES TO PHQ QUESTIONS 1-9: 9
SUM OF ALL RESPONSES TO PHQ QUESTIONS 1-9: 9
9. THOUGHTS THAT YOU WOULD BE BETTER OFF DEAD, OR OF HURTING YOURSELF: 0
8. MOVING OR SPEAKING SO SLOWLY THAT OTHER PEOPLE COULD HAVE NOTICED. OR THE OPPOSITE, BEING SO FIGETY OR RESTLESS THAT YOU HAVE BEEN MOVING AROUND A LOT MORE THAN USUAL: 0
SUM OF ALL RESPONSES TO PHQ QUESTIONS 1-9: 9
SUM OF ALL RESPONSES TO PHQ9 QUESTIONS 1 & 2: 5
4. FEELING TIRED OR HAVING LITTLE ENERGY: 3

## 2023-10-31 NOTE — TELEPHONE ENCOUNTER
Submitted PA for Celecoxib 200MG capsules  Via [facility] Key: [id number] STATUS: PENDING. Follow up done daily; if no response in three days we will refax for status check. If another three days goes by with no response we will call the insurance for status.

## 2023-10-31 NOTE — TELEPHONE ENCOUNTER
I received a PA request for Celecoxib 200MG capsules. Could I please get a diagnosis code for this medication?      Thank you

## 2023-10-31 NOTE — PROGRESS NOTES
SUBJECTIVE:  59 y.o. female for follow up of diabetes. Diabetic Review of Systems - medication compliance: compliant all of the time, diabetic diet compliance: compliant most of the time, home glucose monitoring: is performed sporadically 140-200. Current Outpatient Medications   Medication Sig Dispense Refill    Semaglutide,0.25 or 0.5MG/DOS, (OZEMPIC, 0.25 OR 0.5 MG/DOSE,) 2 MG/1.5ML SOPN Inject 0.5 mg into the skin once a week 1 Adjustable Dose Pre-filled Pen Syringe 1    rOPINIRole (REQUIP) 0.5 MG tablet Take 1 tablet by mouth nightly 30 tablet 5    metFORMIN (GLUCOPHAGE) 1000 MG tablet Take 1 tablet by mouth daily (with breakfast) 30 tablet 5    atorvastatin (LIPITOR) 10 MG tablet Take 1 tablet by mouth daily 90 tablet 3    metoprolol succinate (TOPROL XL) 50 MG extended release tablet Take 1 tablet by mouth daily 90 tablet 3    lamoTRIgine (LAMICTAL) 100 MG tablet       blood glucose test strips (ASCENSIA AUTODISC VI;ONE TOUCH ULTRA TEST VI) strip 1 each by In Vitro route daily As needed.  100 each 3    naproxen (NAPROSYN) 500 MG tablet TAKE ONE TABLET BY MOUTH TWICE A DAY AS NEEDED FOR PAIN 30 tablet 0    aspirin EC 81 MG EC tablet Take 1 tablet by mouth daily      melatonin 5 MG TABS tablet Take 1 tablet by mouth daily      sucralfate (CARAFATE) 1 GM tablet Take 1 tablet by mouth daily      blood glucose monitor strips Test once dialy for diabetes e11.9 100 strip 5    Blood Glucose Monitoring Suppl (ONE TOUCH ULTRA 2) w/Device KIT 1 kit by Does not apply route daily 1 kit 0    ondansetron (ZOFRAN) 4 MG tablet Take 1 tablet by mouth every 8 hours as needed for Nausea or Vomiting      Lancets MISC Test once daily for diabetes e11.9 100 each 3    Blood Glucose Monitoring Suppl CONCETTA Test once daily for diabetes e11.9 1 Device 0    traZODone (DESYREL) 100 MG tablet Take 2 tablets by mouth nightly      escitalopram (LEXAPRO) 20 MG tablet Take 0.5 tablets by mouth daily      tiZANidine (ZANAFLEX) 2 MG tablet

## 2023-11-01 ENCOUNTER — TELEPHONE (OUTPATIENT)
Dept: ADMINISTRATIVE | Age: 63
End: 2023-11-01

## 2023-11-01 NOTE — TELEPHONE ENCOUNTER
Submitted PA for Ozempic (1 MG/DOSE) 4MG/3ML pen-injectors    Via CMM Key: [id number] STATUS: PENDING.    Follow up done daily; if no response in three days we will refax for status check.  If another three days goes by with no response we will call the insurance for status.

## 2023-11-01 NOTE — TELEPHONE ENCOUNTER
The medication is APPROVED. If this requires a response please respond to the pool ( P MHCX [address]). Thank you please advise patient.

## 2023-11-01 NOTE — TELEPHONE ENCOUNTER
The medication is APPROVED.    If this requires a response please respond to the pool ( P MHCX P[facility] MEDICATION PRE-AUTH).      Thank you please advise patient.

## 2023-11-08 NOTE — TELEPHONE ENCOUNTER
Ajith Low it looks like [facility] but [facility] insurance has changed and this is no longer their pharmacy, she is asking if you can resend this to [facility] which has been loaded and pending. If your blood pressure remains high, follow up with your doctor to determine if your medicine should be adjusted

## 2023-12-01 NOTE — TELEPHONE ENCOUNTER
Silke Beckett [phone] (home)    is requesting refill(s) of medication Ozempic 2 mg/Dose to preferred pharmacy [address] [place] [address] Rd 10/31/23 (pertaining to medication)   Last refill 10/31/23 (per medication requested)  Next office visit scheduled or attempted Yes  Date 02/06/24

## 2023-12-18 ENCOUNTER — TELEPHONE (OUTPATIENT)
Dept: FAMILY MEDICINE CLINIC | Age: 63
End: 2023-12-18

## 2023-12-18 NOTE — TELEPHONE ENCOUNTER
Pharmacy requesting clarification on script for ozempic which was sent over.  Script is showing to inject 2mg weekly but pt says should be for 1mg weekly?  Please advise.

## 2024-01-08 RX ORDER — CELECOXIB 200 MG/1
200 CAPSULE ORAL DAILY
Qty: 30 CAPSULE | Refills: 1 | Status: SHIPPED | OUTPATIENT
Start: 2024-01-08

## 2024-01-08 NOTE — TELEPHONE ENCOUNTER
Karishma Lee [phone] (home)    is requesting refill(s) of medication Celecoxib 200 mg Capsule to preferred [facility]    Last OV 10/31/23 (pertaining to medication)   Last refill 10/31/23 (per medication requested)  Next office visit scheduled or attempted Yes  Date 02/06/24

## 2024-01-24 NOTE — TELEPHONE ENCOUNTER
Karishma Lee [phone] (home)    is requesting refill(s) of medication Metformin 1000 mg Tablet to preferred pharmacy [facility]    Last OV 10/31/23 (pertaining to medication)   Last refill 07/24/23 (per medication requested)  Next office visit scheduled or attempted Yes  Date 02/06/24

## 2024-02-01 ENCOUNTER — TELEPHONE (OUTPATIENT)
Dept: PULMONOLOGY | Age: 64
End: 2024-02-01

## 2024-02-01 ENCOUNTER — OFFICE VISIT (OUTPATIENT)
Dept: SLEEP MEDICINE | Age: 64
End: 2024-02-01
Payer: COMMERCIAL

## 2024-02-01 VITALS
HEIGHT: 66 IN | WEIGHT: 205 LBS | BODY MASS INDEX: 32.95 KG/M2 | DIASTOLIC BLOOD PRESSURE: 64 MMHG | HEART RATE: 93 BPM | OXYGEN SATURATION: 95 % | SYSTOLIC BLOOD PRESSURE: 124 MMHG

## 2024-02-01 DIAGNOSIS — I10 ESSENTIAL HYPERTENSION: ICD-10-CM

## 2024-02-01 DIAGNOSIS — Z71.89 CPAP USE COUNSELING: ICD-10-CM

## 2024-02-01 DIAGNOSIS — G47.33 MILD OBSTRUCTIVE SLEEP APNEA: Primary | ICD-10-CM

## 2024-02-01 DIAGNOSIS — Z72.821 POOR SLEEP HYGIENE: ICD-10-CM

## 2024-02-01 DIAGNOSIS — F51.04 PSYCHOPHYSIOLOGICAL INSOMNIA: ICD-10-CM

## 2024-02-01 DIAGNOSIS — E66.9 OBESITY (BMI 30-39.9): ICD-10-CM

## 2024-02-01 DIAGNOSIS — G25.81 RLS (RESTLESS LEGS SYNDROME): ICD-10-CM

## 2024-02-01 PROCEDURE — 99214 OFFICE O/P EST MOD 30 MIN: CPT | Performed by: NURSE PRACTITIONER

## 2024-02-01 PROCEDURE — 3074F SYST BP LT 130 MM HG: CPT | Performed by: NURSE PRACTITIONER

## 2024-02-01 PROCEDURE — 3078F DIAST BP <80 MM HG: CPT | Performed by: NURSE PRACTITIONER

## 2024-02-01 RX ORDER — MIRTAZAPINE 15 MG/1
15 TABLET, FILM COATED ORAL NIGHTLY
COMMUNITY
Start: 2024-01-03

## 2024-02-01 RX ORDER — ROPINIROLE 0.5 MG/1
0.5 TABLET, FILM COATED ORAL NIGHTLY
Qty: 30 TABLET | Refills: 5 | Status: SHIPPED | OUTPATIENT
Start: 2024-02-01

## 2024-02-01 ASSESSMENT — SLEEP AND FATIGUE QUESTIONNAIRES
HOW LIKELY ARE YOU TO NOD OFF OR FALL ASLEEP IN A CAR, WHILE STOPPED FOR A FEW MINUTES IN TRAFFIC: 0
HOW LIKELY ARE YOU TO NOD OFF OR FALL ASLEEP WHILE WATCHING TV: 1
HOW LIKELY ARE YOU TO NOD OFF OR FALL ASLEEP WHILE LYING DOWN TO REST IN THE AFTERNOON WHEN CIRCUMSTANCES PERMIT: 2
HOW LIKELY ARE YOU TO NOD OFF OR FALL ASLEEP WHILE SITTING INACTIVE IN A PUBLIC PLACE: 1
ESS TOTAL SCORE: 8
NECK CIRCUMFERENCE (INCHES): 15.25
HOW LIKELY ARE YOU TO NOD OFF OR FALL ASLEEP WHILE SITTING AND READING: 1
HOW LIKELY ARE YOU TO NOD OFF OR FALL ASLEEP WHEN YOU ARE A PASSENGER IN A CAR FOR AN HOUR WITHOUT A BREAK: 2
HOW LIKELY ARE YOU TO NOD OFF OR FALL ASLEEP WHILE SITTING AND TALKING TO SOMEONE: 1
HOW LIKELY ARE YOU TO NOD OFF OR FALL ASLEEP WHILE SITTING QUIETLY AFTER LUNCH WITHOUT ALCOHOL: 0

## 2024-02-01 NOTE — PROGRESS NOTES
Patient ID: Karishma Lee is a 63 y.o. female who is being seen today for   Chief Complaint   Patient presents with    Follow-up     Sleep     Referring: Dr. Lambert Arriaga    HPI:   Karishma Lee is a 63 y.o. female in office for ANGEL/RLS follow up. States she is doing okay with CPAP   Patient is using CPAP   4-5 hrs/night. Using humidifier. No snoring on CPAP. The pressure is well tolerated. The mask is comfortable-full face. No mask leak. No significant daytime sleepiness. No nodding off when driving. No dry nose or throat. Minimal fatigue. Bedtime is 10 pm and rise time is 9-10 am. Sleep onset is 60 minutes. States switched from trazodone to Remeron per psychiatry.   Wakes up 3 times at night total. 3 nocturia. It takes few minutes to fall back a sleep. No naps during the day. No headache in am. No weight gain. No caffienated beverages during the day. No alcohol. ESS is 8    She continues Requip 0.5 mg. States it is working well.  Denies negative side effects of medication          2/1/2024     1:35 PM 8/3/2023     1:37 PM 2/2/2023     1:14 PM 12/7/2022     2:59 PM 9/7/2022     9:19 AM 3/17/2022     1:53 PM   Sleep Medicine   Sitting and reading 1 1 0 0 0 1   Watching TV 1 1 0 0 1 2   Sitting, inactive in a public place (e.g. a theatre or a meeting) 1 0 0 0 0 0   As a passenger in a car for an hour without a break 2 0 0 0 1 0   Lying down to rest in the afternoon when circumstances permit 2 1 0 1 2 2   Sitting and talking to someone 1 0 0 0 0 0   Sitting quietly after a lunch without alcohol 0 0 0 0 0 0   In a car, while stopped for a few minutes in traffic 0 0 0 0 0 0   [place] Sleepiness Score 8 3 0 1 4 5   Neck circumference (Inches) 15.25 14 15 15 15 15       Past Medical History:  Past Medical History:   Diagnosis Date    ADHD (attention deficit hyperactivity disorder)     Anxiety     Bipolar disorder (HCC)     Depression     Diverticulitis 10/19/2021    DJD (degenerative joint disease)

## 2024-02-06 ENCOUNTER — OFFICE VISIT (OUTPATIENT)
Dept: FAMILY MEDICINE CLINIC | Age: 64
End: 2024-02-06
Payer: COMMERCIAL

## 2024-02-06 VITALS
RESPIRATION RATE: 18 BRPM | BODY MASS INDEX: 32.92 KG/M2 | HEART RATE: 90 BPM | OXYGEN SATURATION: 97 % | HEIGHT: 66 IN | WEIGHT: 204.8 LBS | SYSTOLIC BLOOD PRESSURE: 100 MMHG | DIASTOLIC BLOOD PRESSURE: 64 MMHG

## 2024-02-06 DIAGNOSIS — Z79.4 TYPE 2 DIABETES MELLITUS WITHOUT COMPLICATION, WITH LONG-TERM CURRENT USE OF INSULIN (HCC): Primary | ICD-10-CM

## 2024-02-06 DIAGNOSIS — E11.9 TYPE 2 DIABETES MELLITUS WITHOUT COMPLICATION, WITH LONG-TERM CURRENT USE OF INSULIN (HCC): Primary | ICD-10-CM

## 2024-02-06 LAB — HBA1C MFR BLD: 7.6 %

## 2024-02-06 PROCEDURE — 3074F SYST BP LT 130 MM HG: CPT | Performed by: PHYSICIAN ASSISTANT

## 2024-02-06 PROCEDURE — 99213 OFFICE O/P EST LOW 20 MIN: CPT | Performed by: PHYSICIAN ASSISTANT

## 2024-02-06 PROCEDURE — 3051F HG A1C>EQUAL 7.0%<8.0%: CPT | Performed by: PHYSICIAN ASSISTANT

## 2024-02-06 PROCEDURE — 3078F DIAST BP <80 MM HG: CPT | Performed by: PHYSICIAN ASSISTANT

## 2024-02-06 PROCEDURE — 83036 HEMOGLOBIN GLYCOSYLATED A1C: CPT | Performed by: PHYSICIAN ASSISTANT

## 2024-02-06 RX ORDER — BACILLUS COAGULANS/INULIN 1B-250 MG
CAPSULE ORAL
COMMUNITY

## 2024-02-06 ASSESSMENT — PATIENT HEALTH QUESTIONNAIRE - PHQ9
10. IF YOU CHECKED OFF ANY PROBLEMS, HOW DIFFICULT HAVE THESE PROBLEMS MADE IT FOR YOU TO DO YOUR WORK, TAKE CARE OF THINGS AT HOME, OR GET ALONG WITH OTHER PEOPLE: 0
SUM OF ALL RESPONSES TO PHQ QUESTIONS 1-9: 0
SUM OF ALL RESPONSES TO PHQ QUESTIONS 1-9: 0
6. FEELING BAD ABOUT YOURSELF - OR THAT YOU ARE A FAILURE OR HAVE LET YOURSELF OR YOUR FAMILY DOWN: 0
3. TROUBLE FALLING OR STAYING ASLEEP: 0
SUM OF ALL RESPONSES TO PHQ QUESTIONS 1-9: 0
5. POOR APPETITE OR OVEREATING: 0
SUM OF ALL RESPONSES TO PHQ9 QUESTIONS 1 & 2: 0
8. MOVING OR SPEAKING SO SLOWLY THAT OTHER PEOPLE COULD HAVE NOTICED. OR THE OPPOSITE, BEING SO FIGETY OR RESTLESS THAT YOU HAVE BEEN MOVING AROUND A LOT MORE THAN USUAL: 0
9. THOUGHTS THAT YOU WOULD BE BETTER OFF DEAD, OR OF HURTING YOURSELF: 0
2. FEELING DOWN, DEPRESSED OR HOPELESS: 0
SUM OF ALL RESPONSES TO PHQ QUESTIONS 1-9: 0
1. LITTLE INTEREST OR PLEASURE IN DOING THINGS: 0
4. FEELING TIRED OR HAVING LITTLE ENERGY: 0
7. TROUBLE CONCENTRATING ON THINGS, SUCH AS READING THE NEWSPAPER OR WATCHING TELEVISION: 0

## 2024-02-06 NOTE — PROGRESS NOTES
SUBJECTIVE:  63 y.o. female for follow up of diabetes. Diabetic Review of Systems - medication compliance: compliant all of the time, diabetic diet compliance: compliant most of the time, home glucose monitoring: fasting values range 170's.  Other symptoms and concerns: none .    Current Outpatient Medications   Medication Sig Dispense Refill    Bacillus Coagulans-Inulin (PROBIOTIC) 1-250 BILLION-MG CAPS Take by mouth      mirtazapine (REMERON) 15 MG tablet Take 1 tablet by mouth nightly      rOPINIRole (REQUIP) 0.5 MG tablet Take 1 tablet by mouth nightly 30 tablet 5    metFORMIN (GLUCOPHAGE) 1000 MG tablet TAKE 1 TABLET BY MOUTH DAILY WITH BREAKFAST 30 tablet 5    celecoxib (CELEBREX) 200 MG capsule TAKE 1 CAPSULE BY MOUTH DAILY 30 capsule 1    Semaglutide, 1 MG/DOSE, (OZEMPIC, 1 MG/DOSE,) 4 MG/3ML SOPN Inject 1 mg into the skin every 7 days 3 mL 2    atorvastatin (LIPITOR) 10 MG tablet Take 1 tablet by mouth daily 90 tablet 3    metoprolol succinate (TOPROL XL) 50 MG extended release tablet Take 1 tablet by mouth daily 90 tablet 3    lamoTRIgine (LAMICTAL) 100 MG tablet       blood glucose test strips (ASCENSIA AUTODISC VI;ONE TOUCH ULTRA TEST VI) strip 1 each by In Vitro route daily As needed. 100 each 3    naproxen (NAPROSYN) 500 MG tablet TAKE ONE TABLET BY MOUTH TWICE A DAY AS NEEDED FOR PAIN 30 tablet 0    tiZANidine (ZANAFLEX) 2 MG tablet Take 1 tablet by mouth every 8 hours as needed (muscle spasms) 20 tablet 0    aspirin EC 81 MG EC tablet Take 1 tablet by mouth daily      melatonin 5 MG TABS tablet Take 1 tablet by mouth daily      blood glucose monitor strips Test once dialy for diabetes e11.9 100 strip 5    Blood Glucose Monitoring Suppl (ONE TOUCH ULTRA 2) w/Device KIT 1 kit by Does not apply route daily 1 kit 0    ondansetron (ZOFRAN) 4 MG tablet Take 1 tablet by mouth every 8 hours as needed for Nausea or Vomiting      Lancets MISC Test once daily for diabetes e11.9 100 each 3    Blood Glucose

## 2024-02-12 RX ORDER — ROPINIROLE 0.5 MG/1
0.5 TABLET, FILM COATED ORAL NIGHTLY
Qty: 30 TABLET | Refills: 5 | OUTPATIENT
Start: 2024-02-12

## 2024-02-19 ENCOUNTER — TELEPHONE (OUTPATIENT)
Dept: FAMILY MEDICINE CLINIC | Age: 64
End: 2024-02-19

## 2024-02-20 ENCOUNTER — TELEPHONE (OUTPATIENT)
Dept: ADMINISTRATIVE | Age: 64
End: 2024-02-20

## 2024-02-20 NOTE — TELEPHONE ENCOUNTER
Submitted PA for Ozempic (2 MG/DOSE) 8MG/3ML pen-injectors  Via CMM (Key: [id number]) STATUS: PENDING.    Follow up done daily; if no decision with in three days we will refax.  If another three days goes by with no decision will call the insurance for status.

## 2024-02-21 NOTE — TELEPHONE ENCOUNTER
Information regarding your request:  Prior Authorization Not Required    If this requires a response please respond to the pool ( P MHCX PSC MEDICATION PRE-AUTH).      Thank you please advise patient.

## 2024-03-11 RX ORDER — CELECOXIB 200 MG/1
200 CAPSULE ORAL DAILY
Qty: 30 CAPSULE | Refills: 1 | Status: SHIPPED | OUTPATIENT
Start: 2024-03-11

## 2024-03-11 NOTE — TELEPHONE ENCOUNTER
Karishma Lee [phone] (home)    is requesting refill(s) of medication Celecoxib 200 mg Capsule to preferred [facility]    Last OV 02/06/24 (pertaining to medication)   Last refill 01/08/24 (per medication requested)  Next office visit scheduled or attempted Yes  Date 05/13/24

## 2024-04-03 NOTE — TELEPHONE ENCOUNTER
Karishma Lee [phone] (home)    is requesting refill(s) of medication Celecoxib 200 mg Capsule to preferred pharmacy [facility]    Last OV 02/06/24 (pertaining to medication)   Last refill 03/11/24 (per medication requested)  Next office visit scheduled or attempted Yes  Date 05/13/24

## 2024-04-04 RX ORDER — CELECOXIB 200 MG/1
200 CAPSULE ORAL DAILY
Qty: 30 CAPSULE | Refills: 1 | Status: SHIPPED | OUTPATIENT
Start: 2024-04-04

## 2024-04-23 ENCOUNTER — HOSPITAL ENCOUNTER (INPATIENT)
Age: 64
DRG: 950 | End: 2024-04-23
Attending: STUDENT IN AN ORGANIZED HEALTH CARE EDUCATION/TRAINING PROGRAM | Admitting: STUDENT IN AN ORGANIZED HEALTH CARE EDUCATION/TRAINING PROGRAM
Payer: COMMERCIAL

## 2024-04-23 DIAGNOSIS — S22.49XS CLOSED FRACTURE OF MULTIPLE RIBS, UNSPECIFIED LATERALITY, SEQUELA: Primary | ICD-10-CM

## 2024-04-23 PROBLEM — S06.5XAA SDH (SUBDURAL HEMATOMA) (HCC): Status: ACTIVE | Noted: 2024-04-23

## 2024-04-23 LAB
GLUCOSE BLD-MCNC: 139 MG/DL (ref 70–99)
GLUCOSE BLD-MCNC: 170 MG/DL (ref 70–99)
GLUCOSE BLD-MCNC: 97 MG/DL (ref 70–99)
PERFORMED ON: ABNORMAL
PERFORMED ON: ABNORMAL
PERFORMED ON: NORMAL
SARS-COV-2 RDRP RESP QL NAA+PROBE: NOT DETECTED

## 2024-04-23 PROCEDURE — 6370000000 HC RX 637 (ALT 250 FOR IP): Performed by: STUDENT IN AN ORGANIZED HEALTH CARE EDUCATION/TRAINING PROGRAM

## 2024-04-23 PROCEDURE — 1280000000 HC REHAB R&B

## 2024-04-23 PROCEDURE — 6360000002 HC RX W HCPCS: Performed by: STUDENT IN AN ORGANIZED HEALTH CARE EDUCATION/TRAINING PROGRAM

## 2024-04-23 PROCEDURE — 87635 SARS-COV-2 COVID-19 AMP PRB: CPT

## 2024-04-23 RX ORDER — ENOXAPARIN SODIUM 100 MG/ML
30 INJECTION SUBCUTANEOUS 2 TIMES DAILY
Status: DISCONTINUED | OUTPATIENT
Start: 2024-04-23 | End: 2024-05-06 | Stop reason: HOSPADM

## 2024-04-23 RX ORDER — MIRTAZAPINE 15 MG/1
7.5 TABLET, FILM COATED ORAL NIGHTLY
Status: DISCONTINUED | OUTPATIENT
Start: 2024-04-23 | End: 2024-05-06 | Stop reason: HOSPADM

## 2024-04-23 RX ORDER — INSULIN LISPRO 100 [IU]/ML
0-4 INJECTION, SOLUTION INTRAVENOUS; SUBCUTANEOUS NIGHTLY
Status: DISCONTINUED | OUTPATIENT
Start: 2024-04-23 | End: 2024-05-06 | Stop reason: HOSPADM

## 2024-04-23 RX ORDER — ACETAMINOPHEN 325 MG/1
650 TABLET ORAL EVERY 4 HOURS PRN
Status: DISCONTINUED | OUTPATIENT
Start: 2024-04-23 | End: 2024-04-27

## 2024-04-23 RX ORDER — METOPROLOL SUCCINATE 50 MG/1
50 TABLET, EXTENDED RELEASE ORAL DAILY
Status: DISCONTINUED | OUTPATIENT
Start: 2024-04-24 | End: 2024-05-06 | Stop reason: HOSPADM

## 2024-04-23 RX ORDER — LAMOTRIGINE 100 MG/1
100 TABLET ORAL NIGHTLY
Status: DISCONTINUED | OUTPATIENT
Start: 2024-04-23 | End: 2024-05-06 | Stop reason: HOSPADM

## 2024-04-23 RX ORDER — METHOCARBAMOL 500 MG/1
1000 TABLET, FILM COATED ORAL 4 TIMES DAILY
Status: DISCONTINUED | OUTPATIENT
Start: 2024-04-23 | End: 2024-05-06 | Stop reason: HOSPADM

## 2024-04-23 RX ORDER — POLYETHYLENE GLYCOL 3350 17 G/17G
17 POWDER, FOR SOLUTION ORAL DAILY
Status: DISCONTINUED | OUTPATIENT
Start: 2024-04-24 | End: 2024-05-06 | Stop reason: HOSPADM

## 2024-04-23 RX ORDER — LIDOCAINE 4 G/G
2 PATCH TOPICAL DAILY
Status: DISCONTINUED | OUTPATIENT
Start: 2024-04-23 | End: 2024-05-06 | Stop reason: HOSPADM

## 2024-04-23 RX ORDER — BISACODYL 10 MG
10 SUPPOSITORY, RECTAL RECTAL DAILY PRN
Status: DISCONTINUED | OUTPATIENT
Start: 2024-04-23 | End: 2024-05-06 | Stop reason: HOSPADM

## 2024-04-23 RX ORDER — INSULIN LISPRO 100 [IU]/ML
3 INJECTION, SOLUTION INTRAVENOUS; SUBCUTANEOUS
Status: DISCONTINUED | OUTPATIENT
Start: 2024-04-23 | End: 2024-05-06 | Stop reason: HOSPADM

## 2024-04-23 RX ORDER — ACETAMINOPHEN 500 MG
1000 TABLET ORAL EVERY 8 HOURS SCHEDULED
Status: DISCONTINUED | OUTPATIENT
Start: 2024-04-23 | End: 2024-05-06 | Stop reason: HOSPADM

## 2024-04-23 RX ORDER — MECOBALAMIN 5000 MCG
5 TABLET,DISINTEGRATING ORAL NIGHTLY
Status: DISCONTINUED | OUTPATIENT
Start: 2024-04-23 | End: 2024-05-06 | Stop reason: HOSPADM

## 2024-04-23 RX ORDER — ROPINIROLE 0.5 MG/1
0.5 TABLET, FILM COATED ORAL NIGHTLY
Status: DISCONTINUED | OUTPATIENT
Start: 2024-04-23 | End: 2024-05-06 | Stop reason: HOSPADM

## 2024-04-23 RX ORDER — ATORVASTATIN CALCIUM 10 MG/1
10 TABLET, FILM COATED ORAL DAILY
Status: DISCONTINUED | OUTPATIENT
Start: 2024-04-24 | End: 2024-05-06 | Stop reason: HOSPADM

## 2024-04-23 RX ORDER — GLUCAGON 1 MG/ML
1 KIT INJECTION PRN
Status: DISCONTINUED | OUTPATIENT
Start: 2024-04-23 | End: 2024-05-06 | Stop reason: HOSPADM

## 2024-04-23 RX ORDER — SENNA AND DOCUSATE SODIUM 50; 8.6 MG/1; MG/1
1 TABLET, FILM COATED ORAL 2 TIMES DAILY
Status: DISCONTINUED | OUTPATIENT
Start: 2024-04-23 | End: 2024-05-06 | Stop reason: HOSPADM

## 2024-04-23 RX ORDER — GABAPENTIN 300 MG/1
300 CAPSULE ORAL 3 TIMES DAILY
Status: DISCONTINUED | OUTPATIENT
Start: 2024-04-23 | End: 2024-04-26

## 2024-04-23 RX ORDER — ESCITALOPRAM OXALATE 10 MG/1
20 TABLET ORAL DAILY
Status: DISCONTINUED | OUTPATIENT
Start: 2024-04-24 | End: 2024-05-06 | Stop reason: HOSPADM

## 2024-04-23 RX ORDER — INSULIN LISPRO 100 [IU]/ML
0-8 INJECTION, SOLUTION INTRAVENOUS; SUBCUTANEOUS
Status: DISCONTINUED | OUTPATIENT
Start: 2024-04-23 | End: 2024-05-06 | Stop reason: HOSPADM

## 2024-04-23 RX ORDER — GABAPENTIN 100 MG/1
100 CAPSULE ORAL 3 TIMES DAILY
Status: DISCONTINUED | OUTPATIENT
Start: 2024-04-30 | End: 2024-04-26

## 2024-04-23 RX ORDER — DEXTROSE MONOHYDRATE 100 MG/ML
INJECTION, SOLUTION INTRAVENOUS CONTINUOUS PRN
Status: DISCONTINUED | OUTPATIENT
Start: 2024-04-23 | End: 2024-05-06 | Stop reason: HOSPADM

## 2024-04-23 RX ADMIN — ACETAMINOPHEN 1000 MG: 500 TABLET ORAL at 20:58

## 2024-04-23 RX ADMIN — GABAPENTIN 300 MG: 300 CAPSULE ORAL at 20:58

## 2024-04-23 RX ADMIN — INSULIN HUMAN 22 UNITS: 100 INJECTION, SUSPENSION SUBCUTANEOUS at 17:04

## 2024-04-23 RX ADMIN — SENNOSIDES AND DOCUSATE SODIUM 1 TABLET: 50; 8.6 TABLET ORAL at 20:58

## 2024-04-23 RX ADMIN — Medication 5 MG: at 20:57

## 2024-04-23 RX ADMIN — ACETAMINOPHEN 1000 MG: 500 TABLET ORAL at 15:11

## 2024-04-23 RX ADMIN — ENOXAPARIN SODIUM 30 MG: 100 INJECTION SUBCUTANEOUS at 20:59

## 2024-04-23 RX ADMIN — LAMOTRIGINE 100 MG: 100 TABLET ORAL at 20:59

## 2024-04-23 RX ADMIN — MIRTAZAPINE 7.5 MG: 15 TABLET, FILM COATED ORAL at 20:57

## 2024-04-23 RX ADMIN — GABAPENTIN 300 MG: 300 CAPSULE ORAL at 15:12

## 2024-04-23 RX ADMIN — METHOCARBAMOL 1000 MG: 500 TABLET ORAL at 17:03

## 2024-04-23 RX ADMIN — INSULIN LISPRO 3 UNITS: 100 INJECTION, SOLUTION INTRAVENOUS; SUBCUTANEOUS at 15:11

## 2024-04-23 RX ADMIN — INSULIN LISPRO 3 UNITS: 100 INJECTION, SOLUTION INTRAVENOUS; SUBCUTANEOUS at 17:03

## 2024-04-23 RX ADMIN — METHOCARBAMOL 1000 MG: 500 TABLET ORAL at 20:58

## 2024-04-23 RX ADMIN — ROPINIROLE HYDROCHLORIDE 0.5 MG: 0.5 TABLET, FILM COATED ORAL at 20:57

## 2024-04-23 ASSESSMENT — PAIN DESCRIPTION - DESCRIPTORS
DESCRIPTORS: ACHING
DESCRIPTORS: ACHING

## 2024-04-23 ASSESSMENT — PAIN SCALES - GENERAL
PAINLEVEL_OUTOF10: 5
PAINLEVEL_OUTOF10: 5
PAINLEVEL_OUTOF10: 6

## 2024-04-23 ASSESSMENT — PAIN DESCRIPTION - FREQUENCY
FREQUENCY: INTERMITTENT
FREQUENCY: INTERMITTENT

## 2024-04-23 ASSESSMENT — PAIN DESCRIPTION - ORIENTATION
ORIENTATION: LEFT
ORIENTATION: LEFT

## 2024-04-23 ASSESSMENT — PAIN - FUNCTIONAL ASSESSMENT
PAIN_FUNCTIONAL_ASSESSMENT: PREVENTS OR INTERFERES SOME ACTIVE ACTIVITIES AND ADLS
PAIN_FUNCTIONAL_ASSESSMENT: PREVENTS OR INTERFERES SOME ACTIVE ACTIVITIES AND ADLS

## 2024-04-23 ASSESSMENT — PAIN DESCRIPTION - LOCATION
LOCATION: RIB CAGE
LOCATION: RIB CAGE

## 2024-04-23 ASSESSMENT — PAIN DESCRIPTION - ONSET
ONSET: ON-GOING
ONSET: ON-GOING

## 2024-04-23 ASSESSMENT — PAIN DESCRIPTION - PAIN TYPE
TYPE: ACUTE PAIN
TYPE: ACUTE PAIN

## 2024-04-23 NOTE — CARE COORDINATION
Case Management ARU Admission Assessment   [facility]    Patient:Karishma Lee     Rehab Dx/Hx: SDH (subdural hematoma) (HCC) [S06.5XAA]   :1960  MRN:[id number]  Date of Admit: 2024  Room #: 0168/0168-01       Objective:  met with the patient to complete initial assessment and review the role of Case Management while on the ARU. Patient educated on team conferences. Discussed family training with the patient/family on how it is encouraged on the unit. Order for \"discharge planning\" has been addressed.          Family Present: no   Primary : [facility] Decision Maker:   Primary Decision Maker: Mayito Lee A - Spouse - [phone]   Current PCP:  Trina Nixon PA       Admit date:  2024   Insurance: [employer]    Precert required for SNF:  [] No       [x] Yes   3 Night stay required: [x] No       [] Yes   Admitting diagnosis: SDH (subdural hematoma) (HCC) [S06.5XAA]       Current home situation: Independent PLOF cane for long distance. Lives w/spouse in a one level home with 5 steps. Home has walk-n-shower.   DME at home: [x] Walker     [x] Cane       [] RTS        [] BSC      [] Shower Chair        [] O2       [] HHN     [] CPAP       [] BiPap      [] Hospital Bed       [] W/C        [x] Other: Chair lift, reacher, sock aide   Medical concerns requiring training: none   Medication concerns:  Require financial assistance with meds? [x] No       [] If yes, please explain:   [] Yes              Services prior to admission: none   Preference for HHC or OP Therapy: [] Home health       [x] Outpatient       [] No preference   Patient's goal(s): Go home   Working or volunteering PTA? retired   Active with: [] Senior services        [] [facility]         [] Other:         Dialysis Facility (if applicable) Name:  Address:  Dialysis Schedule:  Phone:  Fax:       Support system at home and in the community: spouse   Caregiver physical  ability: [x] Cue patient for safety  [] Physical lift/lower: [] Light [] Moderate [] Heavy  [x] Cook / Clean  [x] Transport   Who will be the one to contact for family training: Mayito   24 hour care on discharge?: Pending therapy recommendations   What level does the patient need to be at to return home:  Mod i   Discharge plan:  Pending therapy recommendations   Barriers to discharge: Patient progress     ECOC on chart for MD Signature.     Signature:  Nirmala Davila RN

## 2024-04-23 NOTE — PROGRESS NOTES
IRF JEREMIAS Admission Assessment  [facility] Acute Rehab Unit    Patient:Karishma Lee     Rehab Dx/Hx: SDH (subdural hematoma) (HCC) [S06.5XAA]   :1960  MRN:[id number]  Date of Admit: 2024     Pain Effect on Sleep:  Over the past 5 days, how much of the time has pain made it hard for you to sleep at night?  []  0. Does not apply - I have not had any pain or hurting in the past 5 days  []  1. Rarely or not at all  [x]  2. Occasionally  []  3. Frequently  []  4. Almost constantly  []  8. Unable to answer    Over the past 5 days, how often have you limited your participation in rehabilitation therapy sessions due to pain?  []  0. Does not apply - I have not received rehabilitation therapy in the past 5 days  [x]  1. Rarely or not at all  []  2. Occasionally  []  3. Frequently  []  4. Almost constantly  []  8. Unable to answer    Pain Interference with Day-to-Day Activities:  Over the past 5 days, how often have you limited your day-to-day activities (excluding rehabilitation therapy session)?  [x]  1. Rarely or not at all  []  2. Occasionally  []  3. Frequently  []  4. Almost constantly  []  8. Unable to answer      High-Risk Drug Classes: Use and Indication   Is taking: Check if the pt is taking any medications by pharmacological classification  Indication noted: If column 1 is checked, check if there is an indication noted for all meds in the drug class Is taking  (check all that apply) Indication noted (check all that apply)   Antipsychotic [] []   Anticoagulant [] []   Antibiotic [] []   Opioid [] []   Antiplatelet [x] [x]   Hypoglycemic (including insulin) [x] [x]   None of the above [] []     Special Treatments, Procedures, and Programs   Check all of the following treatments, procedures, and programs that apply on admission.  On admission (check all that apply)   Cancer Treatments    A1. Chemotherapy []   A2. IV []   A3. Oral []   A10. Other []   B1. Radiation []   Respiratory Therapies    C1.  Oxygen Therapy []   C2. Continuous []   C3. Intermittent []   C4. High-concentration []   D1. Suctioning []   D2. Scheduled []   D3. As needed []   E1. Tracheostomy Care []   F1. Invasive Mechanical Ventilator (ventilator or respirator) []   G1. Non-invasive Mechanical Ventilator []   G2. BiPAP []   G3. CPAP [x]   Other    H1. IV Medications []   H2. Vasoactive medications []   H3. Antibiotics []   H4. Anticoagulation []   H10. Other []   I1. Transfusions []   J1. Dialysis []   J2. Hemodialysis []   J3. Peritoneal dialysis []   O1. IV access []   O2. Peripheral []   O3. Midline []   O4. Central (PICC, tunneled, port) []   None of the above []     Signature: Ovi Ruelas RN

## 2024-04-23 NOTE — PLAN OF CARE
Problem: Neurosensory - Adult  Goal: Achieves stable or improved neurological status  Outcome: Progressing  Goal: Achieves maximal functionality and self care  Outcome: Progressing

## 2024-04-23 NOTE — PROGRESS NOTES
NURSING ASSESSMENT: ARU ADMISSION  [facility]    Patient:Karishma Lee     Rehab Dx/Hx: SDH (subdural hematoma) (HCC) [S06.5XAA]   :1960  MRN:[id number]  Date of Admit: 2024  Room #: 0168/0168-01    Subjective:   Patient admitted to room 168 @ 1300 from [facility] via stretcher.   Alert and oriented x4.  Oriented to room and call light system. Oriented to rehab routine and therapy schedules. Informed about care conferences and ordering of meals with PCA.    Drug / Medication Review:   Medications were reviewed by RN at time of admission  [x]  No potential or actual clinically significant medication issues were noted.   []  Potential or actual clinically significant medication issues were found and MD was notified. (Specified below if applicable)   []  Allergy to medication   []  Drug interactions (drug/drug, drug/food, drug/disease interactions)   []  Duplicate drug   []  Omission (drug missing from prescribed regimen)   []  Non adherence   []  Adverse reaction   []  Wrong patient, drug, dose, route, time error   []  Ineffective drug therapy    Section GG: Rolling Right and Left   []  Code 06, Independent: if the patient completes the activity by themself with no assistance from a helper.   []  Code 05, Setup or clean up assist: if the helper sets up or cleans up; patient completes activity   []  Code 04, Supervision or touching assist: If the helper provides verbal cues and/or touching/steadying and/or contact guard assistance as patient completes activity   []  Code 03, Partial/Moderate Assist: If the helper does LESS THAN HALF the effort. Durand lifts, holds, or supports trunk or limbs, but provides less than half the effort.   [x]  Code 02, Substantial/Maximal Assist: if the helper does MORE THAN HALF the effort. [place] lifts or holds trunk or limbs and provides more than half the effort.  []  Code 01,Dependent: If the helper does ALL of the effort. Patient does none of the effort

## 2024-04-23 NOTE — CARE COORDINATION
Transportation needs: Spouse can assist   Has lack of transportation kept you from medical appointments, meetings, work, or ADL's? [] Yes, it has kept me from medical appointments or from getting my meds  [] Yes, It has kept me from non-medical meetings, appointments, work, or getting things I need  [x] No  [] Pt unable to respond  [] Pt declines to respond     How often do you need to have someone help you when you read instructions, pamphlets, or other written material from your doctor or pharmacy? [] Never                             [] Always  [x] Rarely                            [] Patient unable to respond  [] Sometimes                     [] Pt declines to respond  [] Often         Ethnicity: Are you of , /a, or Upper sorbian origin?  [x] No, not of , /a, or Upper sorbian origin  [] Yes, Sri Lankan, Sri Lankan American, Chicano/a  [] Yes, Nicaraguan  [] Yes, [place]  [] Yes, another , , or Upper sorbian origin  [] Pt unable to respond  [] Pt declines to respond     Race: [x] White                                                [] Palestinian              []   [] Black or                 [] Upper sorbian          [] Mongolian or Marcie  []  or      [] Sinhala             [] St [place]ian  []                                        [] [place] Islander      [] Other   [] Chinese                                             [] Other        [] Pt unable to respond                      [] Pt declines to respond                  [] None of the above   Preferred Language: english      Nirmala Davila RN

## 2024-04-23 NOTE — PLAN OF CARE
ARU PATIENT TREATMENT PLAN  [facility]  [address] [place]  [address]  [phone]    Karishma Lee    : 1960  Located within [facility] #: [id number]  MRN: [id number]   PHYSICIAN:  April Garduno MD  Primary Problem    Patient Active Problem List   Diagnosis    Essential hypertension    Anxiety    Moderate episode of recurrent major depressive disorder (HCC)    Pure hypercholesterolemia    Breast nodule    Abnormal stress test    Coronary artery disease involving native coronary artery of native heart without angina pectoris    Mild obstructive sleep apnea    Obesity (BMI 30-39.9)    SDH (subdural hematoma) (HCC)       Rehabilitation Diagnosis:     SDH (subdural hematoma) (HCC) [S06.5XAA]       ADMIT DATE:2024    Patient Goals: \"to go home stronger\"     Admitting Impairments: Decreased functional mobility ;Decreased ADL status;Decreased strength;Decreased endurance;Decreased balance;Decreased high-level IADLs;Decreased posture   Barriers: Pain, steps to enter home; decreased endurance and strength; impaired cognition  Participation: Good     CARE PLAN     NURSING:  Karishma Lee while on this unit will:     [] Be continent of bowel and bladder     [x] Have an adequate number of bowel movements  [] Urinate with no urinary retention >300ml in bladder  [] Complete bladder protocol with cornelius removal  [] Maintain O2 SATs at ___%  [x] Have pain managed while on ARU       [] Be pain free by discharge   [x] Have no skin breakdown while on ARU  [] Have improved skin integrity via wound measurements  [x] Have no signs/symptoms of infection at the wound site  [x] Be free from injury during hospitalization   [] Complete education with patient/family with understanding demonstrated for:  [] Adjustment   [] Other:   Nursing interventions may include bowel/bladder training, education for medical assistive devices, medication education, O2 saturation management, energy conservation, stress  (4/27/24)  Short Term Goal 1: Pt will perform toilet transfer with SBA and LRAD  Short Term Goal 2: Pt will perform LB dressing with Darrell using AE  Short Term Goal 3: Pt will perform 1-2 grooming tasks in stance at sink with SBA for balance  Short Term Goal 4: Pt will tolerate x20 reps of BUE therex  Short Term Goal 5: Pt will perform UB ADL with set-up :  Long Term Goals  Time Frame for Long Term Goals : 10 days (5/2/24)  Long Term Goal 1: Pt will perform toilet transfer mod I with LRAD  Long Term Goal 2: Pt will perform TB bathing mod I with AE PRN  Long Term Goal 3: Pt will perform TB dressing mod I with AE PRN  Long Term Goal 4: Pt will tolerate standing for ~10 min with SPV to increase endurance for standing ADLs and mobility  Long Term Goal 5: Pt will perform 1 light IADL task with SPV :    These goals were reviewed with this patient at the time of assessment and Karishma Lee is in agreement    Plan of Care:  Pt to be seen 5 out of 7 days per week, 60   mins (exact) per day for 10 days (exact)  Current Treatment Recommendations: Strengthening, Balance training, Functional mobility training, Endurance training, Patient/Caregiver education & training, Self-Care / ADL, Safety education & training      SPEECH THERAPY: Goals will be left blank if speech is not following this patient.  Goals:  Long Term Goals:   Time Frame for Long Term Goals: 10 Days (5/2/24)  Goal 1: Pt will demonstrate improved cognitive linguistic function for safe return to prior level of care.       Short Term Goals:  Time Frame for Short Term Goals: 7 Days (4/29/24)  Goal 1: Patient will complete executive function tasks (i.e. meds) related to relevant ADLs with 80% accuracy given min cues  Goal 2: Patient will completed graded recall tasks in order to promote memory of functional information on 80% opp given min cues  Goal 3: Pt will completed graded thought organization and problem solving tasks on 80% opp given min cues  Goal 4: Pt will  is a 63 year old female with a past medical history significant for HTN, HLD, GERD, ANGEL, DM2, mood disorder, and obesity who presented to [facility] on 4/[facility]/24 after a fall down multiple stairs, found to have small left SDH, multiple rib fractures, trace left pneumothorax, atelectasis, and left 1-5 TP fractures. She was admitted to [facility] on 4/23/24 due to functional deficits below her baseline.     This plan has been reviewed with Karishma Lee on 4/26/24 in a language the patient understands.  Karishma Lee has had the opportunity to include input with the therapy team.      I have reviewed this initial plan of care and agree with its contents:    Title   Name    Date    Time    Physician: April Garduno MD    Case Mgmt: Nirmala Davila RN    OT: Marcella Schofield, OTR/L    PT: Carmen Reed PT    RN: Ovi Ruelas RN    ST: Bozena Fisher MA CCC-SLP    PPS Coordinator: Leah Yanez, PT, DPT    Other:

## 2024-04-24 LAB
ANION GAP SERPL CALCULATED.3IONS-SCNC: 12 MMOL/L (ref 3–16)
BASOPHILS # BLD: 0.1 K/UL (ref 0–0.2)
BASOPHILS NFR BLD: 0.5 %
BUN SERPL-MCNC: 20 MG/DL (ref 7–20)
CALCIUM SERPL-MCNC: 9.5 MG/DL (ref 8.3–10.6)
CHLORIDE SERPL-SCNC: 99 MMOL/L (ref 99–110)
CO2 SERPL-SCNC: 26 MMOL/L (ref 21–32)
CREAT SERPL-MCNC: 1 MG/DL (ref 0.6–1.2)
DEPRECATED RDW RBC AUTO: 13.9 % (ref 12.4–15.4)
EKG ATRIAL RATE: 115 BPM
EKG DIAGNOSIS: NORMAL
EKG P AXIS: 22 DEGREES
EKG P-R INTERVAL: 140 MS
EKG Q-T INTERVAL: 344 MS
EKG QRS DURATION: 90 MS
EKG QTC CALCULATION (BAZETT): 474 MS
EKG R AXIS: -8 DEGREES
EKG T AXIS: 30 DEGREES
EKG VENTRICULAR RATE: 114 BPM
EOSINOPHIL # BLD: 0.3 K/UL (ref 0–0.6)
EOSINOPHIL NFR BLD: 2.4 %
GFR SERPLBLD CREATININE-BSD FMLA CKD-EPI: 63 ML/MIN/{1.73_M2}
GLUCOSE BLD-MCNC: 131 MG/DL (ref 70–99)
GLUCOSE BLD-MCNC: 150 MG/DL (ref 70–99)
GLUCOSE BLD-MCNC: 195 MG/DL (ref 70–99)
GLUCOSE BLD-MCNC: 277 MG/DL (ref 70–99)
GLUCOSE BLD-MCNC: 55 MG/DL (ref 70–99)
GLUCOSE SERPL-MCNC: 234 MG/DL (ref 70–99)
HCT VFR BLD AUTO: 37 % (ref 36–48)
HGB BLD-MCNC: 12.1 G/DL (ref 12–16)
LYMPHOCYTES # BLD: 1 K/UL (ref 1–5.1)
LYMPHOCYTES NFR BLD: 7.9 %
MCH RBC QN AUTO: 30.6 PG (ref 26–34)
MCHC RBC AUTO-ENTMCNC: 32.8 G/DL (ref 31–36)
MCV RBC AUTO: 93.3 FL (ref 80–100)
MONOCYTES # BLD: 1 K/UL (ref 0–1.3)
MONOCYTES NFR BLD: 8.4 %
NEUTROPHILS # BLD: 10 K/UL (ref 1.7–7.7)
NEUTROPHILS NFR BLD: 80.8 %
PERFORMED ON: ABNORMAL
PLATELET # BLD AUTO: 393 K/UL (ref 135–450)
PMV BLD AUTO: 7.9 FL (ref 5–10.5)
POTASSIUM SERPL-SCNC: 4.2 MMOL/L (ref 3.5–5.1)
RBC # BLD AUTO: 3.96 M/UL (ref 4–5.2)
SODIUM SERPL-SCNC: 137 MMOL/L (ref 136–145)
WBC # BLD AUTO: 12.3 K/UL (ref 4–11)

## 2024-04-24 PROCEDURE — 97530 THERAPEUTIC ACTIVITIES: CPT

## 2024-04-24 PROCEDURE — 6360000002 HC RX W HCPCS: Performed by: STUDENT IN AN ORGANIZED HEALTH CARE EDUCATION/TRAINING PROGRAM

## 2024-04-24 PROCEDURE — 93005 ELECTROCARDIOGRAM TRACING: CPT | Performed by: STUDENT IN AN ORGANIZED HEALTH CARE EDUCATION/TRAINING PROGRAM

## 2024-04-24 PROCEDURE — 85025 COMPLETE CBC W/AUTO DIFF WBC: CPT

## 2024-04-24 PROCEDURE — 93010 ELECTROCARDIOGRAM REPORT: CPT | Performed by: INTERNAL MEDICINE

## 2024-04-24 PROCEDURE — 97167 OT EVAL HIGH COMPLEX 60 MIN: CPT

## 2024-04-24 PROCEDURE — 80048 BASIC METABOLIC PNL TOTAL CA: CPT

## 2024-04-24 PROCEDURE — 96125 COGNITIVE TEST BY HC PRO: CPT

## 2024-04-24 PROCEDURE — 97116 GAIT TRAINING THERAPY: CPT

## 2024-04-24 PROCEDURE — 6370000000 HC RX 637 (ALT 250 FOR IP): Performed by: STUDENT IN AN ORGANIZED HEALTH CARE EDUCATION/TRAINING PROGRAM

## 2024-04-24 PROCEDURE — 97162 PT EVAL MOD COMPLEX 30 MIN: CPT

## 2024-04-24 PROCEDURE — 97535 SELF CARE MNGMENT TRAINING: CPT

## 2024-04-24 PROCEDURE — 36415 COLL VENOUS BLD VENIPUNCTURE: CPT

## 2024-04-24 PROCEDURE — 1280000000 HC REHAB R&B

## 2024-04-24 RX ADMIN — GABAPENTIN 300 MG: 300 CAPSULE ORAL at 08:26

## 2024-04-24 RX ADMIN — ACETAMINOPHEN 1000 MG: 500 TABLET ORAL at 06:18

## 2024-04-24 RX ADMIN — MIRTAZAPINE 7.5 MG: 15 TABLET, FILM COATED ORAL at 22:13

## 2024-04-24 RX ADMIN — INSULIN LISPRO 3 UNITS: 100 INJECTION, SOLUTION INTRAVENOUS; SUBCUTANEOUS at 12:27

## 2024-04-24 RX ADMIN — METHOCARBAMOL 1000 MG: 500 TABLET ORAL at 16:59

## 2024-04-24 RX ADMIN — METOPROLOL SUCCINATE 50 MG: 50 TABLET, EXTENDED RELEASE ORAL at 08:26

## 2024-04-24 RX ADMIN — ACETAMINOPHEN 1000 MG: 500 TABLET ORAL at 22:13

## 2024-04-24 RX ADMIN — ROPINIROLE HYDROCHLORIDE 0.5 MG: 0.5 TABLET, FILM COATED ORAL at 22:14

## 2024-04-24 RX ADMIN — Medication 5 MG: at 22:14

## 2024-04-24 RX ADMIN — LAMOTRIGINE 100 MG: 100 TABLET ORAL at 22:14

## 2024-04-24 RX ADMIN — ATORVASTATIN CALCIUM 10 MG: 10 TABLET, FILM COATED ORAL at 08:26

## 2024-04-24 RX ADMIN — GABAPENTIN 300 MG: 300 CAPSULE ORAL at 22:16

## 2024-04-24 RX ADMIN — METHOCARBAMOL 1000 MG: 500 TABLET ORAL at 08:25

## 2024-04-24 RX ADMIN — ACETAMINOPHEN 1000 MG: 500 TABLET ORAL at 14:00

## 2024-04-24 RX ADMIN — INSULIN LISPRO 3 UNITS: 100 INJECTION, SOLUTION INTRAVENOUS; SUBCUTANEOUS at 16:59

## 2024-04-24 RX ADMIN — SENNOSIDES AND DOCUSATE SODIUM 1 TABLET: 50; 8.6 TABLET ORAL at 22:14

## 2024-04-24 RX ADMIN — INSULIN LISPRO 3 UNITS: 100 INJECTION, SOLUTION INTRAVENOUS; SUBCUTANEOUS at 08:03

## 2024-04-24 RX ADMIN — ENOXAPARIN SODIUM 30 MG: 100 INJECTION SUBCUTANEOUS at 08:27

## 2024-04-24 RX ADMIN — ENOXAPARIN SODIUM 30 MG: 100 INJECTION SUBCUTANEOUS at 22:16

## 2024-04-24 RX ADMIN — METHOCARBAMOL 1000 MG: 500 TABLET ORAL at 22:13

## 2024-04-24 RX ADMIN — SENNOSIDES AND DOCUSATE SODIUM 1 TABLET: 50; 8.6 TABLET ORAL at 08:26

## 2024-04-24 RX ADMIN — ESCITALOPRAM OXALATE 20 MG: 10 TABLET ORAL at 08:26

## 2024-04-24 RX ADMIN — METHOCARBAMOL 1000 MG: 500 TABLET ORAL at 12:26

## 2024-04-24 RX ADMIN — GABAPENTIN 300 MG: 300 CAPSULE ORAL at 14:00

## 2024-04-24 ASSESSMENT — PAIN DESCRIPTION - LOCATION: LOCATION: RIB CAGE

## 2024-04-24 ASSESSMENT — PAIN DESCRIPTION - DESCRIPTORS: DESCRIPTORS: ACHING

## 2024-04-24 ASSESSMENT — PAIN DESCRIPTION - ORIENTATION: ORIENTATION: LEFT

## 2024-04-24 ASSESSMENT — PAIN SCALES - GENERAL: PAINLEVEL_OUTOF10: 5

## 2024-04-24 ASSESSMENT — PAIN - FUNCTIONAL ASSESSMENT: PAIN_FUNCTIONAL_ASSESSMENT: ACTIVITIES ARE NOT PREVENTED

## 2024-04-24 NOTE — PATIENT CARE CONFERENCE
[facility]  Inpatient Rehabilitation  Weekly Team Conference Note    Date: 2024  Patient Name: Karishma Lee        MRN: [id number]    : 1960  (63 y.o.)  Gender: female             Interventions to be utilized toward barriers to discharge, per discipline:  NURSING  Nursing observed barriers to dc: Pain, Decreased endurance, Incontinence of bladder, Skin Care, and Medication managment  Nursing interventions: Medication and pain management, Assist with ADLs  Family Education: No  Fall Risk:  Yes    Stay with me?: No    PHYSICAL THERAPY  Evaluation in progress.  Goals and POC to be established this date.         OCCUPATIONAL THERAPY  Evaluation in progress.  Goals and POC to be established this date.         SPEECH THERAPY (intentionally left blank if not actively being seen by this service):  Evaluation in progress.  Goals and POC to be established this date.          NUTRITION  Weight - Scale: 90.7 kg (200 lb) / Body mass index is 32.78 kg/m².  Diet Order: ADULT DIET; Regular; 4 carb choices (60 gm/meal)  PO Meals Eaten (%): 76 - 100%            CASE MANAGEMENT  Assessment: 63 yr old female. Dx:SDH (subdural hematoma). Independent PLOF cane for long distance. Lives w/spouse in a one level home with 5 steps. Home has walk-n-shower. Patient owns walker, cane, lift chair, reacher and sock aide. Therapy recommendations are pending. DME:TBD.    Interdisciplinary Goals:   1.) Therapy evaluations in progress. Goals and POC to be established this date.   2.)  3.)  4.)  5.)      []  Family Training discussed at conference and to be scheduled.      Discharge Plan   Estimated discharge date: TBD pending evaluations  Destination: TBD pending evaluations  Pass:No  Services at Discharge: TBD pending evaluations  Equipment at Discharge: TBD pending evaluations    Team Members Present at Conference:  : Nirmala Davila RN    Occupational Therapist: pending evaluation  Physical

## 2024-04-24 NOTE — PROGRESS NOTES
Patient A&Ox4, VSS with exception to tachycardia. HR elevated, as high as 130 this am, MD notified. Scheduled medications administered with some relief, HR remains in the 110's. Patient with one episode of lightheadedness while ambulating from chair to the bed, vitals remained at baseline. Pt continues to report left side body pains, medicated per MAR with relief. Pt denies nausea, tolerating diet well. Purewick in place for incontinence with adequate output. Patient instructed to call out for needs. Fall precautions in place. Will continue to monitor for changes.

## 2024-04-24 NOTE — PROGRESS NOTES
FSBS 55, patient alert and oriented, denies any symptom control issues. Consumed 240ml's of regular pepsi, requested instead of juice. Color pink, skin warm and dry, denies any N/V. Declined any further juice/soda or a snack at this time. Will continue to monitor.

## 2024-04-24 NOTE — PROGRESS NOTES
Speech Language Pathology  Facility/Department: [facility]  Initial Speech/Language/Cognitive Assessment       NAME:Karishma Lee  : 1960 (63 y.o.)   MRN: [id number]  ROOM: 0168/0168-01  ADMISSION DATE: 2024  PATIENT DIAGNOSIS(ES): SDH (subdural hematoma) (HCC) [S06.5XAA]  Patient Active Problem List    Diagnosis Date Noted    Mild obstructive sleep apnea 2022    Obesity (BMI 30-39.9) 2022    SDH (subdural hematoma) (HCC) 2024    Coronary artery disease involving native coronary artery of native heart without angina pectoris 2022    Abnormal stress test 2021    Breast nodule 2019    Moderate episode of recurrent major depressive disorder (HCC) 2017    Pure hypercholesterolemia 2017    Anxiety     Essential hypertension      Past Medical History:   Diagnosis Date    ADHD (attention deficit hyperactivity disorder)     Anxiety     Bipolar disorder (HCC)     Depression     Diverticulitis 10/19/2021    DJD (degenerative joint disease)     hands/knees/ankles    Fatty liver     GERD (gastroesophageal reflux disease)     Hyperlipidemia     Hypertension     Irritable bowel syndrome     Mood disorder (HCC)     NOS    Narcotic addiction (HCC)     Obesity     ANGEL (obstructive sleep apnea)     no CPAP machine - not recommended per sleep study    Primary localized osteoarthritis of right knee 2019    Restless leg syndrome     Status post total right knee replacement 2019    Type 2 diabetes mellitus without complication (HCC)     Type II or unspecified type diabetes mellitus without mention of complication, not stated as uncontrolled     Vision impairment     wears glasses and contacts     Past Surgical History:   Procedure Laterality Date    APPENDECTOMY      CHOLECYSTECTOMY  2001    COLONOSCOPY  2004    Due 2014    CYSTOSCOPY  2010    CYSTOSCOPY  2010    insertion of sparc mesh sling with cystoscopy    HYSTERECTOMY (CERVIX STATUS UNKNOWN)

## 2024-04-24 NOTE — CONSULTS
Comprehensive Nutrition Assessment    Type and Reason for Visit:  Initial, Consult    Nutrition Recommendations/Plan:   Continue CC4 diet and encourage po intakes  Add glucerna BID   Obtain updated weight  Monitor nutrition adequacy, pertinent labs, bowel habits, wt changes, and clinical progress     Malnutrition Assessment:  Malnutrition Status:  No malnutrition (04/24/24 1319)      Nutrition Assessment:    Consult for ONS: New ARU pt initially admitted after fall, admitted to ARU with SDH. Hx GERD, HLD, HTN, IBS, DM. Currently on CC4 diet with x1 % po intake recorded per EMR. Current weight stated and limited weight history per EMR, difficult to assess recent weight changes. Pt reports having an appetite and eating her meals. Denies unintentional weight loss. RD encouraged good protein intakes and reviewed sources, pt receptive and also wanting ONS, RD to add. Discussed current diet, pt denied further nutrition related questions. Encourage intakes as tolerated, will monitor.    Nutrition Related Findings:    BG  x24 hrs. Wound Type: None       Current Nutrition Intake & Therapies:    Average Meal Intake: % (x1)  Average Supplements Intake: None Ordered  ADULT DIET; Regular; 4 carb choices (60 gm/meal)    Anthropometric Measures:  Height: 166.4 cm (5' 5.5\")  Ideal Body Weight (IBW): 128 lbs (58 kg)       Current Body Weight: 90.7 kg (199 lb 15.3 oz),   IBW. Weight Source: Stated  Current BMI (kg/m2): 32.8        Weight Adjustment For: No Adjustment                 BMI Categories: Obese Class 1 (BMI 30.0-34.9)    Estimated Daily Nutrient Needs:  Energy Requirements Based On: Kcal/kg (25-30)  Weight Used for Energy Requirements: Ideal  Energy (kcal/day): 6580-3286 kcals  Weight Used for Protein Requirements: Ideal (1-1.2)  Protein (g/day): 58-70 g  Method Used for Fluid Requirements: 1 ml/kcal  Fluid (ml/day): 1101-0855 ml    Nutrition Diagnosis:   No nutrition diagnosis at this time    Nutrition  Interventions:   Food and/or Nutrient Delivery: Continue Current Diet, Start Oral Nutrition Supplement  Nutrition Education/Counseling: Education initiated  Coordination of Nutrition Care: Continue to monitor while inpatient, Interdisciplinary Rounds       Goals:     Goals: PO intake 75% or greater, prior to discharge       Nutrition Monitoring and Evaluation:   Behavioral-Environmental Outcomes: None Identified  Food/Nutrient Intake Outcomes: Food and Nutrient Intake, Supplement Intake  Physical Signs/Symptoms Outcomes: Biochemical Data, Meal Time Behavior, Weight    Discharge Planning:    Continue current diet     Marielena Bello MS RD LD      Contact: Office: [phone]; [place]: [phone]

## 2024-04-24 NOTE — PLAN OF CARE
SLP completed evaluation. Please refer to notes in EMR.      Thank you,   Bozena Fisher M.A. CCC-SLP   Speech-Language Pathologist

## 2024-04-24 NOTE — PLAN OF CARE
Fall precautions in place, bed alarm on, nonskid foot wear applied, bed in lowest position, wheels locked and call light/personal items within reach. Pt agreeable to contact staff for assistance. Patient free from falls this shift, will continue to monitor.

## 2024-04-24 NOTE — H&P
Patient: Karishma Lee  [id number]  Date: 4/24/2024      Chief Complaint: TBI    History of Present Illness/Hospital Course:  Karishma Lee is a 63 year old female with a past medical history significant for HTN, HLD, GERD, ANGEL, DM2, mood disorder, and obesity who presented to [facility] on 4/13/24 after a fall down multiple stairs. She was found to have small left SDH, multiple rib fractures, trace left pneumothorax, atelectasis, and left 1-5 TP fractures. She was admitted to [facility] on 4/23/24 due to functional deficits below her baseline. Today Karishma is seen in her room with nursing present. She reports living in a single level home with 0 CITLALI with her .     Prior Level of Function:  Independent for self care, indoor mobility, stairs, functional cognition     Current Level of Function:  Mod assist for lower body dressing, putting on/taking off footwear, chair/bed transfer  Dependent for toileting hygiene, sit to lying, lying to sitting on side of bed, sit to stand     has a past medical history of ADHD (attention deficit hyperactivity disorder), Anxiety, Bipolar disorder ([facility]), Depression, Diverticulitis, DJD (degenerative joint disease), Fatty liver, GERD (gastroesophageal reflux disease), Hyperlipidemia, Hypertension, Irritable bowel syndrome, Mood disorder ([facility]), Narcotic addiction ([facility]), Obesity, ANGEL (obstructive sleep apnea), Primary localized osteoarthritis of right knee, Restless leg syndrome, Status post total right knee replacement, Type 2 diabetes mellitus without complication ([facility]), Type II or unspecified type diabetes mellitus without mention of complication, not stated as uncontrolled, and Vision impairment.     has a past surgical history that includes Total abdominal hysterectomy w/ bilateral salpingoophorectomy (04/2010); Knee arthroscopy (Right); Plantar fascia surgery; Appendectomy; Cholecystectomy (2001); Cystoscopy (07/09/2010); Cystoscopy (11/18/2010); Colonoscopy (01/2004); Knee arthroscopy  14 days  - PT, OT, ST    Tachycardia  - obtain EKG  - metoprolol    Left Rib Fractures, 2-11  - with pneumothorax during acute stay, since resolved  - not a candidate for SSRF  - multimodal pain control  - IS    L lumbar 1-5 TP fractures  - no intervention or brace needed    HTN  - metoprolol    HLD  - statin    ANGEL  - home CPAP    DM2  - home regimen: metformin, ozempic  - stop NPH due to hypoglycemia  - SSI    Bipolar Disorder  - home requip, lamotrigine, lexapro    Bowels: adjust medications as needed for regular bowel movements     Bladder: Check PVR x 3.  IMC if PVR > 200ml or if any volume is > 500 ml.     Sleep: melatonin nightly     PPX  DVT: lovenox  GI: not indicated    Follow up appointments: Trauma, Neurosurgery, PCP    April Garduno MD 4/24/2024, 10:42 AM

## 2024-04-24 NOTE — PROGRESS NOTES
Physical Therapy  Facility/Department: [facility]  Rehabilitation Physical Therapy Initial Assessment    NAME: Karishma Lee  : 1960 (63 y.o.)  MRN: [id number]  CODE STATUS: Full Code    Date of Service: 24      Past Medical History:   Diagnosis Date    ADHD (attention deficit hyperactivity disorder)     Anxiety     Bipolar disorder (HCC)     Depression     Diverticulitis 10/19/2021    DJD (degenerative joint disease)     hands/knees/ankles    Fatty liver     GERD (gastroesophageal reflux disease)     Hyperlipidemia     Hypertension     Irritable bowel syndrome     Mood disorder (HCC)     NOS    Narcotic addiction (HCC)     Obesity     ANGEL (obstructive sleep apnea)     no CPAP machine - not recommended per sleep study    Primary localized osteoarthritis of right knee 2019    Restless leg syndrome     Status post total right knee replacement 2019    Type 2 diabetes mellitus without complication (HCC)     Type II or unspecified type diabetes mellitus without mention of complication, not stated as uncontrolled     Vision impairment     wears glasses and contacts     Past Surgical History:   Procedure Laterality Date    APPENDECTOMY      CHOLECYSTECTOMY      COLONOSCOPY  2004    Due     CYSTOSCOPY  2010    CYSTOSCOPY  2010    insertion of sparc mesh sling with cystoscopy    HYSTERECTOMY (CERVIX STATUS UNKNOWN)      HYSTERECTOMY, TOTAL ABDOMINAL (CERVIX REMOVED)      JOINT REPLACEMENT      KNEE ARTHROPLASTY      KNEE ARTHROSCOPY Right     KNEE ARTHROSCOPY Left 10/2013    partial medial menisectomy    KNEE SURGERY      PLANTAR FASCIA SURGERY      bilateral    BRANDI AND BSO (CERVIX REMOVED)  2010    DUB    TOTAL KNEE ARTHROPLASTY Right 2019    RIGHT TOTAL KNEE MAKOPLASTY REPLACEMENT WITH ADDUCTOR CANAL BLOCK FOR PAIN CONTROL                 JOE HAKAN  CPT CODE - 18910  performed by Lester Morales MD at [facility] OR    UPPER GASTROINTESTINAL ENDOSCOPY         Chart  week  Therapy Duration: 10 Days  Current Treatment Recommendations: Strengthening;ROM;Balance training;Functional mobility training;Gait training;Equipment evaluation, education, & procurement;Therapeutic activities;Home exercise program;Safety education & training;Patient/Caregiver education & training  Safety Devices  Type of Devices: All fall risk precautions in place;Call light within reach;Chair alarm in place;Left in chair;Patient at risk for falls;Nurse notified;Gait belt  Restraints  Restraints Initially in Place: No    EDUCATION  Education  Education Given To: Patient  Education Provided: Role of Therapy;Plan of Care;Safety;Mobility Training;Transfer Training;Energy Conservation;Fall Prevention Strategies;Equipment;Precautions  Education Provided Comments: ARU expectations, role of PT, safety with transfers, use of AD  Education Method: Verbal  Barriers to Learning: None  Education Outcome: Verbalized understanding;Continued education needed    ELOS: 10         Therapy Time   Individual Concurrent Group Co-treatment   Time In 1400         Time Out 1500         Minutes 60           Timed Code Treatment Minutes: 45 Minutes (15 min eval)       Carmen Reed, PT, 04/24/24 at 4:26 PM

## 2024-04-24 NOTE — PLAN OF CARE
Problem: Discharge Planning  Goal: Discharge to home or other facility with appropriate resources  Outcome: Progressing     Problem: Skin/Tissue Integrity - Adult  Goal: Skin integrity remains intact  Outcome: Progressing     Problem: Skin/Tissue Integrity  Goal: Absence of new skin breakdown  Description: 1.  Monitor for areas of redness and/or skin breakdown  2.  Assess vascular access sites hourly  3.  Every 4-6 hours minimum:  Change oxygen saturation probe site  4.  Every 4-6 hours:  If on nasal continuous positive airway pressure, respiratory therapy assess nares and determine need for appliance change or resting period.  Outcome: Progressing

## 2024-04-24 NOTE — PROGRESS NOTES
Occupational Therapy  Facility/Department: [facility]  Rehabilitation Occupational Therapy Evaluation and Treatment        Date: 24  Patient Name: Karishma Lee       Room: 0168/0168-01  MRN: [id number]  Account: [id number]   : 1960  (63 y.o.) Gender: female     Referring Practitioner: April Garduno MD  Diagnosis: SDH       Restrictions  Restrictions/Precautions: Fall Risk;Up as Tolerated  Other position/activity restrictions: purewick  Required Braces or Orthoses?: No    Subjective  Subjective: Pt resting in bed upon OT arrival, agreeable to eval and treatment  Comments: RN approved therapy          Vitals  Temp: 98.7 °F (37.1 °C)  Pulse: (!) 113  Respirations: 18  BP: (!) 147/89  Oxygen Therapy  SpO2: 93 %  O2 Device: None (Room air)       Objective     Cognition  Overall Cognitive Status: Exceptions  Arousal/Alertness: Appropriate responses to stimuli  Following Commands: Follows one step commands consistently;Follows multistep commands consistently  Attention Span: Attends with cues to redirect;Difficulty attending to directions  Memory: Decreased recall of recent events  Safety Judgement: Decreased awareness of need for assistance;Decreased awareness of need for safety  Problem Solving: Assistance required to generate solutions;Assistance required to implement solutions  Insights: Decreased awareness of deficits  Initiation: Does not require cues  Sequencing: Requires cues for some  Orientation  Overall Orientation Status: Within Functional Limits  Orientation Level: Oriented X4   Perception  Overall Perceptual Status: WFL  Sensation  Overall Sensation Status: WFL     ROM  LUE AROM (degrees)  LUE AROM : WFL  Left Hand AROM (degrees)  Left Hand AROM: WFL  RUE AROM (degrees)  RUE AROM : WFL  Right Hand AROM (degrees)  Right Hand AROM: WFL  LUE Strength  Gross LUE Strength: Exceptions to WFL  L Shoulder Flex: 4-/5  L Elbow Flex: 4/5  L Elbow Ext: 4/5  L Wrist Flex: 4-/5  L Wrist Ext: 4-/5  L  will benefit from continued skilled OT services on ARU to maximize safety and IND with ADLs and mobility. Home with 24hrA and OP OT is recommended at d/c.  Prognosis: Good  Decision Making: High Complexity  Discharge Recommendations: 24 hour supervision or assist;Outpatient OT  Occupational Therapy Plan  Times Per Week: 5-7x/wk  Current Treatment Recommendations: Strengthening;Balance training;Functional mobility training;Endurance training;Patient/Caregiver education & training;Self-Care / ADL;Safety education & training       Therapy Time   Individual Concurrent Group Co-treatment   Time In 0800         Time Out 0930         Minutes 90         Timed Code Treatment Minutes: 75 Minutes (15 min eval)       Marcella Schofield, OT

## 2024-04-25 ENCOUNTER — APPOINTMENT (OUTPATIENT)
Dept: GENERAL RADIOLOGY | Age: 64
DRG: 950 | End: 2024-04-25
Attending: STUDENT IN AN ORGANIZED HEALTH CARE EDUCATION/TRAINING PROGRAM
Payer: COMMERCIAL

## 2024-04-25 LAB
BASOPHILS # BLD: 0 K/UL (ref 0–0.2)
BASOPHILS NFR BLD: 0 %
DEPRECATED RDW RBC AUTO: 14.1 % (ref 12.4–15.4)
EOSINOPHIL # BLD: 0.1 K/UL (ref 0–0.6)
EOSINOPHIL NFR BLD: 1 %
GLUCOSE BLD-MCNC: 155 MG/DL (ref 70–99)
GLUCOSE BLD-MCNC: 164 MG/DL (ref 70–99)
GLUCOSE BLD-MCNC: 256 MG/DL (ref 70–99)
GLUCOSE BLD-MCNC: 312 MG/DL (ref 70–99)
HCT VFR BLD AUTO: 37.2 % (ref 36–48)
HGB BLD-MCNC: 12.1 G/DL (ref 12–16)
LYMPHOCYTES # BLD: 1.6 K/UL (ref 1–5.1)
LYMPHOCYTES NFR BLD: 15 %
MACROCYTES BLD QL SMEAR: ABNORMAL
MCH RBC QN AUTO: 30.5 PG (ref 26–34)
MCHC RBC AUTO-ENTMCNC: 32.7 G/DL (ref 31–36)
MCV RBC AUTO: 93.1 FL (ref 80–100)
MICROCYTES BLD QL SMEAR: ABNORMAL
MONOCYTES # BLD: 0.4 K/UL (ref 0–1.3)
MONOCYTES NFR BLD: 4 %
MYELOCYTES NFR BLD MANUAL: 1 %
NEUTROPHILS # BLD: 8.6 K/UL (ref 1.7–7.7)
NEUTROPHILS NFR BLD: 73 %
NEUTS BAND NFR BLD MANUAL: 6 % (ref 0–7)
OVALOCYTES BLD QL SMEAR: ABNORMAL
PERFORMED ON: ABNORMAL
PLATELET # BLD AUTO: 399 K/UL (ref 135–450)
PLATELET BLD QL SMEAR: ADEQUATE
PMV BLD AUTO: 8 FL (ref 5–10.5)
POLYCHROMASIA BLD QL SMEAR: ABNORMAL
RBC # BLD AUTO: 3.99 M/UL (ref 4–5.2)
SLIDE REVIEW: ABNORMAL
WBC # BLD AUTO: 10.7 K/UL (ref 4–11)

## 2024-04-25 PROCEDURE — 97110 THERAPEUTIC EXERCISES: CPT

## 2024-04-25 PROCEDURE — 74018 RADEX ABDOMEN 1 VIEW: CPT

## 2024-04-25 PROCEDURE — 51798 US URINE CAPACITY MEASURE: CPT

## 2024-04-25 PROCEDURE — 6360000002 HC RX W HCPCS: Performed by: STUDENT IN AN ORGANIZED HEALTH CARE EDUCATION/TRAINING PROGRAM

## 2024-04-25 PROCEDURE — 1280000000 HC REHAB R&B

## 2024-04-25 PROCEDURE — 51702 INSERT TEMP BLADDER CATH: CPT

## 2024-04-25 PROCEDURE — 97530 THERAPEUTIC ACTIVITIES: CPT

## 2024-04-25 PROCEDURE — 6370000000 HC RX 637 (ALT 250 FOR IP): Performed by: STUDENT IN AN ORGANIZED HEALTH CARE EDUCATION/TRAINING PROGRAM

## 2024-04-25 PROCEDURE — 97129 THER IVNTJ 1ST 15 MIN: CPT

## 2024-04-25 PROCEDURE — 97116 GAIT TRAINING THERAPY: CPT

## 2024-04-25 PROCEDURE — 36415 COLL VENOUS BLD VENIPUNCTURE: CPT

## 2024-04-25 PROCEDURE — 85025 COMPLETE CBC W/AUTO DIFF WBC: CPT

## 2024-04-25 PROCEDURE — 97130 THER IVNTJ EA ADDL 15 MIN: CPT

## 2024-04-25 PROCEDURE — 97535 SELF CARE MNGMENT TRAINING: CPT

## 2024-04-25 RX ADMIN — INSULIN LISPRO 3 UNITS: 100 INJECTION, SOLUTION INTRAVENOUS; SUBCUTANEOUS at 17:27

## 2024-04-25 RX ADMIN — ACETAMINOPHEN 1000 MG: 500 TABLET ORAL at 05:58

## 2024-04-25 RX ADMIN — ROPINIROLE HYDROCHLORIDE 0.5 MG: 0.5 TABLET, FILM COATED ORAL at 20:53

## 2024-04-25 RX ADMIN — ATORVASTATIN CALCIUM 10 MG: 10 TABLET, FILM COATED ORAL at 08:20

## 2024-04-25 RX ADMIN — ESCITALOPRAM OXALATE 20 MG: 10 TABLET ORAL at 08:20

## 2024-04-25 RX ADMIN — ENOXAPARIN SODIUM 30 MG: 100 INJECTION SUBCUTANEOUS at 20:53

## 2024-04-25 RX ADMIN — Medication 5 MG: at 20:53

## 2024-04-25 RX ADMIN — METHOCARBAMOL 1000 MG: 500 TABLET ORAL at 08:19

## 2024-04-25 RX ADMIN — INSULIN LISPRO 3 UNITS: 100 INJECTION, SOLUTION INTRAVENOUS; SUBCUTANEOUS at 12:21

## 2024-04-25 RX ADMIN — POLYETHYLENE GLYCOL 3350 17 G: 17 POWDER, FOR SOLUTION ORAL at 08:19

## 2024-04-25 RX ADMIN — ACETAMINOPHEN 1000 MG: 500 TABLET ORAL at 15:30

## 2024-04-25 RX ADMIN — SENNOSIDES AND DOCUSATE SODIUM 1 TABLET: 50; 8.6 TABLET ORAL at 08:20

## 2024-04-25 RX ADMIN — GABAPENTIN 300 MG: 300 CAPSULE ORAL at 15:30

## 2024-04-25 RX ADMIN — INSULIN LISPRO 3 UNITS: 100 INJECTION, SOLUTION INTRAVENOUS; SUBCUTANEOUS at 08:27

## 2024-04-25 RX ADMIN — MIRTAZAPINE 7.5 MG: 15 TABLET, FILM COATED ORAL at 20:53

## 2024-04-25 RX ADMIN — METHOCARBAMOL 1000 MG: 500 TABLET ORAL at 20:53

## 2024-04-25 RX ADMIN — METOPROLOL SUCCINATE 50 MG: 50 TABLET, EXTENDED RELEASE ORAL at 08:20

## 2024-04-25 RX ADMIN — GABAPENTIN 300 MG: 300 CAPSULE ORAL at 08:20

## 2024-04-25 RX ADMIN — METHOCARBAMOL 1000 MG: 500 TABLET ORAL at 12:21

## 2024-04-25 RX ADMIN — METHOCARBAMOL 1000 MG: 500 TABLET ORAL at 17:27

## 2024-04-25 RX ADMIN — INSULIN LISPRO 6 UNITS: 100 INJECTION, SOLUTION INTRAVENOUS; SUBCUTANEOUS at 12:21

## 2024-04-25 RX ADMIN — LAMOTRIGINE 100 MG: 100 TABLET ORAL at 20:53

## 2024-04-25 RX ADMIN — ACETAMINOPHEN 650 MG: 325 TABLET ORAL at 08:19

## 2024-04-25 RX ADMIN — ENOXAPARIN SODIUM 30 MG: 100 INJECTION SUBCUTANEOUS at 08:19

## 2024-04-25 ASSESSMENT — PAIN SCALES - GENERAL
PAINLEVEL_OUTOF10: 0
PAINLEVEL_OUTOF10: 5
PAINLEVEL_OUTOF10: 7
PAINLEVEL_OUTOF10: 0

## 2024-04-25 ASSESSMENT — PAIN DESCRIPTION - DESCRIPTORS: DESCRIPTORS: ACHING;SORE

## 2024-04-25 ASSESSMENT — PAIN DESCRIPTION - ORIENTATION
ORIENTATION: RIGHT;LEFT
ORIENTATION: LEFT

## 2024-04-25 ASSESSMENT — PAIN DESCRIPTION - PAIN TYPE: TYPE: ACUTE PAIN

## 2024-04-25 ASSESSMENT — PAIN DESCRIPTION - LOCATION
LOCATION: ARM;RIB CAGE
LOCATION: RIB CAGE

## 2024-04-25 ASSESSMENT — PAIN DESCRIPTION - FREQUENCY: FREQUENCY: CONTINUOUS

## 2024-04-25 ASSESSMENT — PAIN DESCRIPTION - ONSET: ONSET: ON-GOING

## 2024-04-25 ASSESSMENT — PAIN - FUNCTIONAL ASSESSMENT: PAIN_FUNCTIONAL_ASSESSMENT: ACTIVITIES ARE NOT PREVENTED

## 2024-04-25 NOTE — PROGRESS NOTES
Karishma Lee  4/25/2024  [id number]    Chief Complaint: SDH (subdural hematoma) (HCC)    Subjective:   No acute events overnight. Today Karishma is seen in her room, resting in bed. She reports abdominal distension and constipation.     ROS: denies f/c, n/v, cp, sob    Objective:  Patient Vitals for the past 24 hrs:   BP Temp Temp src Pulse Resp SpO2 Height Weight   04/25/24 0854 122/75 -- -- (!) 111 -- 94 % -- --   04/25/24 0800 138/84 97.9 °F (36.6 °C) Oral 96 20 93 % -- --   04/24/24 2120 133/84 99.3 °F (37.4 °C) Oral 95 18 95 % -- --   04/24/24 1451 -- -- -- -- -- -- 1.651 m (5' 5\") 105.6 kg (232 lb 12.8 oz)   04/24/24 1406 134/83 -- -- (!) 112 -- 94 % -- --   04/24/24 1155 (!) 147/89 -- -- (!) 113 -- -- -- --   04/24/24 1030 -- -- -- (!) 112 -- -- -- --     Gen: No distress, pleasant.   HEENT: Normocephalic, atraumatic.   CV: extremities well perfused  Resp: No respiratory distress.   Abd: Soft, mildly distended, bowel sounds active  Ext: No edema.   Neuro: Alert, oriented, appropriately interactive.     Wt Readings from Last 3 Encounters:   04/24/24 105.6 kg (232 lb 12.8 oz)   02/06/24 92.9 kg (204 lb 12.8 oz)   02/01/24 93 kg (205 lb)       Laboratory data:   Lab Results   Component Value Date    WBC 10.7 04/25/2024    HGB 12.1 04/25/2024    HCT 37.2 04/25/2024    MCV 93.1 04/25/2024     04/25/2024       Lab Results   Component Value Date/Time     04/24/2024 11:16 AM    K 4.2 04/24/2024 11:16 AM    CL 99 04/24/2024 11:16 AM    CO2 26 04/24/2024 11:16 AM    BUN 20 04/24/2024 11:16 AM    CREATININE 1.0 04/24/2024 11:16 AM    GLUCOSE 234 04/24/2024 11:16 AM    CALCIUM 9.5 04/24/2024 11:16 AM        Therapy progress:  Physical therapy:  Bed Mobility:     Sit>supine:     Supine>sit:     Transfers:     Sit>stand:     Stand>sit:     Bed<>chair     Stand Pivot:     Lateral transfer:     Car transfer:     Ambulation:     Stairs:     Curb:     Wheelchair:       Occupational therapy:   Feeding

## 2024-04-25 NOTE — PLAN OF CARE
Problem: Discharge Planning  Goal: Discharge to home or other facility with appropriate resources  4/24/2024 2248 by Clari Henderson, RN  Outcome: Progressing  Flowsheets (Taken 4/24/2024 2120)  Discharge to home or other facility with appropriate resources: Identify barriers to discharge with patient and caregiver  4/24/2024 1601 by Abi Drummond RN  Outcome: Progressing     Problem: Neurosensory - Adult  Goal: Achieves stable or improved neurological status  Outcome: Progressing  Flowsheets (Taken 4/24/2024 2120)  Achieves stable or improved neurological status: Assess for and report changes in neurological status  Goal: Achieves maximal functionality and self care  Outcome: Progressing  Flowsheets (Taken 4/24/2024 2120)  Achieves maximal functionality and self care: Monitor swallowing and airway patency with patient fatigue and changes in neurological status     Problem: Skin/Tissue Integrity - Adult  Goal: Skin integrity remains intact  4/24/2024 2248 by Clari Henderson RN  Outcome: Progressing  Flowsheets (Taken 4/24/2024 2120)  Skin Integrity Remains Intact: Monitor for areas of redness and/or skin breakdown  4/24/2024 1601 by Abi Drummond RN  Outcome: Progressing  Goal: Incisions, wounds, or drain sites healing without S/S of infection  Outcome: Progressing  Flowsheets (Taken 4/24/2024 2120)  Incisions, Wounds, or Drain Sites Healing Without Sign and Symptoms of Infection: ADMISSION and DAILY: Assess and document risk factors for pressure ulcer development     Problem: Musculoskeletal - Adult  Goal: Return mobility to safest level of function  Outcome: Progressing  Flowsheets (Taken 4/24/2024 2120)  Return Mobility to Safest Level of Function: Assess patient stability and activity tolerance for standing, transferring and ambulating with or without assistive devices  Goal: Return ADL status to a safe level of function  Outcome: Progressing  Flowsheets (Taken 4/24/2024 2120)  Return ADL Status

## 2024-04-25 NOTE — PROGRESS NOTES
Physical Therapy  Facility/Department: [facility]  Rehabilitation Physical Therapy Treatment Note    NAME: Karishma Lee  : 1960 (63 y.o.)  MRN: [id number]  CODE STATUS: Full Code    Date of Service: 24       Restrictions:  Restrictions/Precautions: Fall Risk, Up as Tolerated  Position Activity Restriction  Other position/activity restrictions: ashli     SUBJECTIVE  Subjective  Subjective: pt found in bed, agreeable to therapy  Pain: reports 7/10 pain in ribs, has had medication recently          OBJECTIVE  Cognition  Overall Cognitive Status: Exceptions  Arousal/Alertness: Appropriate responses to stimuli  Following Commands: Follows one step commands consistently;Follows multistep commands consistently  Attention Span: Attends with cues to redirect;Difficulty attending to directions  Memory: Decreased recall of recent events  Safety Judgement: Decreased awareness of need for assistance;Decreased awareness of need for safety  Problem Solving: Assistance required to generate solutions;Assistance required to implement solutions  Insights: Decreased awareness of deficits  Initiation: Does not require cues  Sequencing: Requires cues for some  Orientation  Overall Orientation Status: Within Functional Limits  Orientation Level: Oriented X4    Functional Mobility  Sit to Supine  Assistance Level: Contact guard assist  Skilled Clinical Factors: HOB flat with bed rails  Supine to Sit  Assistance Level: Contact guard assist  Skilled Clinical Factors: additional time to complete, cues for sequencing, HOB elevated  Scooting  Assistance Level: Contact guard assist  Skilled Clinical Factors: EOB and in chair  Balance  Sitting Balance: Stand by assistance  Standing Balance: Contact guard assistance  Transfers  Surface: From bed;Wheelchair  Additional Factors: Increased time to complete;Verbal cues;Set-up;Hand placement cues  Device: Walker (RW)  Sit to Stand  Assistance Level: Contact guard assist  Skilled Clinical  Factors: up to RW, cues for hand placement  Stand to Sit  Assistance Level: Contact guard assist  Skilled Clinical Factors: up to RW      Environmental Mobility  Ambulation  Surface: Level surface  Device: Rolling walker  Distance: 40 ft + 15 ft + 95 ft  Activity: Within Unit;Within Room  Activity Comments: cues for RW navigation and keeping it close to GALDINO  Additional Factors: Set-up;Verbal cues;Hand placement cues  Assistance Level: Contact guard assist  Gait Deviations: Slow linda;Decreased step length bilateral;Narrow base of support;Decreased heel strike right;Decreased heel strike left  Wheelchair  Surface: Level surface  Device: Standard wheelchair;Standard Cushion  Additional Factors: Set-up;Verbal cues;Increased time to complete  Assistance Level for Propulsion: Stand by assist  Propulsion Method: Left lower extremity;Right lower extremity  Propulsion Distance: 50 ft         PT Exercises  Exercise Treatment: repeated STS x10 without AD with CGA; SLR x15 each  Circulation/Endurance Exercises: SCIfit level 1.0 x5 min (maintains 70-80 SPM but small step lengths)  Dynamic Standing Balance Exercises: alternating toe taps to cone x10 each (CGA with RW support)      ASSESSMENT/PROGRESS TOWARDS GOALS  Vital Signs  Pulse: (!) 102  Heart Rate Source: Monitor  BP: 139/69  BP Location: Left upper arm  MAP (Calculated): 92  SpO2: 95 %  O2 Device: None (Room air)    Assessment  Assessment: Pt progressing well with therapy, able to ambulate up to 95 ft this date with RW and CGA, and performs bed mobility with CGA this date with increased time to complete using bed rails. Pt participates well with therex, but limited during session by fatigue with activity. Pt also limited by L rib pain with certain movements/activities. Pt would continue to benefit from progressions in strength/endurance and balance to improve independence prior to d/c home. Continue to recommend home with 24/7 A and OP PT at d/c.  Activity Tolerance:  Strategies;Equipment;Precautions  Education Provided Comments: safety with transfers, use of AD  Education Method: Verbal  Barriers to Learning: None  Education Outcome: Verbalized understanding;Continued education needed        Therapy Time   Individual Concurrent Group Co-treatment   Time In 1230         Time Out 1330         Minutes 60           Timed Code Treatment Minutes: 60 Minutes       Derek Trujillo PT, 04/25/24 at 3:43 PM

## 2024-04-25 NOTE — CARE COORDINATION
Weekly team care conference with interdisciplinary team on:04/23/24     Chart reviewed for length of stay. IP day # 3  Unit: ARU   Diagnosis and current status as per MD progress: SDH (subdural hematoma)   Consultants Following: n/a  Planned Discharge Date: TBD  Durable medical equipment needed: RW   Discharge Plan: 24 hour supervision or assist w/Outpatient     Nirmala Davila RN

## 2024-04-25 NOTE — PROGRESS NOTES
Bladder scan for Post void residual 1557. Pt feels constant need to urinate but only voiding a little at a time. Perfect serve sent to Dr. Garduno. New order to insert cornelius. Cornelius cath inserted, 800ml cloudy j luis urine out immediately, clamped for 10 minutes, then another 800 ml out. Pt tolerated well.

## 2024-04-25 NOTE — FLOWSHEET NOTE
04/24/24 2120   Vital Signs   Temp 99.3 °F (37.4 °C)   Temp Source Oral   Pulse 95   Heart Rate Source Monitor   Respirations 18   /84   MAP (Calculated) 100   BP Location Left upper arm   BP Method Automatic   Patient Position Semi ferlers   Pain Assessment   Pain Assessment None - Denies Pain   Care Plan - Pain Goals   Verbalizes/displays adequate comfort level or baseline comfort level Encourage patient to monitor pain and request assistance   Opioid-Induced Sedation   POSS Score 1   RASS   Gómez Agitation Sedation Scale (RASS) 0   Oxygen Therapy   SpO2 95 %   Pulse Oximetry Type Intermittent   Pulse Oximeter Device Mode Intermittent   Pulse Oximeter Device Location Right;Finger   O2 Device None (Room air)   O2 Flow Rate (L/min) 0 L/min   Oxygen Therapy None (Room air)     Pt resting comfortably in bed. Bed alarm on, call light within reach. No needs expressed at this time. Will continue to monitor.

## 2024-04-25 NOTE — PROGRESS NOTES
Occupational Therapy  Facility/Department: [facility]  Rehabilitation Occupational Therapy Daily Treatment Note    Date: 24  Patient Name: Karishma Lee       Room: 0168/0168-01  MRN: [id number]  Account: [id number]   : 1960  (63 y.o.) Gender: female                  Past Medical History:  has a past medical history of ADHD (attention deficit hyperactivity disorder), Anxiety, Bipolar disorder (HCC), Depression, Diverticulitis, DJD (degenerative joint disease), Fatty liver, GERD (gastroesophageal reflux disease), Hyperlipidemia, Hypertension, Irritable bowel syndrome, Mood disorder (HCC), Narcotic addiction (HCC), Obesity, ANGEL (obstructive sleep apnea), Primary localized osteoarthritis of right knee, Restless leg syndrome, Status post total right knee replacement, Type 2 diabetes mellitus without complication (HCC), Type II or unspecified type diabetes mellitus without mention of complication, not stated as uncontrolled, and Vision impairment.  Past Surgical History:   has a past surgical history that includes Total abdominal hysterectomy w/ bilateral salpingoophorectomy (2010); Knee arthroscopy (Right); Plantar fascia surgery; Appendectomy; Cholecystectomy (); Cystoscopy (2010); Cystoscopy (2010); Colonoscopy (2004); Knee arthroscopy (Left, 10/2013); Total knee arthroplasty (Right, 2019); joint replacement; knee surgery; Hysterectomy; Knee Arthroplasty; Hysterectomy, total abdominal; and Upper gastrointestinal endoscopy.    Restrictions  Restrictions/Precautions: Fall Risk, Up as Tolerated  Other position/activity restrictions: purewick  Required Braces or Orthoses?: No    Subjective  Subjective: Pt resting in bed upon OT arrival, agreeable to treatment. /75, , SpO2 94%. Reports 6/10 pain in RUE and ribs, pain medication in place prior to OT arrival.  Restrictions/Precautions: Fall Risk;Up as Tolerated             Objective     Cognition  Overall Cognitive  Status: Exceptions  Arousal/Alertness: Appropriate responses to stimuli  Following Commands: Follows one step commands consistently;Follows multistep commands consistently  Attention Span: Attends with cues to redirect;Difficulty attending to directions  Memory: Decreased recall of recent events  Safety Judgement: Decreased awareness of need for assistance;Decreased awareness of need for safety  Problem Solving: Assistance required to generate solutions;Assistance required to implement solutions  Insights: Decreased awareness of deficits  Initiation: Does not require cues  Sequencing: Requires cues for some  Orientation  Overall Orientation Status: Within Functional Limits  Orientation Level: Oriented X4         ADL  Grooming/Oral Hygiene  Assistance Level: Contact guard assist  Skilled Clinical Factors: combing hair, washing face, and brushing teeth in stance at sink  Upper Extremity Dressing  Assistance Level: Supervision  Skilled Clinical Factors: seated in recliner to doff/don shirt  Lower Extremity Dressing  Equipment Provided: Reachers  Assistance Level: Moderate assistance  Skilled Clinical Factors: modA for clothing mgnt for toileting, Darrell to don pants using reacher (with vc's for sequencing of AE)  Putting On/Taking Off Footwear  Equipment Provided: Sock aid  Assistance Level: Stand by assist  Skilled Clinical Factors: SBA to doff socks with reacher and don socks with sock aid, with verbal cues for sequencing  Toileting  Assistance Level: Dependent  Skilled Clinical Factors: modA for clothing mgnt, totalA for hygiene after BM  Toilet Transfers  Technique: Stand step  Equipment: Grab bars;Standard toilet  Additional Factors: With handrails;Verbal cues;Cues for hand placement;Increased time to complete  Assistance Level: Contact guard assist          Functional Mobility  Device: Rolling walker  Activity: To/From bathroom (x2 trials)  Assistance Level: Contact guard assist  Bed Mobility  Overall Assistance

## 2024-04-25 NOTE — PLAN OF CARE
Problem: Discharge Planning  Goal: Discharge to home or other facility with appropriate resources  Outcome: Progressing  Flowsheets (Taken 4/25/2024 0808)  Discharge to home or other facility with appropriate resources: Identify barriers to discharge with patient and caregiver     Problem: Neurosensory - Adult  Goal: Achieves stable or improved neurological status  Outcome: Progressing  Flowsheets (Taken 4/25/2024 0808)  Achieves stable or improved neurological status: Assess for and report changes in neurological status     Problem: Neurosensory - Adult  Goal: Achieves maximal functionality and self care  Outcome: Progressing  Flowsheets (Taken 4/25/2024 0808)  Achieves maximal functionality and self care: Monitor swallowing and airway patency with patient fatigue and changes in neurological status     Problem: Skin/Tissue Integrity - Adult  Goal: Skin integrity remains intact  Outcome: Progressing  Flowsheets  Taken 4/25/2024 1025  Skin Integrity Remains Intact: Monitor for areas of redness and/or skin breakdown  Taken 4/25/2024 0808  Skin Integrity Remains Intact: Monitor for areas of redness and/or skin breakdown     Problem: Skin/Tissue Integrity - Adult  Goal: Incisions, wounds, or drain sites healing without S/S of infection  Outcome: Progressing  Flowsheets (Taken 4/25/2024 0808)  Incisions, Wounds, or Drain Sites Healing Without Sign and Symptoms of Infection: TWICE DAILY: Assess and document skin integrity     Problem: Musculoskeletal - Adult  Goal: Return mobility to safest level of function  Outcome: Progressing  Flowsheets (Taken 4/25/2024 0808)  Return Mobility to Safest Level of Function:   Assess patient stability and activity tolerance for standing, transferring and ambulating with or without assistive devices   Assist with transfers and ambulation using safe patient handling equipment as needed     Problem: Musculoskeletal - Adult  Goal: Return ADL status to a safe level of function  Outcome:

## 2024-04-25 NOTE — PROGRESS NOTES
[facility]: ACUTE REHAB UNIT  SPEECH-LANGUAGE PATHOLOGY      [x] Daily  [] Weekly Care Conference Note  [] Discharge    Patient:Karishma Lee      :1960  MRN:[id number]  Rehab Dx/Hx: SDH (subdural hematoma) (HCC) [S06.5XAA]    Precautions: falls  Home situation: lives with . Indep with med management.  manages finances.  ST Dx: [] Aphasia  [] Dysarthria  [] Apraxia   [] Oropharyngeal dysphagia [] Cognitive Impairment  [] Other:   Date of Admit: 2024  Room #: 0168/0168-01    Current functional status (updated daily):         Pt being seen for : [] Speech/Language Treatment  [] Dysphagia Treatment [x] Cognitive Treatment  [] Other:  Communication: [x]WFL  [] Aphasia  [] Dysarthria  [] Apraxia  [] Pragmatic Impairment [] Non-verbal  [] Hearing Loss  [] Other:   Cognition: [] WFL  [x] Mild  [] Moderate  [] Severe [] Unable to Assess  [] Other:  Memory: [] WFL  [x] Mild  [] Moderate  [] Severe [] Unable to Assess  [] Other:  Behavior: [] Alert  [] Cooperative  []  Pleasant  [] Confused  [] Agitated  [] Uncooperative  [] Distractible [] Motivated  [] Self-Limiting [] Anxious  [] Other:  Endurance:  [] Adequate for participation in SLP sessions  [] Reduced overall  [] Lethargic  [] Other:  Safety: [] No concerns at this time  [] Reduced insight into deficits  []  Reduced safety awareness [] Not following call light procedures  [] Unable to Assess  [] Other:    Current Diet Order:ADULT DIET; Regular; 4 carb choices (60 gm/meal)  ADULT ORAL NUTRITION SUPPLEMENT; Breakfast, Dinner; Diabetic Oral Supplement  Swallowing Precautions: general aspiration / swallow precautions         Date: 2024      Tx session 1  [id number] Tx session 2  [id number]   Total Timed Code Min 30 30   Total Treatment Minutes 30 30   Individual Treatment Minutes 30 30   Group Treatment Minutes 0 0   Co-Treat Minutes 0 0   Variance/Reason:  N/A N/A   Pain Denies Denies   Pain Intervention [] RN notified  [] Repositioned  []  Intervention offered and patient declined  [x] N/A  [] Other: [] RN notified  [] Repositioned  [] Intervention offered and patient declined  [x] N/A  [] Other:   Subjective     Pt alert and oriented, cooperative and agreeable to participate in therapy. Pt seen sitting upright in bed.    Pt alert and oriented, cooperative and agreeable to participate in therapy. Pt seen sitting upright in bed.      Objective:  Goals     Short Term Goals  Time Frame for Short Term Goals: 7 days (4/29/24)    Goal 1: Patient will complete executive function tasks (i.e. meds) related to relevant ADLs with 80% accuracy given min cues    Discussed medication management:  -pt able to recall medications PTA  -pt able to verbalize how she managed her AM/PM medications with use of pill organizer PTA  -pt denied any difficulties with recalling to take medications PTA    Discussed tracking of medical appts:  -pt stated she and her  use a shared calendar on their phone for appts, events, etc    Pt's  manages the finances.    Mildly complex time concept word problems:  -100% acc indep    Goal 2: Patient will completed graded recall tasks in order to promote memory of functional information on 80% opp given min cues   Introduced WRAP acronym (writing, repetition, association, picturing).  Memory and mental manipulation/ ranking task:  -pt given a set of three words and asked to repeat them in order that they occur  -ex: Sept, May, Nov= May, Sept, Nov  -100% acc indep     Goal 3: Pt will completed graded thought organization and problem solving tasks on 80% opp given min cues   Did not target.  Did not target.    Goal 4: Pt will complete verbal and visual reasoning tasks on 80% opp given min cues   Did not target.  Did not target.    Other areas targeted: N/A N/A   Education:   Edu provided re: WRAP, importance of medication management Edu provided re: rationale for time concepts, compensatory memory strategies      Safety Devices: [x]

## 2024-04-26 LAB
ANION GAP SERPL CALCULATED.3IONS-SCNC: 9 MMOL/L (ref 3–16)
BASOPHILS # BLD: 0.1 K/UL (ref 0–0.2)
BASOPHILS NFR BLD: 0.7 %
BUN SERPL-MCNC: 11 MG/DL (ref 7–20)
CALCIUM SERPL-MCNC: 8.8 MG/DL (ref 8.3–10.6)
CHLORIDE SERPL-SCNC: 105 MMOL/L (ref 99–110)
CO2 SERPL-SCNC: 26 MMOL/L (ref 21–32)
CREAT SERPL-MCNC: 0.7 MG/DL (ref 0.6–1.2)
DEPRECATED RDW RBC AUTO: 13.8 % (ref 12.4–15.4)
EOSINOPHIL # BLD: 0.6 K/UL (ref 0–0.6)
EOSINOPHIL NFR BLD: 6.1 %
GFR SERPLBLD CREATININE-BSD FMLA CKD-EPI: >90 ML/MIN/{1.73_M2}
GLUCOSE BLD-MCNC: 110 MG/DL (ref 70–99)
GLUCOSE BLD-MCNC: 167 MG/DL (ref 70–99)
GLUCOSE BLD-MCNC: 267 MG/DL (ref 70–99)
GLUCOSE SERPL-MCNC: 180 MG/DL (ref 70–99)
HCT VFR BLD AUTO: 34 % (ref 36–48)
HGB BLD-MCNC: 11.3 G/DL (ref 12–16)
LYMPHOCYTES # BLD: 2 K/UL (ref 1–5.1)
LYMPHOCYTES NFR BLD: 21.4 %
MCH RBC QN AUTO: 31 PG (ref 26–34)
MCHC RBC AUTO-ENTMCNC: 33.3 G/DL (ref 31–36)
MCV RBC AUTO: 93.1 FL (ref 80–100)
MONOCYTES # BLD: 0.8 K/UL (ref 0–1.3)
MONOCYTES NFR BLD: 8.1 %
NEUTROPHILS # BLD: 6 K/UL (ref 1.7–7.7)
NEUTROPHILS NFR BLD: 63.7 %
PERFORMED ON: ABNORMAL
PLATELET # BLD AUTO: 405 K/UL (ref 135–450)
PMV BLD AUTO: 7.7 FL (ref 5–10.5)
POTASSIUM SERPL-SCNC: 4 MMOL/L (ref 3.5–5.1)
RBC # BLD AUTO: 3.66 M/UL (ref 4–5.2)
SODIUM SERPL-SCNC: 140 MMOL/L (ref 136–145)
WBC # BLD AUTO: 9.4 K/UL (ref 4–11)

## 2024-04-26 PROCEDURE — 6360000002 HC RX W HCPCS: Performed by: STUDENT IN AN ORGANIZED HEALTH CARE EDUCATION/TRAINING PROGRAM

## 2024-04-26 PROCEDURE — 6370000000 HC RX 637 (ALT 250 FOR IP): Performed by: STUDENT IN AN ORGANIZED HEALTH CARE EDUCATION/TRAINING PROGRAM

## 2024-04-26 PROCEDURE — 36415 COLL VENOUS BLD VENIPUNCTURE: CPT

## 2024-04-26 PROCEDURE — 97110 THERAPEUTIC EXERCISES: CPT

## 2024-04-26 PROCEDURE — 97535 SELF CARE MNGMENT TRAINING: CPT

## 2024-04-26 PROCEDURE — 97530 THERAPEUTIC ACTIVITIES: CPT

## 2024-04-26 PROCEDURE — 1280000000 HC REHAB R&B

## 2024-04-26 PROCEDURE — 97129 THER IVNTJ 1ST 15 MIN: CPT

## 2024-04-26 PROCEDURE — 85025 COMPLETE CBC W/AUTO DIFF WBC: CPT

## 2024-04-26 PROCEDURE — 97116 GAIT TRAINING THERAPY: CPT

## 2024-04-26 PROCEDURE — 80048 BASIC METABOLIC PNL TOTAL CA: CPT

## 2024-04-26 PROCEDURE — 97130 THER IVNTJ EA ADDL 15 MIN: CPT

## 2024-04-26 RX ORDER — TAMSULOSIN HYDROCHLORIDE 0.4 MG/1
0.4 CAPSULE ORAL DAILY
Status: DISCONTINUED | OUTPATIENT
Start: 2024-04-26 | End: 2024-05-06 | Stop reason: HOSPADM

## 2024-04-26 RX ORDER — OXYCODONE HYDROCHLORIDE AND ACETAMINOPHEN 5; 325 MG/1; MG/1
1 TABLET ORAL EVERY 4 HOURS PRN
Status: DISCONTINUED | OUTPATIENT
Start: 2024-04-26 | End: 2024-04-26

## 2024-04-26 RX ORDER — GUAIFENESIN/DEXTROMETHORPHAN 100-10MG/5
5 SYRUP ORAL EVERY 4 HOURS PRN
Status: DISCONTINUED | OUTPATIENT
Start: 2024-04-26 | End: 2024-05-06 | Stop reason: HOSPADM

## 2024-04-26 RX ORDER — GABAPENTIN 400 MG/1
400 CAPSULE ORAL 3 TIMES DAILY
Status: DISCONTINUED | OUTPATIENT
Start: 2024-04-26 | End: 2024-05-06 | Stop reason: HOSPADM

## 2024-04-26 RX ORDER — OXYCODONE AND ACETAMINOPHEN 10; 325 MG/1; MG/1
1 TABLET ORAL EVERY 4 HOURS PRN
Status: DISCONTINUED | OUTPATIENT
Start: 2024-04-26 | End: 2024-04-27

## 2024-04-26 RX ORDER — OXYCODONE HYDROCHLORIDE AND ACETAMINOPHEN 5; 325 MG/1; MG/1
1 TABLET ORAL EVERY 4 HOURS PRN
Status: DISCONTINUED | OUTPATIENT
Start: 2024-04-26 | End: 2024-04-27

## 2024-04-26 RX ADMIN — ACETAMINOPHEN 650 MG: 325 TABLET ORAL at 01:16

## 2024-04-26 RX ADMIN — ENOXAPARIN SODIUM 30 MG: 100 INJECTION SUBCUTANEOUS at 21:10

## 2024-04-26 RX ADMIN — INSULIN LISPRO 3 UNITS: 100 INJECTION, SOLUTION INTRAVENOUS; SUBCUTANEOUS at 12:39

## 2024-04-26 RX ADMIN — METHOCARBAMOL 1000 MG: 500 TABLET ORAL at 08:01

## 2024-04-26 RX ADMIN — OXYCODONE HYDROCHLORIDE AND ACETAMINOPHEN 1 TABLET: 5; 325 TABLET ORAL at 03:31

## 2024-04-26 RX ADMIN — Medication 5 MG: at 21:11

## 2024-04-26 RX ADMIN — MIRTAZAPINE 7.5 MG: 15 TABLET, FILM COATED ORAL at 21:12

## 2024-04-26 RX ADMIN — ACETAMINOPHEN 1000 MG: 500 TABLET ORAL at 14:26

## 2024-04-26 RX ADMIN — METFORMIN HYDROCHLORIDE 500 MG: 500 TABLET ORAL at 14:26

## 2024-04-26 RX ADMIN — ROPINIROLE HYDROCHLORIDE 0.5 MG: 0.5 TABLET, FILM COATED ORAL at 21:11

## 2024-04-26 RX ADMIN — METFORMIN HYDROCHLORIDE 500 MG: 500 TABLET ORAL at 18:06

## 2024-04-26 RX ADMIN — GABAPENTIN 400 MG: 400 CAPSULE ORAL at 14:26

## 2024-04-26 RX ADMIN — METHOCARBAMOL 1000 MG: 500 TABLET ORAL at 18:06

## 2024-04-26 RX ADMIN — GUAIFENESIN AND DEXTROMETHORPHAN 5 ML: 100; 10 SYRUP ORAL at 03:31

## 2024-04-26 RX ADMIN — ACETAMINOPHEN 1000 MG: 500 TABLET ORAL at 08:01

## 2024-04-26 RX ADMIN — METHOCARBAMOL 1000 MG: 500 TABLET ORAL at 12:39

## 2024-04-26 RX ADMIN — ENOXAPARIN SODIUM 30 MG: 100 INJECTION SUBCUTANEOUS at 08:01

## 2024-04-26 RX ADMIN — SENNOSIDES AND DOCUSATE SODIUM 1 TABLET: 50; 8.6 TABLET ORAL at 21:11

## 2024-04-26 RX ADMIN — GABAPENTIN 300 MG: 300 CAPSULE ORAL at 08:01

## 2024-04-26 RX ADMIN — GABAPENTIN 300 MG: 300 CAPSULE ORAL at 03:04

## 2024-04-26 RX ADMIN — SENNOSIDES AND DOCUSATE SODIUM 1 TABLET: 50; 8.6 TABLET ORAL at 08:01

## 2024-04-26 RX ADMIN — GABAPENTIN 400 MG: 400 CAPSULE ORAL at 21:11

## 2024-04-26 RX ADMIN — METHOCARBAMOL 1000 MG: 500 TABLET ORAL at 21:11

## 2024-04-26 RX ADMIN — TAMSULOSIN HYDROCHLORIDE 0.4 MG: 0.4 CAPSULE ORAL at 14:27

## 2024-04-26 RX ADMIN — INSULIN LISPRO 4 UNITS: 100 INJECTION, SOLUTION INTRAVENOUS; SUBCUTANEOUS at 12:40

## 2024-04-26 RX ADMIN — ACETAMINOPHEN 1000 MG: 500 TABLET ORAL at 21:11

## 2024-04-26 RX ADMIN — LAMOTRIGINE 100 MG: 100 TABLET ORAL at 21:12

## 2024-04-26 RX ADMIN — INSULIN LISPRO 3 UNITS: 100 INJECTION, SOLUTION INTRAVENOUS; SUBCUTANEOUS at 08:15

## 2024-04-26 RX ADMIN — ATORVASTATIN CALCIUM 10 MG: 10 TABLET, FILM COATED ORAL at 08:02

## 2024-04-26 RX ADMIN — INSULIN LISPRO 3 UNITS: 100 INJECTION, SOLUTION INTRAVENOUS; SUBCUTANEOUS at 18:06

## 2024-04-26 RX ADMIN — METOPROLOL SUCCINATE 50 MG: 50 TABLET, EXTENDED RELEASE ORAL at 08:02

## 2024-04-26 RX ADMIN — POLYETHYLENE GLYCOL 3350 17 G: 17 POWDER, FOR SOLUTION ORAL at 08:01

## 2024-04-26 RX ADMIN — ESCITALOPRAM OXALATE 20 MG: 10 TABLET ORAL at 08:02

## 2024-04-26 ASSESSMENT — PAIN SCALES - GENERAL
PAINLEVEL_OUTOF10: 7
PAINLEVEL_OUTOF10: 7
PAINLEVEL_OUTOF10: 4
PAINLEVEL_OUTOF10: 3
PAINLEVEL_OUTOF10: 4

## 2024-04-26 ASSESSMENT — PAIN DESCRIPTION - DESCRIPTORS
DESCRIPTORS: ACHING;SORE
DESCRIPTORS: ACHING

## 2024-04-26 ASSESSMENT — PAIN DESCRIPTION - LOCATION
LOCATION: RIB CAGE
LOCATION: RIB CAGE

## 2024-04-26 ASSESSMENT — PAIN DESCRIPTION - PAIN TYPE
TYPE: ACUTE PAIN
TYPE: ACUTE PAIN

## 2024-04-26 ASSESSMENT — PAIN - FUNCTIONAL ASSESSMENT
PAIN_FUNCTIONAL_ASSESSMENT: ACTIVITIES ARE NOT PREVENTED
PAIN_FUNCTIONAL_ASSESSMENT: ACTIVITIES ARE NOT PREVENTED

## 2024-04-26 ASSESSMENT — PAIN DESCRIPTION - ORIENTATION: ORIENTATION: LEFT

## 2024-04-26 ASSESSMENT — PAIN DESCRIPTION - FREQUENCY: FREQUENCY: CONTINUOUS

## 2024-04-26 ASSESSMENT — PAIN DESCRIPTION - ONSET: ONSET: ON-GOING

## 2024-04-26 NOTE — PROGRESS NOTES
[facility]: ACUTE REHAB UNIT  SPEECH-LANGUAGE PATHOLOGY      [x] Daily  [] Weekly Care Conference Note  [] Discharge    Patient:Karishma Lee      :1960  MRN:[id number]  Rehab Dx/Hx: SDH (subdural hematoma) (HCC) [S06.5XAA]    Precautions: falls  Home situation: lives with . Indep with med management.  manages finances.  ST Dx: [] Aphasia  [] Dysarthria  [] Apraxia   [] Oropharyngeal dysphagia [] Cognitive Impairment  [] Other:   Date of Admit: 2024  Room #: 0168/0168-01    Current functional status (updated daily):         Pt being seen for : [] Speech/Language Treatment  [] Dysphagia Treatment [x] Cognitive Treatment  [] Other:  Communication: [x]WFL  [] Aphasia  [] Dysarthria  [] Apraxia  [] Pragmatic Impairment [] Non-verbal  [] Hearing Loss  [] Other:   Cognition: [] WFL  [x] Mild  [] Moderate  [] Severe [] Unable to Assess  [] Other:  Memory: [] WFL  [x] Mild  [] Moderate  [] Severe [] Unable to Assess  [] Other:  Behavior: [] Alert  [] Cooperative  []  Pleasant  [] Confused  [] Agitated  [] Uncooperative  [] Distractible [] Motivated  [] Self-Limiting [] Anxious  [] Other:  Endurance:  [] Adequate for participation in SLP sessions  [] Reduced overall  [] Lethargic  [] Other:  Safety: [] No concerns at this time  [] Reduced insight into deficits  []  Reduced safety awareness [] Not following call light procedures  [] Unable to Assess  [] Other:    Current Diet Order:ADULT DIET; Regular; 4 carb choices (60 gm/meal)  ADULT ORAL NUTRITION SUPPLEMENT; Breakfast, Dinner; Diabetic Oral Supplement  Swallowing Precautions: general aspiration / swallow precautions         Date: 2024      Tx session 1  [id number] Tx session 2  All tx needs met in session 1    Total Timed Code Min 60    Total Treatment Minutes 60    Individual Treatment Minutes 60    Group Treatment Minutes 0    Co-Treat Minutes 0    Variance/Reason:  N/A    Pain Denies    Pain Intervention [] RN notified  [] Repositioned  []  Intervention offered and patient declined  [x] N/A  [] Other: [] RN notified  [] Repositioned  [] Intervention offered and patient declined  [] N/A  [] Other:   Subjective     Pt alert and oriented, cooperative and agreeable to participate in therapy. Pt seen sitting upright in bed.       Objective:  Goals     Short Term Goals  Time Frame for Short Term Goals: 7 days (4/29/24)    Goal 1: Patient will complete executive function tasks (i.e. meds) related to relevant ADLs with 80% accuracy given min cues    Portions of the RITU (assessment of language related functional activities) completed:  -telling time: 100% acc  -understanding medicine labels: 100% acc  -reading instructions: 100% acc  -using a calendar: 100% acc  -math word problems: 100% acc        Goal 2: Patient will completed graded recall tasks in order to promote memory of functional information on 80% opp given min cues   Functional recall task:  -word list retention  -pt given a set of four words  -category exclusion  -ex: water, paper, nail, soda (which ones are not drinks?)  -4 word sets: 100% acc  -5 word sets: 50% acc indep improving to 100% acc given mod cues       Goal 3: Pt will completed graded thought organization and problem solving tasks on 80% opp given min cues   Thought organization/recall reduced when presented with 5 words, increasing complexity of task presented more difficult for pt.     Goal 4: Pt will complete verbal and visual reasoning tasks on 80% opp given min cues   Did not target.     Other areas targeted: N/A    Education:   Edu provided re: compensatory memory strategies, rationale for portions of the RITU    Safety Devices: [x] Call light within reach  [] Chair alarm activated  [x] Bed alarm activated  [x] Other: RN in room  [] Call light within reach  [] Chair alarm activated  [] Bed alarm activated  [] Other:    Assessment: Pt pleasant and cooperative, agreeable to participate. Pt completed all portions of the RITU  (assessment of language related functional activities- see above) with 100% acc. Pt completed 4 word set recall task with 100% acc, but as the complexity of task increased to 5 words, pt presented with more difficulty, requiring increased repetition and cues for consistent use of compensatory strategies. Overall, pt continues to demonstrate progress towards goals and remains strongly motivated to improve.    Plan: Continue as per plan of care.      Additional Information:     Barriers toward progress: inability to manage medications, will need assist/supervision; inability to manage finances, will need assist/supervision; severity of cognition (mild) with reduced insight negatively impacting safety/independence  Discharge recommendations:  [] Home independently  [] Home with assistance []  24 hour supervision  [] ECF [x] Other: See PT/OT recs  Continued Tx Upon Discharge: ? [] Yes [] No [x] TBD based on progress while on ARU [] Vital Stim indicated [] Other:   Estimated discharge date: TBD    Interventions used this date:  [] Speech/Language Treatment  [] Instruction in HEP [] Group [] Dysphagia Treatment [x] Cognitive Treatment   [] Other:      Total Time Breakdown / Charges    Time in Time out Total Time / units   Cognitive Tx 1330 1430 60 mins / 4 units    Speech Tx -- -- --   Dysphagia Tx -- -- --       Electronically Signed by     Bethanie Correia M.A CCC-SLP #[id number]  Speech-Language Pathologist

## 2024-04-26 NOTE — PLAN OF CARE
Problem: Discharge Planning  Goal: Discharge to home or other facility with appropriate resources  4/26/2024 0320 by Morena Hallman RN  Outcome: Progressing  4/25/2024 1831 by Blanquita Lackey RN  Outcome: Progressing  Flowsheets (Taken 4/25/2024 0808)  Discharge to home or other facility with appropriate resources: Identify barriers to discharge with patient and caregiver     Problem: Neurosensory - Adult  Goal: Achieves stable or improved neurological status  4/26/2024 0320 by Morena Hallman RN  Outcome: Progressing  4/25/2024 1831 by Blanquita Lackey RN  Outcome: Progressing  Flowsheets (Taken 4/25/2024 0808)  Achieves stable or improved neurological status: Assess for and report changes in neurological status  Goal: Achieves maximal functionality and self care  4/26/2024 0320 by Morena Hallman RN  Outcome: Progressing  4/25/2024 1831 by Blanquita Lackey RN  Outcome: Progressing  Flowsheets (Taken 4/25/2024 0808)  Achieves maximal functionality and self care: Monitor swallowing and airway patency with patient fatigue and changes in neurological status     Problem: Skin/Tissue Integrity - Adult  Goal: Skin integrity remains intact  4/26/2024 0320 by Morena Hallman RN  Outcome: Progressing  4/25/2024 1831 by Blanquita Lackey RN  Outcome: Progressing  Flowsheets  Taken 4/25/2024 1025  Skin Integrity Remains Intact: Monitor for areas of redness and/or skin breakdown  Taken 4/25/2024 0808  Skin Integrity Remains Intact: Monitor for areas of redness and/or skin breakdown  Goal: Incisions, wounds, or drain sites healing without S/S of infection  4/26/2024 0320 by Morena Hallman RN  Outcome: Progressing  4/25/2024 1831 by Blanquita Lackey RN  Outcome: Progressing  Flowsheets (Taken 4/25/2024 0808)  Incisions, Wounds, or Drain Sites Healing Without Sign and Symptoms of Infection: TWICE DAILY: Assess and document skin integrity     Problem: Musculoskeletal - Adult  Goal: Return mobility to safest level of  function  4/26/2024 0320 by Morena Hallman RN  Outcome: Progressing  4/25/2024 1831 by Blanquita Lackey RN  Outcome: Progressing  Flowsheets (Taken 4/25/2024 0808)  Return Mobility to Safest Level of Function:   Assess patient stability and activity tolerance for standing, transferring and ambulating with or without assistive devices   Assist with transfers and ambulation using safe patient handling equipment as needed  Goal: Return ADL status to a safe level of function  4/26/2024 0320 by Morena Hallman RN  Outcome: Progressing  4/25/2024 1831 by Blanquita Lackey RN  Outcome: Progressing  Flowsheets (Taken 4/25/2024 0808)  Return ADL Status to a Safe Level of Function:   Administer medication as ordered   Assess activities of daily living deficits and provide assistive devices as needed     Problem: Metabolic/Fluid and Electrolytes - Adult  Goal: Glucose maintained within prescribed range  4/26/2024 0320 by Morena Hallman RN  Outcome: Progressing  4/25/2024 1831 by Blanquita Lackey RN  Outcome: Progressing  Flowsheets (Taken 4/25/2024 0808)  Glucose maintained within prescribed range: Monitor blood glucose as ordered     Problem: Skin/Tissue Integrity  Goal: Absence of new skin breakdown  Description: 1.  Monitor for areas of redness and/or skin breakdown  2.  Assess vascular access sites hourly  3.  Every 4-6 hours minimum:  Change oxygen saturation probe site  4.  Every 4-6 hours:  If on nasal continuous positive airway pressure, respiratory therapy assess nares and determine need for appliance change or resting period.  4/26/2024 0320 by Morena Hallman RN  Outcome: Progressing  4/25/2024 1831 by Blanquita Lackey RN  Outcome: Progressing     Problem: Safety - Adult  Goal: Free from fall injury  4/26/2024 0320 by Morena Hallman RN  Outcome: Progressing  4/25/2024 1831 by Blanquita Lackey RN  Outcome: Progressing     Problem: Pain  Goal: Verbalizes/displays adequate comfort level or baseline comfort

## 2024-04-26 NOTE — PROGRESS NOTES
Occupational Therapy  Facility/Department: [facility]  Rehabilitation Occupational Therapy Daily Treatment Note    Date: 24  Patient Name: Karishma Lee       Room: 0168/0168-01  MRN: [id number]  Account: [id number]   : 1960  (63 y.o.) Gender: female                    Past Medical History:  has a past medical history of ADHD (attention deficit hyperactivity disorder), Anxiety, Bipolar disorder (HCC), Depression, Diverticulitis, DJD (degenerative joint disease), Fatty liver, GERD (gastroesophageal reflux disease), Hyperlipidemia, Hypertension, Irritable bowel syndrome, Mood disorder (HCC), Narcotic addiction (HCC), Obesity, ANGEL (obstructive sleep apnea), Primary localized osteoarthritis of right knee, Restless leg syndrome, Status post total right knee replacement, Type 2 diabetes mellitus without complication (HCC), Type II or unspecified type diabetes mellitus without mention of complication, not stated as uncontrolled, and Vision impairment.  Past Surgical History:   has a past surgical history that includes Total abdominal hysterectomy w/ bilateral salpingoophorectomy (2010); Knee arthroscopy (Right); Plantar fascia surgery; Appendectomy; Cholecystectomy (); Cystoscopy (2010); Cystoscopy (2010); Colonoscopy (2004); Knee arthroscopy (Left, 10/2013); Total knee arthroplasty (Right, 2019); joint replacement; knee surgery; Hysterectomy; Knee Arthroplasty; Hysterectomy, total abdominal; and Upper gastrointestinal endoscopy.    Restrictions  Restrictions/Precautions: Fall Risk, Up as Tolerated  Other position/activity restrictions: cornelius  Required Braces or Orthoses?: No    Subjective  Subjective: Pt semi-supine in bed and agreeable to OT tx with RN present. /81, , SPO2 92%.  Restrictions/Precautions: Fall Risk;Up as Tolerated             Objective     Cognition  Overall Cognitive Status: Exceptions  Arousal/Alertness: Appropriate responses to stimuli  Following

## 2024-04-26 NOTE — PROGRESS NOTES
Physical Therapy  Facility/Department: [facility]  Rehabilitation Physical Therapy Treatment Note    NAME: Karishma Lee  : 1960 (63 y.o.)  MRN: [id number]  CODE STATUS: Full Code    Date of Service: 24       Restrictions:  Restrictions/Precautions: Fall Risk, Up as Tolerated  Position Activity Restriction  Other position/activity restrictions: ashli     SUBJECTIVE  Subjective  Subjective: Pt in recliner, agreeable to therapy  Pain: 10/10 pain in ribs, back, and RUE. RN notified and administered meds during session.     OBJECTIVE  124/83, 99 bpm, 93%    Cognition  Overall Cognitive Status: Exceptions  Arousal/Alertness: Appropriate responses to stimuli  Following Commands: Follows one step commands consistently;Follows multistep commands consistently  Attention Span: Attends with cues to redirect;Difficulty attending to directions  Memory: Decreased recall of recent events  Safety Judgement: Decreased awareness of need for assistance;Decreased awareness of need for safety  Problem Solving: Assistance required to generate solutions;Assistance required to implement solutions  Insights: Decreased awareness of deficits  Initiation: Does not require cues  Sequencing: Requires cues for some  Orientation  Overall Orientation Status: Within Functional Limits  Orientation Level: Oriented X4    Functional Mobility  Supine to Sit  Assistance Level: Stand by assist  Skilled Clinical Factors: bed flat, use of bedrail  Sit to Stand  Assistance Level: Stand by assist  Skilled Clinical Factors: up to RW, cues for hand placement  Stand to Sit  Assistance Level: Stand by assist  Skilled Clinical Factors: with RW      Environmental Mobility  Ambulation  Surface: Level surface  Device: Rolling walker  Distance: 320 ft  Activity: Within Unit;Within Room  Activity Comments: Pt ambulates with slow linda, short step length, and minimal clearance without LOB.  Additional Factors: Set-up;Verbal cues;Hand placement  will transfer bed to chair with RW and supervision  Short Term Goal 5: pt will complete 2 steps with supervision and 1 HR  Long Term Goals  Time Frame for Long Term Goals : 5/2  Long Term Goal 1: pt will perform STS MOD I  Long Term Goal 2: pt will perform supine<> sit with MIN A - MET 4/25, progress to mod-ind  Long Term Goal 3: pt will ambulate 150 ft with RW and CGA  Long Term Goal 4: pt will transfer bed to chair with RW MOD I  Long Term Goal 5: pt will complete 4 steps with supervision and no HR    PLAN OF CARE/SAFETY  Physical Therapy Plan  General Plan: 5-7 times per week  Therapy Duration: 10 Days  Current Treatment Recommendations: Strengthening;ROM;Balance training;Functional mobility training;Gait training;Equipment evaluation, education, & procurement;Therapeutic activities;Home exercise program;Safety education & training;Patient/Caregiver education & training  Safety Devices  Type of Devices: All fall risk precautions in place;Call light within reach;Patient at risk for falls;Nurse notified;Gait belt;Left in bed;Bed alarm in place  Restraints  Restraints Initially in Place: No    EDUCATION  Education  Education Given To: Patient  Education Provided: Role of Therapy;Plan of Care;Safety;Mobility Training;Transfer Training;Energy Conservation;Fall Prevention Strategies;Equipment;Precautions  Education Provided Comments: progressive mobility, progress, stairs sequencing, safety, HEP  Education Method: Verbal  Barriers to Learning: None  Education Outcome: Verbalized understanding;Continued education needed        Therapy Time   Individual Concurrent Group Co-treatment   Time In 1230         Time Out 1330         Minutes 60           Timed Code Treatment Minutes: 60 Minutes       Kiara Martinez PT, 04/26/24 at 3:32 PM

## 2024-04-26 NOTE — PROGRESS NOTES
Karishma Lee  4/26/2024  [id number]    Chief Complaint: SDH (subdural hematoma) (HCC)    Subjective:   Overnight patient with increased pain across her back and down her arm. Today she is seen in her room. She reports pain overnight. Discussed increasing gabapentin and making higher dose of percocet available for her. She reports having a bowel movement last night.     ROS: denies f/c, n/v, cp, sob    Objective:  Patient Vitals for the past 24 hrs:   BP Temp Temp src Pulse Resp SpO2   04/26/24 0801 (!) 142/81 98.2 °F (36.8 °C) Oral (!) 101 18 92 %   04/25/24 2049 (!) 146/85 98.4 °F (36.9 °C) Oral (!) 106 20 95 %   04/25/24 1235 139/69 -- -- (!) 102 -- 95 %     Gen: No distress, pleasant. Supine in bed  HEENT: Normocephalic, atraumatic.   CV: extremities well perfused  Resp: No respiratory distress.   Abd: Soft, mildly distended  Ext: No edema.   Neuro: Alert, oriented, appropriately interactive.   Psych: appropriate mood and affect    Wt Readings from Last 3 Encounters:   04/24/24 105.6 kg (232 lb 12.8 oz)   02/06/24 92.9 kg (204 lb 12.8 oz)   02/01/24 93 kg (205 lb)       Laboratory data:   Lab Results   Component Value Date    WBC 9.4 04/26/2024    HGB 11.3 (L) 04/26/2024    HCT 34.0 (L) 04/26/2024    MCV 93.1 04/26/2024     04/26/2024       Lab Results   Component Value Date/Time     04/26/2024 07:48 AM    K 4.0 04/26/2024 07:48 AM     04/26/2024 07:48 AM    CO2 26 04/26/2024 07:48 AM    BUN 11 04/26/2024 07:48 AM    CREATININE 0.7 04/26/2024 07:48 AM    GLUCOSE 180 04/26/2024 07:48 AM    CALCIUM 8.8 04/26/2024 07:48 AM        Therapy progress:  Physical therapy:  Bed Mobility:     Sit>supine:  Assistance Level: Contact guard assist  Skilled Clinical Factors: HOB flat with bed rails  Supine>sit:  Assistance Level: Contact guard assist  Skilled Clinical Factors: additional time to complete, cues for sequencing, HOB elevated  Transfers:  Surface: From bed, Wheelchair  Additional Factors:  Factors: modA for clothing mgnt, totalA for hygiene after BM    Speech therapy:    ADULT DIET; Regular; 4 carb choices (60 gm/meal)  ADULT ORAL NUTRITION SUPPLEMENT; Breakfast, Dinner; Diabetic Oral Supplement    Body mass index is 38.74 kg/m².    Assessment and Plan:  Karishma Lee is a 63 year old female with a past medical history significant for HTN, HLD, GERD, ANGEL, DM2, mood disorder, and obesity who presented to [facility] on 4/13/24 after a fall down multiple stairs, found to have small left SDH, multiple rib fractures, trace left pneumothorax, atelectasis, and left 1-5 TP fractures. She was admitted to [facility] on 4/23/24 due to functional deficits below her baseline.      Left SDH  - nonoperative management  - no AC or antiplatelet for 14 days  - PT, OT, ST    Constipation  - XR abd showing moderate colonic stool burden  - continue bowel regimen    Urinary Retention  - cornelius placed  - start flomax  - consider voiding trial closer to discharge     Tachycardia  - EKG showing sinus tachycardia  - metoprolol     Left Rib Fractures, 2-11  - with pneumothorax during acute stay, since resolved  - not a candidate for SSRF  - multimodal pain control, increased gabapentin, PRN percocet  - IS     L lumbar 1-5 TP fractures  - no intervention or brace needed     HTN  - metoprolol     HLD  - statin     ANGEL  - home CPAP     DM2  - home regimen: metformin, ozempic  - stopped NPH due to hypoglycemia  - SSI  - start metformin     Bipolar Disorder  - home requip, lamotrigine, lexapro     Bowels: adjust medications as needed for regular bowel movements      Bladder: Check PVR x 3.  IMC if PVR > 200ml or if any volume is > 500 ml.      Sleep: melatonin nightly      PPX  DVT: lovenox  GI: not indicated     Follow up appointments: Trauma, Neurosurgery, PCP    Therapy progress: able to ambulate up to 95 ft this date with RW and CGA     April Garduno MD 4/26/2024, 11:30 AM

## 2024-04-26 NOTE — PROGRESS NOTES
Patient having increased side/ back pain and congestion. Spoke to Laureen LÓPEZ, oxycodone 5mg and robitussin ordered. Will continue to monitor.

## 2024-04-26 NOTE — PLAN OF CARE
Problem: Skin/Tissue Integrity - Adult  Goal: Skin integrity remains intact  4/26/2024 1416 by Jessenia Dior, RN  Outcome: Progressing    Goal: Incisions, wounds, or drain sites healing without S/S of infection  4/26/2024 1416 by Jessenia Dior, RN  Outcome: Progressing

## 2024-04-27 LAB
GLUCOSE BLD-MCNC: 153 MG/DL (ref 70–99)
GLUCOSE BLD-MCNC: 166 MG/DL (ref 70–99)
GLUCOSE BLD-MCNC: 208 MG/DL (ref 70–99)
GLUCOSE BLD-MCNC: 262 MG/DL (ref 70–99)
PERFORMED ON: ABNORMAL

## 2024-04-27 PROCEDURE — 97110 THERAPEUTIC EXERCISES: CPT

## 2024-04-27 PROCEDURE — 97130 THER IVNTJ EA ADDL 15 MIN: CPT

## 2024-04-27 PROCEDURE — 6360000002 HC RX W HCPCS: Performed by: STUDENT IN AN ORGANIZED HEALTH CARE EDUCATION/TRAINING PROGRAM

## 2024-04-27 PROCEDURE — 97129 THER IVNTJ 1ST 15 MIN: CPT

## 2024-04-27 PROCEDURE — 6370000000 HC RX 637 (ALT 250 FOR IP): Performed by: STUDENT IN AN ORGANIZED HEALTH CARE EDUCATION/TRAINING PROGRAM

## 2024-04-27 PROCEDURE — 97530 THERAPEUTIC ACTIVITIES: CPT

## 2024-04-27 PROCEDURE — 1280000000 HC REHAB R&B

## 2024-04-27 PROCEDURE — 97116 GAIT TRAINING THERAPY: CPT

## 2024-04-27 PROCEDURE — 97535 SELF CARE MNGMENT TRAINING: CPT

## 2024-04-27 RX ORDER — OXYCODONE HYDROCHLORIDE 5 MG/1
10 TABLET ORAL EVERY 4 HOURS PRN
Status: DISCONTINUED | OUTPATIENT
Start: 2024-04-27 | End: 2024-04-27

## 2024-04-27 RX ORDER — OXYCODONE HYDROCHLORIDE 5 MG/1
5 TABLET ORAL EVERY 4 HOURS PRN
Status: DISCONTINUED | OUTPATIENT
Start: 2024-04-27 | End: 2024-04-27

## 2024-04-27 RX ORDER — OXYCODONE HYDROCHLORIDE 15 MG/1
15 TABLET ORAL EVERY 4 HOURS PRN
Status: DISCONTINUED | OUTPATIENT
Start: 2024-04-27 | End: 2024-04-30 | Stop reason: SDUPTHER

## 2024-04-27 RX ORDER — OXYCODONE HYDROCHLORIDE 5 MG/1
10 TABLET ORAL EVERY 4 HOURS PRN
Status: DISCONTINUED | OUTPATIENT
Start: 2024-04-27 | End: 2024-04-30

## 2024-04-27 RX ORDER — ACETAMINOPHEN 500 MG
1000 TABLET ORAL EVERY 8 HOURS SCHEDULED
Status: DISCONTINUED | OUTPATIENT
Start: 2024-04-27 | End: 2024-04-27

## 2024-04-27 RX ADMIN — TAMSULOSIN HYDROCHLORIDE 0.4 MG: 0.4 CAPSULE ORAL at 08:46

## 2024-04-27 RX ADMIN — ACETAMINOPHEN 1000 MG: 500 TABLET ORAL at 14:21

## 2024-04-27 RX ADMIN — ENOXAPARIN SODIUM 30 MG: 100 INJECTION SUBCUTANEOUS at 08:45

## 2024-04-27 RX ADMIN — ACETAMINOPHEN 1000 MG: 500 TABLET ORAL at 06:09

## 2024-04-27 RX ADMIN — OXYCODONE 10 MG: 5 TABLET ORAL at 00:24

## 2024-04-27 RX ADMIN — INSULIN LISPRO 3 UNITS: 100 INJECTION, SOLUTION INTRAVENOUS; SUBCUTANEOUS at 17:20

## 2024-04-27 RX ADMIN — ENOXAPARIN SODIUM 30 MG: 100 INJECTION SUBCUTANEOUS at 21:27

## 2024-04-27 RX ADMIN — GABAPENTIN 400 MG: 400 CAPSULE ORAL at 21:28

## 2024-04-27 RX ADMIN — SENNOSIDES AND DOCUSATE SODIUM 1 TABLET: 50; 8.6 TABLET ORAL at 21:28

## 2024-04-27 RX ADMIN — ACETAMINOPHEN 1000 MG: 500 TABLET ORAL at 21:28

## 2024-04-27 RX ADMIN — ATORVASTATIN CALCIUM 10 MG: 10 TABLET, FILM COATED ORAL at 08:46

## 2024-04-27 RX ADMIN — INSULIN LISPRO 4 UNITS: 100 INJECTION, SOLUTION INTRAVENOUS; SUBCUTANEOUS at 12:03

## 2024-04-27 RX ADMIN — Medication 5 MG: at 21:28

## 2024-04-27 RX ADMIN — MIRTAZAPINE 7.5 MG: 15 TABLET, FILM COATED ORAL at 21:28

## 2024-04-27 RX ADMIN — METHOCARBAMOL 1000 MG: 500 TABLET ORAL at 08:46

## 2024-04-27 RX ADMIN — METHOCARBAMOL 1000 MG: 500 TABLET ORAL at 21:28

## 2024-04-27 RX ADMIN — OXYCODONE 10 MG: 5 TABLET ORAL at 14:21

## 2024-04-27 RX ADMIN — ROPINIROLE HYDROCHLORIDE 0.5 MG: 0.5 TABLET, FILM COATED ORAL at 21:28

## 2024-04-27 RX ADMIN — OXYCODONE 10 MG: 5 TABLET ORAL at 08:16

## 2024-04-27 RX ADMIN — GABAPENTIN 400 MG: 400 CAPSULE ORAL at 08:46

## 2024-04-27 RX ADMIN — METFORMIN HYDROCHLORIDE 500 MG: 500 TABLET ORAL at 17:20

## 2024-04-27 RX ADMIN — METHOCARBAMOL 1000 MG: 500 TABLET ORAL at 14:21

## 2024-04-27 RX ADMIN — ESCITALOPRAM OXALATE 20 MG: 10 TABLET ORAL at 08:46

## 2024-04-27 RX ADMIN — INSULIN LISPRO 3 UNITS: 100 INJECTION, SOLUTION INTRAVENOUS; SUBCUTANEOUS at 08:46

## 2024-04-27 RX ADMIN — LAMOTRIGINE 100 MG: 100 TABLET ORAL at 21:28

## 2024-04-27 RX ADMIN — INSULIN LISPRO 3 UNITS: 100 INJECTION, SOLUTION INTRAVENOUS; SUBCUTANEOUS at 12:03

## 2024-04-27 RX ADMIN — GABAPENTIN 400 MG: 400 CAPSULE ORAL at 14:21

## 2024-04-27 RX ADMIN — SENNOSIDES AND DOCUSATE SODIUM 1 TABLET: 50; 8.6 TABLET ORAL at 08:46

## 2024-04-27 RX ADMIN — METFORMIN HYDROCHLORIDE 500 MG: 500 TABLET ORAL at 08:46

## 2024-04-27 RX ADMIN — METHOCARBAMOL 1000 MG: 500 TABLET ORAL at 17:20

## 2024-04-27 RX ADMIN — POLYETHYLENE GLYCOL 3350 17 G: 17 POWDER, FOR SOLUTION ORAL at 08:45

## 2024-04-27 RX ADMIN — METOPROLOL SUCCINATE 50 MG: 50 TABLET, EXTENDED RELEASE ORAL at 08:46

## 2024-04-27 ASSESSMENT — PAIN SCALES - GENERAL
PAINLEVEL_OUTOF10: 8
PAINLEVEL_OUTOF10: 8
PAINLEVEL_OUTOF10: 10
PAINLEVEL_OUTOF10: 6

## 2024-04-27 ASSESSMENT — PAIN DESCRIPTION - DESCRIPTORS
DESCRIPTORS: ACHING
DESCRIPTORS: ACHING

## 2024-04-27 ASSESSMENT — PAIN DESCRIPTION - LOCATION
LOCATION: RIB CAGE
LOCATION: RIB CAGE

## 2024-04-27 NOTE — PROGRESS NOTES
Physical Therapy  Facility/Department: [facility]  Rehabilitation Physical Therapy Treatment Note    NAME: Karishma Lee  : 1960 (63 y.o.)  MRN: [id number]  CODE STATUS: Full Code    Date of Service: 24       Restrictions:  Restrictions/Precautions: Fall Risk, Up as Tolerated  Position Activity Restriction  Other position/activity restrictions: ashli     SUBJECTIVE  Subjective  Subjective: Pt asleep in bed upon arrival, agreeable to therapy upon wakening  Pain: 10/10 pain in ribs, back, and RUE. RN notified.     OBJECTIVE  132/79, 105 bpm, 92%    Cognition  Overall Cognitive Status: WFL  Orientation  Overall Orientation Status: Within Functional Limits  Orientation Level: Oriented X4    Functional Mobility  Supine to Sit  Assistance Level: Moderate assistance  Skilled Clinical Factors: HOB elevated, use of bedrail, toward L, assist at trunk  Sit to Stand  Assistance Level: Stand by assist  Skilled Clinical Factors: up to RW  Stand to Sit  Assistance Level: Stand by assist  Skilled Clinical Factors: with RW  Car Transfer  Assistance Level: Stand by assist  Skilled Clinical Factors: with RW, increased time      Environmental Mobility  Ambulation  Surface: Level surface  Device: Rolling walker  Distance: 150 ft, 190 ft, 80 ft  Activity: Within Unit;Within Room  Activity Comments: Pt ambulates with slow linda, short step length, and minimal clearance without LOB.  Additional Factors: Set-up  Assistance Level: Supervision  Gait Deviations: Slow linda;Decreased step length bilateral;Narrow base of support;Decreased heel strike right;Decreased heel strike left                2x 15 BLE LAQ, seated marches, and seated HR with 1.5 lb ankle weights clinton   2x 15 clinton seated clams against blue theraband    Pt provided assistance with threading pants and donning shoes at EOB. Pt provided with shirt and dons without assistance. Pt requires SBA for standing balance while pulling up pants without UE support x 20 sec.        ASSESSMENT/PROGRESS TOWARDS GOALS  Assessment  Assessment: Patient seen for gait, transfers, and LE strengthening. Patient completed transfers with SBA and RW. Pt ambulates with RW and supervision. Pt declines to attempt stairs this AM d/t fatigue. Pt completes seated HEP for strengthening. Pt will continue to benefit from skilled PT in ARU to progress endurance and independence with mobility.  Activity Tolerance: Patient limited by pain;Patient limited by endurance;Patient limited by fatigue  Discharge Recommendations: Outpatient PT;24 hour supervision or assist  PT Equipment Recommendations  Equipment Needed: Yes  Mobility Devices: Walker  Walker: Rolling  Other: RW    Goals  Patient Goals   Patient Goals : \"to go home stronger\"  Short Term Goals  Time Frame for Short Term Goals: 4/29  Short Term Goal 1: pt will perform STS with RW and supervision  Short Term Goal 2: pt will perform supine <> sit with MOD A - MET 4/25, CGA with bed rails, progress to SBA  Short Term Goal 3: pt will ambulate 50 ft with RW and CGA - MET 4/25, progress to 100 ft with RW and CGA  Short Term Goal 4: pt will transfer bed to chair with RW and supervision  Short Term Goal 5: pt will complete 2 steps with supervision and 1 HR  Long Term Goals  Time Frame for Long Term Goals : 5/2  Long Term Goal 1: pt will perform STS MOD I  Long Term Goal 2: pt will perform supine<> sit with MIN A - MET 4/25, progress to mod-ind  Long Term Goal 3: pt will ambulate 150 ft with RW and CGA  Long Term Goal 4: pt will transfer bed to chair with RW MOD I  Long Term Goal 5: pt will complete 4 steps with supervision and no HR    PLAN OF CARE/SAFETY  Physical Therapy Plan  General Plan: 5-7 times per week  Therapy Duration: 10 Days  Current Treatment Recommendations: Strengthening;ROM;Balance training;Functional mobility training;Gait training;Equipment evaluation, education, & procurement;Therapeutic activities;Home exercise program;Safety education &

## 2024-04-27 NOTE — PROGRESS NOTES
[facility]: ACUTE REHAB UNIT  SPEECH-LANGUAGE PATHOLOGY      [x] Daily  [] Weekly Care Conference Note  [] Discharge    Patient:Karishma Lee      :1960  MRN:[id number]  Rehab Dx/Hx: SDH (subdural hematoma) (HCC) [S06.5XAA]    Precautions: falls  Home situation: lives with . Indep with med management.  manages finances.  ST Dx: [] Aphasia  [] Dysarthria  [] Apraxia   [] Oropharyngeal dysphagia [x] Cognitive Impairment  [] Other:   Date of Admit: 2024  Room #: 0168/0168-01    Current functional status (updated daily):         Pt being seen for : [] Speech/Language Treatment  [] Dysphagia Treatment [x] Cognitive Treatment  [] Other:  Communication: [x]WFL  [] Aphasia  [] Dysarthria  [] Apraxia  [] Pragmatic Impairment [] Non-verbal  [] Hearing Loss  [] Other:   Cognition: [] WFL  [x] Mild  [] Moderate  [] Severe [] Unable to Assess  [] Other:  Memory: [] WFL  [x] Mild  [] Moderate  [] Severe [] Unable to Assess  [] Other:  Behavior: [x] Alert  [x] Cooperative  [x]  Pleasant  [] Confused  [] Agitated  [] Uncooperative  [] Distractible [] Motivated  [] Self-Limiting [] Anxious  [] Other:  Endurance:  [x] Adequate for participation in SLP sessions  [] Reduced overall  [] Lethargic  [] Other:  Safety: [x] No concerns at this time  [] Reduced insight into deficits  []  Reduced safety awareness [] Not following call light procedures  [] Unable to Assess  [] Other:    Current Diet Order:ADULT DIET; Regular; 4 carb choices (60 gm/meal)  ADULT ORAL NUTRITION SUPPLEMENT; Breakfast, Dinner; Diabetic Oral Supplement, thi  liquids  Swallowing Precautions: general aspiration and swallow precautions         Date: 2024      Tx session 1  [id number] Tx session 2  All tx needs met in session 1    Total Timed Code Min 60    Total Treatment Minutes 60    Individual Treatment Minutes 60    Group Treatment Minutes 0    Co-Treat Minutes 0    Variance/Reason:  N/A    Pain Denies    Pain Intervention [] RN  reasoning to organize errands into a written schedule with 100% acc, accounting for locations and time constraints. Mild impairment in working memory during the task, for which she compensated with re-reading and self-editing. Visual attention/reasoning also WFL for trail-making task including attention to both sequence/alternation. Pt verbalized good insight into her need to \"pay close attention\" within cognitively challenging tasks. Pt functionally used grouping strategy to recall lists of 6 words (expanding on 5 word recall task from yesterday) 100% with minimal cues to create subgroups of only 2-3 words. Pt with difficulty figuring simple math, though perceives this is close to her baseline. Overall, pt continues to demonstrate progress towards goals and remains strongly motivated to improve.    Plan: Continue as per plan of care.      Additional Information:     Barriers toward progress: inability to manage medications, will need assist/supervision; inability to manage finances, will need assist/supervision; severity of cognition (mild) with reduced insight negatively impacting safety/independence  Discharge recommendations:  [] Home independently  [] Home with assistance []  24 hour supervision  [] ECF [x] Other: See PT/OT recs  Continued Tx Upon Discharge: ? [] Yes [] No [x] TBD based on progress while on ARU [] Vital Stim indicated [] Other:   Estimated discharge date: TBD    Interventions used this date:  [] Speech/Language Treatment  [] Instruction in HEP [] Group [] Dysphagia Treatment [x] Cognitive Treatment   [] Other:      Total Time Breakdown / Charges    Time in Time out Total Time / units   Cognitive Tx 0930 1030 60 mins / 4 units    Speech Tx -- -- --   Dysphagia Tx -- -- --       Electronically Signed by     Rupali Smith MS CCC-SLP  Speech Language Pathologist

## 2024-04-27 NOTE — PLAN OF CARE
Problem: Skin/Tissue Integrity - Adult  Goal: Skin integrity remains intact  4/26/2024 1416 by Jessenia Dior, RN  Outcome: Progressing

## 2024-04-27 NOTE — PROGRESS NOTES
Occupational Therapy  Facility/Department: [facility]  Rehabilitation Occupational Therapy Daily Treatment Note    Date: 24  Patient Name: Karishma Lee       Room: 0168/0168-01  MRN: [id number]  Account: [id number]   : 1960  (63 y.o.) Gender: female                    Past Medical History:  has a past medical history of ADHD (attention deficit hyperactivity disorder), Anxiety, Bipolar disorder (HCC), Depression, Diverticulitis, DJD (degenerative joint disease), Fatty liver, GERD (gastroesophageal reflux disease), Hyperlipidemia, Hypertension, Irritable bowel syndrome, Mood disorder (HCC), Narcotic addiction (HCC), Obesity, ANGEL (obstructive sleep apnea), Primary localized osteoarthritis of right knee, Restless leg syndrome, Status post total right knee replacement, Type 2 diabetes mellitus without complication (HCC), Type II or unspecified type diabetes mellitus without mention of complication, not stated as uncontrolled, and Vision impairment.  Past Surgical History:   has a past surgical history that includes Total abdominal hysterectomy w/ bilateral salpingoophorectomy (2010); Knee arthroscopy (Right); Plantar fascia surgery; Appendectomy; Cholecystectomy (); Cystoscopy (2010); Cystoscopy (2010); Colonoscopy (2004); Knee arthroscopy (Left, 10/2013); Total knee arthroplasty (Right, 2019); joint replacement; knee surgery; Hysterectomy; Knee Arthroplasty; Hysterectomy, total abdominal; and Upper gastrointestinal endoscopy.    Restrictions  Restrictions/Precautions: Fall Risk, Up as Tolerated  Other position/activity restrictions: cornelius  Required Braces or Orthoses?: No    Subjective  Subjective: Pt up in recliner, agreeable to OT treatment. Pt reports pain in ribs, difficulty sleeping overnight. /82, HR 97, SPO2 92%  Restrictions/Precautions: Fall Risk;Up as Tolerated       Objective  Cognition  Overall Cognitive Status: Exceptions  Attention Span: Difficulty dividing  attention  Orientation  Overall Orientation Status: Within Functional Limits         ADL  Grooming/Oral Hygiene  Assistance Level: Stand by assist  Skilled Clinical Factors: to comb hair, wash face, and brush teeth in stance; 2nd trial for handwashing later in session  Toilet Transfers  Technique: Stand step  Equipment: Raised toilet seat with arms  Additional Factors: Verbal cues  Assistance Level: Stand by assist          Functional Mobility  Device: Rolling walker  Activity: To/From bathroom  Assistance Level: Stand by assist  Sit to Supine  Assistance Level: Moderate assistance  Skilled Clinical Factors: logroll technique, A for trunk, bed flat and rails removed  Supine to Sit  Assistance Level: Moderate assistance  Skilled Clinical Factors: logroll technique, A for trunk, bed flat and rails removed  Transfers  Surface: From bed;To bed;To chair with arms;From chair with arms  Additional Factors: Hand placement cues;Increased time to complete;Verbal cues  Device: Walker  Sit to Stand  Assistance Level: Stand by assist  Stand to Sit  Assistance Level: Stand by assist  Skilled Clinical Factors: VCs for hand placement     Pt participated in tabletop game with focus on increased standing endurance and improved balance. Pt able to stand 1st trial 2 mins 30 secs, 2nd trail 5 mins 10 secs, after that pt requesting seated activity due to pain.     OT Exercises  Pt performed x15-20 reps of the following BUE exercises:  []Grasp/release  [x]Wrist flexion/extension with 2# weight  [x]Forearm pronation/supination with 2# weight  [x]Elbow flexion/extension with 2# weight  [x]Shoulder flexion/extension  []Chest press  []Shoulder horizontal Abduction/Adduction  [x]Scapular elevation/retraction   LUE shoulder movement limited due to increased pain in rib cage.         Assessment  Assessment  Assessment: Pt tolerated session well overall, limited throughout by pain in rib cage. Pt reports concern for bed mobility upon discharge,  simulated home setup with rails removed and bed heightened, due to pain bedrail replaced and utilized for remaining trials and pt requiring grossly mod A to complete, pt reports spouse will be purchasing bed rail. Pt requires grossly SBA for functional transfers, occasional VCs for safety. Pt fatigues relatively quickly with standing level activities and requires seated rest breaks. Pt making progress toward goals, continue OT per POC.   Activity Tolerance: Patient limited by pain;Patient limited by endurance;Patient limited by fatigue  Discharge Recommendations: 24 hour supervision or assist;Outpatient OT  Safety Devices  Safety Devices in place: Yes  Type of devices: Left in chair;Chair alarm in place;Call light within reach;Nurse notified;Gait belt    Patient Education  Education  Education Given To: Patient  Education Provided: Role of Therapy;Plan of Care;Home Exercise Program;Precautions;Safety;ADL Function;Mobility Training;Transfer Training;Equipment;DME/Home Modifications;Fall Prevention Strategies  Education Method: Demonstration;Verbal  Barriers to Learning: Cognition  Education Outcome: Verbalized understanding;Demonstrated understanding;Continued education needed    Plan  Occupational Therapy Plan  Times Per Week: 5-7x/wk    Goals  Short Term Goals  Time Frame for Short Term Goals: 5 days (4/27/24)  Short Term Goal 1: Pt will perform toilet transfer with SBA and LRAD- GOAL MET, pt demos SBA with RTS with arms 4/27/24  Short Term Goal 3: Pt will perform 1-2 grooming tasks in stance at sink with SBA for balance-- GOAL MET, pt demos 3 grooming tasks in stance SBA 4/27/24  Long Term Goals  Time Frame for Long Term Goals : 10 days (5/2/24) - progressing 4/27      Therapy Time   Individual Concurrent Group Co-treatment   Time In 1100         Time Out 1200         Minutes 60         Timed Code Treatment Minutes: 60 Minutes       JOSÉ LUIS Bradley/CAILIN

## 2024-04-27 NOTE — PLAN OF CARE
Problem: Safety - Adult  Goal: Free from fall injury  Outcome: Progressing  Flowsheets (Taken 4/27/2024 1831)  Free From Fall Injury:   Instruct family/caregiver on patient safety   Based on caregiver fall risk screen, instruct family/caregiver to ask for assistance with transferring infant if caregiver noted to have fall risk factors     Problem: Pain  Goal: Verbalizes/displays adequate comfort level or baseline comfort level  Outcome: Progressing  Flowsheets (Taken 4/27/2024 1831)  Verbalizes/displays adequate comfort level or baseline comfort level:   Encourage patient to monitor pain and request assistance   Administer analgesics based on type and severity of pain and evaluate response   Consider cultural and social influences on pain and pain management   Assess pain using appropriate pain scale   Implement non-pharmacological measures as appropriate and evaluate response   Notify Licensed Independent Practitioner if interventions unsuccessful or patient reports new pain

## 2024-04-28 VITALS
BODY MASS INDEX: 38.79 KG/M2 | OXYGEN SATURATION: 93 % | TEMPERATURE: 98.2 F | SYSTOLIC BLOOD PRESSURE: 134 MMHG | HEART RATE: 93 BPM | RESPIRATION RATE: 18 BRPM | DIASTOLIC BLOOD PRESSURE: 84 MMHG | HEIGHT: 65 IN | WEIGHT: 232.8 LBS

## 2024-04-28 LAB
GLUCOSE BLD-MCNC: 152 MG/DL (ref 70–99)
GLUCOSE BLD-MCNC: 175 MG/DL (ref 70–99)
GLUCOSE BLD-MCNC: 188 MG/DL (ref 70–99)
GLUCOSE BLD-MCNC: 193 MG/DL (ref 70–99)
PERFORMED ON: ABNORMAL

## 2024-04-28 PROCEDURE — 6360000002 HC RX W HCPCS: Performed by: STUDENT IN AN ORGANIZED HEALTH CARE EDUCATION/TRAINING PROGRAM

## 2024-04-28 PROCEDURE — 51702 INSERT TEMP BLADDER CATH: CPT

## 2024-04-28 PROCEDURE — 6370000000 HC RX 637 (ALT 250 FOR IP): Performed by: STUDENT IN AN ORGANIZED HEALTH CARE EDUCATION/TRAINING PROGRAM

## 2024-04-28 PROCEDURE — 1280000000 HC REHAB R&B

## 2024-04-28 RX ADMIN — METOPROLOL SUCCINATE 50 MG: 50 TABLET, EXTENDED RELEASE ORAL at 08:29

## 2024-04-28 RX ADMIN — TAMSULOSIN HYDROCHLORIDE 0.4 MG: 0.4 CAPSULE ORAL at 08:28

## 2024-04-28 RX ADMIN — ACETAMINOPHEN 1000 MG: 500 TABLET ORAL at 21:07

## 2024-04-28 RX ADMIN — ATORVASTATIN CALCIUM 10 MG: 10 TABLET, FILM COATED ORAL at 08:28

## 2024-04-28 RX ADMIN — MIRTAZAPINE 7.5 MG: 15 TABLET, FILM COATED ORAL at 21:07

## 2024-04-28 RX ADMIN — METHOCARBAMOL 1000 MG: 500 TABLET ORAL at 13:53

## 2024-04-28 RX ADMIN — INSULIN LISPRO 3 UNITS: 100 INJECTION, SOLUTION INTRAVENOUS; SUBCUTANEOUS at 17:01

## 2024-04-28 RX ADMIN — GABAPENTIN 400 MG: 400 CAPSULE ORAL at 13:53

## 2024-04-28 RX ADMIN — POLYETHYLENE GLYCOL 3350 17 G: 17 POWDER, FOR SOLUTION ORAL at 08:29

## 2024-04-28 RX ADMIN — ACETAMINOPHEN 1000 MG: 500 TABLET ORAL at 13:53

## 2024-04-28 RX ADMIN — OXYCODONE 10 MG: 5 TABLET ORAL at 04:37

## 2024-04-28 RX ADMIN — ACETAMINOPHEN 1000 MG: 500 TABLET ORAL at 04:37

## 2024-04-28 RX ADMIN — METHOCARBAMOL 1000 MG: 500 TABLET ORAL at 21:08

## 2024-04-28 RX ADMIN — ENOXAPARIN SODIUM 30 MG: 100 INJECTION SUBCUTANEOUS at 21:07

## 2024-04-28 RX ADMIN — METFORMIN HYDROCHLORIDE 500 MG: 500 TABLET ORAL at 09:48

## 2024-04-28 RX ADMIN — GABAPENTIN 400 MG: 400 CAPSULE ORAL at 21:07

## 2024-04-28 RX ADMIN — LAMOTRIGINE 100 MG: 100 TABLET ORAL at 21:07

## 2024-04-28 RX ADMIN — INSULIN LISPRO 3 UNITS: 100 INJECTION, SOLUTION INTRAVENOUS; SUBCUTANEOUS at 08:29

## 2024-04-28 RX ADMIN — OXYCODONE 10 MG: 5 TABLET ORAL at 09:48

## 2024-04-28 RX ADMIN — Medication 5 MG: at 21:07

## 2024-04-28 RX ADMIN — METFORMIN HYDROCHLORIDE 500 MG: 500 TABLET ORAL at 17:01

## 2024-04-28 RX ADMIN — METHOCARBAMOL 1000 MG: 500 TABLET ORAL at 08:28

## 2024-04-28 RX ADMIN — METHOCARBAMOL 1000 MG: 500 TABLET ORAL at 17:01

## 2024-04-28 RX ADMIN — ROPINIROLE HYDROCHLORIDE 0.5 MG: 0.5 TABLET, FILM COATED ORAL at 21:07

## 2024-04-28 RX ADMIN — ESCITALOPRAM OXALATE 20 MG: 10 TABLET ORAL at 08:28

## 2024-04-28 RX ADMIN — GABAPENTIN 400 MG: 400 CAPSULE ORAL at 08:28

## 2024-04-28 RX ADMIN — INSULIN LISPRO 3 UNITS: 100 INJECTION, SOLUTION INTRAVENOUS; SUBCUTANEOUS at 12:04

## 2024-04-28 RX ADMIN — ENOXAPARIN SODIUM 30 MG: 100 INJECTION SUBCUTANEOUS at 08:28

## 2024-04-28 RX ADMIN — OXYCODONE 10 MG: 5 TABLET ORAL at 21:15

## 2024-04-28 ASSESSMENT — PAIN DESCRIPTION - LOCATION
LOCATION: CHEST;RIB CAGE;ARM
LOCATION: RIB CAGE;ARM

## 2024-04-28 ASSESSMENT — PAIN DESCRIPTION - DESCRIPTORS
DESCRIPTORS: ACHING
DESCRIPTORS: ACHING

## 2024-04-28 ASSESSMENT — PAIN - FUNCTIONAL ASSESSMENT: PAIN_FUNCTIONAL_ASSESSMENT: ACTIVITIES ARE NOT PREVENTED

## 2024-04-28 ASSESSMENT — PAIN DESCRIPTION - PAIN TYPE: TYPE: ACUTE PAIN

## 2024-04-28 ASSESSMENT — PAIN SCALES - GENERAL
PAINLEVEL_OUTOF10: 6
PAINLEVEL_OUTOF10: 6
PAINLEVEL_OUTOF10: 7

## 2024-04-28 ASSESSMENT — PAIN DESCRIPTION - ORIENTATION
ORIENTATION: RIGHT
ORIENTATION: RIGHT;LEFT

## 2024-04-28 NOTE — PLAN OF CARE
Problem: Safety - Adult  Goal: Free from fall injury  Outcome: Progressing  Flowsheets (Taken 4/28/2024 1716)  Free From Fall Injury:   Instruct family/caregiver on patient safety   Based on caregiver fall risk screen, instruct family/caregiver to ask for assistance with transferring infant if caregiver noted to have fall risk factors

## 2024-04-28 NOTE — PROGRESS NOTES
Karishma Lee  4/27/2024  [id number]    Chief Complaint: SDH (subdural hematoma) (HCC)    Subjective:   States some increased pain overnight, with needing medication more frequently. Discussed pain radiating down arms.    ROS: denies f/c, n/v, cp, sob    Objective:  Patient Vitals for the past 24 hrs:   BP Temp Temp src Pulse Resp SpO2   04/27/24 2118 126/78 -- Oral 93 18 91 %   04/27/24 0842 138/82 98.5 °F (36.9 °C) Oral 97 18 92 %   04/27/24 0054 -- -- -- -- 17 --     Gen: No distress, pleasant. Supine in bed  HEENT: Normocephalic, atraumatic.   CV: extremities well perfused  Resp: No respiratory distress.   Abd: Soft, mildly distended  Ext: No edema.   Neuro: Alert, oriented, appropriately interactive.   Psych: appropriate mood and affect    Wt Readings from Last 3 Encounters:   04/24/24 105.6 kg (232 lb 12.8 oz)   02/06/24 92.9 kg (204 lb 12.8 oz)   02/01/24 93 kg (205 lb)       Laboratory data:   Lab Results   Component Value Date    WBC 9.4 04/26/2024    HGB 11.3 (L) 04/26/2024    HCT 34.0 (L) 04/26/2024    MCV 93.1 04/26/2024     04/26/2024       Lab Results   Component Value Date/Time     04/26/2024 07:48 AM    K 4.0 04/26/2024 07:48 AM     04/26/2024 07:48 AM    CO2 26 04/26/2024 07:48 AM    BUN 11 04/26/2024 07:48 AM    CREATININE 0.7 04/26/2024 07:48 AM    GLUCOSE 180 04/26/2024 07:48 AM    CALCIUM 8.8 04/26/2024 07:48 AM        Therapy progress:  Physical therapy:  Bed Mobility:     Sit>supine:  Assistance Level: Contact guard assist  Skilled Clinical Factors: HOB flat with bed rails  Supine>sit:  Assistance Level: Moderate assistance  Skilled Clinical Factors: HOB elevated, use of bedrail, toward L, assist at trunk  Transfers:  Surface: From bed, Wheelchair  Additional Factors: Increased time to complete, Verbal cues, Set-up, Hand placement cues  Device: Walker (RW)  Sit>stand:  Assistance Level: Stand by assist  Skilled Clinical Factors: up to RW  Stand>sit:  Assistance Level: Stand  by assist  Skilled Clinical Factors: with RW  Bed<>chair     Stand Pivot:     Lateral transfer:     Car transfer:  Assistance Level: Stand by assist  Skilled Clinical Factors: with RW, increased time  Ambulation:  Surface: Level surface  Device: Rolling walker  Distance: 150 ft, 190 ft, 80 ft  Activity: Within Unit, Within Room  Activity Comments: Pt ambulates with slow linda, short step length, and minimal clearance without LOB.  Additional Factors: Set-up  Assistance Level: Supervision  Gait Deviations: Slow linda, Decreased step length bilateral, Narrow base of support, Decreased heel strike right, Decreased heel strike left  Stairs:  Stair Height: 6''  Device: Bilateral handrails  Number of Stairs: 2x 4  Additional Factors: Non-reciprocal going down, Non-reciprocal going up, Increased time to complete  Assistance Level: Contact guard assist  Skilled Clinical Factors: pt descents backward on first set, descends facing forward on second set, cues for safe sequencing. pt limited by fatigue.  Curb:     Wheelchair:  Surface: Level surface  Device: Standard wheelchair, Standard Cushion  Additional Factors: Set-up, Verbal cues, Increased time to complete  Assistance Level for Propulsion: Stand by assist  Propulsion Method: Left lower extremity, Right lower extremity  Propulsion Distance: 50 ft    Occupational therapy:   Feeding     Grooming/Oral Hygiene  Assistance Level: Stand by assist  Skilled Clinical Factors: to comb hair, wash face, and brush teeth in stance; 2nd trial for handwashing later in session  UE Bathing  Assistance Level: Stand by assist  Skilled Clinical Factors: seated on shower chair  LE Bathing  Equipment Provided: Long-handled sponge  Assistance Level: Moderate assistance  Skilled Clinical Factors: use of LH sponge to wash BLE; assist to dry BLE and buttocks d/t fatigue  UE Dressing  Assistance Level: Moderate assistance  Skilled Clinical Factors: assist to thread BUE, pt able to don over head;

## 2024-04-29 ENCOUNTER — APPOINTMENT (OUTPATIENT)
Dept: CT IMAGING | Age: 64
DRG: 950 | End: 2024-04-29
Attending: STUDENT IN AN ORGANIZED HEALTH CARE EDUCATION/TRAINING PROGRAM
Payer: COMMERCIAL

## 2024-04-29 ENCOUNTER — APPOINTMENT (OUTPATIENT)
Dept: GENERAL RADIOLOGY | Age: 64
DRG: 950 | End: 2024-04-29
Attending: STUDENT IN AN ORGANIZED HEALTH CARE EDUCATION/TRAINING PROGRAM
Payer: COMMERCIAL

## 2024-04-29 LAB
ANION GAP SERPL CALCULATED.3IONS-SCNC: 10 MMOL/L (ref 3–16)
BASOPHILS # BLD: 0.1 K/UL (ref 0–0.2)
BASOPHILS NFR BLD: 0.7 %
BUN SERPL-MCNC: 14 MG/DL (ref 7–20)
CALCIUM SERPL-MCNC: 9 MG/DL (ref 8.3–10.6)
CHLORIDE SERPL-SCNC: 100 MMOL/L (ref 99–110)
CO2 SERPL-SCNC: 28 MMOL/L (ref 21–32)
CREAT SERPL-MCNC: 0.7 MG/DL (ref 0.6–1.2)
DEPRECATED RDW RBC AUTO: 13.7 % (ref 12.4–15.4)
EKG ATRIAL RATE: 87 BPM
EKG DIAGNOSIS: NORMAL
EKG P AXIS: 21 DEGREES
EKG P-R INTERVAL: 146 MS
EKG Q-T INTERVAL: 378 MS
EKG QRS DURATION: 86 MS
EKG QTC CALCULATION (BAZETT): 454 MS
EKG R AXIS: 5 DEGREES
EKG T AXIS: 36 DEGREES
EKG VENTRICULAR RATE: 87 BPM
EOSINOPHIL # BLD: 0.6 K/UL (ref 0–0.6)
EOSINOPHIL NFR BLD: 6.8 %
GFR SERPLBLD CREATININE-BSD FMLA CKD-EPI: >90 ML/MIN/{1.73_M2}
GLUCOSE BLD-MCNC: 144 MG/DL (ref 70–99)
GLUCOSE BLD-MCNC: 215 MG/DL (ref 70–99)
GLUCOSE BLD-MCNC: 244 MG/DL (ref 70–99)
GLUCOSE BLD-MCNC: 246 MG/DL (ref 70–99)
GLUCOSE SERPL-MCNC: 168 MG/DL (ref 70–99)
HCT VFR BLD AUTO: 35 % (ref 36–48)
HGB BLD-MCNC: 11.6 G/DL (ref 12–16)
LYMPHOCYTES # BLD: 2.3 K/UL (ref 1–5.1)
LYMPHOCYTES NFR BLD: 24.9 %
MCH RBC QN AUTO: 30.9 PG (ref 26–34)
MCHC RBC AUTO-ENTMCNC: 33 G/DL (ref 31–36)
MCV RBC AUTO: 93.4 FL (ref 80–100)
MONOCYTES # BLD: 0.6 K/UL (ref 0–1.3)
MONOCYTES NFR BLD: 6.5 %
NEUTROPHILS # BLD: 5.6 K/UL (ref 1.7–7.7)
NEUTROPHILS NFR BLD: 61.1 %
PERFORMED ON: ABNORMAL
PLATELET # BLD AUTO: 434 K/UL (ref 135–450)
PMV BLD AUTO: 8.2 FL (ref 5–10.5)
POTASSIUM SERPL-SCNC: 4.5 MMOL/L (ref 3.5–5.1)
RBC # BLD AUTO: 3.75 M/UL (ref 4–5.2)
SODIUM SERPL-SCNC: 138 MMOL/L (ref 136–145)
TROPONIN, HIGH SENSITIVITY: 18 NG/L (ref 0–14)
TROPONIN, HIGH SENSITIVITY: 20 NG/L (ref 0–14)
WBC # BLD AUTO: 9.2 K/UL (ref 4–11)

## 2024-04-29 PROCEDURE — 85025 COMPLETE CBC W/AUTO DIFF WBC: CPT

## 2024-04-29 PROCEDURE — 84484 ASSAY OF TROPONIN QUANT: CPT

## 2024-04-29 PROCEDURE — 97116 GAIT TRAINING THERAPY: CPT

## 2024-04-29 PROCEDURE — 6370000000 HC RX 637 (ALT 250 FOR IP): Performed by: STUDENT IN AN ORGANIZED HEALTH CARE EDUCATION/TRAINING PROGRAM

## 2024-04-29 PROCEDURE — 97129 THER IVNTJ 1ST 15 MIN: CPT

## 2024-04-29 PROCEDURE — 97130 THER IVNTJ EA ADDL 15 MIN: CPT

## 2024-04-29 PROCEDURE — 6360000002 HC RX W HCPCS: Performed by: STUDENT IN AN ORGANIZED HEALTH CARE EDUCATION/TRAINING PROGRAM

## 2024-04-29 PROCEDURE — 97112 NEUROMUSCULAR REEDUCATION: CPT

## 2024-04-29 PROCEDURE — 1280000000 HC REHAB R&B

## 2024-04-29 PROCEDURE — 71045 X-RAY EXAM CHEST 1 VIEW: CPT

## 2024-04-29 PROCEDURE — 80048 BASIC METABOLIC PNL TOTAL CA: CPT

## 2024-04-29 PROCEDURE — 97110 THERAPEUTIC EXERCISES: CPT

## 2024-04-29 PROCEDURE — 97535 SELF CARE MNGMENT TRAINING: CPT

## 2024-04-29 PROCEDURE — 70450 CT HEAD/BRAIN W/O DYE: CPT

## 2024-04-29 PROCEDURE — 97530 THERAPEUTIC ACTIVITIES: CPT

## 2024-04-29 PROCEDURE — 93010 ELECTROCARDIOGRAM REPORT: CPT | Performed by: INTERNAL MEDICINE

## 2024-04-29 PROCEDURE — 36415 COLL VENOUS BLD VENIPUNCTURE: CPT

## 2024-04-29 PROCEDURE — 93005 ELECTROCARDIOGRAM TRACING: CPT | Performed by: STUDENT IN AN ORGANIZED HEALTH CARE EDUCATION/TRAINING PROGRAM

## 2024-04-29 RX ORDER — IPRATROPIUM BROMIDE AND ALBUTEROL SULFATE 2.5; .5 MG/3ML; MG/3ML
1 SOLUTION RESPIRATORY (INHALATION)
Status: DISCONTINUED | OUTPATIENT
Start: 2024-04-29 | End: 2024-04-29

## 2024-04-29 RX ORDER — CALCIUM CARBONATE 500 MG/1
500 TABLET, CHEWABLE ORAL 3 TIMES DAILY PRN
Status: DISCONTINUED | OUTPATIENT
Start: 2024-04-29 | End: 2024-05-06 | Stop reason: HOSPADM

## 2024-04-29 RX ORDER — IPRATROPIUM BROMIDE AND ALBUTEROL SULFATE 2.5; .5 MG/3ML; MG/3ML
1 SOLUTION RESPIRATORY (INHALATION) EVERY 4 HOURS PRN
Status: DISCONTINUED | OUTPATIENT
Start: 2024-04-29 | End: 2024-05-06 | Stop reason: HOSPADM

## 2024-04-29 RX ORDER — PANTOPRAZOLE SODIUM 40 MG/1
40 TABLET, DELAYED RELEASE ORAL
Status: DISCONTINUED | OUTPATIENT
Start: 2024-04-29 | End: 2024-05-06 | Stop reason: HOSPADM

## 2024-04-29 RX ORDER — AMOXICILLIN AND CLAVULANATE POTASSIUM 875; 125 MG/1; MG/1
1 TABLET, FILM COATED ORAL EVERY 12 HOURS SCHEDULED
Status: COMPLETED | OUTPATIENT
Start: 2024-04-29 | End: 2024-05-05

## 2024-04-29 RX ADMIN — METHOCARBAMOL 1000 MG: 500 TABLET ORAL at 17:06

## 2024-04-29 RX ADMIN — GABAPENTIN 400 MG: 400 CAPSULE ORAL at 20:44

## 2024-04-29 RX ADMIN — METHOCARBAMOL 1000 MG: 500 TABLET ORAL at 20:44

## 2024-04-29 RX ADMIN — ATORVASTATIN CALCIUM 10 MG: 10 TABLET, FILM COATED ORAL at 08:23

## 2024-04-29 RX ADMIN — INSULIN LISPRO 3 UNITS: 100 INJECTION, SOLUTION INTRAVENOUS; SUBCUTANEOUS at 17:06

## 2024-04-29 RX ADMIN — METHOCARBAMOL 1000 MG: 500 TABLET ORAL at 11:53

## 2024-04-29 RX ADMIN — ROPINIROLE HYDROCHLORIDE 0.5 MG: 0.5 TABLET, FILM COATED ORAL at 20:44

## 2024-04-29 RX ADMIN — AMOXICILLIN AND CLAVULANATE POTASSIUM 1 TABLET: 875; 125 TABLET, FILM COATED ORAL at 11:53

## 2024-04-29 RX ADMIN — ACETAMINOPHEN 1000 MG: 500 TABLET ORAL at 20:44

## 2024-04-29 RX ADMIN — INSULIN LISPRO 3 UNITS: 100 INJECTION, SOLUTION INTRAVENOUS; SUBCUTANEOUS at 11:52

## 2024-04-29 RX ADMIN — METHOCARBAMOL 1000 MG: 500 TABLET ORAL at 08:22

## 2024-04-29 RX ADMIN — ENOXAPARIN SODIUM 30 MG: 100 INJECTION SUBCUTANEOUS at 08:23

## 2024-04-29 RX ADMIN — SENNOSIDES AND DOCUSATE SODIUM 1 TABLET: 50; 8.6 TABLET ORAL at 20:44

## 2024-04-29 RX ADMIN — PANTOPRAZOLE SODIUM 40 MG: 40 TABLET, DELAYED RELEASE ORAL at 11:55

## 2024-04-29 RX ADMIN — ESCITALOPRAM OXALATE 20 MG: 10 TABLET ORAL at 08:22

## 2024-04-29 RX ADMIN — ACETAMINOPHEN 1000 MG: 500 TABLET ORAL at 05:54

## 2024-04-29 RX ADMIN — LAMOTRIGINE 100 MG: 100 TABLET ORAL at 20:44

## 2024-04-29 RX ADMIN — GABAPENTIN 400 MG: 400 CAPSULE ORAL at 13:03

## 2024-04-29 RX ADMIN — INSULIN LISPRO 2 UNITS: 100 INJECTION, SOLUTION INTRAVENOUS; SUBCUTANEOUS at 11:52

## 2024-04-29 RX ADMIN — MIRTAZAPINE 7.5 MG: 15 TABLET, FILM COATED ORAL at 20:44

## 2024-04-29 RX ADMIN — Medication 5 MG: at 20:44

## 2024-04-29 RX ADMIN — SENNOSIDES AND DOCUSATE SODIUM 1 TABLET: 50; 8.6 TABLET ORAL at 08:22

## 2024-04-29 RX ADMIN — METOPROLOL SUCCINATE 50 MG: 50 TABLET, EXTENDED RELEASE ORAL at 08:23

## 2024-04-29 RX ADMIN — ACETAMINOPHEN 1000 MG: 500 TABLET ORAL at 13:03

## 2024-04-29 RX ADMIN — METFORMIN HYDROCHLORIDE 1000 MG: 500 TABLET ORAL at 17:06

## 2024-04-29 RX ADMIN — METFORMIN HYDROCHLORIDE 500 MG: 500 TABLET ORAL at 08:23

## 2024-04-29 RX ADMIN — OXYCODONE 10 MG: 5 TABLET ORAL at 20:44

## 2024-04-29 RX ADMIN — INSULIN LISPRO 3 UNITS: 100 INJECTION, SOLUTION INTRAVENOUS; SUBCUTANEOUS at 08:23

## 2024-04-29 RX ADMIN — TAMSULOSIN HYDROCHLORIDE 0.4 MG: 0.4 CAPSULE ORAL at 08:23

## 2024-04-29 RX ADMIN — AMOXICILLIN AND CLAVULANATE POTASSIUM 1 TABLET: 875; 125 TABLET, FILM COATED ORAL at 20:44

## 2024-04-29 RX ADMIN — ENOXAPARIN SODIUM 30 MG: 100 INJECTION SUBCUTANEOUS at 20:44

## 2024-04-29 RX ADMIN — INSULIN LISPRO 2 UNITS: 100 INJECTION, SOLUTION INTRAVENOUS; SUBCUTANEOUS at 17:06

## 2024-04-29 RX ADMIN — GABAPENTIN 400 MG: 400 CAPSULE ORAL at 08:22

## 2024-04-29 ASSESSMENT — PAIN DESCRIPTION - ORIENTATION: ORIENTATION: MID

## 2024-04-29 ASSESSMENT — PAIN DESCRIPTION - DESCRIPTORS: DESCRIPTORS: TIGHTNESS

## 2024-04-29 ASSESSMENT — PAIN DESCRIPTION - LOCATION
LOCATION: CHEST
LOCATION: RIB CAGE

## 2024-04-29 ASSESSMENT — PAIN DESCRIPTION - PAIN TYPE: TYPE: ACUTE PAIN

## 2024-04-29 ASSESSMENT — PAIN SCALES - GENERAL
PAINLEVEL_OUTOF10: 9
PAINLEVEL_OUTOF10: 8

## 2024-04-29 NOTE — PROGRESS NOTES
Physical Therapy  Facility/Department: [facility]  Rehabilitation Physical Therapy Treatment Note    NAME: Karishma Lee  : 1960 (63 y.o.)  MRN: [id number]  CODE STATUS: Full Code    Date of Service: 24       Restrictions:  Restrictions/Precautions: Fall Risk, Up as Tolerated  Position Activity Restriction  Other position/activity restrictions: ashli     SUBJECTIVE  Subjective  Subjective: pt motivated to participate  Pain: L rib pain, 6/10, encouraged pt to exhale with exertion with some relief        Post Treatment Pain Screening : not formally rated, stated comfortable in bed          OBJECTIVE  Cognition  Overall Cognitive Status: Exceptions  Arousal/Alertness: Appropriate responses to stimuli  Following Commands: Follows multistep commands consistently  Attention Span: Attends with cues to redirect  Memory: Decreased recall of recent events  Safety Judgement: Decreased awareness of need for safety  Problem Solving: Assistance required to identify errors made  Insights: Fully aware of deficits  Initiation: Does not require cues  Sequencing: Requires cues for some  Orientation  Overall Orientation Status: Within Functional Limits  Orientation Level: Oriented X4    Functional Mobility  Bed Mobility  Overall Assistance Level: Moderate Assistance (attempted to log roll to L side to exit bed, pt limited by pain, pt able to roll to R with cga and sit with Mod A from flat bed)  Bridging  Assistance Level: Independent  Sit to Supine  Assistance Level: Stand by assist  Skilled Clinical Factors: HOB flat with bed rails  Supine to Sit  Assistance Level: Moderate assistance  Skilled Clinical Factors: flat bed, log roll to R side  Scooting  Assistance Level: Stand by assist  Skilled Clinical Factors: EOB and in chair  Balance  Sitting Balance: Stand by assistance  Standing Balance: Contact guard assistance  Standing Balance  Time: 30 sec  Activity: standing with UE support in // bars with cga  Comments: min

## 2024-04-29 NOTE — PROGRESS NOTES
(RW)  Sit>stand:  Assistance Level: Stand by assist  Skilled Clinical Factors: up to RW  Stand>sit:  Assistance Level: Stand by assist  Skilled Clinical Factors: with RW  Bed<>chair     Stand Pivot:     Lateral transfer:     Car transfer:  Assistance Level: Stand by assist  Skilled Clinical Factors: with RW, increased time  Ambulation:  Surface: Level surface  Device: Rolling walker  Distance: 150 ft, 190 ft, 80 ft  Activity: Within Unit, Within Room  Activity Comments: Pt ambulates with slow linda, short step length, and minimal clearance without LOB.  Additional Factors: Set-up  Assistance Level: Supervision  Gait Deviations: Slow linda, Decreased step length bilateral, Narrow base of support, Decreased heel strike right, Decreased heel strike left  Stairs:  Stair Height: 6''  Device: Bilateral handrails  Number of Stairs: 2x 4  Additional Factors: Non-reciprocal going down, Non-reciprocal going up, Increased time to complete  Assistance Level: Contact guard assist  Skilled Clinical Factors: pt descents backward on first set, descends facing forward on second set, cues for safe sequencing. pt limited by fatigue.  Curb:     Wheelchair:  Surface: Level surface  Device: Standard wheelchair, Standard Cushion  Additional Factors: Set-up, Verbal cues, Increased time to complete  Assistance Level for Propulsion: Stand by assist  Propulsion Method: Left lower extremity, Right lower extremity  Propulsion Distance: 50 ft    Occupational therapy:   Feeding     Grooming/Oral Hygiene  Assistance Level: Stand by assist  Skilled Clinical Factors: to comb hair, wash face, and brush teeth in stance; 2nd trial for handwashing later in session  UE Bathing  Assistance Level: Stand by assist  Skilled Clinical Factors: seated on shower chair  LE Bathing  Equipment Provided: Long-handled sponge  Assistance Level: Moderate assistance  Skilled Clinical Factors: use of LH sponge to wash BLE; assist to dry BLE and buttocks d/t  fatigue  UE Dressing  Assistance Level: Moderate assistance  Skilled Clinical Factors: assist to thread BUE, pt able to don over head; doffed gown  LE Dressing  Equipment Provided: Reachers  Assistance Level: Maximum assistance  Skilled Clinical Factors: use of reacher to thread BLE and cornelius into brief; assist to thread BLE into pants. Pt fatigued and required assist to don/doff over hips  Putting On/Taking Off Footwear  Equipment Provided: Reachers  Assistance Level: Moderate assistance  Skilled Clinical Factors: use of reacher to doff sock, DEP to don bilat socks  Toileting  Assistance Level: Dependent  Skilled Clinical Factors: modA for clothing mgnt, totalA for hygiene after BM    Speech therapy:    ADULT DIET; Regular; 4 carb choices (60 gm/meal)  ADULT ORAL NUTRITION SUPPLEMENT; Breakfast, Dinner; Diabetic Oral Supplement    Body mass index is 38.74 kg/m².    Assessment and Plan:  Karishma Lee is a 63 year old female with a past medical history significant for HTN, HLD, GERD, ANGEL, DM2, mood disorder, and obesity who presented to [facility] on 4/13/24 after a fall down multiple stairs, found to have small left SDH, multiple rib fractures, trace left pneumothorax, atelectasis, and left 1-5 TP fractures. She was admitted to [facility] on 4/23/24 due to functional deficits below her baseline.      Left SDH  - nonoperative management  - no AC or antiplatelet for 14 days  - PT, OT, ST    Atypical Chest pain  - EKG and troponin reassuring  - possibly indigestion  - added PPI and tums PRN    Constipation  - XR abd showing moderate colonic stool burden  - continue bowel regimen    Urinary Retention  - cornelius placed  - start flomax  - consider voiding trial closer to discharge     Tachycardia  - EKG showing sinus tachycardia  - metoprolol     Left Rib Fractures, 2-11  - with pneumothorax during acute stay, since resolved  - not a candidate for SSRF  - multimodal pain control, increased gabapentin, PRN percocet   -Increased PRN

## 2024-04-29 NOTE — PROGRESS NOTES
Karishma Lee  4/28/2024  [id number]    Chief Complaint: SDH (subdural hematoma) (HCC)    Subjective:   States improved pain control today, continued pain radiating down R arm.    ROS: denies f/c, n/v, cp, sob    Objective:  Patient Vitals for the past 24 hrs:   BP Temp Temp src Pulse Resp SpO2   04/28/24 0948 -- -- -- -- 18 --   04/28/24 0820 (!) 143/85 98.1 °F (36.7 °C) Oral 95 18 95 %   04/27/24 2118 126/78 -- Oral 93 18 91 %     Gen: No distress, pleasant. Supine in bed  HEENT: Normocephalic, atraumatic.   CV: extremities well perfused  Resp: No respiratory distress.   Abd: Soft, mildly distended  Ext: No edema.   Neuro: Alert, oriented, appropriately interactive.   Psych: appropriate mood and affect    Wt Readings from Last 3 Encounters:   04/24/24 105.6 kg (232 lb 12.8 oz)   02/06/24 92.9 kg (204 lb 12.8 oz)   02/01/24 93 kg (205 lb)       Laboratory data:   Lab Results   Component Value Date    WBC 9.4 04/26/2024    HGB 11.3 (L) 04/26/2024    HCT 34.0 (L) 04/26/2024    MCV 93.1 04/26/2024     04/26/2024       Lab Results   Component Value Date/Time     04/26/2024 07:48 AM    K 4.0 04/26/2024 07:48 AM     04/26/2024 07:48 AM    CO2 26 04/26/2024 07:48 AM    BUN 11 04/26/2024 07:48 AM    CREATININE 0.7 04/26/2024 07:48 AM    GLUCOSE 180 04/26/2024 07:48 AM    CALCIUM 8.8 04/26/2024 07:48 AM        Therapy progress:  Physical therapy:  Bed Mobility:     Sit>supine:  Assistance Level: Contact guard assist  Skilled Clinical Factors: HOB flat with bed rails  Supine>sit:  Assistance Level: Moderate assistance  Skilled Clinical Factors: HOB elevated, use of bedrail, toward L, assist at trunk  Transfers:  Surface: From bed, Wheelchair  Additional Factors: Increased time to complete, Verbal cues, Set-up, Hand placement cues  Device: Walker (RW)  Sit>stand:  Assistance Level: Stand by assist  Skilled Clinical Factors: up to RW  Stand>sit:  Assistance Level: Stand by assist  Skilled Clinical Factors:  with RW  Bed<>chair     Stand Pivot:     Lateral transfer:     Car transfer:  Assistance Level: Stand by assist  Skilled Clinical Factors: with RW, increased time  Ambulation:  Surface: Level surface  Device: Rolling walker  Distance: 150 ft, 190 ft, 80 ft  Activity: Within Unit, Within Room  Activity Comments: Pt ambulates with slow linda, short step length, and minimal clearance without LOB.  Additional Factors: Set-up  Assistance Level: Supervision  Gait Deviations: Slow linda, Decreased step length bilateral, Narrow base of support, Decreased heel strike right, Decreased heel strike left  Stairs:  Stair Height: 6''  Device: Bilateral handrails  Number of Stairs: 2x 4  Additional Factors: Non-reciprocal going down, Non-reciprocal going up, Increased time to complete  Assistance Level: Contact guard assist  Skilled Clinical Factors: pt descents backward on first set, descends facing forward on second set, cues for safe sequencing. pt limited by fatigue.  Curb:     Wheelchair:  Surface: Level surface  Device: Standard wheelchair, Standard Cushion  Additional Factors: Set-up, Verbal cues, Increased time to complete  Assistance Level for Propulsion: Stand by assist  Propulsion Method: Left lower extremity, Right lower extremity  Propulsion Distance: 50 ft    Occupational therapy:   Feeding     Grooming/Oral Hygiene  Assistance Level: Stand by assist  Skilled Clinical Factors: to comb hair, wash face, and brush teeth in stance; 2nd trial for handwashing later in session  UE Bathing  Assistance Level: Stand by assist  Skilled Clinical Factors: seated on shower chair  LE Bathing  Equipment Provided: Long-handled sponge  Assistance Level: Moderate assistance  Skilled Clinical Factors: use of LH sponge to wash BLE; assist to dry BLE and buttocks d/t fatigue  UE Dressing  Assistance Level: Moderate assistance  Skilled Clinical Factors: assist to thread BUE, pt able to don over head; doffed gown  LE Dressing  Equipment

## 2024-04-29 NOTE — CARE COORDINATION
Clinicals faxed to [facility] for continued stay review.    Thank you.   Hayley Max M.A, CCC-SLP  Clinical Liaison

## 2024-04-29 NOTE — CARE COORDINATION
[facility] Case Management/Follow up:  Chart reviewed. IP day #: 6  Consultants Following: n/a  Discharge date: TBD  Therapy recommendations:  24 hour supervision or assist w/Outpatient   DME needed: RW     Independent PLOF cane for long distance. Lives w/spouse in a one level home with 5 steps. Home has walk-n-shower. Patient owns walker, cane Chair lift, reacher, sock aide.     Nirmala Davila RN

## 2024-04-29 NOTE — PROGRESS NOTES
[facility]: ACUTE REHAB UNIT  SPEECH-LANGUAGE PATHOLOGY      [x] Daily  [] Weekly Care Conference Note  [] Discharge    Patient:Karishma Lee      :1960  MRN:[id number]  Rehab Dx/Hx: SDH (subdural hematoma) (HCC) [S06.5XAA]    Precautions: falls  Home situation: lives with . Indep with med management.  manages finances.  ST Dx: [] Aphasia  [] Dysarthria  [] Apraxia   [] Oropharyngeal dysphagia [x] Cognitive Impairment  [] Other:   Date of Admit: 2024  Room #: 0168/0168-01    Current functional status (updated daily):         Pt being seen for : [] Speech/Language Treatment  [] Dysphagia Treatment [x] Cognitive Treatment  [] Other:  Communication: [x]WFL  [] Aphasia  [] Dysarthria  [] Apraxia  [] Pragmatic Impairment [] Non-verbal  [] Hearing Loss  [] Other:   Cognition: [] WFL  [x] Mild  [] Moderate  [] Severe [] Unable to Assess  [] Other:  Memory: [] WFL  [x] Mild  [] Moderate  [] Severe [] Unable to Assess  [] Other:  Behavior: [x] Alert  [x] Cooperative  [x]  Pleasant  [] Confused  [] Agitated  [] Uncooperative  [] Distractible [] Motivated  [] Self-Limiting [] Anxious  [] Other:  Endurance:  [x] Adequate for participation in SLP sessions  [] Reduced overall  [] Lethargic  [] Other:  Safety: [x] No concerns at this time  [] Reduced insight into deficits  []  Reduced safety awareness [] Not following call light procedures  [] Unable to Assess  [] Other:    Current Diet Order:ADULT DIET; Regular; 4 carb choices (60 gm/meal)  ADULT ORAL NUTRITION SUPPLEMENT; Breakfast, Dinner; Diabetic Oral Supplement, thi  liquids  Swallowing Precautions: general aspiration and swallow precautions         Date: 2024      Tx session 1  1030 - 1130 Tx session 2  All tx needs met in session 1    Total Timed Code Min 60    Total Treatment Minutes 60    Individual Treatment Minutes 60    Group Treatment Minutes 0    Co-Treat Minutes 0    Variance/Reason:  N/A    Pain Denies - reports chest pain has improved   attention, very tangential. Pt required frequent redirection throughout session.      Education:   SLP edu pt re: role of SLP, rationale of tx tasks, recall strategies    Safety Devices: [x] Call light within reach  [] Chair alarm activated  [x] Bed alarm activated  [] Other:  [] Call light within reach  [] Chair alarm activated  [] Bed alarm activated  [] Other:    Assessment: Pt alert and cooperative, agreeable to tx. Pt did well with cognitive tasks this date. Pt did require frequent redirection throughout session due to reduced attention. Pt benefited from min cues to complete a memory and mental manipulation task. Pt with good acc indep completing a thought org task, and then increased acc given the min cues. Pt indep used a sample prescription label to answer questions. Pt is motivated to improve. Continue goals above.    Plan: Continue as per plan of care.      Additional Information:     Barriers toward progress: inability to manage medications, will need assist/supervision; inability to manage finances, will need assist/supervision; severity of cognition (mild) with reduced insight negatively impacting safety/independence  Discharge recommendations:  [] Home independently  [] Home with assistance []  24 hour supervision  [] ECF [x] Other: See PT/OT recs  Continued Tx Upon Discharge: ? [] Yes [] No [x] TBD based on progress while on ARU [] Vital Stim indicated [] Other:   Estimated discharge date: TBD    Interventions used this date:  [] Speech/Language Treatment  [] Instruction in HEP [] Group [] Dysphagia Treatment [x] Cognitive Treatment   [] Other:      Total Time Breakdown / Charges    Time in Time out Total Time / units   Cognitive Tx 1030 1130 60 mins / 4 units    Speech Tx -- -- --   Dysphagia Tx -- -- --       Electronically Signed by     Rufina Clemons MA CCC-SLP #[id number]  Speech Language Pathologist

## 2024-04-29 NOTE — PLAN OF CARE
Problem: Neurosensory - Adult  Goal: Achieves stable or improved neurological status  Outcome: Progressing  Flowsheets (Taken 4/29/2024 0800)  Achieves stable or improved neurological status: Assess for and report changes in neurological status

## 2024-04-29 NOTE — PROGRESS NOTES
Occupational Therapy  Facility/Department: [facility]  Rehabilitation Occupational Therapy Daily Treatment Note    Date: 24  Patient Name: Karishma Lee       Room: 0168/0168-01  MRN: [id number]  Account: [id number]   : 1960  (63 y.o.) Gender: female      Pt was bathed during OT session this date. Pt was Showered with soap/water with Minimal Assist.           Past Medical History:  has a past medical history of ADHD (attention deficit hyperactivity disorder), Anxiety, Bipolar disorder (HCC), Depression, Diverticulitis, DJD (degenerative joint disease), Fatty liver, GERD (gastroesophageal reflux disease), Hyperlipidemia, Hypertension, Irritable bowel syndrome, Mood disorder (HCC), Narcotic addiction ([facility]), Obesity, ANGEL (obstructive sleep apnea), Primary localized osteoarthritis of right knee, Restless leg syndrome, Status post total right knee replacement, Type 2 diabetes mellitus without complication (HCC), Type II or unspecified type diabetes mellitus without mention of complication, not stated as uncontrolled, and Vision impairment.  Past Surgical History:   has a past surgical history that includes Total abdominal hysterectomy w/ bilateral salpingoophorectomy (2010); Knee arthroscopy (Right); Plantar fascia surgery; Appendectomy; Cholecystectomy (); Cystoscopy (2010); Cystoscopy (2010); Colonoscopy (2004); Knee arthroscopy (Left, 10/2013); Total knee arthroplasty (Right, 2019); joint replacement; knee surgery; Hysterectomy; Knee Arthroplasty; Hysterectomy, total abdominal; and Upper gastrointestinal endoscopy.    Restrictions  Restrictions/Precautions: Fall Risk, Up as Tolerated  Other position/activity restrictions: cornelius  Required Braces or Orthoses?: No    Subjective  Subjective: Pt semi-supine in bed and agreeable to OT tx. /75, , SPO2 90%. 8/10 pain in ribs and back  Restrictions/Precautions: Fall Risk;Up as Tolerated             Objective      Minutes 60         Timed Code Treatment Minutes: 60 Minutes       Kortney Almodovar, OT

## 2024-04-29 NOTE — PROGRESS NOTES
Physical Therapy Hold    Attempted to see pt this am however spoke to MD and pt is a hold this am due to 8/10 chest pain. Pt receiving an EKG this am. Will follow-up as schedule allows this afternoon.    Therapy Time:   Individual Concurrent Group Co-treatment   Time In 0800         Time Out 0800         Minutes 0           Timed Code Treatment Minutes:  0    Total Treatment Minutes: 0    Minute Variance: 60 minutes  Reason: Med hold (8/10 chest pain)    Sunny Nieves, PT

## 2024-04-29 NOTE — PROGRESS NOTES
Patient c/o 8/10 chest pain. Dr. Garduno aware and saw patient. Nurse, lab and EKG tech at bedside. Portable chest xray on way to floor.

## 2024-04-30 LAB
GLUCOSE BLD-MCNC: 118 MG/DL (ref 70–99)
GLUCOSE BLD-MCNC: 145 MG/DL (ref 70–99)
GLUCOSE BLD-MCNC: 211 MG/DL (ref 70–99)
GLUCOSE BLD-MCNC: 283 MG/DL (ref 70–99)
PERFORMED ON: ABNORMAL

## 2024-04-30 PROCEDURE — 1280000000 HC REHAB R&B

## 2024-04-30 PROCEDURE — 97535 SELF CARE MNGMENT TRAINING: CPT

## 2024-04-30 PROCEDURE — 97530 THERAPEUTIC ACTIVITIES: CPT

## 2024-04-30 PROCEDURE — 97112 NEUROMUSCULAR REEDUCATION: CPT

## 2024-04-30 PROCEDURE — 97129 THER IVNTJ 1ST 15 MIN: CPT

## 2024-04-30 PROCEDURE — 6370000000 HC RX 637 (ALT 250 FOR IP): Performed by: STUDENT IN AN ORGANIZED HEALTH CARE EDUCATION/TRAINING PROGRAM

## 2024-04-30 PROCEDURE — 97116 GAIT TRAINING THERAPY: CPT

## 2024-04-30 PROCEDURE — 97130 THER IVNTJ EA ADDL 15 MIN: CPT

## 2024-04-30 PROCEDURE — 6360000002 HC RX W HCPCS: Performed by: STUDENT IN AN ORGANIZED HEALTH CARE EDUCATION/TRAINING PROGRAM

## 2024-04-30 RX ORDER — OXYCODONE HYDROCHLORIDE 5 MG/1
10 TABLET ORAL EVERY 4 HOURS PRN
Status: DISCONTINUED | OUTPATIENT
Start: 2024-04-30 | End: 2024-04-30 | Stop reason: SDUPTHER

## 2024-04-30 RX ORDER — ASPIRIN 81 MG/1
81 TABLET, CHEWABLE ORAL DAILY
Status: DISCONTINUED | OUTPATIENT
Start: 2024-04-30 | End: 2024-05-06 | Stop reason: HOSPADM

## 2024-04-30 RX ORDER — OXYCODONE HYDROCHLORIDE 15 MG/1
15 TABLET ORAL EVERY 4 HOURS PRN
Status: DISCONTINUED | OUTPATIENT
Start: 2024-04-30 | End: 2024-05-06 | Stop reason: HOSPADM

## 2024-04-30 RX ORDER — OXYCODONE HYDROCHLORIDE 5 MG/1
10 TABLET ORAL EVERY 4 HOURS PRN
Status: DISCONTINUED | OUTPATIENT
Start: 2024-04-30 | End: 2024-05-06 | Stop reason: HOSPADM

## 2024-04-30 RX ADMIN — SENNOSIDES AND DOCUSATE SODIUM 1 TABLET: 50; 8.6 TABLET ORAL at 20:21

## 2024-04-30 RX ADMIN — GABAPENTIN 400 MG: 400 CAPSULE ORAL at 20:20

## 2024-04-30 RX ADMIN — ASPIRIN 81 MG 81 MG: 81 TABLET ORAL at 17:25

## 2024-04-30 RX ADMIN — TAMSULOSIN HYDROCHLORIDE 0.4 MG: 0.4 CAPSULE ORAL at 08:14

## 2024-04-30 RX ADMIN — LAMOTRIGINE 100 MG: 100 TABLET ORAL at 20:21

## 2024-04-30 RX ADMIN — GABAPENTIN 400 MG: 400 CAPSULE ORAL at 14:47

## 2024-04-30 RX ADMIN — METHOCARBAMOL 1000 MG: 500 TABLET ORAL at 08:13

## 2024-04-30 RX ADMIN — POLYETHYLENE GLYCOL 3350 17 G: 17 POWDER, FOR SOLUTION ORAL at 08:14

## 2024-04-30 RX ADMIN — INSULIN LISPRO 3 UNITS: 100 INJECTION, SOLUTION INTRAVENOUS; SUBCUTANEOUS at 17:25

## 2024-04-30 RX ADMIN — PANTOPRAZOLE SODIUM 40 MG: 40 TABLET, DELAYED RELEASE ORAL at 06:15

## 2024-04-30 RX ADMIN — ATORVASTATIN CALCIUM 10 MG: 10 TABLET, FILM COATED ORAL at 08:14

## 2024-04-30 RX ADMIN — INSULIN LISPRO 3 UNITS: 100 INJECTION, SOLUTION INTRAVENOUS; SUBCUTANEOUS at 08:05

## 2024-04-30 RX ADMIN — AMOXICILLIN AND CLAVULANATE POTASSIUM 1 TABLET: 875; 125 TABLET, FILM COATED ORAL at 08:13

## 2024-04-30 RX ADMIN — ROPINIROLE HYDROCHLORIDE 0.5 MG: 0.5 TABLET, FILM COATED ORAL at 20:21

## 2024-04-30 RX ADMIN — GABAPENTIN 400 MG: 400 CAPSULE ORAL at 08:14

## 2024-04-30 RX ADMIN — SENNOSIDES AND DOCUSATE SODIUM 1 TABLET: 50; 8.6 TABLET ORAL at 08:14

## 2024-04-30 RX ADMIN — METFORMIN HYDROCHLORIDE 1000 MG: 500 TABLET ORAL at 08:14

## 2024-04-30 RX ADMIN — OXYCODONE 10 MG: 5 TABLET ORAL at 06:15

## 2024-04-30 RX ADMIN — ACETAMINOPHEN 1000 MG: 500 TABLET ORAL at 20:21

## 2024-04-30 RX ADMIN — AMOXICILLIN AND CLAVULANATE POTASSIUM 1 TABLET: 875; 125 TABLET, FILM COATED ORAL at 20:21

## 2024-04-30 RX ADMIN — MIRTAZAPINE 7.5 MG: 15 TABLET, FILM COATED ORAL at 20:20

## 2024-04-30 RX ADMIN — Medication 5 MG: at 20:20

## 2024-04-30 RX ADMIN — ENOXAPARIN SODIUM 30 MG: 100 INJECTION SUBCUTANEOUS at 20:20

## 2024-04-30 RX ADMIN — ACETAMINOPHEN 1000 MG: 500 TABLET ORAL at 06:15

## 2024-04-30 RX ADMIN — INSULIN LISPRO 4 UNITS: 100 INJECTION, SOLUTION INTRAVENOUS; SUBCUTANEOUS at 12:03

## 2024-04-30 RX ADMIN — ESCITALOPRAM OXALATE 20 MG: 10 TABLET ORAL at 08:14

## 2024-04-30 RX ADMIN — OXYCODONE 10 MG: 5 TABLET ORAL at 21:54

## 2024-04-30 RX ADMIN — METHOCARBAMOL 1000 MG: 500 TABLET ORAL at 20:25

## 2024-04-30 RX ADMIN — METHOCARBAMOL 1000 MG: 500 TABLET ORAL at 17:25

## 2024-04-30 RX ADMIN — ENOXAPARIN SODIUM 30 MG: 100 INJECTION SUBCUTANEOUS at 08:13

## 2024-04-30 RX ADMIN — METOPROLOL SUCCINATE 50 MG: 50 TABLET, EXTENDED RELEASE ORAL at 08:13

## 2024-04-30 RX ADMIN — INSULIN LISPRO 3 UNITS: 100 INJECTION, SOLUTION INTRAVENOUS; SUBCUTANEOUS at 12:04

## 2024-04-30 RX ADMIN — METFORMIN HYDROCHLORIDE 1000 MG: 500 TABLET ORAL at 17:25

## 2024-04-30 RX ADMIN — METHOCARBAMOL 1000 MG: 500 TABLET ORAL at 12:03

## 2024-04-30 RX ADMIN — ACETAMINOPHEN 1000 MG: 500 TABLET ORAL at 14:46

## 2024-04-30 ASSESSMENT — PAIN DESCRIPTION - DESCRIPTORS: DESCRIPTORS: ACHING;DISCOMFORT;THROBBING

## 2024-04-30 ASSESSMENT — PAIN SCALES - GENERAL
PAINLEVEL_OUTOF10: 0
PAINLEVEL_OUTOF10: 8
PAINLEVEL_OUTOF10: 0

## 2024-04-30 ASSESSMENT — PAIN DESCRIPTION - ORIENTATION: ORIENTATION: LEFT

## 2024-04-30 ASSESSMENT — PAIN - FUNCTIONAL ASSESSMENT: PAIN_FUNCTIONAL_ASSESSMENT: ACTIVITIES ARE NOT PREVENTED

## 2024-04-30 ASSESSMENT — PAIN DESCRIPTION - LOCATION: LOCATION: RIB CAGE

## 2024-04-30 NOTE — PATIENT CARE CONFERENCE
Functional mobility training, Endurance training, Patient/Caregiver education & training, Self-Care / ADL, Safety education & training, Equipment evaluation, education, & procurement, Neuromuscular re-education    OT Goals:  Patient Goals   Patient goals : \"to be able to use the toilet\"  Short Term Goals  Time Frame for Short Term Goals: 5 days (4/27/24)- extend to discharge  Short Term Goal 1: Pt will perform toilet transfer with SBA and LRAD- GOAL MET, pt demos SBA with RTS with arms 4/27/24  Short Term Goal 2: Pt will perform LB dressing with Darrell using AE- MET 4/29 pt SBA with AE  Short Term Goal 3: Pt will perform 1-2 grooming tasks in stance at sink with SBA for balance-- GOAL MET, pt demos 3 grooming tasks in stance SBA 4/27/24  Short Term Goal 4: Pt will tolerate x20 reps of BUE therex. GOAL MET 5/1/24 Pt completed 20 reps of ther ex.  Short Term Goal 5: Pt will perform UB ADL with set-up- MET 4/30  Long Term Goals  Time Frame for Long Term Goals : 10 days (5/2/24) - progressing 5/1  Long Term Goal 1: Pt will perform toilet transfer mod I with LRAD  Long Term Goal 2: Pt will perform TB bathing mod I with AE PRN  Long Term Goal 3: Pt will perform TB dressing mod I with AE PRN  Long Term Goal 4: Pt will tolerate standing for ~10 min with SPV to increase endurance for standing ADLs and mobility-progressing 4/30 pt stood 6:30 with SPV  Long Term Goal 5: Pt will perform 1 light IADL task with SPV- MET 4/30    OT Assessment:  Pt tolerated therapy well this date, improving to SBA-setup for TB dressing, SPV for grooming in stance, and SPV for IADL task completion. Pt improved standing endurance by completing IADL, standing for 6:30 and 4 mins with SPV. Pt grossly SBA for fxl transfers during session, demo'ing appropriate hand placement when transferring. Pt not limited by pain during session. Continue OT POC.     SPEECH THERAPY :  Speech therapy observed barriers to dc:    Baseline: lives with . Indep with  MD on 5/1/2024 at 1:56 PM

## 2024-04-30 NOTE — PROGRESS NOTES
Physical Therapy  Facility/Department: [facility]  Rehabilitation Physical Therapy Treatment Note    NAME: Karishma Lee  : 1960 (63 y.o.)  MRN: [id number]  CODE STATUS: Full Code    Date of Service: 24       Restrictions:  Restrictions/Precautions: Up as Tolerated, Fall Risk  Position Activity Restriction  Other position/activity restrictions: ashli     SUBJECTIVE  Subjective  Subjective: Pt seated in chair upon arrival with chair alarm activated. Patient agreeable to PT tx.  Pain: Patient reports no pain at beginning of session; reports minimal onset of pain in L abdomen during balance activity.           OBJECTIVE  Cognition  Overall Cognitive Status: WFL  Arousal/Alertness: Appropriate responses to stimuli  Following Commands: Follows one step commands consistently  Attention Span: Appears intact  Memory: Appears intact  Safety Judgement: Good awareness of safety precautions  Problem Solving: Able to problem solve independently  Insights: Fully aware of deficits  Initiation: Does not require cues  Sequencing: Requires cues for some  Orientation  Overall Orientation Status: Within Normal Limits  Orientation Level: Oriented X4    Functional Mobility  Bed Mobility  Overall Assistance Level:  Sit to Supine  Assistance Level: Stand by assist  Skilled Clinical Factors: HOB flat with bed rails  Balance  Sitting Balance: Stand by assistance  Standing Balance: Contact guard assistance  Standing Balance  Time: 2 min  Comments: min lateral sway and ankle/hip strategies noted  Transfers  Surface: To bed;To chair without arms;From bed;From chair without arms;Wheelchair  Additional Factors: Increased time to complete;Verbal cues;Set-up;Hand placement cues  Device: Walker  Sit to Stand  Assistance Level: Stand by assist  Skilled Clinical Factors: up to FWW  Stand to Sit  Assistance Level: Stand by assist  Skilled Clinical Factors: with FWW  Bed To/From Chair  Technique: Stand step  Assistance Level: Stand by  assist      Environmental Mobility  Ambulation  Surface: Level surface  Device: Rolling walker  Distance: 184ft + 187ft  Activity: Within Unit;Within Room  Activity Comments: Pt ambulates with slower linda and short step length. Pt required supervision without LOB  Additional Factors: Increased time to complete  Assistance Level: Supervision  Gait Deviations: Slow linda;Decreased step length bilateral;Narrow base of support;Decreased heel strike right;Decreased heel strike left;Path deviations  Stairs  Stair Height: 6''  Device: Bilateral handrails;One handrail  Number of Stairs: 3x4  Additional Factors: Non-reciprocal going down;Non-reciprocal going up;Increased time to complete;Hand placement cues  Assistance Level: Contact guard assist  Skilled Clinical Factors: Pt ascends and descends non-reciprocally for x2 4 step repetitions with use of B HR's. Pt ascends and descends on 3rd repetition using L UE ascending and R UE descending with cup placed in other hand to simulate stairs and HR in home.  Skilled Clinical Factors - Comments: Pt required CGA throughout stair negotiation and VC, Increased time noted to complete task.  Curb  Curb Height: 6''  Device: Rolling walker  Number of Curbs: 1  Additional Factors: Verbal cues;Increased time to complete;Non-reciprocal going down;Non-reciprocal going up  Assistance Level: Contact guard assist  Skilled Clinical Factors: Pt ascends/descends 2x2 6\" curb step with rest break between. Pt required CGA and VC for proper negotiation of curb step to increase safety and awareness. Pt steps up with (R) LE and descends with (R) LE.      Neuromuscular Education  Neuromuscular Education: No  Neuromuscular Comments: Pt completes standing balance between FWW without use of UE and with SBA. Pt stands for x2 minutes and completes reaching activity with wall rings. VC given to encourage using opposite UE to encourage trunk rotation and challenge balance. Pt reports feeling dizzy during

## 2024-04-30 NOTE — PROGRESS NOTES
Occupational Therapy  Facility/Department: [facility]  Rehabilitation Occupational Therapy Daily Treatment Note    Date: 24  Patient Name: Karishma Lee       Room: 0168/0168-01  MRN: [id number]  Account: [id number]   : 1960  (63 y.o.) Gender: female                    Past Medical History:  has a past medical history of ADHD (attention deficit hyperactivity disorder), Anxiety, Bipolar disorder (HCC), Depression, Diverticulitis, DJD (degenerative joint disease), Fatty liver, GERD (gastroesophageal reflux disease), Hyperlipidemia, Hypertension, Irritable bowel syndrome, Mood disorder (HCC), Narcotic addiction (HCC), Obesity, ANGEL (obstructive sleep apnea), Primary localized osteoarthritis of right knee, Restless leg syndrome, Status post total right knee replacement, Type 2 diabetes mellitus without complication (HCC), Type II or unspecified type diabetes mellitus without mention of complication, not stated as uncontrolled, and Vision impairment.  Past Surgical History:   has a past surgical history that includes Total abdominal hysterectomy w/ bilateral salpingoophorectomy (2010); Knee arthroscopy (Right); Plantar fascia surgery; Appendectomy; Cholecystectomy (); Cystoscopy (2010); Cystoscopy (2010); Colonoscopy (2004); Knee arthroscopy (Left, 10/2013); Total knee arthroplasty (Right, 2019); joint replacement; knee surgery; Hysterectomy; Knee Arthroplasty; Hysterectomy, total abdominal; and Upper gastrointestinal endoscopy.    Restrictions  Restrictions/Precautions: Up as Tolerated, Fall Risk  Other position/activity restrictions: cornelius  Required Braces or Orthoses?: No    Subjective  Subjective: Pt semi-supine in bed and agreeable to OT tx. /75, , SPO2 90%. 8/10 pain in ribs and back  Restrictions/Precautions: Up as Tolerated;Fall Risk             Objective     Cognition  Overall Cognitive Status: WFL  Arousal/Alertness: Appropriate responses to  placement cues;Increased time to complete;Verbal cues  Device: Walker  Sit to Stand  Assistance Level: Stand by assist  Skilled Clinical Factors: for multiple stands during session, demo'd appropriate hand placement  Stand to Sit  Assistance Level: Stand by assist  Skilled Clinical Factors: VCs for hand placement   OT Exercises  Dynamic Standing Balance Exercises: Pt stood to complete tabletop activity with pegboard to match pegboard pattern. Pt stood for 4 mins and 5 mins with seated rest break in between. Pt reported inc fatigue as completed task.     Assessment  Assessment  Assessment: Pt tolerated therapy well this date, improving to SBA-setup for TB dressing, SPV for grooming in stance, and SPV for IADL task completion. Pt improved standing endurance by completing IADL, standing for 6:30 and 4 mins with SPV. Pt grossly SBA for fxl transfers during session, demo'ing appropriate hand placement when transferring. Pt not limited by pain during session. Continue OT POC.  Activity Tolerance: Patient tolerated treatment well;Patient limited by endurance  Discharge Recommendations: 24 hour supervision or assist;Outpatient OT  OT Equipment Recommendations  Equipment Needed: No  Safety Devices  Safety Devices in place: Yes  Type of devices: Nurse notified;Gait belt;Call light within reach;Chair alarm in place;Left in chair  Restraints  Initially in place: No    Patient Education  Education  Education Given To: Patient  Education Provided: Role of Therapy;Plan of Care;Precautions;Safety;ADL Function;Mobility Training;Transfer Training;Equipment;DME/Home Modifications;Fall Prevention Strategies;IADL Function  Education Provided Comments: AE for LB dressing,  building endurance/tolerance, OT POC, safe transfer techniques, hand placement for safe transfers  Education Method: Verbal  Barriers to Learning: Cognition  Education Outcome: Verbalized understanding;Demonstrated understanding;Continued education

## 2024-04-30 NOTE — PROGRESS NOTES
[facility]: ACUTE REHAB UNIT  SPEECH-LANGUAGE PATHOLOGY      [x] Daily  [] Weekly Care Conference Note  [] Discharge    Patient:Karishma Lee      :1960  MRN:[id number]  Rehab Dx/Hx: SDH (subdural hematoma) (HCC) [S06.5XAA]    Precautions: falls  Home situation: lives with . Indep with med management.  manages finances.  ST Dx: [] Aphasia  [] Dysarthria  [] Apraxia   [] Oropharyngeal dysphagia [x] Cognitive Impairment  [] Other:   Date of Admit: 2024  Room #: 0168/0168-01    Current functional status (updated daily):         Pt being seen for : [] Speech/Language Treatment  [] Dysphagia Treatment [x] Cognitive Treatment  [] Other:  Communication: [x]WFL  [] Aphasia  [] Dysarthria  [] Apraxia  [] Pragmatic Impairment [] Non-verbal  [] Hearing Loss  [] Other:   Cognition: [] WFL  [x] Mild  [] Moderate  [] Severe [] Unable to Assess  [] Other:  Memory: [] WFL  [x] Mild  [] Moderate  [] Severe [] Unable to Assess  [] Other:  Behavior: [x] Alert  [x] Cooperative  [x]  Pleasant  [] Confused  [] Agitated  [] Uncooperative  [] Distractible [] Motivated  [] Self-Limiting [] Anxious  [] Other:  Endurance:  [x] Adequate for participation in SLP sessions  [] Reduced overall  [] Lethargic  [] Other:  Safety: [x] No concerns at this time  [] Reduced insight into deficits  []  Reduced safety awareness [] Not following call light procedures  [] Unable to Assess  [] Other:    Current Diet Order:ADULT DIET; Regular; 4 carb choices (60 gm/meal)  ADULT ORAL NUTRITION SUPPLEMENT; Breakfast, Dinner; Diabetic Oral Supplement, thi  liquids  Swallowing Precautions: general aspiration and swallow precautions         Date: 2024      Tx session 1  830 - 930 Tx session 2  All tx needs met in session 1    Total Timed Code Min 60    Total Treatment Minutes 60    Individual Treatment Minutes 60    Group Treatment Minutes 0    Co-Treat Minutes 0    Variance/Reason:  N/A    Pain Denies     Pain Intervention [] RN

## 2024-04-30 NOTE — PLAN OF CARE
Problem: Musculoskeletal - Adult  Goal: Return mobility to safest level of function  Outcome: Progressing  Goal: Return ADL status to a safe level of function  Outcome: Progressing

## 2024-04-30 NOTE — PROGRESS NOTES
Karishma Lee  4/30/2024  [id number]    Chief Complaint: SDH (subdural hematoma) (HCC)    Subjective:   No acute events overnight. Today Karishma is seen in her room. She reports feeling well and denies any further chest tightness. Discussed result of CT head.     ROS: denies f/c, n/v, cp, sob    Objective:  Patient Vitals for the past 24 hrs:   BP Temp Temp src Pulse Resp SpO2   04/30/24 1427 134/69 -- -- -- -- 93 %   04/30/24 1419 134/69 -- -- 100 -- 93 %   04/30/24 0800 (!) 149/71 98.1 °F (36.7 °C) Oral 94 16 96 %   04/29/24 2030 136/69 98.2 °F (36.8 °C) Oral (!) 101 16 93 %     Gen: No distress, pleasant. Supine in bed  HEENT: Normocephalic, atraumatic.   CV: extremities well perfused  Resp: No respiratory distress.   Abd: Soft, mildly distended  Ext: No edema.   Neuro: Alert, oriented, appropriately interactive.   Psych: appropriate mood and affect    Wt Readings from Last 3 Encounters:   04/24/24 105.6 kg (232 lb 12.8 oz)   02/06/24 92.9 kg (204 lb 12.8 oz)   02/01/24 93 kg (205 lb)       Laboratory data:   Lab Results   Component Value Date    WBC 9.2 04/29/2024    HGB 11.6 (L) 04/29/2024    HCT 35.0 (L) 04/29/2024    MCV 93.4 04/29/2024     04/29/2024       Lab Results   Component Value Date/Time     04/29/2024 05:38 AM    K 4.5 04/29/2024 05:38 AM     04/29/2024 05:38 AM    CO2 28 04/29/2024 05:38 AM    BUN 14 04/29/2024 05:38 AM    CREATININE 0.7 04/29/2024 05:38 AM    GLUCOSE 168 04/29/2024 05:38 AM    CALCIUM 9.0 04/29/2024 05:38 AM        Therapy progress:  Physical therapy:  Bed Mobility:  Overall Assistance Level: Modified Independent  Sit>supine:  Assistance Level: Stand by assist  Skilled Clinical Factors: HOB flat with bed rails  Supine>sit:  Assistance Level: Moderate assistance  Skilled Clinical Factors: flat bed, log roll to R side  Transfers:  Surface: To bed, To chair without arms, From bed, From chair without arms, Wheelchair  Additional Factors: Increased time to complete,  deficits below her baseline.      Left SDH  - nonoperative management  - repeat CT head showing resolved SDH, Neurosurgery reviewed. Ok to resume asa  - PT, OT, ST    Atypical Chest pain  - EKG and troponin reassuring  - possibly indigestion  - added PPI and tums PRN    Constipation  - XR abd showing moderate colonic stool burden  - continue bowel regimen    Urinary Retention  - cornelius placed  - started flomax  - consider voiding trial closer to discharge     Tachycardia  - EKG showing sinus tachycardia  - metoprolol     Left Rib Fractures, 2-11  - with pneumothorax during acute stay, since resolved  - not a candidate for SSRF  - multimodal pain control, increased gabapentin, PRN oxycodone  - IS     L lumbar 1-5 TP fractures  - no intervention or brace needed     HTN  - metoprolol     HLD  - statin     ANGEL  - home CPAP     DM2  - home regimen: metformin, ozempic  - stopped NPH due to hypoglycemia  - SSI  - increased metformin     Bipolar Disorder  - home requip, lamotrigine, lexapro     Bowels: adjust medications as needed for regular bowel movements      Bladder: Check PVR x 3.  IMC if PVR > 200ml or if any volume is > 500 ml.      Sleep: melatonin nightly      PPX  DVT: lovenox  GI: not indicated     Follow up appointments: Trauma, Neurosurgery, PCP    Therapy progress: improving to SBA-setup for TB dressing     April Garduno MD  4/30/2024, 4:33 PM

## 2024-05-01 LAB
ANION GAP SERPL CALCULATED.3IONS-SCNC: 10 MMOL/L (ref 3–16)
BASOPHILS # BLD: 0.1 K/UL (ref 0–0.2)
BASOPHILS NFR BLD: 1.1 %
BUN SERPL-MCNC: 13 MG/DL (ref 7–20)
CALCIUM SERPL-MCNC: 9.5 MG/DL (ref 8.3–10.6)
CHLORIDE SERPL-SCNC: 101 MMOL/L (ref 99–110)
CO2 SERPL-SCNC: 27 MMOL/L (ref 21–32)
CREAT SERPL-MCNC: 0.7 MG/DL (ref 0.6–1.2)
DEPRECATED RDW RBC AUTO: 14.1 % (ref 12.4–15.4)
EOSINOPHIL # BLD: 0.6 K/UL (ref 0–0.6)
EOSINOPHIL NFR BLD: 6.8 %
GFR SERPLBLD CREATININE-BSD FMLA CKD-EPI: >90 ML/MIN/{1.73_M2}
GLUCOSE BLD-MCNC: 101 MG/DL (ref 70–99)
GLUCOSE BLD-MCNC: 110 MG/DL (ref 70–99)
GLUCOSE BLD-MCNC: 154 MG/DL (ref 70–99)
GLUCOSE BLD-MCNC: 275 MG/DL (ref 70–99)
GLUCOSE SERPL-MCNC: 179 MG/DL (ref 70–99)
HCT VFR BLD AUTO: 37.1 % (ref 36–48)
HGB BLD-MCNC: 12.1 G/DL (ref 12–16)
LYMPHOCYTES # BLD: 2.3 K/UL (ref 1–5.1)
LYMPHOCYTES NFR BLD: 24.8 %
MCH RBC QN AUTO: 30.7 PG (ref 26–34)
MCHC RBC AUTO-ENTMCNC: 32.7 G/DL (ref 31–36)
MCV RBC AUTO: 93.6 FL (ref 80–100)
MONOCYTES # BLD: 0.6 K/UL (ref 0–1.3)
MONOCYTES NFR BLD: 6.5 %
NEUTROPHILS # BLD: 5.7 K/UL (ref 1.7–7.7)
NEUTROPHILS NFR BLD: 60.8 %
PERFORMED ON: ABNORMAL
PLATELET # BLD AUTO: 475 K/UL (ref 135–450)
PMV BLD AUTO: 8 FL (ref 5–10.5)
POTASSIUM SERPL-SCNC: 4.4 MMOL/L (ref 3.5–5.1)
RBC # BLD AUTO: 3.96 M/UL (ref 4–5.2)
SODIUM SERPL-SCNC: 138 MMOL/L (ref 136–145)
WBC # BLD AUTO: 9.4 K/UL (ref 4–11)

## 2024-05-01 PROCEDURE — 80048 BASIC METABOLIC PNL TOTAL CA: CPT

## 2024-05-01 PROCEDURE — 6370000000 HC RX 637 (ALT 250 FOR IP): Performed by: STUDENT IN AN ORGANIZED HEALTH CARE EDUCATION/TRAINING PROGRAM

## 2024-05-01 PROCEDURE — 85025 COMPLETE CBC W/AUTO DIFF WBC: CPT

## 2024-05-01 PROCEDURE — 36415 COLL VENOUS BLD VENIPUNCTURE: CPT

## 2024-05-01 PROCEDURE — 97129 THER IVNTJ 1ST 15 MIN: CPT

## 2024-05-01 PROCEDURE — 97116 GAIT TRAINING THERAPY: CPT

## 2024-05-01 PROCEDURE — 6360000002 HC RX W HCPCS: Performed by: STUDENT IN AN ORGANIZED HEALTH CARE EDUCATION/TRAINING PROGRAM

## 2024-05-01 PROCEDURE — 97130 THER IVNTJ EA ADDL 15 MIN: CPT

## 2024-05-01 PROCEDURE — 97110 THERAPEUTIC EXERCISES: CPT

## 2024-05-01 PROCEDURE — 1280000000 HC REHAB R&B

## 2024-05-01 PROCEDURE — 97535 SELF CARE MNGMENT TRAINING: CPT

## 2024-05-01 PROCEDURE — 97530 THERAPEUTIC ACTIVITIES: CPT

## 2024-05-01 RX ORDER — LIDOCAINE 4 G/G
1 PATCH TOPICAL DAILY
Status: DISCONTINUED | OUTPATIENT
Start: 2024-05-01 | End: 2024-05-06 | Stop reason: HOSPADM

## 2024-05-01 RX ADMIN — INSULIN LISPRO 4 UNITS: 100 INJECTION, SOLUTION INTRAVENOUS; SUBCUTANEOUS at 11:58

## 2024-05-01 RX ADMIN — INSULIN LISPRO 3 UNITS: 100 INJECTION, SOLUTION INTRAVENOUS; SUBCUTANEOUS at 11:59

## 2024-05-01 RX ADMIN — ACETAMINOPHEN 1000 MG: 500 TABLET ORAL at 20:31

## 2024-05-01 RX ADMIN — ENOXAPARIN SODIUM 30 MG: 100 INJECTION SUBCUTANEOUS at 08:33

## 2024-05-01 RX ADMIN — MIRTAZAPINE 7.5 MG: 15 TABLET, FILM COATED ORAL at 20:37

## 2024-05-01 RX ADMIN — OXYCODONE 10 MG: 5 TABLET ORAL at 20:31

## 2024-05-01 RX ADMIN — ACETAMINOPHEN 1000 MG: 500 TABLET ORAL at 14:48

## 2024-05-01 RX ADMIN — GABAPENTIN 400 MG: 400 CAPSULE ORAL at 08:34

## 2024-05-01 RX ADMIN — METHOCARBAMOL 1000 MG: 500 TABLET ORAL at 20:37

## 2024-05-01 RX ADMIN — OXYCODONE 10 MG: 5 TABLET ORAL at 05:32

## 2024-05-01 RX ADMIN — ASPIRIN 81 MG 81 MG: 81 TABLET ORAL at 08:35

## 2024-05-01 RX ADMIN — SENNOSIDES AND DOCUSATE SODIUM 1 TABLET: 50; 8.6 TABLET ORAL at 20:31

## 2024-05-01 RX ADMIN — AMOXICILLIN AND CLAVULANATE POTASSIUM 1 TABLET: 875; 125 TABLET, FILM COATED ORAL at 08:35

## 2024-05-01 RX ADMIN — METFORMIN HYDROCHLORIDE 1000 MG: 500 TABLET ORAL at 17:06

## 2024-05-01 RX ADMIN — POLYETHYLENE GLYCOL 3350 17 G: 17 POWDER, FOR SOLUTION ORAL at 08:34

## 2024-05-01 RX ADMIN — METHOCARBAMOL 1000 MG: 500 TABLET ORAL at 17:06

## 2024-05-01 RX ADMIN — PANTOPRAZOLE SODIUM 40 MG: 40 TABLET, DELAYED RELEASE ORAL at 05:32

## 2024-05-01 RX ADMIN — METOPROLOL SUCCINATE 50 MG: 50 TABLET, EXTENDED RELEASE ORAL at 08:35

## 2024-05-01 RX ADMIN — ESCITALOPRAM OXALATE 20 MG: 10 TABLET ORAL at 08:35

## 2024-05-01 RX ADMIN — INSULIN LISPRO 3 UNITS: 100 INJECTION, SOLUTION INTRAVENOUS; SUBCUTANEOUS at 08:44

## 2024-05-01 RX ADMIN — INSULIN LISPRO 3 UNITS: 100 INJECTION, SOLUTION INTRAVENOUS; SUBCUTANEOUS at 17:04

## 2024-05-01 RX ADMIN — TAMSULOSIN HYDROCHLORIDE 0.4 MG: 0.4 CAPSULE ORAL at 08:36

## 2024-05-01 RX ADMIN — GABAPENTIN 400 MG: 400 CAPSULE ORAL at 20:31

## 2024-05-01 RX ADMIN — AMOXICILLIN AND CLAVULANATE POTASSIUM 1 TABLET: 875; 125 TABLET, FILM COATED ORAL at 20:31

## 2024-05-01 RX ADMIN — ROPINIROLE HYDROCHLORIDE 0.5 MG: 0.5 TABLET, FILM COATED ORAL at 20:31

## 2024-05-01 RX ADMIN — Medication 5 MG: at 20:31

## 2024-05-01 RX ADMIN — SENNOSIDES AND DOCUSATE SODIUM 1 TABLET: 50; 8.6 TABLET ORAL at 08:35

## 2024-05-01 RX ADMIN — ENOXAPARIN SODIUM 30 MG: 100 INJECTION SUBCUTANEOUS at 20:31

## 2024-05-01 RX ADMIN — METHOCARBAMOL 1000 MG: 500 TABLET ORAL at 08:35

## 2024-05-01 RX ADMIN — ATORVASTATIN CALCIUM 10 MG: 10 TABLET, FILM COATED ORAL at 08:35

## 2024-05-01 RX ADMIN — GABAPENTIN 400 MG: 400 CAPSULE ORAL at 14:48

## 2024-05-01 RX ADMIN — METFORMIN HYDROCHLORIDE 1000 MG: 500 TABLET ORAL at 08:35

## 2024-05-01 RX ADMIN — LAMOTRIGINE 100 MG: 100 TABLET ORAL at 20:31

## 2024-05-01 RX ADMIN — ACETAMINOPHEN 1000 MG: 500 TABLET ORAL at 05:33

## 2024-05-01 RX ADMIN — METHOCARBAMOL 1000 MG: 500 TABLET ORAL at 11:59

## 2024-05-01 ASSESSMENT — PAIN DESCRIPTION - FREQUENCY: FREQUENCY: CONTINUOUS

## 2024-05-01 ASSESSMENT — PAIN DESCRIPTION - LOCATION
LOCATION: KNEE;RIB CAGE
LOCATION: KNEE

## 2024-05-01 ASSESSMENT — PAIN DESCRIPTION - ONSET: ONSET: ON-GOING

## 2024-05-01 ASSESSMENT — PAIN DESCRIPTION - ORIENTATION
ORIENTATION: LEFT
ORIENTATION: RIGHT

## 2024-05-01 ASSESSMENT — PAIN DESCRIPTION - DESCRIPTORS
DESCRIPTORS: ACHING;DISCOMFORT;THROBBING
DESCRIPTORS: ACHING;DISCOMFORT;THROBBING

## 2024-05-01 ASSESSMENT — PAIN SCALES - GENERAL
PAINLEVEL_OUTOF10: 7
PAINLEVEL_OUTOF10: 7

## 2024-05-01 ASSESSMENT — PAIN DESCRIPTION - PAIN TYPE: TYPE: CHRONIC PAIN

## 2024-05-01 NOTE — CARE COORDINATION
Weekly team care conference with interdisciplinary team on:05/01/24     Chart reviewed for length of stay. IP day # 8  Unit: ARU   Diagnosis and current status as per MD progress: SDH (subdural hematoma)   Consultants Following: n/a  Discharge Plan   Estimated discharge date: 5/6/24  Destination: outpatient therapies  Pass:No  Services at Discharge: Outpatient Physical Therapy, Occupational Therapy, and Speech Therapy  Equipment at Discharge: None    Will try removal of cornelius on 5/2 w/voiding trial if patient fails will have to re-insert and go home with cornelius. Patient and spouse will need cornelius care training.    Nirmala Davila RN

## 2024-05-01 NOTE — PLAN OF CARE
Problem: Neurosensory - Adult  Goal: Achieves maximal functionality and self care  Outcome: Progressing     Problem: Skin/Tissue Integrity - Adult  Goal: Skin integrity remains intact  Outcome: Progressing     Problem: Skin/Tissue Integrity  Goal: Absence of new skin breakdown  Description:   Monitor for areas of redness and/or skin breakdown    Problem: Pain  Goal: Verbalizes/displays adequate comfort level or baseline comfort level  Outcome: Progressing   Outcome: Progressing     Problem: Chronic Conditions and Co-morbidities  Goal: Patient's chronic conditions and co-morbidity symptoms are monitored and maintained or improved  Outcome: Progressing

## 2024-05-01 NOTE — PLAN OF CARE
Patient able to use pain rating scale 0/10 adequately without problems.  Pain medications explained along with frequency and when to notify the nurse with  possible side effects. Verbalizes understanding. Medicated with prn oxy with effective results this shift. Ice also applied to left rib area with effective results. Call light within reach. Resting quietly in bed with eyes closed at present.

## 2024-05-01 NOTE — PROGRESS NOTES
Physical Therapy  Facility/Department: [facility]  Rehabilitation Physical Therapy Treatment Note    NAME: Karishma Lee  : 1960 (63 y.o.)  MRN: [id number]  CODE STATUS: Full Code    Date of Service: 24       Restrictions:  Restrictions/Precautions: Up as Tolerated, Fall Risk  Position Activity Restriction  Other position/activity restrictions: ashli     SUBJECTIVE  Subjective  Subjective: Pt seated in chair upon arrival with chair alarm activated. Patient agreeable to PT tx.  Pain: Patient reports no pain at beginning of session; reports mild stomach discomfort from lunch        OBJECTIVE  Cognition  Overall Cognitive Status: Exceptions  Arousal/Alertness: Appropriate responses to stimuli  Following Commands: Follows one step commands consistently  Attention Span: Appears intact  Memory: Appears intact  Safety Judgement: Good awareness of safety precautions  Problem Solving: Able to problem solve independently  Insights: Fully aware of deficits  Initiation: Does not require cues  Sequencing: Requires cues for some  Orientation  Overall Orientation Status: Within Functional Limits    Functional Mobility  Bed Mobility  Overall Assistance Level: Modified Independent  Bridging  Assistance Level: Independent  Sit to Supine  Assistance Level: Modified independent  Skilled Clinical Factors: HOB flat with use of B UE on bed rails  Transfers  Surface: To bed;Wheelchair  Additional Factors: Increased time to complete;Verbal cues;Set-up;Hand placement cues  Device: Walker  Sit to Stand  Assistance Level: Stand by assist  Skilled Clinical Factors: up to RW  Stand to Sit  Assistance Level: Stand by assist  Skilled Clinical Factors: with RW      Environmental Mobility  Ambulation  Surface: Uneven surface;Level surface  Device: Rolling walker  Distance: 265ft + 196ft + 203ft  Activity: Other (Comment);Within Unit (walked outside on uneven surfaces (pavement, inclines/declines))  Activity Comments: Pt ambulates with  Recommendations: Strengthening;ROM;Balance training;Functional mobility training;Gait training;Equipment evaluation, education, & procurement;Therapeutic activities;Home exercise program;Safety education & training;Patient/Caregiver education & training  Safety Devices  Type of Devices: All fall risk precautions in place;Call light within reach;Patient at risk for falls;Nurse notified;Gait belt;Bed alarm in place;Left in bed  Restraints  Restraints Initially in Place: No    EDUCATION  Education  Education Given To: Patient  Education Provided: Plan of Care;Safety;Mobility Training (endurance)  Education Provided Comments: importance of cardiovascular endurance and safety with ambulation and AD  Education Method: Verbal  Barriers to Learning: None  Education Outcome: Verbalized understanding;Continued education needed        Therapy Time   Individual Concurrent Group Co-treatment   Time In 1300         Time Out 1400         Minutes 60           Timed Code Treatment Minutes: 60 Minutes       LYNNETTE Shirley, 05/01/24 at 3:01 PM       This session was completed by the student physical therapist with supervision from Kiara Martinez PT, DPT and documentation was reviewed.

## 2024-05-01 NOTE — PROGRESS NOTES
Karishma Lee  5/1/2024  [id number]    Chief Complaint: SDH (subdural hematoma) (HCC)    Subjective:   No acute events overnight. Today Karishma is seen in her room. She reports some indigestion and feeling \"queasy\". She has been having bowel movements. She also reports left knee pain. Discussed trying a lidocaine patch. Will do voiding trial tomorrow.     ROS: denies f/c, n/v, cp, sob    Objective:  Patient Vitals for the past 24 hrs:   BP Temp Temp src Pulse Resp SpO2   05/01/24 1412 123/74 -- -- (!) 102 -- 92 %   05/01/24 1003 137/75 -- -- 88 -- 95 %   05/01/24 0830 (!) 148/71 98 °F (36.7 °C) Oral 90 18 95 %   05/01/24 0602 -- -- -- -- 16 --   04/30/24 2224 -- -- -- -- 16 --   04/30/24 2015 135/84 98.1 °F (36.7 °C) Oral 89 16 95 %     Gen: No distress, pleasant. Seated up in chair  HEENT: Normocephalic, atraumatic.   CV: extremities well perfused  Resp: No respiratory distress. On room air  Abd: Soft, mildly distended  Ext: No edema.   Neuro: Alert, oriented, appropriately interactive.   Psych: appropriate mood and affect    Wt Readings from Last 3 Encounters:   04/24/24 105.6 kg (232 lb 12.8 oz)   02/06/24 92.9 kg (204 lb 12.8 oz)   02/01/24 93 kg (205 lb)       Laboratory data:   Lab Results   Component Value Date    WBC 9.4 05/01/2024    HGB 12.1 05/01/2024    HCT 37.1 05/01/2024    MCV 93.6 05/01/2024     (H) 05/01/2024       Lab Results   Component Value Date/Time     05/01/2024 05:41 AM    K 4.4 05/01/2024 05:41 AM     05/01/2024 05:41 AM    CO2 27 05/01/2024 05:41 AM    BUN 13 05/01/2024 05:41 AM    CREATININE 0.7 05/01/2024 05:41 AM    GLUCOSE 179 05/01/2024 05:41 AM    CALCIUM 9.5 05/01/2024 05:41 AM        Therapy progress:  Physical therapy:  Bed Mobility:  Overall Assistance Level: Modified Independent  Sit>supine:  Assistance Level: Modified independent  Skilled Clinical Factors: HOB flat with use of B UE on bed rails  Supine>sit:  Assistance Level: Moderate assistance  Skilled  Clinical Factors: flat bed, log roll to R side  Transfers:  Surface: To bed, Wheelchair  Additional Factors: Increased time to complete, Verbal cues, Set-up, Hand placement cues  Device: Walker  Sit>stand:  Assistance Level: Stand by assist  Skilled Clinical Factors: up to RW  Stand>sit:  Assistance Level: Stand by assist  Skilled Clinical Factors: with RW  Bed<>chair  Technique: Stand step  Assistance Level: Stand by assist  Stand Pivot:     Lateral transfer:     Car transfer:  Assistance Level: Stand by assist  Skilled Clinical Factors: with RW, increased time  Ambulation:  Surface: Uneven surface, Level surface  Device: Rolling walker  Distance: 265ft + 196ft + 203ft  Activity: Other (Comment), Within Unit (walked outside on uneven surfaces (pavement, inclines/declines))  Activity Comments: Pt ambulates with slower linda and short step length with mild L foot flat/drag. Pt required supervision without LOB  Additional Factors: Increased time to complete  Assistance Level: Supervision, Stand by assist  Gait Deviations: Slow linda, Decreased step length bilateral, Narrow base of support, Decreased heel strike right, Decreased heel strike left, Path deviations  Stairs:  Stair Height: 6''  Device: Bilateral handrails, One handrail  Number of Stairs: 3x4  Additional Factors: Non-reciprocal going down, Non-reciprocal going up, Increased time to complete, Hand placement cues  Assistance Level: Contact guard assist  Skilled Clinical Factors: Pt ascends and descends non-reciprocally for x2 4 step repetitions with use of B HR's. Pt ascends and descends on 3rd repetition using L UE ascending and R UE descending with cup placed in other hand to simulate stairs and HR in home.  Skilled Clinical Factors - Comments: Pt required CGA throughout stair negotiation and VC, Increased time noted to complete task.  Curb:  Curb Height: 6''  Device: Rolling walker  Number of Curbs: 1  Additional Factors: Verbal cues, Increased time to

## 2024-05-01 NOTE — CARE COORDINATION
Call received from insurance inquiring about admission date. Updates faxed to insurance. Aileen Murphy RN

## 2024-05-01 NOTE — PROGRESS NOTES
[facility]: ACUTE REHAB UNIT  SPEECH-LANGUAGE PATHOLOGY      [x] Daily  [] Weekly Care Conference Note  [] Discharge    Patient:Karishma Lee      :1960  MRN:[id number]  Rehab Dx/Hx: SDH (subdural hematoma) (HCC) [S06.5XAA]    Precautions: falls  Home situation: lives with . Indep with med management.  manages finances.  ST Dx: [] Aphasia  [] Dysarthria  [] Apraxia   [] Oropharyngeal dysphagia [x] Cognitive Impairment  [] Other:   Date of Admit: 2024  Room #: 0168/0168-01    Current functional status (updated daily):         Pt being seen for : [] Speech/Language Treatment  [] Dysphagia Treatment [x] Cognitive Treatment  [] Other:  Communication: [x]WFL  [] Aphasia  [] Dysarthria  [] Apraxia  [] Pragmatic Impairment [] Non-verbal  [] Hearing Loss  [] Other:   Cognition: [] WFL  [x] Mild  [] Moderate  [] Severe [] Unable to Assess  [] Other:  Memory: [] WFL  [x] Mild  [] Moderate  [] Severe [] Unable to Assess  [] Other:  Behavior: [x] Alert  [x] Cooperative  [x]  Pleasant  [] Confused  [] Agitated  [] Uncooperative  [] Distractible [] Motivated  [] Self-Limiting [] Anxious  [] Other:  Endurance:  [x] Adequate for participation in SLP sessions  [] Reduced overall  [] Lethargic  [] Other:  Safety: [x] No concerns at this time  [] Reduced insight into deficits  []  Reduced safety awareness [] Not following call light procedures  [] Unable to Assess  [] Other:    Current Diet Order:ADULT DIET; Regular; 4 carb choices (60 gm/meal)  ADULT ORAL NUTRITION SUPPLEMENT; Breakfast, Dinner; Diabetic Oral Supplement, thi  liquids  Swallowing Precautions: general aspiration and swallow precautions         Date: 2024      Tx session 1  8152-5743 Tx session 2  4515-1374   Total Timed Code Min 30 30   Total Treatment Minutes 30 30   Individual Treatment Minutes 30 30   Group Treatment Minutes 0 0   Co-Treat Minutes 0 0   Variance/Reason:  N/A N/A   Pain Denies  Denies   Pain Intervention [] RN notified  []

## 2024-05-01 NOTE — PROGRESS NOTES
Occupational Therapy  Facility/Department: [facility]  Rehabilitation Occupational Therapy Daily Treatment Note    Date: 24  Patient Name: Karishma Lee       Room: 0168/0168-01  MRN: [id number]  Account: [id number]   : 1960  (63 y.o.) Gender: female                    Past Medical History:  has a past medical history of ADHD (attention deficit hyperactivity disorder), Anxiety, Bipolar disorder ([facility]), Depression, Diverticulitis, DJD (degenerative joint disease), Fatty liver, GERD (gastroesophageal reflux disease), Hyperlipidemia, Hypertension, Irritable bowel syndrome, Mood disorder (HCC), Narcotic addiction ([facility]), Obesity, NAGEL (obstructive sleep apnea), Primary localized osteoarthritis of right knee, Restless leg syndrome, Status post total right knee replacement, Type 2 diabetes mellitus without complication (HCC), Type II or unspecified type diabetes mellitus without mention of complication, not stated as uncontrolled, and Vision impairment.  Past Surgical History:   has a past surgical history that includes Total abdominal hysterectomy w/ bilateral salpingoophorectomy (2010); Knee arthroscopy (Right); Plantar fascia surgery; Appendectomy; Cholecystectomy (); Cystoscopy (2010); Cystoscopy (2010); Colonoscopy (2004); Knee arthroscopy (Left, 10/2013); Total knee arthroplasty (Right, 2019); joint replacement; knee surgery; Hysterectomy; Knee Arthroplasty; Hysterectomy, total abdominal; and Upper gastrointestinal endoscopy.    Restrictions  Restrictions/Precautions: Up as Tolerated, Fall Risk  Other position/activity restrictions: cornelius  Required Braces or Orthoses?: No    Subjective  Subjective: Pt in bed, pleasant, pain 6/10 in L ribs and L knee, agreeable to OT session, /75, HR 88, O2 sats 95% on RA.  Restrictions/Precautions: Up as Tolerated;Fall Risk             Objective     Cognition  Overall Cognitive Status: Exceptions  Arousal/Alertness: Appropriate responses  arms;From chair with arms;From bed;Raised toilet Seat;Wheelchair  Additional Factors: Hand placement cues;Increased time to complete;Verbal cues  Device: Walker  Sit to Stand  Assistance Level: Supervision  Stand to Sit  Assistance Level: Supervision  Bed To/From Chair  Technique: Stand step  Assistance Level: Supervision   OT Exercises  A/AROM Exercises: x20 BUE ex with 2# free weight: elbow flexion, wrist extension, shoulder flexion  to 90* (AROM LUE)     Assessment  Assessment  Assessment: Pt agreeable to OT session. Pt demonstrated improved balance and tolerance for SPV for all transfers and mobility with tolerance up to 10 minutes of mobility around unit. Pt completed LB dressing with Darrell and cues for managing cornelius. Pt completed bowel hygiene well with setup. Pt demonstrated improved strength for BUE ther ex for 20 reps with mild pain in L ribcage with shoulder exercises. Pt required min/mod VCs for visual scanning in hallway and increased time cues for serial subtractions during dual tasking. Continue POC.  Activity Tolerance: Patient tolerated treatment well;Patient limited by endurance  Discharge Recommendations: 24 hour supervision or assist;Outpatient OT  OT Equipment Recommendations  Equipment Needed: No  Safety Devices  Safety Devices in place: Yes  Type of devices: Nurse notified;Gait belt;Call light within reach;Chair alarm in place;Left in chair    Patient Education  Education  Education Given To: Patient  Education Provided: Role of Therapy;Plan of Care;Precautions;Safety;ADL Function;Mobility Training;Transfer Training;Equipment;DME/Home Modifications;Fall Prevention Strategies;IADL Function  Education Provided Comments: AE for LB dressing, building endurance/tolerance, OT POC, safe transfer techniques, hand placement for safe transfers, visual scanning  Education Method: Verbal  Barriers to Learning: Cognition  Education Outcome: Verbalized understanding;Demonstrated understanding;Continued

## 2024-05-02 LAB
GLUCOSE BLD-MCNC: 146 MG/DL (ref 70–99)
GLUCOSE BLD-MCNC: 155 MG/DL (ref 70–99)
GLUCOSE BLD-MCNC: 184 MG/DL (ref 70–99)
GLUCOSE BLD-MCNC: 215 MG/DL (ref 70–99)
PERFORMED ON: ABNORMAL

## 2024-05-02 PROCEDURE — 97129 THER IVNTJ 1ST 15 MIN: CPT

## 2024-05-02 PROCEDURE — 97530 THERAPEUTIC ACTIVITIES: CPT

## 2024-05-02 PROCEDURE — 1280000000 HC REHAB R&B

## 2024-05-02 PROCEDURE — 97130 THER IVNTJ EA ADDL 15 MIN: CPT

## 2024-05-02 PROCEDURE — 6360000002 HC RX W HCPCS: Performed by: STUDENT IN AN ORGANIZED HEALTH CARE EDUCATION/TRAINING PROGRAM

## 2024-05-02 PROCEDURE — 97116 GAIT TRAINING THERAPY: CPT

## 2024-05-02 PROCEDURE — 6370000000 HC RX 637 (ALT 250 FOR IP): Performed by: STUDENT IN AN ORGANIZED HEALTH CARE EDUCATION/TRAINING PROGRAM

## 2024-05-02 PROCEDURE — 97110 THERAPEUTIC EXERCISES: CPT

## 2024-05-02 PROCEDURE — 51798 US URINE CAPACITY MEASURE: CPT

## 2024-05-02 PROCEDURE — 97112 NEUROMUSCULAR REEDUCATION: CPT

## 2024-05-02 RX ADMIN — INSULIN LISPRO 3 UNITS: 100 INJECTION, SOLUTION INTRAVENOUS; SUBCUTANEOUS at 08:11

## 2024-05-02 RX ADMIN — TAMSULOSIN HYDROCHLORIDE 0.4 MG: 0.4 CAPSULE ORAL at 08:21

## 2024-05-02 RX ADMIN — OXYCODONE 10 MG: 5 TABLET ORAL at 13:24

## 2024-05-02 RX ADMIN — ESCITALOPRAM OXALATE 20 MG: 10 TABLET ORAL at 08:20

## 2024-05-02 RX ADMIN — MIRTAZAPINE 7.5 MG: 15 TABLET, FILM COATED ORAL at 21:17

## 2024-05-02 RX ADMIN — SENNOSIDES AND DOCUSATE SODIUM 1 TABLET: 50; 8.6 TABLET ORAL at 08:21

## 2024-05-02 RX ADMIN — POLYETHYLENE GLYCOL 3350 17 G: 17 POWDER, FOR SOLUTION ORAL at 08:12

## 2024-05-02 RX ADMIN — GABAPENTIN 400 MG: 400 CAPSULE ORAL at 13:24

## 2024-05-02 RX ADMIN — METHOCARBAMOL 1000 MG: 500 TABLET ORAL at 08:21

## 2024-05-02 RX ADMIN — ENOXAPARIN SODIUM 30 MG: 100 INJECTION SUBCUTANEOUS at 21:17

## 2024-05-02 RX ADMIN — LAMOTRIGINE 100 MG: 100 TABLET ORAL at 21:16

## 2024-05-02 RX ADMIN — METOPROLOL SUCCINATE 50 MG: 50 TABLET, EXTENDED RELEASE ORAL at 08:20

## 2024-05-02 RX ADMIN — ROPINIROLE HYDROCHLORIDE 0.5 MG: 0.5 TABLET, FILM COATED ORAL at 21:16

## 2024-05-02 RX ADMIN — METHOCARBAMOL 1000 MG: 500 TABLET ORAL at 11:55

## 2024-05-02 RX ADMIN — INSULIN LISPRO 2 UNITS: 100 INJECTION, SOLUTION INTRAVENOUS; SUBCUTANEOUS at 11:55

## 2024-05-02 RX ADMIN — GABAPENTIN 400 MG: 400 CAPSULE ORAL at 21:16

## 2024-05-02 RX ADMIN — METHOCARBAMOL 1000 MG: 500 TABLET ORAL at 17:11

## 2024-05-02 RX ADMIN — INSULIN LISPRO 3 UNITS: 100 INJECTION, SOLUTION INTRAVENOUS; SUBCUTANEOUS at 17:10

## 2024-05-02 RX ADMIN — ATORVASTATIN CALCIUM 10 MG: 10 TABLET, FILM COATED ORAL at 08:21

## 2024-05-02 RX ADMIN — ASPIRIN 81 MG 81 MG: 81 TABLET ORAL at 08:21

## 2024-05-02 RX ADMIN — AMOXICILLIN AND CLAVULANATE POTASSIUM 1 TABLET: 875; 125 TABLET, FILM COATED ORAL at 08:21

## 2024-05-02 RX ADMIN — SENNOSIDES AND DOCUSATE SODIUM 1 TABLET: 50; 8.6 TABLET ORAL at 21:17

## 2024-05-02 RX ADMIN — ACETAMINOPHEN 1000 MG: 500 TABLET ORAL at 21:17

## 2024-05-02 RX ADMIN — INSULIN LISPRO 3 UNITS: 100 INJECTION, SOLUTION INTRAVENOUS; SUBCUTANEOUS at 11:56

## 2024-05-02 RX ADMIN — ENOXAPARIN SODIUM 30 MG: 100 INJECTION SUBCUTANEOUS at 08:20

## 2024-05-02 RX ADMIN — METFORMIN HYDROCHLORIDE 1000 MG: 500 TABLET ORAL at 17:11

## 2024-05-02 RX ADMIN — OXYCODONE 15 MG: 15 TABLET ORAL at 21:16

## 2024-05-02 RX ADMIN — OXYCODONE 10 MG: 5 TABLET ORAL at 06:48

## 2024-05-02 RX ADMIN — ACETAMINOPHEN 1000 MG: 500 TABLET ORAL at 13:24

## 2024-05-02 RX ADMIN — PANTOPRAZOLE SODIUM 40 MG: 40 TABLET, DELAYED RELEASE ORAL at 06:48

## 2024-05-02 RX ADMIN — METFORMIN HYDROCHLORIDE 1000 MG: 500 TABLET ORAL at 08:21

## 2024-05-02 RX ADMIN — METHOCARBAMOL 1000 MG: 500 TABLET ORAL at 21:16

## 2024-05-02 RX ADMIN — GABAPENTIN 400 MG: 400 CAPSULE ORAL at 08:21

## 2024-05-02 RX ADMIN — Medication 5 MG: at 21:16

## 2024-05-02 RX ADMIN — AMOXICILLIN AND CLAVULANATE POTASSIUM 1 TABLET: 875; 125 TABLET, FILM COATED ORAL at 21:17

## 2024-05-02 RX ADMIN — ACETAMINOPHEN 1000 MG: 500 TABLET ORAL at 06:48

## 2024-05-02 ASSESSMENT — PAIN DESCRIPTION - DESCRIPTORS
DESCRIPTORS: ACHING;SORE
DESCRIPTORS: ACHING;DISCOMFORT
DESCRIPTORS: ACHING
DESCRIPTORS: ACHING

## 2024-05-02 ASSESSMENT — PAIN DESCRIPTION - FREQUENCY
FREQUENCY: CONTINUOUS
FREQUENCY: CONTINUOUS

## 2024-05-02 ASSESSMENT — PAIN DESCRIPTION - ONSET
ONSET: ON-GOING
ONSET: ON-GOING

## 2024-05-02 ASSESSMENT — PAIN SCALES - GENERAL
PAINLEVEL_OUTOF10: 5
PAINLEVEL_OUTOF10: 6
PAINLEVEL_OUTOF10: 7
PAINLEVEL_OUTOF10: 5
PAINLEVEL_OUTOF10: 7
PAINLEVEL_OUTOF10: 8

## 2024-05-02 ASSESSMENT — PAIN - FUNCTIONAL ASSESSMENT
PAIN_FUNCTIONAL_ASSESSMENT: PREVENTS OR INTERFERES SOME ACTIVE ACTIVITIES AND ADLS
PAIN_FUNCTIONAL_ASSESSMENT: ACTIVITIES ARE NOT PREVENTED
PAIN_FUNCTIONAL_ASSESSMENT: PREVENTS OR INTERFERES SOME ACTIVE ACTIVITIES AND ADLS

## 2024-05-02 ASSESSMENT — PAIN DESCRIPTION - ORIENTATION
ORIENTATION: LEFT

## 2024-05-02 ASSESSMENT — PAIN DESCRIPTION - PAIN TYPE
TYPE: ACUTE PAIN
TYPE: CHRONIC PAIN

## 2024-05-02 ASSESSMENT — PAIN DESCRIPTION - LOCATION
LOCATION: RIB CAGE
LOCATION: RIB CAGE;KNEE
LOCATION: KNEE
LOCATION: RIB CAGE

## 2024-05-02 NOTE — PLAN OF CARE
Patient continues on antibiotic for treatment of pneumonia, no side effects noted. Afebrile, denies cough or congestion. Lungs with diminished breath sounds bilateral bases. Encouraged patient to utilize incentive spirometer, verbalized understanding. Medicated with prn oxycodone for complaints of left knee and left side pain with effective results. Patient currently resting quietly in bed with eyes closed. CPAP in place, tolerating well. Will continue to monitor.

## 2024-05-02 NOTE — PROGRESS NOTES
Physical Therapy  Facility/Department: [facility]  Rehabilitation Physical Therapy Treatment Note    NAME: Karishma Lee  : 1960 (63 y.o.)  MRN: [id number]  CODE STATUS: Full Code    Date of Service: 24       Restrictions:  Restrictions/Precautions: Up as Tolerated, Fall Risk  Position Activity Restriction  Other position/activity restrictions: .     SUBJECTIVE  Subjective  Subjective: Pt seated in chair upon arrival, pleasant and agreeable to PT tx at this time.  Pain: Pt denies any pain at this time. Reports lidocaine patch is helping knee pain.           OBJECTIVE  Cognition  Overall Cognitive Status: Exceptions  Arousal/Alertness: Appropriate responses to stimuli  Following Commands: Follows one step commands consistently  Attention Span: Appears intact  Memory: Appears intact  Safety Judgement: Good awareness of safety precautions  Problem Solving: Able to problem solve independently  Insights: Fully aware of deficits  Sequencing: Requires cues for some  Orientation  Overall Orientation Status: Within Functional Limits  Orientation Level: Oriented X4    Functional Mobility  Sit to Stand  Assistance Level: Supervision  Skilled Clinical Factors: Pt completes multiple t/f with RW with Sup  Stand to Sit  Assistance Level: Supervision  Skilled Clinical Factors: Pt completes multiple t/f with RW with Sup      Environmental Mobility  Ambulation  Surface: Level surface;Uneven surface  Device: Rolling walker  Distance: 285 ft + 270 ft  Activity: Within Room;Other (Comment) (ambulates to gift shop + unit)  Activity Comments: Pt ambulates with slower linda and short step length. Pt required supervision with RW over even surfaces, without LOB. Pt is requiring SBA when ambulating over uneven surfaces outside of the unit.   Additional Factors: Increased time to complete  Assistance Level: Supervision-SBA  Gait Deviations: Slow linda;Decreased step length bilateral;Narrow base of support;Decreased heel strike  2: pt will perform supine <> sit with MOD A - MET 4/25, CGA with bed rails, progress to SBA  Short Term Goal 3: pt will ambulate 50 ft with RW and CGA - MET 4/25, progress to 100 ft with RW and CGA - met 4/29  Short Term Goal 4: pt will transfer bed to chair with RW and supervision - 4/29 SBA  Short Term Goal 5: pt will complete 2 steps with supervision and 1 HR - not met 4/29  Long Term Goals  Time Frame for Long Term Goals : 5/2 - extended to 5/6 to meet d/c date.   Long Term Goal 1: pt will perform STS MOD I  Long Term Goal 2: pt will perform supine<> sit with MIN A - MET 4/25, progress to mod-ind  Long Term Goal 3: pt will ambulate 150 ft with RW and CGA - MET 5/2 - ambulates over 150ft with supervision   Long Term Goal 4: pt will transfer bed to chair with RW MOD I  Long Term Goal 5: pt will complete 4 steps with supervision and no HR    PLAN OF CARE/SAFETY  Physical Therapy Plan  General Plan: 5-7 times per week  Therapy Duration: 10 Days  Current Treatment Recommendations: Strengthening;ROM;Balance training;Functional mobility training;Gait training;Equipment evaluation, education, & procurement;Therapeutic activities;Home exercise program;Safety education & training;Patient/Caregiver education & training;Stair training;Transfer training;Neuromuscular re-education;Endurance training  Safety Devices  Type of Devices: Call light within reach;Patient at risk for falls;Nurse notified;Gait belt;Left in chair;Chair alarm in place  Restraints  Restraints Initially in Place: No    EDUCATION  Education  Education Given To: Patient  Education Provided: Home Exercise Program;Mobility Training;Safety  Education Method: Verbal  Barriers to Learning: None  Education Outcome: Verbalized understanding;Continued education needed  Skilled Clinical Factors: HEP, importance of toileting, OP PT recommendation        Therapy Time   Individual Concurrent Group Co-treatment   Time In 1030         Time Out 1100         Minutes 30

## 2024-05-02 NOTE — PROGRESS NOTES
Occupational Therapy  Facility/Department: [facility]  Rehabilitation Occupational Therapy Daily Treatment Note    Date: 24  Patient Name: Karishma Lee       Room: 0168/0168-01  MRN: [id number]  Account: [id number]   : 1960  (63 y.o.) Gender: female                    Past Medical History:  has a past medical history of ADHD (attention deficit hyperactivity disorder), Anxiety, Bipolar disorder ([facility]), Depression, Diverticulitis, DJD (degenerative joint disease), Fatty liver, GERD (gastroesophageal reflux disease), Hyperlipidemia, Hypertension, Irritable bowel syndrome, Mood disorder (HCC), Narcotic addiction ([facility]), Obesity, ANGEL (obstructive sleep apnea), Primary localized osteoarthritis of right knee, Restless leg syndrome, Status post total right knee replacement, Type 2 diabetes mellitus without complication (HCC), Type II or unspecified type diabetes mellitus without mention of complication, not stated as uncontrolled, and Vision impairment.  Past Surgical History:   has a past surgical history that includes Total abdominal hysterectomy w/ bilateral salpingoophorectomy (2010); Knee arthroscopy (Right); Plantar fascia surgery; Appendectomy; Cholecystectomy (); Cystoscopy (2010); Cystoscopy (2010); Colonoscopy (2004); Knee arthroscopy (Left, 10/2013); Total knee arthroplasty (Right, 2019); joint replacement; knee surgery; Hysterectomy; Knee Arthroplasty; Hysterectomy, total abdominal; and Upper gastrointestinal endoscopy.    Restrictions  Restrictions/Precautions: Up as Tolerated, Fall Risk, General Precautions  Other position/activity restrictions: .  Required Braces or Orthoses?: No    Subjective  Subjective: Pt in recliner, pleasant, pain 4/10 in L ribcage, states received pain medication recently, agreeable to OT session, /60, HR 84, O2 sats 91% on RA.  Restrictions/Precautions: Up as Tolerated;Fall Risk;General Precautions             Objective

## 2024-05-02 NOTE — PROGRESS NOTES
Karishma Lee  5/2/2024  [id number]    Chief Complaint: SDH (subdural hematoma) (HCC)    Subjective:   No acute events overnight. Today Karishma is seen in her room. She reports feeling improved from yesterday. She denies nausea today. She also reports improvement in left knee pain with lidocaine patch. Voiding trial today.     ROS: denies f/c, n/v, cp, sob    Objective:  Patient Vitals for the past 24 hrs:   BP Temp Temp src Pulse Resp SpO2   05/02/24 0815 (!) 141/68 98.7 °F (37.1 °C) Oral 95 16 92 %   05/02/24 0648 -- -- -- -- 16 --   05/01/24 2101 -- -- -- -- 16 --   05/01/24 2015 135/80 97.4 °F (36.3 °C) Oral 89 16 97 %   05/01/24 1412 123/74 -- -- (!) 102 -- 92 %     Gen: No distress, pleasant. Seated up in chair  HEENT: Normocephalic, atraumatic.   CV: extremities well perfused  Resp: No respiratory distress. On room air  Abd: Soft, mildly distended  Ext: No edema.   Neuro: Alert, oriented, appropriately interactive. Speech fluent  Psych: appropriate mood and affect    Wt Readings from Last 3 Encounters:   04/24/24 105.6 kg (232 lb 12.8 oz)   02/06/24 92.9 kg (204 lb 12.8 oz)   02/01/24 93 kg (205 lb)       Laboratory data:   Lab Results   Component Value Date    WBC 9.4 05/01/2024    HGB 12.1 05/01/2024    HCT 37.1 05/01/2024    MCV 93.6 05/01/2024     (H) 05/01/2024       Lab Results   Component Value Date/Time     05/01/2024 05:41 AM    K 4.4 05/01/2024 05:41 AM     05/01/2024 05:41 AM    CO2 27 05/01/2024 05:41 AM    BUN 13 05/01/2024 05:41 AM    CREATININE 0.7 05/01/2024 05:41 AM    GLUCOSE 179 05/01/2024 05:41 AM    CALCIUM 9.5 05/01/2024 05:41 AM        Therapy progress:  Physical therapy:  Bed Mobility:  Overall Assistance Level: Modified Independent  Sit>supine:  Assistance Level: Modified independent  Skilled Clinical Factors: HOB flat with use of B UE on bed rails  Supine>sit:  Assistance Level: Modified independent  Skilled Clinical Factors: HOB elevated, use of BR, increased time  trace left pneumothorax, atelectasis, and left 1-5 TP fractures. She was admitted to [facility] on 4/23/24 due to functional deficits below her baseline.      Left SDH  - nonoperative management  - repeat CT head showing resolved SDH, Neurosurgery reviewed. Ok to resume asa, resumed 4/30  - PT, OT, ST    Atypical Chest pain, resolved  - EKG and troponin reassuring  - possibly indigestion  - added PPI and tums PRN    Constipation, improved  - XR abd showing moderate colonic stool burden  - continue bowel regimen    Urinary Retention  - cornelius placed  - started flomax  - voiding trial today     Tachycardia  - EKG showing sinus tachycardia  - metoprolol     Left Rib Fractures, 2-11  - with pneumothorax during acute stay, since resolved  - not a candidate for SSRF  - multimodal pain control, increased gabapentin, PRN oxycodone  - IS     L lumbar 1-5 TP fractures  - no intervention or brace needed     HTN  - metoprolol     HLD  - statin     ANGEL  - home CPAP     DM2  - home regimen: metformin, ozempic  - stopped NPH due to hypoglycemia  - SSI  - increased metformin     Bipolar Disorder  - home requip, lamotrigine, lexapro     Bowels: adjust medications as needed for regular bowel movements      Bladder: Check PVR x 3.  IMC if PVR > 200ml or if any volume is > 500 ml.      Sleep: melatonin nightly      PPX  DVT: lovenox  GI: not indicated     Follow up appointments: Trauma, Neurosurgery, PCP    Therapy progress: completes 6 steps total (2 +6 ) with BHR with CGA to SBA   Services: OP PT, OT, ST  EDOD: 5/6    April Garduno MD  5/2/2024, 11:50 AM

## 2024-05-02 NOTE — PROGRESS NOTES
Repositioned  [] Intervention offered and patient declined  [x] N/A  [] Other: [] RN notified  [] Repositioned  [] Intervention offered and patient declined  [x] N/A  [] Other:   Subjective     Pt alert and oriented, cooperative and agreeable to participate in therapy. Pt seen sitting upright in bedside chair.  Pt alert and oriented, cooperative and agreeable to participate in therapy. Pt seen sitting upright in bed.    Objective:  Goals     Short Term Goals  Time Frame for Short Term Goals: Extend goals through 05/04/24    Goal 1: Patient will complete executive function tasks (i.e. meds) related to relevant ADLs with 80% accuracy given min cues    Complete remainder of Identifying medication errors that were sorted in AM/PM organizer (from yesterday's tx session)  -pt given medications that were already sorted for AM/PM  -pt asked to identify errors of medications that were sorted inaccurately according to medication labels  -100% acc indep    Did not target.    Goal 2: Patient will completed graded recall tasks in order to promote memory of functional information on 80% opp given min cues   Functional recall of 16 novel items sorted into four categories:  -sorting of words: 100% acc  -100% acc recall    Short term recall of the same 16 items after a 10 min delay:  -94% acc (able to recall 15/16 items)   Higher level recall task:  -pt given four categories associated with each suit in a deck of cards  -ex: hearts= food, diamonds= drinks, spades= states, clubs= colors  -cards were presented at random order  -alternating attention: 100% acc  -recall of categories and asked to not name the same item twice per category: 100% acc     Goal 3: Pt will completed graded thought organization and problem solving tasks on 80% opp given min cues   Thought organization targeted during recall task- see goal 2 above.    Discussed safety situations and logical solutions:  -100% acc indep   Goal 4: Pt will complete verbal and  / Charges    Time in Time out Total Time / units   Cognitive Tx 1100  1230 1130  1300 30 mins / 2 units  30 mins / 2 units    Speech Tx -- -- --   Dysphagia Tx -- -- --       Electronically Signed by     Bethanie Correia M.A CCC-SLP #[id number]  Speech-Language Pathologist

## 2024-05-02 NOTE — PROGRESS NOTES
Physical Therapy  Facility/Department: [facility]  Rehabilitation Physical Therapy Treatment Note    NAME: Karishma Lee  : 1960 (63 y.o.)  MRN: [id number]  CODE STATUS: Full Code    Date of Service: 24       Restrictions:  Restrictions/Precautions: Up as Tolerated, Fall Risk  Position Activity Restriction  Other position/activity restrictions: .     SUBJECTIVE  Subjective  Subjective: Pt supine in bed on approach, pleasant and agreeable to PT tx  Pain: Pt denies c/o pain initially but reports chronic discomfort in L knee and and reports rib cage pain improved with lidocane patch in place            OBJECTIVE  Orientation  Overall Orientation Status: Within Functional Limits    Functional Mobility  Supine to Sit  Assistance Level: Modified independent  Skilled Clinical Factors: HOB elevated, use of BR, increased time to complete    Balance  Sitting Balance: Independent  Standing Balance: Supervision  Standing Balance  Activity: Grossly Sup with RW  X 2 set x 1 minutes reciprocal toe taps to 6\" step with RW and Sup for balance. Completed to improve dynamic balance and coordination and improve strength through SLS.    Transfers  Surface: From bed;From chair with arms  Additional Factors: Increased time to complete  Device: Walker (RW)  Sit to Stand  Assistance Level: Supervision  Skilled Clinical Factors: Pt completes multiple t/f with RW with Sup  Stand to Sit  Assistance Level: Supervision  Skilled Clinical Factors: Pt completes multiple t/f with RW with Sup      Environmental Mobility  Ambulation  Surface: Level surface  Device: Rolling walker  Distance: 200' + 200'  Activity: Within Room;Within Unit  Additional Factors: Increased time to complete  Assistance Level: Supervision  Gait Deviations: Slow linda;Decreased step length bilateral;Narrow base of support;Decreased heel strike right;Decreased heel strike left;Path deviations  Skilled Clinical Factors: Slow reciprocal pattern, B decreased toe  HR - not met 4/29  Long Term Goals  Time Frame for Long Term Goals : 5/2  Long Term Goal 1: pt will perform STS MOD I  Long Term Goal 2: pt will perform supine<> sit with MIN A - MET 4/25, progress to mod-ind  Long Term Goal 3: pt will ambulate 150 ft with RW and CGA  Long Term Goal 4: pt will transfer bed to chair with RW MOD I  Long Term Goal 5: pt will complete 4 steps with supervision and no HR    PLAN OF CARE/SAFETY  Physical Therapy Plan  General Plan: 5-7 times per week  Therapy Duration: 10 Days  Current Treatment Recommendations: Strengthening;ROM;Balance training;Functional mobility training;Gait training;Equipment evaluation, education, & procurement;Therapeutic activities;Home exercise program;Safety education & training;Patient/Caregiver education & training;Stair training;Transfer training;Neuromuscular re-education;Endurance training  Safety Devices  Type of Devices: Call light within reach;Patient at risk for falls;Nurse notified;Gait belt;Left in bed;Left in chair;Chair alarm in place    EDUCATION  Education  Education Given To: Patient  Education Provided: Role of Therapy;Plan of Care;Safety;Mobility Training  Education Provided Comments: Role of PT, goals and progression with mobility  Education Method: Verbal  Barriers to Learning: None  Education Outcome: Verbalized understanding;Continued education needed        Therapy Time   Individual Concurrent Group Co-treatment   Time In 0900         Time Out 0930         Minutes 30           Timed Code Treatment Minutes: 30 Minutes       Beulah Sue PT, DPT 05/02/24 at 9:55 AM

## 2024-05-03 LAB
ANION GAP SERPL CALCULATED.3IONS-SCNC: 12 MMOL/L (ref 3–16)
BASOPHILS # BLD: 0.1 K/UL (ref 0–0.2)
BASOPHILS NFR BLD: 0.7 %
BUN SERPL-MCNC: 15 MG/DL (ref 7–20)
CALCIUM SERPL-MCNC: 9.2 MG/DL (ref 8.3–10.6)
CHLORIDE SERPL-SCNC: 101 MMOL/L (ref 99–110)
CO2 SERPL-SCNC: 26 MMOL/L (ref 21–32)
CREAT SERPL-MCNC: 0.7 MG/DL (ref 0.6–1.2)
DEPRECATED RDW RBC AUTO: 14.5 % (ref 12.4–15.4)
EOSINOPHIL # BLD: 0.6 K/UL (ref 0–0.6)
EOSINOPHIL NFR BLD: 7.4 %
GFR SERPLBLD CREATININE-BSD FMLA CKD-EPI: >90 ML/MIN/{1.73_M2}
GLUCOSE BLD-MCNC: 128 MG/DL (ref 70–99)
GLUCOSE BLD-MCNC: 161 MG/DL (ref 70–99)
GLUCOSE BLD-MCNC: 168 MG/DL (ref 70–99)
GLUCOSE BLD-MCNC: 171 MG/DL (ref 70–99)
GLUCOSE SERPL-MCNC: 154 MG/DL (ref 70–99)
HCT VFR BLD AUTO: 37.3 % (ref 36–48)
HGB BLD-MCNC: 12.2 G/DL (ref 12–16)
LYMPHOCYTES # BLD: 2.6 K/UL (ref 1–5.1)
LYMPHOCYTES NFR BLD: 30.3 %
MCH RBC QN AUTO: 30.7 PG (ref 26–34)
MCHC RBC AUTO-ENTMCNC: 32.6 G/DL (ref 31–36)
MCV RBC AUTO: 94.1 FL (ref 80–100)
MONOCYTES # BLD: 0.6 K/UL (ref 0–1.3)
MONOCYTES NFR BLD: 6.7 %
NEUTROPHILS # BLD: 4.7 K/UL (ref 1.7–7.7)
NEUTROPHILS NFR BLD: 54.9 %
PERFORMED ON: ABNORMAL
PLATELET # BLD AUTO: 472 K/UL (ref 135–450)
PMV BLD AUTO: 8 FL (ref 5–10.5)
POTASSIUM SERPL-SCNC: 4.2 MMOL/L (ref 3.5–5.1)
RBC # BLD AUTO: 3.97 M/UL (ref 4–5.2)
SODIUM SERPL-SCNC: 139 MMOL/L (ref 136–145)
WBC # BLD AUTO: 8.6 K/UL (ref 4–11)

## 2024-05-03 PROCEDURE — 36415 COLL VENOUS BLD VENIPUNCTURE: CPT

## 2024-05-03 PROCEDURE — 1280000000 HC REHAB R&B

## 2024-05-03 PROCEDURE — 97110 THERAPEUTIC EXERCISES: CPT

## 2024-05-03 PROCEDURE — 6370000000 HC RX 637 (ALT 250 FOR IP): Performed by: STUDENT IN AN ORGANIZED HEALTH CARE EDUCATION/TRAINING PROGRAM

## 2024-05-03 PROCEDURE — 85025 COMPLETE CBC W/AUTO DIFF WBC: CPT

## 2024-05-03 PROCEDURE — 97530 THERAPEUTIC ACTIVITIES: CPT

## 2024-05-03 PROCEDURE — 80048 BASIC METABOLIC PNL TOTAL CA: CPT

## 2024-05-03 PROCEDURE — 97129 THER IVNTJ 1ST 15 MIN: CPT

## 2024-05-03 PROCEDURE — 6360000002 HC RX W HCPCS: Performed by: STUDENT IN AN ORGANIZED HEALTH CARE EDUCATION/TRAINING PROGRAM

## 2024-05-03 PROCEDURE — 97535 SELF CARE MNGMENT TRAINING: CPT

## 2024-05-03 PROCEDURE — 51798 US URINE CAPACITY MEASURE: CPT

## 2024-05-03 PROCEDURE — 51701 INSERT BLADDER CATHETER: CPT

## 2024-05-03 PROCEDURE — 97116 GAIT TRAINING THERAPY: CPT

## 2024-05-03 PROCEDURE — 97130 THER IVNTJ EA ADDL 15 MIN: CPT

## 2024-05-03 RX ORDER — POLYETHYLENE GLYCOL 3350 17 G/17G
17 POWDER, FOR SOLUTION ORAL DAILY
Qty: 527 G | Refills: 0 | Status: SHIPPED | OUTPATIENT
Start: 2024-05-04 | End: 2024-06-03

## 2024-05-03 RX ORDER — OXYCODONE HYDROCHLORIDE 5 MG/1
5 TABLET ORAL EVERY 6 HOURS PRN
Qty: 28 TABLET | Refills: 0 | Status: SHIPPED | OUTPATIENT
Start: 2024-05-03 | End: 2024-05-10

## 2024-05-03 RX ORDER — METHOCARBAMOL 1000 MG/1
1000 TABLET, COATED ORAL 4 TIMES DAILY
Qty: 120 TABLET | Refills: 1 | Status: SHIPPED | OUTPATIENT
Start: 2024-05-03 | End: 2024-06-02

## 2024-05-03 RX ORDER — SENNA AND DOCUSATE SODIUM 50; 8.6 MG/1; MG/1
1 TABLET, FILM COATED ORAL 2 TIMES DAILY
Qty: 60 TABLET | Refills: 1 | Status: SHIPPED | OUTPATIENT
Start: 2024-05-03

## 2024-05-03 RX ORDER — GABAPENTIN 400 MG/1
400 CAPSULE ORAL 3 TIMES DAILY
Qty: 90 CAPSULE | Refills: 0 | Status: SHIPPED | OUTPATIENT
Start: 2024-05-03 | End: 2024-06-02

## 2024-05-03 RX ORDER — TAMSULOSIN HYDROCHLORIDE 0.4 MG/1
0.4 CAPSULE ORAL DAILY
Qty: 30 CAPSULE | Refills: 2 | Status: SHIPPED | OUTPATIENT
Start: 2024-05-04

## 2024-05-03 RX ADMIN — ACETAMINOPHEN 1000 MG: 500 TABLET ORAL at 19:59

## 2024-05-03 RX ADMIN — ASPIRIN 81 MG 81 MG: 81 TABLET ORAL at 07:53

## 2024-05-03 RX ADMIN — AMOXICILLIN AND CLAVULANATE POTASSIUM 1 TABLET: 875; 125 TABLET, FILM COATED ORAL at 19:57

## 2024-05-03 RX ADMIN — ACETAMINOPHEN 1000 MG: 500 TABLET ORAL at 05:42

## 2024-05-03 RX ADMIN — OXYCODONE 15 MG: 15 TABLET ORAL at 05:43

## 2024-05-03 RX ADMIN — ENOXAPARIN SODIUM 30 MG: 100 INJECTION SUBCUTANEOUS at 07:54

## 2024-05-03 RX ADMIN — METOPROLOL SUCCINATE 50 MG: 50 TABLET, EXTENDED RELEASE ORAL at 07:53

## 2024-05-03 RX ADMIN — ROPINIROLE HYDROCHLORIDE 0.5 MG: 0.5 TABLET, FILM COATED ORAL at 19:57

## 2024-05-03 RX ADMIN — METHOCARBAMOL 1000 MG: 500 TABLET ORAL at 16:45

## 2024-05-03 RX ADMIN — METFORMIN HYDROCHLORIDE 1000 MG: 500 TABLET ORAL at 07:53

## 2024-05-03 RX ADMIN — GABAPENTIN 400 MG: 400 CAPSULE ORAL at 07:53

## 2024-05-03 RX ADMIN — LAMOTRIGINE 100 MG: 100 TABLET ORAL at 19:57

## 2024-05-03 RX ADMIN — METHOCARBAMOL 1000 MG: 500 TABLET ORAL at 07:53

## 2024-05-03 RX ADMIN — PANTOPRAZOLE SODIUM 40 MG: 40 TABLET, DELAYED RELEASE ORAL at 05:42

## 2024-05-03 RX ADMIN — METHOCARBAMOL 1000 MG: 500 TABLET ORAL at 11:51

## 2024-05-03 RX ADMIN — SENNOSIDES AND DOCUSATE SODIUM 1 TABLET: 50; 8.6 TABLET ORAL at 19:57

## 2024-05-03 RX ADMIN — SENNOSIDES AND DOCUSATE SODIUM 1 TABLET: 50; 8.6 TABLET ORAL at 07:53

## 2024-05-03 RX ADMIN — INSULIN LISPRO 3 UNITS: 100 INJECTION, SOLUTION INTRAVENOUS; SUBCUTANEOUS at 07:52

## 2024-05-03 RX ADMIN — Medication 5 MG: at 19:56

## 2024-05-03 RX ADMIN — GABAPENTIN 400 MG: 400 CAPSULE ORAL at 19:56

## 2024-05-03 RX ADMIN — ACETAMINOPHEN 1000 MG: 500 TABLET ORAL at 13:46

## 2024-05-03 RX ADMIN — POLYETHYLENE GLYCOL 3350 17 G: 17 POWDER, FOR SOLUTION ORAL at 07:52

## 2024-05-03 RX ADMIN — INSULIN LISPRO 3 UNITS: 100 INJECTION, SOLUTION INTRAVENOUS; SUBCUTANEOUS at 11:51

## 2024-05-03 RX ADMIN — TAMSULOSIN HYDROCHLORIDE 0.4 MG: 0.4 CAPSULE ORAL at 07:53

## 2024-05-03 RX ADMIN — AMOXICILLIN AND CLAVULANATE POTASSIUM 1 TABLET: 875; 125 TABLET, FILM COATED ORAL at 07:53

## 2024-05-03 RX ADMIN — ESCITALOPRAM OXALATE 20 MG: 10 TABLET ORAL at 07:53

## 2024-05-03 RX ADMIN — METFORMIN HYDROCHLORIDE 1000 MG: 500 TABLET ORAL at 16:45

## 2024-05-03 RX ADMIN — GABAPENTIN 400 MG: 400 CAPSULE ORAL at 13:46

## 2024-05-03 RX ADMIN — INSULIN LISPRO 3 UNITS: 100 INJECTION, SOLUTION INTRAVENOUS; SUBCUTANEOUS at 16:45

## 2024-05-03 RX ADMIN — METHOCARBAMOL 1000 MG: 500 TABLET ORAL at 19:56

## 2024-05-03 RX ADMIN — ATORVASTATIN CALCIUM 10 MG: 10 TABLET, FILM COATED ORAL at 07:53

## 2024-05-03 RX ADMIN — OXYCODONE 15 MG: 15 TABLET ORAL at 19:57

## 2024-05-03 RX ADMIN — ENOXAPARIN SODIUM 30 MG: 100 INJECTION SUBCUTANEOUS at 19:56

## 2024-05-03 RX ADMIN — MIRTAZAPINE 7.5 MG: 15 TABLET, FILM COATED ORAL at 19:57

## 2024-05-03 ASSESSMENT — PAIN DESCRIPTION - FREQUENCY: FREQUENCY: CONTINUOUS

## 2024-05-03 ASSESSMENT — PAIN DESCRIPTION - LOCATION
LOCATION: RIB CAGE

## 2024-05-03 ASSESSMENT — PAIN SCALES - GENERAL
PAINLEVEL_OUTOF10: 9
PAINLEVEL_OUTOF10: 7
PAINLEVEL_OUTOF10: 5

## 2024-05-03 ASSESSMENT — PAIN DESCRIPTION - DESCRIPTORS
DESCRIPTORS: ACHING
DESCRIPTORS: ACHING;THROBBING

## 2024-05-03 ASSESSMENT — PAIN DESCRIPTION - ORIENTATION
ORIENTATION: LEFT

## 2024-05-03 ASSESSMENT — PAIN DESCRIPTION - PAIN TYPE
TYPE: CHRONIC PAIN
TYPE: ACUTE PAIN

## 2024-05-03 ASSESSMENT — PAIN DESCRIPTION - ONSET: ONSET: ON-GOING

## 2024-05-03 NOTE — PLAN OF CARE
Problem: Discharge Planning  Goal: Discharge to home or other facility with appropriate resources  Outcome: Progressing     Problem: Neurosensory - Adult  Goal: Achieves stable or improved neurological status  Outcome: Progressing  Goal: Achieves maximal functionality and self care  Outcome: Progressing     Problem: Skin/Tissue Integrity - Adult  Goal: Skin integrity remains intact  5/2/2024 2321 by Brittany Osorio RN  Outcome: Progressing  5/2/2024 1659 by Jessenia Dior RN  Outcome: Progressing  Goal: Incisions, wounds, or drain sites healing without S/S of infection  5/2/2024 2321 by Brittany Osorio RN  Outcome: Progressing  5/2/2024 1659 by Jessenia Dior RN  Outcome: Progressing     Problem: Musculoskeletal - Adult  Goal: Return mobility to safest level of function  Outcome: Progressing  Goal: Return ADL status to a safe level of function  Outcome: Progressing     Problem: Metabolic/Fluid and Electrolytes - Adult  Goal: Glucose maintained within prescribed range  Outcome: Progressing     Problem: Skin/Tissue Integrity  Goal: Absence of new skin breakdown  Description: 1.  Monitor for areas of redness and/or skin breakdown  2.  Assess vascular access sites hourly  3.  Every 4-6 hours minimum:  Change oxygen saturation probe site  4.  Every 4-6 hours:  If on nasal continuous positive airway pressure, respiratory therapy assess nares and determine need for appliance change or resting period.  Outcome: Progressing     Problem: Safety - Adult  Goal: Free from fall injury  Outcome: Progressing     Problem: Pain  Goal: Verbalizes/displays adequate comfort level or baseline comfort level  Outcome: Progressing     Problem: Chronic Conditions and Co-morbidities  Goal: Patient's chronic conditions and co-morbidity symptoms are monitored and maintained or improved  Outcome: Progressing     Problem: ABCDS Injury Assessment  Goal: Absence of physical injury  Outcome: Progressing

## 2024-05-03 NOTE — PROGRESS NOTES
[facility]: ACUTE REHAB UNIT  SPEECH-LANGUAGE PATHOLOGY      [x] Daily  [] Weekly Care Conference Note  [] Discharge    Patient:Karishma Lee      :1960  MRN:[id number]  Rehab Dx/Hx: SDH (subdural hematoma) (HCC) [S06.5XAA]    Precautions: falls  Home situation: lives with . Indep with med management.  manages finances.  ST Dx: [] Aphasia  [] Dysarthria  [] Apraxia   [] Oropharyngeal dysphagia [x] Cognitive Impairment  [] Other:   Date of Admit: 2024  Room #: 0168/0168-01    Current functional status (updated daily):         Pt being seen for : [] Speech/Language Treatment  [] Dysphagia Treatment [x] Cognitive Treatment  [] Other:  Communication: [x]WFL  [] Aphasia  [] Dysarthria  [] Apraxia  [] Pragmatic Impairment [] Non-verbal  [] Hearing Loss  [] Other:   Cognition: [] WFL  [x] Mild  [] Moderate  [] Severe [] Unable to Assess  [] Other:  Memory: [] WFL  [x] Mild  [] Moderate  [] Severe [] Unable to Assess  [] Other:  Behavior: [x] Alert  [x] Cooperative  [x]  Pleasant  [] Confused  [] Agitated  [] Uncooperative  [] Distractible [] Motivated  [] Self-Limiting [] Anxious  [] Other:  Endurance:  [x] Adequate for participation in SLP sessions  [] Reduced overall  [] Lethargic  [] Other:  Safety: [x] No concerns at this time  [] Reduced insight into deficits  []  Reduced safety awareness [] Not following call light procedures  [] Unable to Assess  [] Other:    Current Diet Order:ADULT DIET; Regular; 4 carb choices (60 gm/meal)  ADULT ORAL NUTRITION SUPPLEMENT; Breakfast, Dinner; Diabetic Oral Supplement, thi  liquids  Swallowing Precautions: general aspiration and swallow precautions         Date: 5/3/2024      Tx session 1  [id number] All tx needs met in session 1   Total Timed Code Min 60 0   Total Treatment Minutes 60 0   Individual Treatment Minutes 60 0   Group Treatment Minutes 0 0   Co-Treat Minutes 0 0   Variance/Reason:  N/A N/A   Pain Denies  N/A   Pain Intervention [] RN notified  []  strategies, strategies to promote med management       Safety Devices: [x] Call light within reach  [] Chair alarm activated  [x] Bed alarm activated  [] Other:  [] Call light within reach  [] Chair alarm activated  [] Bed alarm activated  [] Other:    Assessment: Pt sorted medications based on those listed in MAR in 4 TOD pill organizer on 100% opp indep. SLP edu ot re: pre-sorted pill packs from pharmacy if sorting meds \"too tedious\" d/t number of medications. Pt agreeable to consider as indicated. SLP edu pt re: WRAP memory strategies. Pt implemented internal strategies to recall 5 novel names given min cues and recalled 5/5 on delay of 10 min indep. Pt determined alternate methods to complete a task and pro/con for each indep. Pt is motivated to improve and is progressing well towards her goals. Continue goals above.    Plan: Continue as per plan of care.      Additional Information:     Barriers toward progress: inability to manage medications, will need assist/supervision; inability to manage finances, will need assist/supervision; severity of cognition (mild) with reduced insight negatively impacting safety/independence  Discharge recommendations:  [] Home independently  [] Home with assistance []  24 hour supervision  [] ECF [x] Other: See PT/OT recs  Continued Tx Upon Discharge: ? [] Yes [] No [x] TBD based on progress while on ARU [] Vital Stim indicated [] Other:   Estimated discharge date: TBD    Interventions used this date:  [] Speech/Language Treatment  [] Instruction in HEP [] Group [] Dysphagia Treatment [x] Cognitive Treatment   [] Other:      Total Time Breakdown / Charges    Time in Time out Total Time / units   Cognitive Tx 1400 1500 60 mins / 4 units   Speech Tx -- -- --   Dysphagia Tx -- -- --       Electronically Signed by     Bozena Fisher M.A., CCC-SLP   Speech-Language Pathologist

## 2024-05-03 NOTE — CONSULTS
Reason for Consult: urinary retention    History of Present Illness: Karishma Lee is a 63 y.o. female who was admitted s/p fall down stairs with a subdural hematoma, multiple rib fractures, left pneumothorax, atelectasis and left 1-5 TP fractures. She is in acute rehab and we have been consulted for urinary retention. She underwent a voiding trial yesterday and is voiding but still requiring ISC. She denies issues voiding recently prior to her hospitalization. The ISC is not too bothersome to have performed.       ROS:  General: no fever or chills  CV: No chest pain  Pulm: No SOB  GI: No nausea, vomiting, diarrhea or constipation  Skin: No rash  Neuro: No headaches, dizziness or neurologic symptoms  : as above  Hematologic: no complaints  Endocrine: no complaints  Psych: no complaints    Past Medical History:   Past Medical History:   Diagnosis Date    ADHD (attention deficit hyperactivity disorder)     Anxiety     Bipolar disorder (HCC)     Depression     Diverticulitis 10/19/2021    DJD (degenerative joint disease)     hands/knees/ankles    Fatty liver     GERD (gastroesophageal reflux disease)     Hyperlipidemia     Hypertension     Irritable bowel syndrome     Mood disorder (HCC)     NOS    Narcotic addiction (HCC)     Obesity     ANGEL (obstructive sleep apnea)     no CPAP machine - not recommended per sleep study    Primary localized osteoarthritis of right knee 03/29/2019    Restless leg syndrome     Status post total right knee replacement 03/29/2019    Type 2 diabetes mellitus without complication (HCC)     Type II or unspecified type diabetes mellitus without mention of complication, not stated as uncontrolled     Vision impairment     wears glasses and contacts       Past Surgical History:  Past Surgical History:   Procedure Laterality Date    APPENDECTOMY      CHOLECYSTECTOMY  2001    COLONOSCOPY  01/2004    Due 2014    CYSTOSCOPY  07/09/2010    CYSTOSCOPY  11/18/2010    insertion of sparc

## 2024-05-03 NOTE — PROGRESS NOTES
POC.  Activity Tolerance: Patient limited by endurance;Patient limited by fatigue  Discharge Recommendations: 24 hour supervision or assist;Outpatient OT  OT Equipment Recommendations  Equipment Needed: No  Safety Devices  Safety Devices in place: Yes  Type of devices: Nurse notified;Gait belt;Call light within reach;Left in chair;Chair alarm in place    Patient Education  Education  Education Given To: Patient  Education Provided: Role of Therapy;Plan of Care;Precautions;Safety;ADL Function;Mobility Training;Transfer Training;Equipment;DME/Home Modifications;Fall Prevention Strategies  Education Provided Comments: building endurance/tolerance, OT POC, safe transfer techniques, hand placement for safe transfers, visual scanning, dual tasking, reaching out of GALDINO, balance  Education Method: Verbal  Barriers to Learning: Cognition  Education Outcome: Verbalized understanding;Demonstrated understanding;Continued education needed    Plan  Occupational Therapy Plan  Times Per Week: 5-7x/wk  Current Treatment Recommendations: Strengthening;Balance training;Functional mobility training;Endurance training;Patient/Caregiver education & training;Self-Care / ADL;Safety education & training;Equipment evaluation, education, & procurement;Neuromuscular re-education    Goals  Short Term Goals  Time Frame for Short Term Goals: 5 days (4/27/24)  Short Term Goal 1: Pt will perform toilet transfer with SBA and LRAD- GOAL MET, pt demos SBA with RTS with arms 4/27/24  Short Term Goal 2: Pt will perform LB dressing with Darrell using AE- MET 4/29 pt SBA with AE  Short Term Goal 3: Pt will perform 1-2 grooming tasks in stance at sink with SBA for balance-- GOAL MET, pt demos 3 grooming tasks in stance SBA 4/27/24  Short Term Goal 4: Pt will tolerate x20 reps of BUE therex. GOAL MET 5/1/24 Pt completed 20 reps of ther ex.  Short Term Goal 5: Pt will perform UB ADL with set-up- MET 4/30  Long Term Goals  Time Frame for Long Term Goals : 10

## 2024-05-03 NOTE — PLAN OF CARE
Problem: Discharge Planning  Goal: Discharge to home or other facility with appropriate resources  5/3/2024 1220 by Niecy Villa RN  Outcome: Progressing  5/2/2024 2321 by Brittany Osorio RN  Outcome: Progressing     Problem: Neurosensory - Adult  Goal: Achieves stable or improved neurological status  5/3/2024 1220 by Niecy Villa RN  Outcome: Progressing  5/2/2024 2321 by Brittany Osorio RN  Outcome: Progressing  Goal: Achieves maximal functionality and self care  5/3/2024 1220 by Niecy Villa RN  Outcome: Progressing  5/2/2024 2321 by Brittany Osorio RN  Outcome: Progressing     Problem: Skin/Tissue Integrity - Adult  Goal: Skin integrity remains intact  5/3/2024 1220 by Niecy Villa RN  Outcome: Progressing  5/2/2024 2321 by Brittany Osorio RN  Outcome: Progressing  Goal: Incisions, wounds, or drain sites healing without S/S of infection  5/3/2024 1220 by Niecy Villa RN  Outcome: Progressing  5/2/2024 2321 by Brittany Osorio RN  Outcome: Progressing     Problem: Musculoskeletal - Adult  Goal: Return mobility to safest level of function  5/3/2024 1220 by Niecy Villa RN  Outcome: Progressing  5/2/2024 2321 by Brittany Osorio RN  Outcome: Progressing  Goal: Return ADL status to a safe level of function  5/3/2024 1220 by Niecy Villa RN  Outcome: Progressing  5/2/2024 2321 by Brittany Osorio RN  Outcome: Progressing     Problem: Metabolic/Fluid and Electrolytes - Adult  Goal: Glucose maintained within prescribed range  5/3/2024 1220 by Niecy Villa RN  Outcome: Progressing  5/2/2024 2321 by Brittany Osorio RN  Outcome: Progressing     Problem: Skin/Tissue Integrity  Goal: Absence of new skin breakdown  Description: 1.  Monitor for areas of redness and/or skin breakdown  2.  Assess vascular access sites hourly  3.  Every 4-6 hours minimum:  Change oxygen saturation probe site  4.  Every 4-6 hours:  If on nasal continuous positive airway pressure, respiratory therapy assess  john and determine need for appliance change or resting period.  5/3/2024 1220 by Niecy Villa RN  Outcome: Progressing  5/2/2024 2321 by Brittany Osorio RN  Outcome: Progressing     Problem: Safety - Adult  Goal: Free from fall injury  5/3/2024 1220 by Niecy Villa RN  Outcome: Progressing  5/2/2024 2321 by Brittany Osorio RN  Outcome: Progressing     Problem: Pain  Goal: Verbalizes/displays adequate comfort level or baseline comfort level  5/3/2024 1220 by Niecy Villa RN  Outcome: Progressing  5/2/2024 2321 by Brittany Osorio RN  Outcome: Progressing     Problem: Chronic Conditions and Co-morbidities  Goal: Patient's chronic conditions and co-morbidity symptoms are monitored and maintained or improved  5/3/2024 1220 by Niecy Villa RN  Outcome: Progressing  5/2/2024 2321 by Brittany Osorio RN  Outcome: Progressing     Problem: ABCDS Injury Assessment  Goal: Absence of physical injury  5/3/2024 1220 by Niecy Villa RN  Outcome: Progressing  5/2/2024 2321 by Brittany Osorio RN  Outcome: Progressing

## 2024-05-03 NOTE — CONSULTS
Comprehensive Nutrition Assessment    Type and Reason for Visit:  Initial, Consult    Nutrition Recommendations/Plan:   Continue CC4 diet and encourage po intakes  Continue Glucerna BID   Obtain updated weight, wt from admit is stated  Monitor nutrition adequacy, pertinent labs, bowel habits, wt changes, and clinical progress     Malnutrition Assessment:  Malnutrition Status:  No malnutrition (04/24/24 1319)      Nutrition Assessment:    Follow up. Patient with good appetite and consuming % of each meal as well as one ONS daily. Tolerating CC4 diet, med SSI and metformin ordered. Constipation improved and bowels are moving, regimen in place. CBW is stated, RD will request actual wt to be taken. No new nutritional concerns, RD will continue to monitor nutritional status.    Nutrition Related Findings:    , +BM 5/1, +1 edema BLE Wound Type: None       Current Nutrition Intake & Therapies:    Average Meal Intake: %  Average Supplements Intake: %  ADULT DIET; Regular; 4 carb choices (60 gm/meal)  ADULT ORAL NUTRITION SUPPLEMENT; Breakfast, Dinner; Diabetic Oral Supplement    Anthropometric Measures:  Height: 165.1 cm (5' 5\")  Ideal Body Weight (IBW): 125 lbs (57 kg)    Current Body Weight:  (stated, new wt requested),   IBW. Weight Source: Stated  Current BMI (kg/m2): 32.8  Weight Adjustment For: No Adjustment  BMI Categories: Obese Class 1 (BMI 30.0-34.9)    Estimated Daily Nutrient Needs:  Energy Requirements Based On: Kcal/kg (25-30)  Weight Used for Energy Requirements: Ideal  Energy (kcal/day): 0815-4762 kcals  Weight Used for Protein Requirements: Ideal (1-1.2)  Protein (g/day): 58-70 g  Method Used for Fluid Requirements: 1 ml/kcal  Fluid (ml/day): 1416-5721 ml    Nutrition Diagnosis:   No nutrition diagnosis at this time    Nutrition Interventions:   Food and/or Nutrient Delivery: Continue Current Diet, Continue Oral Nutrition Supplement  Nutrition Education/Counseling: Education

## 2024-05-03 NOTE — PROGRESS NOTES
Physical Therapy  Facility/Department: [facility]  Rehabilitation Physical Therapy Treatment Note    NAME: Karishma Lee  : 1960 (63 y.o.)  MRN: [id number]  CODE STATUS: Full Code    Date of Service: 5/3/24       Restrictions:  Restrictions/Precautions: Up as Tolerated, Fall Risk, General Precautions  Position Activity Restriction  Other position/activity restrictions: up with assistance, up as tolerated     SUBJECTIVE  Subjective  Subjective: Pt in bed upon arrival, agreeble to therapy  Pain: Pt reporting 6/10 pain at ribs, states she had already received pain meds          OBJECTIVE  93%, 115 bpm, 137/71    Cognition  Overall Cognitive Status: Exceptions  Arousal/Alertness: Appropriate responses to stimuli  Following Commands: Follows one step commands consistently  Attention Span: Appears intact  Memory: Appears intact  Safety Judgement: Good awareness of safety precautions  Problem Solving: Able to problem solve independently  Insights: Fully aware of deficits  Initiation: Does not require cues  Sequencing: Requires cues for some  Orientation  Overall Orientation Status: Within Functional Limits  Orientation Level: Oriented X4    Functional Mobility  Supine to Sit  Assistance Level: Modified independent  Skilled Clinical Factors: HOB elevated, use of BR, increased time to complete  Sit to Stand  Assistance Level: Stand by assist;Modified independent  Skilled Clinical Factors: mod I from EOB and toilet with RW, SBA without use of hands from w/c  Stand to Sit  Assistance Level: Stand by assist;Modified independent  Skilled Clinical Factors: mod I with use of hands to w/c and recliner, SBA without use of hands to w/c      Environmental Mobility  Ambulation  Surface: Level surface  Device: Rolling walker  Distance: 2x 250 ft with multiple turns through hallways over level tile  Activity Comments: Pt ambulates with slow linda, short step length, and minimal clearance without LOB.  Additional Factors:  Increased time to complete  Assistance Level: Supervision  Gait Deviations: Slow linda;Decreased step length bilateral;Narrow base of support;Decreased heel strike right;Decreased heel strike left;Path deviations  Curb  Curb Height: 6''  Device: Rolling walker  Number of Curbs: 4  Additional Factors: Verbal cues;Increased time to complete;Non-reciprocal going down;Non-reciprocal going up  Assistance Level: Supervision                -5 consecutive reps of STS from w/c without use of hands with SBA  -15 reps BLE standing marches at RW with SBA  -15 reps of standing heel raises with RW and SBA  -15 reps of mini squats with RW and SBA  -15 reps BLE LAQ  -10 reps of standing hip abduction with BUE support on handrail and SBA  -10 reps of 6\" step ups with BHR and SBA    Pt stood with SBA while completing LB dressing and toileting tasks at start of session.         ASSESSMENT/PROGRESS TOWARDS GOALS  Assessment  Assessment: Patient seen for gait, curb step, and LE strengthening.  Patient completed STS with mod I with use of hands and SBA without use of hands. Pt ambulates with supervision and RW. Pt tolerates 4 consecutive curb steps with RW and supervision. Pt completes standing and seated HEP for strengthening with min cues for technique. Pt educated further on HEP completion, verbalizes and demos understanding. Pt provided with intermittent seated rest breaks d/t decreased endurance. Pt denies dizziness during session.  Activity Tolerance: Patient tolerated treatment well;Patient limited by endurance  Discharge Recommendations: Outpatient PT;24 hour supervision or assist  PT Equipment Recommendations  Equipment Needed: Yes  Mobility Devices: Walker  Walker: Rolling  Other: RW    Goals  Patient Goals   Patient Goals : \"to go home stronger\"  Short Term Goals  Time Frame for Short Term Goals: 4/29  Short Term Goal 1: pt will perform STS with RW and supervision - 4/29 SBA  Short Term Goal 2: pt will perform supine <> sit

## 2024-05-03 NOTE — CARE COORDINATION
[facility] Case Management/Follow up:  Chart reviewed. IP day #: 10  Consultants Following: Urology   Discharge date: 5/6/24   Therapy recommendations:Outpatient Physical Therapy, Occupational Therapy, and Speech Therapy   DME needed: None     Kat out. Independent PLOF cane for long distance. Lives w/spouse in a one level home with 5 steps. Home has walk-n-shower. Patient owns walker, cane Chair lift, reacher, sock aide. Will provide outpatient therapy script on discharge.     Nirmala Davila RN

## 2024-05-03 NOTE — PROGRESS NOTES
independent  Skilled Clinical Factors: HOB elevated, use of BR, increased time to complete  Transfers:  Surface: From bed, From chair with arms  Additional Factors: Increased time to complete  Device: Walker (RW)  Sit>stand:  Assistance Level: Stand by assist, Modified independent  Skilled Clinical Factors: mod I from EOB and toilet with RW, SBA without use of hands from w/c  Stand>sit:  Assistance Level: Stand by assist, Modified independent  Skilled Clinical Factors: mod I with use of hands to w/c and recliner, SBA without use of hands to w/c  Bed<>chair  Technique: Stand step  Assistance Level: Stand by assist  Stand Pivot:     Lateral transfer:     Car transfer:  Assistance Level: Stand by assist  Skilled Clinical Factors: with RW, increased time  Ambulation:  Surface: Level surface  Device: Rolling walker  Distance: 2x 250 ft with multiple turns through hallways over level tile  Activity: Within Room, Other (Comment) (ambulates to gift shop + unit)  Activity Comments: Pt ambulates with slow linda, short step length, and minimal clearance without LOB.  Additional Factors: Increased time to complete  Assistance Level: Supervision  Gait Deviations: Slow linda, Decreased step length bilateral, Narrow base of support, Decreased heel strike right, Decreased heel strike left, Path deviations  Skilled Clinical Factors: Slow reciprocal pattern, B decreased toe clearance and heel strike, slight forward flexed trunk. Overall steady without overt LOB.  Stairs:  Stair Height: 6''  Device: One handrail  Number of Stairs: 3 + 4  Additional Factors: Non-reciprocal going down, Non-reciprocal going up, Increased time to complete  Assistance Level: Contact guard assist, Stand by assist  Skilled Clinical Factors: SBA throughout with increased time to complete task  Skilled Clinical Factors - Comments: Pt required CGA throughout stair negotiation and VC, Increased time noted to complete task.  Curb:  Curb Height: 6''  Device:  medical history significant for HTN, HLD, GERD, ANGEL, DM2, mood disorder, and obesity who presented to [facility] on 4/13/24 after a fall down multiple stairs, found to have small left SDH, multiple rib fractures, trace left pneumothorax, atelectasis, and left 1-5 TP fractures. She was admitted to [facility] on 4/23/24 due to functional deficits below her baseline.      Left SDH  - nonoperative management  - repeat CT head showing resolved SDH, Neurosurgery reviewed. Ok to resume asa, resumed 4/30  - PT, OT, ST    Atypical Chest pain, resolved  - EKG and troponin reassuring  - possibly indigestion  - added PPI and tums PRN    Constipation, improved  - XR abd showing moderate colonic stool burden  - continue bowel regimen    Urinary Retention  - cornelius placed  - started flomax  - voiding trial 5/2, requiring ISC. Reports history of needing dilation. Consult Urology, appreciate assistance     Tachycardia  - EKG showing sinus tachycardia  - metoprolol     Left Rib Fractures, 2-11  - with pneumothorax during acute stay, since resolved  - not a candidate for SSRF  - multimodal pain control, increased gabapentin, PRN oxycodone  - IS     L lumbar 1-5 TP fractures  - no intervention or brace needed     HTN  - metoprolol     HLD  - statin     ANGEL  - home CPAP     DM2  - home regimen: metformin, ozempic  - stopped NPH due to hypoglycemia  - SSI  - increased metformin     Bipolar Disorder  - home requip, lamotrigine, lexapro     Bowels: adjust medications as needed for regular bowel movements      Bladder: Check PVR x 3.  IMC if PVR > 200ml or if any volume is > 500 ml.      Sleep: melatonin nightly      PPX  DVT: lovenox  GI: not indicated     Follow up appointments: Trauma, Neurosurgery, PCP    Therapy progress: demonstrated improved balance for SPV with RW for mobility to/from gym and SBA for Blazepod task without RW   Services: OP PT, OT, ST  EDOD: 5/6    April Garduno MD  5/3/2024, 12:45 PM

## 2024-05-03 NOTE — PROGRESS NOTES
05/03/24 1251   Encounter Summary   Encounter Overview/Reason Volunteer Encounter   Service Provided For Patient   Last Encounter  05/03/24  (Pastoral Care Volunteer)   Rituals, Rites and Sacraments   Type Denominational Communion

## 2024-05-04 LAB
GLUCOSE BLD-MCNC: 142 MG/DL (ref 70–99)
GLUCOSE BLD-MCNC: 143 MG/DL (ref 70–99)
GLUCOSE BLD-MCNC: 161 MG/DL (ref 70–99)
GLUCOSE BLD-MCNC: 190 MG/DL (ref 70–99)
PERFORMED ON: ABNORMAL

## 2024-05-04 PROCEDURE — 1280000000 HC REHAB R&B

## 2024-05-04 PROCEDURE — 6360000002 HC RX W HCPCS: Performed by: STUDENT IN AN ORGANIZED HEALTH CARE EDUCATION/TRAINING PROGRAM

## 2024-05-04 PROCEDURE — 97116 GAIT TRAINING THERAPY: CPT

## 2024-05-04 PROCEDURE — 6370000000 HC RX 637 (ALT 250 FOR IP): Performed by: STUDENT IN AN ORGANIZED HEALTH CARE EDUCATION/TRAINING PROGRAM

## 2024-05-04 PROCEDURE — 97129 THER IVNTJ 1ST 15 MIN: CPT

## 2024-05-04 PROCEDURE — 97530 THERAPEUTIC ACTIVITIES: CPT

## 2024-05-04 PROCEDURE — 97535 SELF CARE MNGMENT TRAINING: CPT

## 2024-05-04 PROCEDURE — 97130 THER IVNTJ EA ADDL 15 MIN: CPT

## 2024-05-04 PROCEDURE — 51798 US URINE CAPACITY MEASURE: CPT

## 2024-05-04 PROCEDURE — 51701 INSERT BLADDER CATHETER: CPT

## 2024-05-04 RX ADMIN — INSULIN LISPRO 3 UNITS: 100 INJECTION, SOLUTION INTRAVENOUS; SUBCUTANEOUS at 11:36

## 2024-05-04 RX ADMIN — ASPIRIN 81 MG 81 MG: 81 TABLET ORAL at 08:18

## 2024-05-04 RX ADMIN — AMOXICILLIN AND CLAVULANATE POTASSIUM 1 TABLET: 875; 125 TABLET, FILM COATED ORAL at 08:18

## 2024-05-04 RX ADMIN — METFORMIN HYDROCHLORIDE 1000 MG: 500 TABLET ORAL at 08:18

## 2024-05-04 RX ADMIN — ESCITALOPRAM OXALATE 20 MG: 10 TABLET ORAL at 08:18

## 2024-05-04 RX ADMIN — ENOXAPARIN SODIUM 30 MG: 100 INJECTION SUBCUTANEOUS at 08:17

## 2024-05-04 RX ADMIN — ENOXAPARIN SODIUM 30 MG: 100 INJECTION SUBCUTANEOUS at 20:43

## 2024-05-04 RX ADMIN — GABAPENTIN 400 MG: 400 CAPSULE ORAL at 08:18

## 2024-05-04 RX ADMIN — ACETAMINOPHEN 1000 MG: 500 TABLET ORAL at 05:45

## 2024-05-04 RX ADMIN — LAMOTRIGINE 100 MG: 100 TABLET ORAL at 20:40

## 2024-05-04 RX ADMIN — OXYCODONE 15 MG: 15 TABLET ORAL at 05:44

## 2024-05-04 RX ADMIN — PANTOPRAZOLE SODIUM 40 MG: 40 TABLET, DELAYED RELEASE ORAL at 05:44

## 2024-05-04 RX ADMIN — GABAPENTIN 400 MG: 400 CAPSULE ORAL at 20:40

## 2024-05-04 RX ADMIN — ACETAMINOPHEN 1000 MG: 500 TABLET ORAL at 20:40

## 2024-05-04 RX ADMIN — SENNOSIDES AND DOCUSATE SODIUM 1 TABLET: 50; 8.6 TABLET ORAL at 20:40

## 2024-05-04 RX ADMIN — ROPINIROLE HYDROCHLORIDE 0.5 MG: 0.5 TABLET, FILM COATED ORAL at 20:39

## 2024-05-04 RX ADMIN — Medication 5 MG: at 20:39

## 2024-05-04 RX ADMIN — METFORMIN HYDROCHLORIDE 1000 MG: 500 TABLET ORAL at 17:52

## 2024-05-04 RX ADMIN — INSULIN LISPRO 3 UNITS: 100 INJECTION, SOLUTION INTRAVENOUS; SUBCUTANEOUS at 08:17

## 2024-05-04 RX ADMIN — ACETAMINOPHEN 1000 MG: 500 TABLET ORAL at 14:31

## 2024-05-04 RX ADMIN — GABAPENTIN 400 MG: 400 CAPSULE ORAL at 14:31

## 2024-05-04 RX ADMIN — ATORVASTATIN CALCIUM 10 MG: 10 TABLET, FILM COATED ORAL at 08:18

## 2024-05-04 RX ADMIN — OXYCODONE 15 MG: 15 TABLET ORAL at 20:48

## 2024-05-04 RX ADMIN — METHOCARBAMOL 1000 MG: 500 TABLET ORAL at 20:39

## 2024-05-04 RX ADMIN — METHOCARBAMOL 1000 MG: 500 TABLET ORAL at 17:52

## 2024-05-04 RX ADMIN — METHOCARBAMOL 1000 MG: 500 TABLET ORAL at 14:31

## 2024-05-04 RX ADMIN — METHOCARBAMOL 1000 MG: 500 TABLET ORAL at 08:18

## 2024-05-04 RX ADMIN — AMOXICILLIN AND CLAVULANATE POTASSIUM 1 TABLET: 875; 125 TABLET, FILM COATED ORAL at 20:40

## 2024-05-04 RX ADMIN — METOPROLOL SUCCINATE 50 MG: 50 TABLET, EXTENDED RELEASE ORAL at 08:17

## 2024-05-04 RX ADMIN — INSULIN LISPRO 3 UNITS: 100 INJECTION, SOLUTION INTRAVENOUS; SUBCUTANEOUS at 16:24

## 2024-05-04 RX ADMIN — MIRTAZAPINE 7.5 MG: 15 TABLET, FILM COATED ORAL at 20:40

## 2024-05-04 RX ADMIN — TAMSULOSIN HYDROCHLORIDE 0.4 MG: 0.4 CAPSULE ORAL at 08:18

## 2024-05-04 ASSESSMENT — PAIN SCALES - GENERAL
PAINLEVEL_OUTOF10: 8
PAINLEVEL_OUTOF10: 0
PAINLEVEL_OUTOF10: 8
PAINLEVEL_OUTOF10: 0

## 2024-05-04 ASSESSMENT — PAIN DESCRIPTION - LOCATION: LOCATION: GENERALIZED

## 2024-05-04 ASSESSMENT — PAIN - FUNCTIONAL ASSESSMENT: PAIN_FUNCTIONAL_ASSESSMENT: ACTIVITIES ARE NOT PREVENTED

## 2024-05-04 ASSESSMENT — PAIN DESCRIPTION - DESCRIPTORS: DESCRIPTORS: ACHING;DISCOMFORT;THROBBING

## 2024-05-04 ASSESSMENT — PAIN DESCRIPTION - ORIENTATION: ORIENTATION: LEFT

## 2024-05-04 NOTE — PROGRESS NOTES
PVR straight cath of 400ml's clear, yellow urine, no foul odor. Continent of approximately 200ml's. Aseptic tech. Patient tolerated procedure well.

## 2024-05-04 NOTE — PROGRESS NOTES
PVR straight cath of 700ml's clear yellow urine, no foul odor. Aseptic tech. Patient tolerated procedure well.

## 2024-05-04 NOTE — PROGRESS NOTES
being easily distractible or having difficulty keeping track of what was being said?  [] 0. Behavior not present    [] 1. Behavior continuously present, does not fluctuate   [x] 2. Behavior present, fluctuates (comes and goes, changes in severity)    C. Disorganized thinking: Was the patient's thinking disorganized or incoherent (rambling or irrelevant conversation, unclear or illogical flow of ideas, or unpredictable switching from subject to subject)?  [x] 0. Behavior not present    [] 1. Behavior continuously present, does not fluctuate   [] 2. Behavior present, fluctuates (comes and goes, changes in severity)    D. Altered level of consciousness: Did the patient have altered level of consciousness as indicated by any of the following criteria?  Vigilant: startled easily to any sound or touch  Lethargic: repeatedly dozed off when being asked questions, but responded to voice or touch  Stuporous: very difficult to arouse and keep aroused for the interview  Comatose: could not be aroused  [x] 0. Behavior not present    [] 1. Behavior continuously present, does not fluctuate   [] 2. Behavior present, fluctuates (comes and goes, changes in severity)      Electronically signed by Marcella Schofield OT on 5/4/2024 at 12:03 PM

## 2024-05-04 NOTE — PROGRESS NOTES
Urology Attending Progress Note      Subjective: Pt not emptying bladder well.  Needs intermittent caths    Vitals:  /70   Pulse (!) 110   Temp 98 °F (36.7 °C) (Oral)   Resp 16   Ht 1.651 m (5' 5\")   Wt 102.8 kg (226 lb 11.2 oz)   SpO2 93%   BMI 37.72 kg/m²   Temp  Av.1 °F (36.7 °C)  Min: 98 °F (36.7 °C)  Max: 98.1 °F (36.7 °C)    Intake/Output Summary (Last 24 hours) at 2024 0951  Last data filed at 2024 0838  Gross per 24 hour   Intake 840 ml   Output 2150 ml   Net -1310 ml       Exam: unchanged    Labs:  WBC:    Lab Results   Component Value Date/Time    WBC 8.6 2024 05:13 AM     Hemoglobin/Hematocrit:    Lab Results   Component Value Date/Time    HGB 12.2 2024 05:13 AM    HCT 37.3 2024 05:13 AM     BMP:    Lab Results   Component Value Date/Time     2024 05:13 AM    K 4.2 2024 05:13 AM     2024 05:13 AM    CO2 26 2024 05:13 AM    BUN 15 2024 05:13 AM    CREATININE 0.7 2024 05:13 AM    CALCIUM 9.2 2024 05:13 AM    GFRAA >60 2022 09:42 AM    GFRAA >60 2012 09:36 AM    LABGLOM >90 2024 05:13 AM    LABGLOM >90 2024 05:38 AM     PT/INR:    Lab Results   Component Value Date/Time    PROTIME 10.5 2019 04:43 PM    INR 0.92 2019 04:43 PM     PTT:    Lab Results   Component Value Date/Time    APTT 32.8 2019 04:43 PM   [APTT        Impression/Plan: 64 yo WF with incomplete bladder emptying - needs intemittent cath for volumes of 400 -700cc.  Will need cornelius to go home on Monday if not improved    Kvng Melvin MD

## 2024-05-04 NOTE — PLAN OF CARE
Problem: Skin/Tissue Integrity - Adult  Goal: Skin integrity remains intact  5/3/2024 2327 by Alysa Ching, RN  Outcome: Progressing  5/3/2024 1220 by Niecy Villa RN  Outcome: Progressing     Problem: Skin/Tissue Integrity - Adult  Goal: Incisions, wounds, or drain sites healing without S/S of infection  5/3/2024 2327 by Alysa Ching, RN  Outcome: Progressing  5/3/2024 1220 by Niecy Villa RN  Outcome: Progressing

## 2024-05-04 NOTE — PROGRESS NOTES
Occupational Therapy  Facility/Department: [facility]  Rehabilitation Occupational Therapy Daily Treatment Note    Date: 24  Patient Name: Karishma Lee       Room: 0168/0168-01  MRN: [id number]  Account: [id number]   : 1960  (63 y.o.) Gender: female              Pt was bathed during OT session this date. Pt was Showered with soap/water with Independent.        Past Medical History:  has a past medical history of ADHD (attention deficit hyperactivity disorder), Anxiety, Bipolar disorder (HCC), Depression, Diverticulitis, DJD (degenerative joint disease), Fatty liver, GERD (gastroesophageal reflux disease), Hyperlipidemia, Hypertension, Irritable bowel syndrome, Mood disorder ([facility]), Narcotic addiction ([facility]), Obesity, ANGEL (obstructive sleep apnea), Primary localized osteoarthritis of right knee, Restless leg syndrome, Status post total right knee replacement, Type 2 diabetes mellitus without complication (HCC), Type II or unspecified type diabetes mellitus without mention of complication, not stated as uncontrolled, and Vision impairment.  Past Surgical History:   has a past surgical history that includes Total abdominal hysterectomy w/ bilateral salpingoophorectomy (2010); Knee arthroscopy (Right); Plantar fascia surgery; Appendectomy; Cholecystectomy (); Cystoscopy (2010); Cystoscopy (2010); Colonoscopy (2004); Knee arthroscopy (Left, 10/2013); Total knee arthroplasty (Right, 2019); joint replacement; knee surgery; Hysterectomy; Knee Arthroplasty; Hysterectomy, total abdominal; and Upper gastrointestinal endoscopy.    Restrictions  Restrictions/Precautions: Up as Tolerated, Fall Risk, General Precautions  Other position/activity restrictions: up with assistance, up as tolerated  Required Braces or Orthoses?: No    Subjective  Subjective: Pt resting in bed upon OT arrival, pleasant and agreeable to session. Denies pain. Vitals WFL.  Restrictions/Precautions: Up as  and was provided with HEP for BUE therex to perform at home.  Activity Tolerance: Patient tolerated treatment well  Discharge Recommendations: 24 hour supervision or assist;Outpatient OT  OT Equipment Recommendations  Equipment Needed: No  Safety Devices  Safety Devices in place: Yes  Type of devices: Nurse notified;Gait belt;Call light within reach;Left in chair;Chair alarm in place  Restraints  Initially in place: No    Patient Education  Education  Education Given To: Patient  Education Provided: Role of Therapy;Plan of Care;Precautions;Safety;ADL Function;Mobility Training;Transfer Training;Equipment;DME/Home Modifications;Fall Prevention Strategies  Education Provided Comments: BUE HEP  Education Method: Verbal  Barriers to Learning: None  Education Outcome: Verbalized understanding;Demonstrated understanding    Plan  Occupational Therapy Plan  Times Per Week: 5-7x/wk  Current Treatment Recommendations: Strengthening;Balance training;Functional mobility training;Endurance training;Patient/Caregiver education & training;Self-Care / ADL;Safety education & training;Equipment evaluation, education, & procurement;Neuromuscular re-education    Goals  Patient Goals   Patient goals : \"to be able to use the toilet\"  Short Term Goals  Time Frame for Short Term Goals: 5 days (4/27/24)  Short Term Goal 1: Pt will perform toilet transfer with SBA and LRAD- GOAL MET, pt demos SBA with RTS with arms 4/27/24  Short Term Goal 2: Pt will perform LB dressing with Darrell using AE- MET 4/29 pt SBA with AE  Short Term Goal 3: Pt will perform 1-2 grooming tasks in stance at sink with SBA for balance-- GOAL MET, pt demos 3 grooming tasks in stance SBA 4/27/24  Short Term Goal 4: Pt will tolerate x20 reps of BUE therex. GOAL MET 5/1/24 Pt completed 20 reps of ther ex.  Short Term Goal 5: Pt will perform UB ADL with set-up- MET 4/30  Long Term Goals  Time Frame for Long Term Goals : 10 days (5/2/24) continue to discharge date of 5/6-

## 2024-05-04 NOTE — PROGRESS NOTES
[facility]: ACUTE REHAB UNIT  SPEECH-LANGUAGE PATHOLOGY      [x] Daily  [] Weekly Care Conference Note  [] Discharge    Patient:Karishma Lee      :1960  MRN:[id number]  Rehab Dx/Hx: SDH (subdural hematoma) (HCC) [S06.5XAA]    Precautions: falls  Home situation: lives with . Indep with med management.  manages finances.  ST Dx: [] Aphasia  [] Dysarthria  [] Apraxia   [] Oropharyngeal dysphagia [x] Cognitive Impairment  [] Other:   Date of Admit: 2024  Room #: 0168/0168-01    Current functional status (updated daily):         Pt being seen for : [] Speech/Language Treatment  [] Dysphagia Treatment [x] Cognitive Treatment  [] Other:  Communication: [x]WFL  [] Aphasia  [] Dysarthria  [] Apraxia  [] Pragmatic Impairment [] Non-verbal  [] Hearing Loss  [] Other:   Cognition: [] WFL  [x] Mild  [] Moderate  [] Severe [] Unable to Assess  [] Other:  Memory: [] WFL  [x] Mild  [] Moderate  [] Severe [] Unable to Assess  [] Other:  Behavior: [x] Alert  [x] Cooperative  [x]  Pleasant  [] Confused  [] Agitated  [] Uncooperative  [] Distractible [] Motivated  [] Self-Limiting [] Anxious  [] Other:  Endurance:  [x] Adequate for participation in SLP sessions  [] Reduced overall  [] Lethargic  [] Other:  Safety: [x] No concerns at this time  [] Reduced insight into deficits  []  Reduced safety awareness [] Not following call light procedures  [] Unable to Assess  [] Other:    Current Diet Order:ADULT DIET; Regular; 4 carb choices (60 gm/meal)  ADULT ORAL NUTRITION SUPPLEMENT; Breakfast, Dinner; Diabetic Oral Supplement, thi  liquids  Swallowing Precautions: general aspiration and swallow precautions         Date: 2024      Tx session 1  [id number] Discharge Summary   Total Timed Code Min 60 0   Total Treatment Minutes 60 0   Individual Treatment Minutes 60 0   Group Treatment Minutes 0 0   Co-Treat Minutes 0 0   Variance/Reason:  N/A N/A   Pain Denies  N/A   Pain Intervention [] RN notified  []  Repositioned  [] Intervention offered and patient declined  [x] N/A  [] Other: [] RN notified  [] Repositioned  [] Intervention offered and patient declined  [] N/A  [] Other:   Subjective     Pt alert and oriented, cooperative and agreeable to participate in therapy. Pt seen sitting upright in bedside chair     Objective:  Goals     Short Term Goals  Time Frame for Short Term Goals: Extend goals through 05/04/24    Goal 1: Patient will complete executive function tasks (i.e. meds) related to relevant ADLs with 80% accuracy given min cues    Money General Questions:   - 100% accuracy independently    Visuospatial/Executive Function Task on MoCA:  - 80% (4/5)   - pt did not accurately copy cube drawing, but completed trail making task and all parts of clock drawing accurately Goal Met   Goal 2: Patient will completed graded recall tasks in order to promote memory of functional information on 80% opp given min cues   Recall Subtest of MoCA:   - 100% acc (5/5)     Goal Met   Goal 3: Pt will completed graded thought organization and problem solving tasks on 80% opp given min cues   Alphabet Game (1 thing starting with each letter of alphabet that you can buy at same store):  -92% acc indep, increased to 100% acc with min cueing     Mental Manipulation Word Progression:  - 100% acc indep    Divergent Naming Task given starting letter:  - 80% acc indep, increased to 100% acc with min cueing   Goal Met   Goal 4: Pt will complete verbal and visual reasoning tasks on 80% opp given min cues   Alphabet Game (1 thing starting with each letter of alphabet that you can buy at same store):  -92% acc indep, increased to 100% acc with min cueing     Abstraction Subtest of MoCA:   - 100% acc (2/2) Goal Met   Other areas targeted: MoCA (see below for subtest scores) N/A   Education:   Edu provided re: WRAP memory strategies, strategies to use upon d/c for med management      Safety Devices: [x] Call light within reach  [] Chair alarm

## 2024-05-04 NOTE — PROGRESS NOTES
Physical Therapy  Discharge Summary    Name:Karishma Lee  MRN:[id number]  :1960  Treatment Diagnosis: decreased balance and endurance  Diagnosis: SDH    Restrictions/Precautions  Restrictions/Precautions: Up as Tolerated, Fall Risk, General Precautions  Required Braces or Orthoses?: No           Position Activity Restriction  Other position/activity restrictions: up with assistance, up as tolerated     Goals:                  Short Term Goals  Time Frame for Short Term Goals:   Short Term Goal 1: pt will perform STS with RW and supervision -  SBA  Short Term Goal 2: pt will perform supine <> sit with MOD A - MET , CGA with bed rails, progress to SBA  Short Term Goal 3: pt will ambulate 50 ft with RW and CGA - MET , progress to 100 ft with RW and CGA - met   Short Term Goal 4: pt will transfer bed to chair with RW and supervision -  SBA  Short Term Goal 5: pt will complete 2 steps with supervision and 1 HR - not met             Long Term Goals  Time Frame for Long Term Goals :  - extended to  to reflect updated LOS  Long Term Goal 1: pt will perform STS MOD I; goal met  - pt completes STS with mod I and RW  Long Term Goal 2: pt will perform supine<> sit with MIN A - MET , progress to mod-ind  Long Term Goal 3: pt will ambulate 150 ft with RW and CGA - met  Long Term Goal 4: pt will transfer bed to chair with RW MOD I; goal met  - pt completes transfers with RW and mod I  Long Term Goal 5: pt will complete 4 steps with supervision and no HR; not met  - pt completes 8 stairs with BHR mod I    Pt. Met 2/5 short term goals and  long term goals.     Pt. Currently ambulates 300 feet with RW and mod I  Up/down 8 steps with BHR and mod I  Sit to/from stand with mod I and RW  Bed mobility with mod I and bedrail on L side  Recommend OP PT in order to progress balance and endurance  Pt. Safe to return home with assistance from family .   Provided pt. with written

## 2024-05-04 NOTE — PROGRESS NOTES
Physical Therapy  Facility/Department: [facility]  Rehabilitation Physical Therapy Treatment Note    NAME: Karishma Lee  : 1960 (63 y.o.)  MRN: [id number]  CODE STATUS: Full Code    Date of Service: 24       Restrictions:  Restrictions/Precautions: Up as Tolerated, Fall Risk, General Precautions  Position Activity Restriction  Other position/activity restrictions: up with assistance, up as tolerated     SUBJECTIVE  Subjective  Subjective: Pt in bed, agreeable to therapy  Pain: Pt reporting pain in ribs and L knee during session, does not formally rate        OBJECTIVE  93%, 102 bpm, 130/74    Cognition  Overall Cognitive Status: WFL  Arousal/Alertness: Appropriate responses to stimuli  Following Commands: Follows one step commands consistently;Follows multistep commands consistently  Attention Span: Appears intact  Memory: Appears intact  Safety Judgement: Good awareness of safety precautions  Problem Solving: Able to problem solve independently  Insights: Fully aware of deficits  Initiation: Does not require cues  Sequencing: Does not require cues  Orientation  Overall Orientation Status: Within Functional Limits  Orientation Level: Oriented X4    Functional Mobility  Roll Left  Assistance Level: Modified independent  Skilled Clinical Factors: bed flat, use of bedrail (owns bedrail for L side of bed)  Roll Right  Assistance Level: Modified independent  Skilled Clinical Factors: bed flat, no rails  Sit to Supine  Assistance Level: Modified independent  Skilled Clinical Factors: bed flat, L bedrail  Supine to Sit  Assistance Level: Modified independent  Skilled Clinical Factors: bed flat, use of L bedrail  Sit to Stand  Assistance Level: Modified independent  Skilled Clinical Factors: with RW from EOB and w/c  Stand to Sit  Assistance Level: Modified independent  Skilled Clinical Factors: with RW to w/c  Car Transfer  Assistance Level: Modified independent  Skilled Clinical Factors: with

## 2024-05-04 NOTE — PLAN OF CARE
Problem: Safety - Adult  Goal: Free from fall injury  5/4/2024 1024 by Cher An, RN  Outcome: Progressing  5/3/2024 2327 by Alysa Ching, RN  Outcome: Progressing

## 2024-05-05 VITALS
RESPIRATION RATE: 16 BRPM | OXYGEN SATURATION: 96 % | SYSTOLIC BLOOD PRESSURE: 129 MMHG | DIASTOLIC BLOOD PRESSURE: 71 MMHG | HEIGHT: 65 IN | TEMPERATURE: 98 F | WEIGHT: 218.9 LBS | BODY MASS INDEX: 36.47 KG/M2 | HEART RATE: 96 BPM

## 2024-05-05 LAB
GLUCOSE BLD-MCNC: 116 MG/DL (ref 70–99)
GLUCOSE BLD-MCNC: 116 MG/DL (ref 70–99)
GLUCOSE BLD-MCNC: 141 MG/DL (ref 70–99)
GLUCOSE BLD-MCNC: 144 MG/DL (ref 70–99)
PERFORMED ON: ABNORMAL

## 2024-05-05 PROCEDURE — 51701 INSERT BLADDER CATHETER: CPT

## 2024-05-05 PROCEDURE — 51702 INSERT TEMP BLADDER CATH: CPT

## 2024-05-05 PROCEDURE — 6370000000 HC RX 637 (ALT 250 FOR IP): Performed by: STUDENT IN AN ORGANIZED HEALTH CARE EDUCATION/TRAINING PROGRAM

## 2024-05-05 PROCEDURE — 1280000000 HC REHAB R&B

## 2024-05-05 PROCEDURE — 6360000002 HC RX W HCPCS: Performed by: STUDENT IN AN ORGANIZED HEALTH CARE EDUCATION/TRAINING PROGRAM

## 2024-05-05 PROCEDURE — 51798 US URINE CAPACITY MEASURE: CPT

## 2024-05-05 RX ADMIN — AMOXICILLIN AND CLAVULANATE POTASSIUM 1 TABLET: 875; 125 TABLET, FILM COATED ORAL at 09:02

## 2024-05-05 RX ADMIN — GABAPENTIN 400 MG: 400 CAPSULE ORAL at 21:04

## 2024-05-05 RX ADMIN — PANTOPRAZOLE SODIUM 40 MG: 40 TABLET, DELAYED RELEASE ORAL at 05:04

## 2024-05-05 RX ADMIN — METHOCARBAMOL 1000 MG: 500 TABLET ORAL at 21:05

## 2024-05-05 RX ADMIN — SENNOSIDES AND DOCUSATE SODIUM 1 TABLET: 50; 8.6 TABLET ORAL at 21:05

## 2024-05-05 RX ADMIN — METFORMIN HYDROCHLORIDE 1000 MG: 500 TABLET ORAL at 09:02

## 2024-05-05 RX ADMIN — ENOXAPARIN SODIUM 30 MG: 100 INJECTION SUBCUTANEOUS at 21:05

## 2024-05-05 RX ADMIN — METHOCARBAMOL 1000 MG: 500 TABLET ORAL at 11:34

## 2024-05-05 RX ADMIN — ACETAMINOPHEN 1000 MG: 500 TABLET ORAL at 13:35

## 2024-05-05 RX ADMIN — MIRTAZAPINE 7.5 MG: 15 TABLET, FILM COATED ORAL at 21:05

## 2024-05-05 RX ADMIN — ATORVASTATIN CALCIUM 10 MG: 10 TABLET, FILM COATED ORAL at 09:02

## 2024-05-05 RX ADMIN — AMOXICILLIN AND CLAVULANATE POTASSIUM 1 TABLET: 875; 125 TABLET, FILM COATED ORAL at 21:05

## 2024-05-05 RX ADMIN — SENNOSIDES AND DOCUSATE SODIUM 1 TABLET: 50; 8.6 TABLET ORAL at 09:02

## 2024-05-05 RX ADMIN — METHOCARBAMOL 1000 MG: 500 TABLET ORAL at 15:46

## 2024-05-05 RX ADMIN — INSULIN LISPRO 3 UNITS: 100 INJECTION, SOLUTION INTRAVENOUS; SUBCUTANEOUS at 15:46

## 2024-05-05 RX ADMIN — ACETAMINOPHEN 1000 MG: 500 TABLET ORAL at 05:03

## 2024-05-05 RX ADMIN — ASPIRIN 81 MG 81 MG: 81 TABLET ORAL at 09:02

## 2024-05-05 RX ADMIN — METHOCARBAMOL 1000 MG: 500 TABLET ORAL at 09:02

## 2024-05-05 RX ADMIN — METOPROLOL SUCCINATE 50 MG: 50 TABLET, EXTENDED RELEASE ORAL at 09:02

## 2024-05-05 RX ADMIN — METFORMIN HYDROCHLORIDE 1000 MG: 500 TABLET ORAL at 15:46

## 2024-05-05 RX ADMIN — Medication 5 MG: at 21:04

## 2024-05-05 RX ADMIN — ESCITALOPRAM OXALATE 20 MG: 10 TABLET ORAL at 09:02

## 2024-05-05 RX ADMIN — ROPINIROLE HYDROCHLORIDE 0.5 MG: 0.5 TABLET, FILM COATED ORAL at 21:04

## 2024-05-05 RX ADMIN — INSULIN LISPRO 3 UNITS: 100 INJECTION, SOLUTION INTRAVENOUS; SUBCUTANEOUS at 09:04

## 2024-05-05 RX ADMIN — TAMSULOSIN HYDROCHLORIDE 0.4 MG: 0.4 CAPSULE ORAL at 09:03

## 2024-05-05 RX ADMIN — OXYCODONE 10 MG: 5 TABLET ORAL at 21:04

## 2024-05-05 RX ADMIN — GABAPENTIN 400 MG: 400 CAPSULE ORAL at 09:02

## 2024-05-05 RX ADMIN — INSULIN LISPRO 3 UNITS: 100 INJECTION, SOLUTION INTRAVENOUS; SUBCUTANEOUS at 11:33

## 2024-05-05 RX ADMIN — LAMOTRIGINE 100 MG: 100 TABLET ORAL at 21:05

## 2024-05-05 RX ADMIN — OXYCODONE 15 MG: 15 TABLET ORAL at 05:03

## 2024-05-05 RX ADMIN — GABAPENTIN 400 MG: 400 CAPSULE ORAL at 13:35

## 2024-05-05 RX ADMIN — ENOXAPARIN SODIUM 30 MG: 100 INJECTION SUBCUTANEOUS at 09:02

## 2024-05-05 RX ADMIN — ACETAMINOPHEN 1000 MG: 500 TABLET ORAL at 21:05

## 2024-05-05 ASSESSMENT — PAIN SCALES - GENERAL
PAINLEVEL_OUTOF10: 6
PAINLEVEL_OUTOF10: 8
PAINLEVEL_OUTOF10: 10

## 2024-05-05 ASSESSMENT — PAIN DESCRIPTION - LOCATION
LOCATION: RIB CAGE
LOCATION: GENERALIZED
LOCATION: RIB CAGE;GENERALIZED

## 2024-05-05 NOTE — PLAN OF CARE
Problem: Discharge Planning  Goal: Discharge to home or other facility with appropriate resources  5/5/2024 1038 by Kortney Marte, RN  Outcome: Progressing     Problem: Skin/Tissue Integrity  Goal: Absence of new skin breakdown  Description: 1.  Monitor for areas of redness and/or skin breakdown  2.  Assess vascular access sites hourly  3.  Every 4-6 hours minimum:  Change oxygen saturation probe site  4.  Every 4-6 hours:  If on nasal continuous positive airway pressure, respiratory therapy assess nares and determine need for appliance change or resting period.  5/5/2024 1038 by Kortney Marte, RN  Outcome: Progressing     Problem: Safety - Adult  Goal: Free from fall injury  5/5/2024 1038 by Kortney Marte, RN  Outcome: Progressing

## 2024-05-05 NOTE — PLAN OF CARE
Problem: Discharge Planning  Goal: Discharge to home or other facility with appropriate resources  Outcome: Progressing     Problem: Neurosensory - Adult  Goal: Achieves stable or improved neurological status  Outcome: Progressing  Goal: Achieves maximal functionality and self care  Outcome: Progressing     Problem: Skin/Tissue Integrity - Adult  Goal: Skin integrity remains intact  Outcome: Progressing  Goal: Incisions, wounds, or drain sites healing without S/S of infection  Outcome: Progressing     Problem: Musculoskeletal - Adult  Goal: Return mobility to safest level of function  Outcome: Progressing  Goal: Return ADL status to a safe level of function  Outcome: Progressing     Problem: Metabolic/Fluid and Electrolytes - Adult  Goal: Glucose maintained within prescribed range  Outcome: Progressing     Problem: Skin/Tissue Integrity  Goal: Absence of new skin breakdown  Description: 1.  Monitor for areas of redness and/or skin breakdown  2.  Assess vascular access sites hourly  3.  Every 4-6 hours minimum:  Change oxygen saturation probe site  4.  Every 4-6 hours:  If on nasal continuous positive airway pressure, respiratory therapy assess nares and determine need for appliance change or resting period.  Outcome: Progressing     Problem: Safety - Adult  Goal: Free from fall injury  5/5/2024 0016 by Selina Montgomery RN  Problem: Pain  Goal: Verbalizes/displays adequate comfort level or baseline comfort level  Outcome: Progressing     Problem: Chronic Conditions and Co-morbidities  Goal: Patient's chronic conditions and co-morbidity symptoms are monitored and maintained or improved  Outcome: Progressing     Problem: ABCDS Injury Assessment  Goal: Absence of physical injury  Outcome: Progressing

## 2024-05-05 NOTE — PROGRESS NOTES
Urology Attending Progress Note      Subjective: Pt still unable to void    Vitals:  /66   Pulse 88   Temp 98.3 °F (36.8 °C) (Oral)   Resp 16   Ht 1.651 m (5' 5\")   Wt 99.3 kg (218 lb 14.4 oz)   SpO2 94%   BMI 36.43 kg/m²   Temp  Av.3 °F (36.8 °C)  Min: 98.3 °F (36.8 °C)  Max: 98.3 °F (36.8 °C)    Intake/Output Summary (Last 24 hours) at 2024 0836  Last data filed at 2024 0508  Gross per 24 hour   Intake 840 ml   Output 1900 ml   Net -1060 ml       Exam: no change    Labs:  WBC:    Lab Results   Component Value Date/Time    WBC 8.6 2024 05:13 AM     Hemoglobin/Hematocrit:    Lab Results   Component Value Date/Time    HGB 12.2 2024 05:13 AM    HCT 37.3 2024 05:13 AM     BMP:    Lab Results   Component Value Date/Time     2024 05:13 AM    K 4.2 2024 05:13 AM     2024 05:13 AM    CO2 26 2024 05:13 AM    BUN 15 2024 05:13 AM    CREATININE 0.7 2024 05:13 AM    CALCIUM 9.2 2024 05:13 AM    GFRAA >60 2022 09:42 AM    GFRAA >60 2012 09:36 AM    LABGLOM >90 2024 05:13 AM    LABGLOM >90 2024 05:38 AM     PT/INR:    Lab Results   Component Value Date/Time    PROTIME 10.5 2019 04:43 PM    INR 0.92 2019 04:43 PM     PTT:    Lab Results   Component Value Date/Time    APTT 32.8 2019 04:43 PM   [APTT        Impression/Plan: AUR - unable to void   Will place cornelius   OK to go to rehab per  with cornelius   VT at later date in 1-2 weeks as rehab progresses   Will sign off - please call if we can be of service    Kvng Melvin MD

## 2024-05-05 NOTE — PROGRESS NOTES
Shift assessment completed and charted. VSS. A&OX4. Kat replaced per MD order, pt tolerated well. Chg bath completed. Bed alarm on. Bed locked and in lowest position. Call light within reach. Pt denies any other needs at this time. Will continue to monitor.

## 2024-05-05 NOTE — PROGRESS NOTES
Karishma Lee  5/5/2024  [id number]    Chief Complaint: SDH (subdural hematoma) (HCC)    Subjective:   Seen in her room this morning. No new complaints overnight. She is doing well in therapy and is hopeful for return to function with therapy. Slept well last night. No new pain this morning.     ROS: denies f/c, n/v, cp, sob    Objective:  Patient Vitals for the past 24 hrs:   BP Temp Temp src Pulse Resp SpO2 Weight   05/05/24 0900 115/81 98.1 °F (36.7 °C) Oral 100 16 95 % --   05/05/24 0530 -- -- -- -- -- -- 99.3 kg (218 lb 14.4 oz)   05/04/24 2048 -- -- -- -- 16 -- --   05/04/24 2015 118/66 98.3 °F (36.8 °C) Oral 88 16 94 % --       Gen: No distress, pleasant. Seated up in chair  HEENT: Normocephalic, atraumatic.   CV: extremities well perfused  Resp: No respiratory distress. On room air  Abd: Soft, nondistended  Ext: No edema.   Neuro: Alert, oriented, appropriately interactive. Speech fluent  Psych: appropriate mood and affect    Wt Readings from Last 3 Encounters:   05/05/24 99.3 kg (218 lb 14.4 oz)   02/06/24 92.9 kg (204 lb 12.8 oz)   02/01/24 93 kg (205 lb)       Laboratory data:   Lab Results   Component Value Date    WBC 8.6 05/03/2024    HGB 12.2 05/03/2024    HCT 37.3 05/03/2024    MCV 94.1 05/03/2024     (H) 05/03/2024       Lab Results   Component Value Date/Time     05/03/2024 05:13 AM    K 4.2 05/03/2024 05:13 AM     05/03/2024 05:13 AM    CO2 26 05/03/2024 05:13 AM    BUN 15 05/03/2024 05:13 AM    CREATININE 0.7 05/03/2024 05:13 AM    GLUCOSE 154 05/03/2024 05:13 AM    CALCIUM 9.2 05/03/2024 05:13 AM        Therapy progress:  Physical therapy:  Bed Mobility:  Overall Assistance Level: Modified Independent  Sit>supine:  Assistance Level: Modified independent  Skilled Clinical Factors: bed flat, L bedrail  Supine>sit:  Assistance Level: Modified independent  Skilled Clinical Factors: bed flat, use of L bedrail  Transfers:  Surface: From bed, From chair with arms  Additional

## 2024-05-06 VITALS
WEIGHT: 217.2 LBS | BODY MASS INDEX: 36.19 KG/M2 | HEIGHT: 65 IN | SYSTOLIC BLOOD PRESSURE: 147 MMHG | HEART RATE: 102 BPM | RESPIRATION RATE: 16 BRPM | TEMPERATURE: 98.2 F | DIASTOLIC BLOOD PRESSURE: 66 MMHG | OXYGEN SATURATION: 93 %

## 2024-05-06 LAB
GLUCOSE BLD-MCNC: 124 MG/DL (ref 70–99)
GLUCOSE BLD-MCNC: 166 MG/DL (ref 70–99)
PERFORMED ON: ABNORMAL
PERFORMED ON: ABNORMAL

## 2024-05-06 PROCEDURE — 6370000000 HC RX 637 (ALT 250 FOR IP): Performed by: STUDENT IN AN ORGANIZED HEALTH CARE EDUCATION/TRAINING PROGRAM

## 2024-05-06 PROCEDURE — 6360000002 HC RX W HCPCS: Performed by: STUDENT IN AN ORGANIZED HEALTH CARE EDUCATION/TRAINING PROGRAM

## 2024-05-06 RX ADMIN — ENOXAPARIN SODIUM 30 MG: 100 INJECTION SUBCUTANEOUS at 08:38

## 2024-05-06 RX ADMIN — METFORMIN HYDROCHLORIDE 1000 MG: 500 TABLET ORAL at 08:37

## 2024-05-06 RX ADMIN — INSULIN LISPRO 3 UNITS: 100 INJECTION, SOLUTION INTRAVENOUS; SUBCUTANEOUS at 08:41

## 2024-05-06 RX ADMIN — ACETAMINOPHEN 1000 MG: 500 TABLET ORAL at 05:46

## 2024-05-06 RX ADMIN — METOPROLOL SUCCINATE 50 MG: 50 TABLET, EXTENDED RELEASE ORAL at 08:38

## 2024-05-06 RX ADMIN — TAMSULOSIN HYDROCHLORIDE 0.4 MG: 0.4 CAPSULE ORAL at 08:38

## 2024-05-06 RX ADMIN — ESCITALOPRAM OXALATE 20 MG: 10 TABLET ORAL at 08:38

## 2024-05-06 RX ADMIN — GABAPENTIN 400 MG: 400 CAPSULE ORAL at 08:38

## 2024-05-06 RX ADMIN — SENNOSIDES AND DOCUSATE SODIUM 1 TABLET: 50; 8.6 TABLET ORAL at 08:38

## 2024-05-06 RX ADMIN — METHOCARBAMOL 1000 MG: 500 TABLET ORAL at 08:38

## 2024-05-06 RX ADMIN — ASPIRIN 81 MG 81 MG: 81 TABLET ORAL at 08:38

## 2024-05-06 RX ADMIN — OXYCODONE 15 MG: 15 TABLET ORAL at 05:49

## 2024-05-06 RX ADMIN — PANTOPRAZOLE SODIUM 40 MG: 40 TABLET, DELAYED RELEASE ORAL at 05:46

## 2024-05-06 RX ADMIN — ATORVASTATIN CALCIUM 10 MG: 10 TABLET, FILM COATED ORAL at 08:38

## 2024-05-06 ASSESSMENT — PAIN DESCRIPTION - FREQUENCY: FREQUENCY: CONTINUOUS

## 2024-05-06 ASSESSMENT — PAIN DESCRIPTION - PAIN TYPE: TYPE: ACUTE PAIN

## 2024-05-06 ASSESSMENT — PAIN DESCRIPTION - ONSET: ONSET: ON-GOING

## 2024-05-06 ASSESSMENT — PAIN DESCRIPTION - DESCRIPTORS: DESCRIPTORS: ACHING

## 2024-05-06 ASSESSMENT — PAIN SCALES - GENERAL
PAINLEVEL_OUTOF10: 5
PAINLEVEL_OUTOF10: 7
PAINLEVEL_OUTOF10: 9

## 2024-05-06 ASSESSMENT — PAIN - FUNCTIONAL ASSESSMENT: PAIN_FUNCTIONAL_ASSESSMENT: ACTIVITIES ARE NOT PREVENTED

## 2024-05-06 ASSESSMENT — PAIN DESCRIPTION - ORIENTATION: ORIENTATION: LEFT

## 2024-05-06 ASSESSMENT — PAIN DESCRIPTION - LOCATION: LOCATION: RIB CAGE

## 2024-05-06 NOTE — PROGRESS NOTES
IRF [facility]EREMIAS Discharge Assessment  [facility] Acute Rehab Unit    Patient:Karishma Lee     Rehab Dx/Hx: SDH (subdural hematoma) (HCC) [S06.5XAA]   :1960  MRN:[id number]  Date of Admit: 2024     Pain Effect on Sleep:  Over the past 5 days, how much of the time has pain made it hard for you to sleep at night?  []  0. Does not apply - I have not had any pain or hurting in the past 5 days  []  1. Rarely or not at all  []  2. Occasionally  []  3. Frequently  [x]  4. Almost constantly  []  8. Unable to answer    Over the past 5 days, how often have you limited your participation in rehabilitation therapy sessions due to pain?  []  0. Does not apply - I have not received rehabilitation therapy in the past 5 days  [x]  1. Rarely or not at all  []  2. Occasionally  []  3. Frequently  []  4. Almost constantly  []  8. Unable to answer    Pain Interference with Day-to-Day Activities:  Over the past 5 days, how often have you limited your day-to-day activities (excluding rehabilitation therapy session)?  []  1. Rarely or not at all  [x]  2. Occasionally  []  3. Frequently  []  4. Almost constantly  []  8. Unable to answer      High-Risk Drug Classes: Use and Indication   Is taking: Check if the pt is taking any medications by pharmacological classification  Indication noted: If column 1 is checked, check if there is an indication noted for all meds in the drug class Is taking  (check all that apply) Indication noted (check all that apply)   Antipsychotic [] []   Anticoagulant [] []   Antibiotic [x] [x]   Opioid [x] [x]   Antiplatelet [x] [x]   Hypoglycemic (including insulin) [x] [x]   None of the above [] []     Special Treatments, Procedures, and Programs   Check all of the following treatments, procedures, and programs that apply on discharge.  On discharge (check all that apply)   Cancer Treatments    A1. Chemotherapy []   A2. IV []   A3. Oral []   A10. Other []   B1. Radiation []   Respiratory Therapies    C1.

## 2024-05-06 NOTE — PROGRESS NOTES
ACUTE REHAB UNIT: DISCHARGE  [facility]    Patient:Karishma Lee     Rehab Dx/Hx: SDH (subdural hematoma) (HCC) [S06.5XAA]   :1960  MRN:[id number]  Date of Admit: 2024   Date of Discharge: 2024    Subjective:   Order for patient discharge.  Reviewed discharge summary (AVS) with patient and  including medications, physical instructions (safety) and diet. Pt in stable condition.     Reconciled Medication List - Patient:   Medications were reviewed by RN at time of discharge with patient and/or family:  []  Via EMR   []  Via health information exchange  [x]  Verbal (e.g. in person, telephone, video conferencing)  [x]  Paper-based (e.g. fax, copies, printouts)   []  Other Methods (e.g. texting, email, CDs)    Reconciled Medication List - Subsequent provider:   [] No, current reconciled medication list not provided to the subsequent provider.  [x] Yes, current reconciled medication list provided to the subsequent provider.   [x] Via EMR    [] Via health information exchange  [] Verbal (e.g. in person, telephone, video conferencing)  [] Paper-based (e.g. fax, copies, printouts)   [] Other Methods (e.g. texting, email, CDs)    Discharge disposition:  Pt was discharged via:  [x]  Wheelchair  []  Stretcher  []  Safe ambulation and use of assistive device  []  Other:     Pt was discharged to:  [x]  Private car  []  Transportation service to next destination  []  Other:     Discharge destination:  [x]  Home  []  Skilled Nursing Facility  []  Long Term Care  []  Other:        Discharge BIMS:  Number of word repeated after first attempt:  []  0. None []  1. One []  2. Two [x]  3. Three    Able to report correct year:  []  0. Missed by >5 years, or no answer  []  1. Missed by 2-5 years  []  2. Missed by 1 year  [x]  3. Correct    Able to report correct month:   []  0. Missed by >1 month, or no answer  []  1. Missed by 6 days to 1 month  [x]  2. Accurate within 5 days    Able to

## 2024-05-06 NOTE — PLAN OF CARE
Problem: Discharge Planning  Goal: Discharge to home or other facility with appropriate resources  5/6/2024 0030 by Bess Garcia RN  Outcome: Progressing

## 2024-05-06 NOTE — CARE COORDINATION
Case Management Discharge Summary  [facility] - Acute Rehab Unit    Patient:Karishma Lee     Rehab Dx/Hx: SDH (subdural hematoma) (HCC) [S06.5XAA]       Today's Date: 5/6/2024    Discharge to:  Home w/outpatient therapy    Pre-certification completed:  [x] No       [] Yes     [] N/A   Hospital Exemption Notification (HENS) completed:  [x] No       [] Yes     [] N/A   IMM given:  N/A       New Durable Medical Equipment ordered/agency:  [facility] for RW   Transportation:  Medical Transport explained to pt/family. Pt/family voice no agency preference.    Agency used:   time:  Ambulance form completed: [] No       [] Yes   [] N/A       Confirmed discharge plan with:  Patient: [] No       [x] Yes  Family:  [] No       [x] Yes      Name:Mayito Lee (Spouse)  Contact number: [phone]      RN, name: Ovi       Has lack of transportation kept you from medical appointments, meetings, work, or ADL's? [] Yes, it has kept me from medical appointments or from getting my meds  [] Yes, It has kept me from non-medical meetings, appointments, work, or getting things I need  [x] No  [] Pt unable to respond  [] Pt declines to respond     How often do you need to have someone help you when you read instructions, pamphlets, or other written material from your doctor or pharmacy? [] Never                             [] Always  [x] Rarely                            [] Patient declines to respond  [] Sometimes                    [] Patient unable to respond  [] Often       Note: CM provided outpatient therapy PT/OT/Speech script to patient.    Discharging nurse to complete MANDA, reconcile AVS, and place final copy with patient's discharge packet. RN to ensure that written prescriptions for  Level II medications are sent with patient to the facility as per protocol.          Signature: Nirmala Davila RN

## 2024-05-06 NOTE — PROGRESS NOTES
D- Order for patient discharge.  A- Reviewed discharge summary (AVS) with patient including medications, physical instructions (safety) and diet. Patient is in stable condition. Prescriptions filled and given to patient from outpatient pharmacy.   R- Discharged via wheelchair to private car with all prescriptions and belongings to go home.

## 2024-05-06 NOTE — DISCHARGE SUMMARY
Physical Medicine & Rehabilitation  Discharge Summary     Patient Identification:  Karishma Lee  : 1960  Admit date: 2024  Discharge date: 2024   Attending provider: April Garduno MD    Primary care provider: Trina Nixon PA     Discharge Diagnoses:   Active Hospital Problems    Diagnosis     SDH (subdural hematoma) (HCC) [S06.5XAA]        History of Present Illness/Acute Hospital Course:  Karishma Lee is a 63 year old female with a past medical history significant for HTN, HLD, GERD, ANGEL, DM2, mood disorder, and obesity who presented to [facility] on 24 after a fall down multiple stairs. She was found to have small left SDH, multiple rib fractures, trace left pneumothorax, atelectasis, and left 1-5 TP fractures. She was admitted to [facility] on 24 due to functional deficits below her baseline. Today Karishma is seen in her room with nursing present. She reports living in a single level home with 0 CITLALI with her .     Inpatient Rehabilitation Course:   Karishma Lee is a 63 y.o. female admitted to inpatient rehabilitation on 2024 with SDH (subdural hematoma) (HCC).  The patient participated in an aggressive multidisciplinary inpatient rehabilitation program involving 3 hours of therapy per day, at least 5 days per week. Discharging home with family. OP therapies and DME ordered. Patient will follow-up with Trauma, Neurosurgery, PCP, Urology.     Impairments: impaired mobility and ADLs, impaired gait and balance    Medical Management:    Left SDH  - nonoperative management  - repeat CT head showing resolved SDH, Neurosurgery reviewed. Ok to resume asa, resumed   - PT, OT, ST     Atypical Chest pain, resolved  - EKG and troponin reassuring  - possibly indigestion  - added PPI and tums PRN     Constipation, improved  - XR abd showing moderate colonic stool burden  - continue bowel regimen     Urinary Retention  - cornelius placed early during rehab stay  - started flomax  - voiding

## 2024-05-07 ENCOUNTER — TELEPHONE (OUTPATIENT)
Dept: FAMILY MEDICINE CLINIC | Age: 64
End: 2024-05-07

## 2024-05-07 NOTE — TELEPHONE ENCOUNTER
Care Transitions Initial Follow Up Call    Outreach made within 2 business days of discharge: Yes    Patient: Karishma Lee Patient : 1960   MRN: [id number]  Reason for Admission: There are no discharge diagnoses documented for the most recent discharge.  Discharge Date: 24       Spoke with: Karishma Lee    Discharge department/facility: [facility] Interactive Patient Contact:  Was patient able to fill all prescriptions: Yes  Was patient instructed to bring all medications to the follow-up visit: Yes  Is patient taking all medications as directed in the discharge summary? Yes  Does patient understand their discharge instructions: Yes  Does patient have questions or concerns that need addressed prior to 7-14 day follow up office visit: no    Scheduled appointment with PCP within 7-14 days    Follow Up  Future Appointments   Date Time Provider Department Center   2024  1:15 PM Trina Nixon PA [facility] [place] [facility]   2024  1:40 PM Ingrid Varner APRN - CNP [facility]       Zak Nunez MA

## 2024-05-09 DIAGNOSIS — E78.00 PURE HYPERCHOLESTEROLEMIA: ICD-10-CM

## 2024-05-09 RX ORDER — ATORVASTATIN CALCIUM 10 MG/1
10 TABLET, FILM COATED ORAL DAILY
Qty: 90 TABLET | Refills: 1 | Status: SHIPPED | OUTPATIENT
Start: 2024-05-09

## 2024-05-09 RX ORDER — METOPROLOL SUCCINATE 50 MG/1
50 TABLET, EXTENDED RELEASE ORAL DAILY
Qty: 90 TABLET | Refills: 1 | Status: SHIPPED | OUTPATIENT
Start: 2024-05-09

## 2024-05-09 NOTE — TELEPHONE ENCOUNTER
[facility] patient last seen 5/16/2023.     Refills sent to pharmacy. Please call patient to schedule 1 yr follow up.

## 2024-05-09 NOTE — TELEPHONE ENCOUNTER
5/9- called pt @[phone]. [facility] informing pt to call back to schedule annual apt with [facility].     5/16/2023 [facility]

## 2024-05-13 RX ORDER — SEMAGLUTIDE 2.68 MG/ML
INJECTION, SOLUTION SUBCUTANEOUS
Qty: 3 ML | Refills: 2 | Status: SHIPPED | OUTPATIENT
Start: 2024-05-13

## 2024-05-13 SDOH — ECONOMIC STABILITY: INCOME INSECURITY: HOW HARD IS IT FOR YOU TO PAY FOR THE VERY BASICS LIKE FOOD, HOUSING, MEDICAL CARE, AND HEATING?: NOT HARD AT ALL

## 2024-05-13 SDOH — ECONOMIC STABILITY: FOOD INSECURITY: WITHIN THE PAST 12 MONTHS, THE FOOD YOU BOUGHT JUST DIDN'T LAST AND YOU DIDN'T HAVE MONEY TO GET MORE.: NEVER TRUE

## 2024-05-13 SDOH — ECONOMIC STABILITY: FOOD INSECURITY: WITHIN THE PAST 12 MONTHS, YOU WORRIED THAT YOUR FOOD WOULD RUN OUT BEFORE YOU GOT MONEY TO BUY MORE.: NEVER TRUE

## 2024-05-13 SDOH — ECONOMIC STABILITY: TRANSPORTATION INSECURITY
IN THE PAST 12 MONTHS, HAS LACK OF TRANSPORTATION KEPT YOU FROM MEETINGS, WORK, OR FROM GETTING THINGS NEEDED FOR DAILY LIVING?: NO

## 2024-05-13 NOTE — TELEPHONE ENCOUNTER
Karishma Lee is requesting refill(s) ozempic  Last OV 2/6/24 (pertaining to medication)  LR 2/6/24 (per medication requested)  Next office visit scheduled or attempted Yes   If no, reason:  5/14/24

## 2024-05-14 ENCOUNTER — OFFICE VISIT (OUTPATIENT)
Dept: FAMILY MEDICINE CLINIC | Age: 64
End: 2024-05-14

## 2024-05-14 VITALS
HEIGHT: 65 IN | RESPIRATION RATE: 16 BRPM | WEIGHT: 209.6 LBS | OXYGEN SATURATION: 95 % | DIASTOLIC BLOOD PRESSURE: 86 MMHG | SYSTOLIC BLOOD PRESSURE: 126 MMHG | HEART RATE: 102 BPM | BODY MASS INDEX: 34.92 KG/M2

## 2024-05-14 DIAGNOSIS — Z09 HOSPITAL DISCHARGE FOLLOW-UP: Primary | ICD-10-CM

## 2024-05-14 RX ORDER — SEMAGLUTIDE 1.34 MG/ML
1 INJECTION, SOLUTION SUBCUTANEOUS
COMMUNITY
Start: 2024-02-17

## 2024-05-14 RX ORDER — LANCETS 30 GAUGE
EACH MISCELLANEOUS
Qty: 100 EACH | Refills: 5 | Status: SHIPPED | OUTPATIENT
Start: 2024-05-14

## 2024-05-14 RX ORDER — GLUCOSAMINE HCL/CHONDROITIN SU 500-400 MG
CAPSULE ORAL
Qty: 100 STRIP | Refills: 5 | Status: SHIPPED | OUTPATIENT
Start: 2024-05-14

## 2024-05-14 ASSESSMENT — PATIENT HEALTH QUESTIONNAIRE - PHQ9
10. IF YOU CHECKED OFF ANY PROBLEMS, HOW DIFFICULT HAVE THESE PROBLEMS MADE IT FOR YOU TO DO YOUR WORK, TAKE CARE OF THINGS AT HOME, OR GET ALONG WITH OTHER PEOPLE: NOT DIFFICULT AT ALL
3. TROUBLE FALLING OR STAYING ASLEEP: SEVERAL DAYS
9. THOUGHTS THAT YOU WOULD BE BETTER OFF DEAD, OR OF HURTING YOURSELF: NOT AT ALL
SUM OF ALL RESPONSES TO PHQ QUESTIONS 1-9: 12
SUM OF ALL RESPONSES TO PHQ QUESTIONS 1-9: 12
8. MOVING OR SPEAKING SO SLOWLY THAT OTHER PEOPLE COULD HAVE NOTICED. OR THE OPPOSITE, BEING SO FIGETY OR RESTLESS THAT YOU HAVE BEEN MOVING AROUND A LOT MORE THAN USUAL: NOT AT ALL
7. TROUBLE CONCENTRATING ON THINGS, SUCH AS READING THE NEWSPAPER OR WATCHING TELEVISION: SEVERAL DAYS
4. FEELING TIRED OR HAVING LITTLE ENERGY: NOT AT ALL
5. POOR APPETITE OR OVEREATING: NEARLY EVERY DAY
6. FEELING BAD ABOUT YOURSELF - OR THAT YOU ARE A FAILURE OR HAVE LET YOURSELF OR YOUR FAMILY DOWN: NEARLY EVERY DAY
SUM OF ALL RESPONSES TO PHQ QUESTIONS 1-9: 12
1. LITTLE INTEREST OR PLEASURE IN DOING THINGS: SEVERAL DAYS
SUM OF ALL RESPONSES TO PHQ QUESTIONS 1-9: 12
SUM OF ALL RESPONSES TO PHQ9 QUESTIONS 1 & 2: 4
2. FEELING DOWN, DEPRESSED OR HOPELESS: NEARLY EVERY DAY

## 2024-05-14 ASSESSMENT — ENCOUNTER SYMPTOMS
RESPIRATORY NEGATIVE: 1
GASTROINTESTINAL NEGATIVE: 1

## 2024-05-14 NOTE — PROGRESS NOTES
Post-Discharge Transitional Care Follow Up    Karishma Lee   YOB: 1960    Date of Office Visit:  5/14/2024  Date of Hospital Admission: 4/23/24  Date of Hospital Discharge: 5/6/24    Care management risk score Rising risk (score 2-5) and Complex Care (Scores >=6): No Risk Score On File     Non face to face  following discharge, date last encounter closed (first attempt may have been earlier): 05/07/2024     Call initiated 2 business days of discharge: Yes    ASSESSMENT/PLAN:   Hospital discharge follow-up  -     IA DISCHARGE MEDS RECONCILED W/ CURRENT OUTPATIENT MED LIST    Medical Decision Making: moderate complexity  No follow-ups on file.    On this date 5/14/2024 I have spent 25 minutes reviewing previous notes, test results and face to face with the patient discussing the diagnosis and importance of compliance with the treatment plan as well as documenting on the day of the visit.       Subjective:   HPI    Inpatient course: Discharge summary reviewed- see chart.    Interval history/Current status:     Has cornelius in place seeing urology on 5/21. Has order for outpatient PT, would like to start that after her urinary issues are resolved.   Pain levels are improving. Using tylenol regularly. Taking oxycodone only at night if needed. Still pain in the ribs and in the right arm. Denies back pain. Using walker at home and that is going well. Appetite fair, it is improving.sleep is good. Has appointment with psyc on 5/20/24 with [facility]. Lives on the main floor. Laundry and additional living space in the basement. Considering stair lift as both of them have health issues.     Patient Active Problem List   Diagnosis    Essential hypertension    Anxiety    Moderate episode of recurrent major depressive disorder (HCC)    Pure hypercholesterolemia    Breast nodule    Abnormal stress test    Coronary artery disease involving native coronary artery of native heart without angina pectoris    Mild

## 2024-05-17 RX ORDER — CELECOXIB 200 MG/1
200 CAPSULE ORAL DAILY
Qty: 30 CAPSULE | Refills: 1 | OUTPATIENT
Start: 2024-05-17

## 2024-05-20 ENCOUNTER — APPOINTMENT (OUTPATIENT)
Dept: GENERAL RADIOLOGY | Age: 64
DRG: 699 | End: 2024-05-20
Payer: COMMERCIAL

## 2024-05-20 ENCOUNTER — HOSPITAL ENCOUNTER (INPATIENT)
Age: 64
LOS: 2 days | Discharge: HOME | DRG: 699 | End: 2024-05-22
Attending: STUDENT IN AN ORGANIZED HEALTH CARE EDUCATION/TRAINING PROGRAM | Admitting: INTERNAL MEDICINE
Payer: COMMERCIAL

## 2024-05-20 ENCOUNTER — APPOINTMENT (OUTPATIENT)
Dept: CT IMAGING | Age: 64
DRG: 699 | End: 2024-05-20
Payer: COMMERCIAL

## 2024-05-20 DIAGNOSIS — N39.0 URINARY TRACT INFECTION ASSOCIATED WITH INDWELLING URETHRAL CATHETER, INITIAL ENCOUNTER (HCC): ICD-10-CM

## 2024-05-20 DIAGNOSIS — A41.9 SEPSIS, DUE TO UNSPECIFIED ORGANISM, UNSPECIFIED WHETHER ACUTE ORGAN DYSFUNCTION PRESENT (HCC): Primary | ICD-10-CM

## 2024-05-20 DIAGNOSIS — T83.511A URINARY TRACT INFECTION ASSOCIATED WITH INDWELLING URETHRAL CATHETER, INITIAL ENCOUNTER (HCC): ICD-10-CM

## 2024-05-20 DIAGNOSIS — R10.30 LOWER ABDOMINAL PAIN: ICD-10-CM

## 2024-05-20 LAB
ALBUMIN SERPL-MCNC: 4.1 G/DL (ref 3.4–5)
ALBUMIN/GLOB SERPL: 1.3 {RATIO} (ref 1.1–2.2)
ALP SERPL-CCNC: 203 U/L (ref 40–129)
ALT SERPL-CCNC: 21 U/L (ref 10–40)
ANION GAP SERPL CALCULATED.3IONS-SCNC: 17 MMOL/L (ref 3–16)
AST SERPL-CCNC: 21 U/L (ref 15–37)
BACTERIA URNS QL MICRO: ABNORMAL /HPF
BASOPHILS # BLD: 0.1 K/UL (ref 0–0.2)
BASOPHILS NFR BLD: 0.8 %
BILIRUB SERPL-MCNC: 0.4 MG/DL (ref 0–1)
BILIRUB UR QL STRIP.AUTO: ABNORMAL
BUN SERPL-MCNC: 9 MG/DL (ref 7–20)
CALCIUM SERPL-MCNC: 9.5 MG/DL (ref 8.3–10.6)
CHLORIDE SERPL-SCNC: 99 MMOL/L (ref 99–110)
CLARITY UR: ABNORMAL
CO2 SERPL-SCNC: 21 MMOL/L (ref 21–32)
COLOR UR: YELLOW
CREAT SERPL-MCNC: 0.8 MG/DL (ref 0.6–1.2)
DEPRECATED RDW RBC AUTO: 14.1 % (ref 12.4–15.4)
EOSINOPHIL # BLD: 0.6 K/UL (ref 0–0.6)
EOSINOPHIL NFR BLD: 3.9 %
EPI CELLS #/AREA URNS HPF: ABNORMAL /HPF (ref 0–5)
FLUAV RNA RESP QL NAA+PROBE: NOT DETECTED
FLUBV RNA RESP QL NAA+PROBE: NOT DETECTED
GFR SERPLBLD CREATININE-BSD FMLA CKD-EPI: 83 ML/MIN/{1.73_M2}
GLUCOSE BLD-MCNC: 103 MG/DL (ref 70–99)
GLUCOSE BLD-MCNC: 120 MG/DL (ref 70–99)
GLUCOSE BLD-MCNC: 129 MG/DL (ref 70–99)
GLUCOSE SERPL-MCNC: 273 MG/DL (ref 70–99)
GLUCOSE UR STRIP.AUTO-MCNC: 250 MG/DL
HCT VFR BLD AUTO: 44 % (ref 36–48)
HGB BLD-MCNC: 14.6 G/DL (ref 12–16)
HGB UR QL STRIP.AUTO: ABNORMAL
HYALINE CASTS #/AREA URNS LPF: ABNORMAL /LPF (ref 0–2)
KETONES UR STRIP.AUTO-MCNC: 15 MG/DL
LACTATE BLDV-SCNC: 2.3 MMOL/L (ref 0.4–1.9)
LACTATE BLDV-SCNC: NORMAL MMOL/L (ref 0.4–1.9)
LEUKOCYTE ESTERASE UR QL STRIP.AUTO: ABNORMAL
LIPASE SERPL-CCNC: 40 U/L (ref 13–60)
LYMPHOCYTES # BLD: 1.9 K/UL (ref 1–5.1)
LYMPHOCYTES NFR BLD: 13 %
MCH RBC QN AUTO: 30.6 PG (ref 26–34)
MCHC RBC AUTO-ENTMCNC: 33.1 G/DL (ref 31–36)
MCV RBC AUTO: 92.3 FL (ref 80–100)
MONOCYTES # BLD: 0.7 K/UL (ref 0–1.3)
MONOCYTES NFR BLD: 4.6 %
MUCOUS THREADS #/AREA URNS LPF: ABNORMAL /LPF
NEUTROPHILS # BLD: 11.7 K/UL (ref 1.7–7.7)
NEUTROPHILS NFR BLD: 77.7 %
NITRITE UR QL STRIP.AUTO: POSITIVE
PERFORMED ON: ABNORMAL
PH UR STRIP.AUTO: 5.5 [PH] (ref 5–8)
PLATELET # BLD AUTO: 321 K/UL (ref 135–450)
PLATELET BLD QL SMEAR: ADEQUATE
PMV BLD AUTO: 9 FL (ref 5–10.5)
POTASSIUM SERPL-SCNC: 3.7 MMOL/L (ref 3.5–5.1)
PROT SERPL-MCNC: 7.2 G/DL (ref 6.4–8.2)
PROT UR STRIP.AUTO-MCNC: 100 MG/DL
RBC # BLD AUTO: 4.77 M/UL (ref 4–5.2)
RBC #/AREA URNS HPF: ABNORMAL /HPF (ref 0–4)
RENAL EPI CELLS #/AREA UR COMP ASSIST: ABNORMAL /HPF (ref 0–1)
SARS-COV-2 RNA RESP QL NAA+PROBE: NOT DETECTED
SLIDE REVIEW: ABNORMAL
SODIUM SERPL-SCNC: 137 MMOL/L (ref 136–145)
SP GR UR STRIP.AUTO: >=1.03 (ref 1–1.03)
UA COMPLETE W REFLEX CULTURE PNL UR: YES
UA DIPSTICK W REFLEX MICRO PNL UR: YES
URN SPEC COLLECT METH UR: ABNORMAL
UROBILINOGEN UR STRIP-ACNC: 0.2 E.U./DL
WBC # BLD AUTO: 15 K/UL (ref 4–11)
WBC #/AREA URNS HPF: ABNORMAL /HPF (ref 0–5)

## 2024-05-20 PROCEDURE — 87186 SC STD MICRODIL/AGAR DIL: CPT

## 2024-05-20 PROCEDURE — 80053 COMPREHEN METABOLIC PANEL: CPT

## 2024-05-20 PROCEDURE — 85025 COMPLETE CBC W/AUTO DIFF WBC: CPT

## 2024-05-20 PROCEDURE — 51798 US URINE CAPACITY MEASURE: CPT

## 2024-05-20 PROCEDURE — 87040 BLOOD CULTURE FOR BACTERIA: CPT

## 2024-05-20 PROCEDURE — 6360000002 HC RX W HCPCS: Performed by: STUDENT IN AN ORGANIZED HEALTH CARE EDUCATION/TRAINING PROGRAM

## 2024-05-20 PROCEDURE — 99285 EMERGENCY DEPT VISIT HI MDM: CPT

## 2024-05-20 PROCEDURE — 96375 TX/PRO/DX INJ NEW DRUG ADDON: CPT

## 2024-05-20 PROCEDURE — 87086 URINE CULTURE/COLONY COUNT: CPT

## 2024-05-20 PROCEDURE — 6370000000 HC RX 637 (ALT 250 FOR IP)

## 2024-05-20 PROCEDURE — 2580000003 HC RX 258

## 2024-05-20 PROCEDURE — 87077 CULTURE AEROBIC IDENTIFY: CPT

## 2024-05-20 PROCEDURE — 87636 SARSCOV2 & INF A&B AMP PRB: CPT

## 2024-05-20 PROCEDURE — 81001 URINALYSIS AUTO W/SCOPE: CPT

## 2024-05-20 PROCEDURE — 96365 THER/PROPH/DIAG IV INF INIT: CPT

## 2024-05-20 PROCEDURE — 1200000000 HC SEMI PRIVATE

## 2024-05-20 PROCEDURE — 83605 ASSAY OF LACTIC ACID: CPT

## 2024-05-20 PROCEDURE — 6360000004 HC RX CONTRAST MEDICATION: Performed by: STUDENT IN AN ORGANIZED HEALTH CARE EDUCATION/TRAINING PROGRAM

## 2024-05-20 PROCEDURE — 83036 HEMOGLOBIN GLYCOSYLATED A1C: CPT

## 2024-05-20 PROCEDURE — 2580000003 HC RX 258: Performed by: STUDENT IN AN ORGANIZED HEALTH CARE EDUCATION/TRAINING PROGRAM

## 2024-05-20 PROCEDURE — 83690 ASSAY OF LIPASE: CPT

## 2024-05-20 PROCEDURE — 71045 X-RAY EXAM CHEST 1 VIEW: CPT

## 2024-05-20 PROCEDURE — 74177 CT ABD & PELVIS W/CONTRAST: CPT

## 2024-05-20 PROCEDURE — 6360000002 HC RX W HCPCS

## 2024-05-20 RX ORDER — 0.9 % SODIUM CHLORIDE 0.9 %
1750 INTRAVENOUS SOLUTION INTRAVENOUS ONCE
Status: COMPLETED | OUTPATIENT
Start: 2024-05-20 | End: 2024-05-20

## 2024-05-20 RX ORDER — INSULIN LISPRO 100 [IU]/ML
0-8 INJECTION, SOLUTION INTRAVENOUS; SUBCUTANEOUS
Status: DISCONTINUED | OUTPATIENT
Start: 2024-05-20 | End: 2024-05-22 | Stop reason: HOSPADM

## 2024-05-20 RX ORDER — POLYETHYLENE GLYCOL 3350 17 G/17G
17 POWDER, FOR SOLUTION ORAL DAILY PRN
Status: DISCONTINUED | OUTPATIENT
Start: 2024-05-20 | End: 2024-05-22 | Stop reason: HOSPADM

## 2024-05-20 RX ORDER — ONDANSETRON 4 MG/1
4 TABLET, ORALLY DISINTEGRATING ORAL EVERY 8 HOURS PRN
Status: DISCONTINUED | OUTPATIENT
Start: 2024-05-20 | End: 2024-05-22 | Stop reason: HOSPADM

## 2024-05-20 RX ORDER — ASPIRIN 81 MG/1
81 TABLET ORAL DAILY
Status: DISCONTINUED | OUTPATIENT
Start: 2024-05-20 | End: 2024-05-22 | Stop reason: HOSPADM

## 2024-05-20 RX ORDER — SENNA AND DOCUSATE SODIUM 50; 8.6 MG/1; MG/1
1 TABLET, FILM COATED ORAL 2 TIMES DAILY
Status: DISCONTINUED | OUTPATIENT
Start: 2024-05-20 | End: 2024-05-22 | Stop reason: HOSPADM

## 2024-05-20 RX ORDER — ACETAMINOPHEN 650 MG/1
650 SUPPOSITORY RECTAL EVERY 6 HOURS PRN
Status: DISCONTINUED | OUTPATIENT
Start: 2024-05-20 | End: 2024-05-22 | Stop reason: HOSPADM

## 2024-05-20 RX ORDER — TAMSULOSIN HYDROCHLORIDE 0.4 MG/1
0.4 CAPSULE ORAL DAILY
Status: DISCONTINUED | OUTPATIENT
Start: 2024-05-20 | End: 2024-05-22 | Stop reason: HOSPADM

## 2024-05-20 RX ORDER — LAMOTRIGINE 100 MG/1
100 TABLET ORAL NIGHTLY
Status: DISCONTINUED | OUTPATIENT
Start: 2024-05-20 | End: 2024-05-22 | Stop reason: HOSPADM

## 2024-05-20 RX ORDER — ROPINIROLE 0.5 MG/1
0.5 TABLET, FILM COATED ORAL NIGHTLY
Status: DISCONTINUED | OUTPATIENT
Start: 2024-05-20 | End: 2024-05-22 | Stop reason: HOSPADM

## 2024-05-20 RX ORDER — ENOXAPARIN SODIUM 100 MG/ML
40 INJECTION SUBCUTANEOUS DAILY
Status: DISCONTINUED | OUTPATIENT
Start: 2024-05-20 | End: 2024-05-22 | Stop reason: HOSPADM

## 2024-05-20 RX ORDER — INSULIN LISPRO 100 [IU]/ML
0-4 INJECTION, SOLUTION INTRAVENOUS; SUBCUTANEOUS NIGHTLY
Status: DISCONTINUED | OUTPATIENT
Start: 2024-05-20 | End: 2024-05-22 | Stop reason: HOSPADM

## 2024-05-20 RX ORDER — ATORVASTATIN CALCIUM 10 MG/1
10 TABLET, FILM COATED ORAL DAILY
Status: DISCONTINUED | OUTPATIENT
Start: 2024-05-20 | End: 2024-05-22 | Stop reason: HOSPADM

## 2024-05-20 RX ORDER — DEXTROSE MONOHYDRATE 100 MG/ML
INJECTION, SOLUTION INTRAVENOUS CONTINUOUS PRN
Status: DISCONTINUED | OUTPATIENT
Start: 2024-05-20 | End: 2024-05-22 | Stop reason: HOSPADM

## 2024-05-20 RX ORDER — ACETAMINOPHEN 325 MG/1
650 TABLET ORAL EVERY 6 HOURS PRN
Status: DISCONTINUED | OUTPATIENT
Start: 2024-05-20 | End: 2024-05-22 | Stop reason: HOSPADM

## 2024-05-20 RX ORDER — SODIUM CHLORIDE 0.9 % (FLUSH) 0.9 %
5-40 SYRINGE (ML) INJECTION EVERY 12 HOURS SCHEDULED
Status: DISCONTINUED | OUTPATIENT
Start: 2024-05-20 | End: 2024-05-22 | Stop reason: HOSPADM

## 2024-05-20 RX ORDER — SODIUM CHLORIDE 9 MG/ML
INJECTION, SOLUTION INTRAVENOUS PRN
Status: DISCONTINUED | OUTPATIENT
Start: 2024-05-20 | End: 2024-05-22 | Stop reason: HOSPADM

## 2024-05-20 RX ORDER — ESCITALOPRAM OXALATE 10 MG/1
10 TABLET ORAL DAILY
Status: DISCONTINUED | OUTPATIENT
Start: 2024-05-20 | End: 2024-05-22 | Stop reason: HOSPADM

## 2024-05-20 RX ORDER — GLUCAGON 1 MG/ML
1 KIT INJECTION PRN
Status: DISCONTINUED | OUTPATIENT
Start: 2024-05-20 | End: 2024-05-22 | Stop reason: HOSPADM

## 2024-05-20 RX ORDER — INSULIN LISPRO 100 [IU]/ML
0-8 INJECTION, SOLUTION INTRAVENOUS; SUBCUTANEOUS EVERY 4 HOURS
Status: DISCONTINUED | OUTPATIENT
Start: 2024-05-20 | End: 2024-05-20

## 2024-05-20 RX ORDER — MIRTAZAPINE 15 MG/1
7.5 TABLET, FILM COATED ORAL NIGHTLY
Status: DISCONTINUED | OUTPATIENT
Start: 2024-05-20 | End: 2024-05-22 | Stop reason: HOSPADM

## 2024-05-20 RX ORDER — ONDANSETRON 2 MG/ML
4 INJECTION INTRAMUSCULAR; INTRAVENOUS EVERY 6 HOURS PRN
Status: DISCONTINUED | OUTPATIENT
Start: 2024-05-20 | End: 2024-05-22 | Stop reason: HOSPADM

## 2024-05-20 RX ORDER — GABAPENTIN 400 MG/1
400 CAPSULE ORAL 3 TIMES DAILY
Status: DISCONTINUED | OUTPATIENT
Start: 2024-05-20 | End: 2024-05-22 | Stop reason: HOSPADM

## 2024-05-20 RX ORDER — SODIUM CHLORIDE 0.9 % (FLUSH) 0.9 %
5-40 SYRINGE (ML) INJECTION PRN
Status: DISCONTINUED | OUTPATIENT
Start: 2024-05-20 | End: 2024-05-22 | Stop reason: HOSPADM

## 2024-05-20 RX ORDER — METOPROLOL SUCCINATE 50 MG/1
50 TABLET, EXTENDED RELEASE ORAL DAILY
Status: DISCONTINUED | OUTPATIENT
Start: 2024-05-20 | End: 2024-05-22 | Stop reason: HOSPADM

## 2024-05-20 RX ADMIN — IOPAMIDOL 75 ML: 755 INJECTION, SOLUTION INTRAVENOUS at 08:41

## 2024-05-20 RX ADMIN — ACETAMINOPHEN 650 MG: 325 TABLET ORAL at 15:50

## 2024-05-20 RX ADMIN — MIRTAZAPINE 7.5 MG: 15 TABLET, FILM COATED ORAL at 19:41

## 2024-05-20 RX ADMIN — ENOXAPARIN SODIUM 40 MG: 100 INJECTION SUBCUTANEOUS at 11:59

## 2024-05-20 RX ADMIN — ESCITALOPRAM OXALATE 10 MG: 10 TABLET ORAL at 11:59

## 2024-05-20 RX ADMIN — VANCOMYCIN HYDROCHLORIDE 2000 MG: 10 INJECTION, POWDER, LYOPHILIZED, FOR SOLUTION INTRAVENOUS at 09:00

## 2024-05-20 RX ADMIN — TAMSULOSIN HYDROCHLORIDE 0.4 MG: 0.4 CAPSULE ORAL at 12:00

## 2024-05-20 RX ADMIN — ATORVASTATIN CALCIUM 10 MG: 10 TABLET, FILM COATED ORAL at 11:59

## 2024-05-20 RX ADMIN — CEFEPIME 2000 MG: 2 INJECTION, POWDER, FOR SOLUTION INTRAVENOUS at 15:56

## 2024-05-20 RX ADMIN — PIPERACILLIN AND TAZOBACTAM 4500 MG: 4; .5 INJECTION, POWDER, LYOPHILIZED, FOR SOLUTION INTRAVENOUS at 08:11

## 2024-05-20 RX ADMIN — ROPINIROLE HYDROCHLORIDE 0.5 MG: 0.5 TABLET, FILM COATED ORAL at 19:41

## 2024-05-20 RX ADMIN — GABAPENTIN 400 MG: 400 CAPSULE ORAL at 15:50

## 2024-05-20 RX ADMIN — GABAPENTIN 400 MG: 400 CAPSULE ORAL at 22:46

## 2024-05-20 RX ADMIN — LAMOTRIGINE 100 MG: 100 TABLET ORAL at 19:42

## 2024-05-20 RX ADMIN — METOPROLOL SUCCINATE 50 MG: 50 TABLET, EXTENDED RELEASE ORAL at 12:00

## 2024-05-20 RX ADMIN — ASPIRIN 81 MG: 81 TABLET, COATED ORAL at 12:00

## 2024-05-20 RX ADMIN — SODIUM CHLORIDE 1750 ML: 9 INJECTION, SOLUTION INTRAVENOUS at 08:07

## 2024-05-20 ASSESSMENT — PAIN DESCRIPTION - LOCATION: LOCATION: ABDOMEN;RIB CAGE

## 2024-05-20 ASSESSMENT — PAIN SCALES - GENERAL
PAINLEVEL_OUTOF10: 0
PAINLEVEL_OUTOF10: 0
PAINLEVEL_OUTOF10: 8

## 2024-05-20 ASSESSMENT — PAIN DESCRIPTION - FREQUENCY: FREQUENCY: CONTINUOUS

## 2024-05-20 ASSESSMENT — PAIN - FUNCTIONAL ASSESSMENT: PAIN_FUNCTIONAL_ASSESSMENT: 0-10

## 2024-05-20 ASSESSMENT — PAIN SCALES - WONG BAKER: WONGBAKER_NUMERICALRESPONSE: NO HURT

## 2024-05-20 ASSESSMENT — PAIN DESCRIPTION - ONSET: ONSET: ON-GOING

## 2024-05-20 NOTE — H&P
V2.0  History and Physical      Name:  Karishma Lee /Age/Sex: 1960  (63 y.o. female)   MRN & CSN:  [id number] & [id number] Encounter Date/Time: 2024 2:03 PM EDT   Location:   PCP: Trina Nixon PA       Hospital Day: 1    Assessment and Plan:   Karishma Lee is a 63 y.o. female with a pmh of  HTN, HLD, GERD, ANGEL, T2DM, Bipolar disorder, and Obesity who presents with UTI (urinary tract infection).    Hospital Problems             Last Modified POA    * (Principal) UTI (urinary tract infection) 2024 Yes       Plan:  Urinary Tract Infection  -UA showed: bilirubin, ketones, blood, bacteria, white blood cells, nitrites, and leukocyte esterase  -Discontinue Zosyn and Vanc  -Continue IV Cefepime  -Consulted Urology     Abdominal pain  -CT Abdomen: Small left pleural effusion  -CXR: No acute abnormality  -CMP reviewed  -Continue Tylenol as needed     Left rib fractures 2-11  -S/P mechanical fall at home in 2024  -Continue Neurontin   -Continue Tylenol as needed     Hypertension  -Continue Toprol XL     Hyperlipidemia  -Continue Lipitor     Obstructive sleep apnea  -Home CPAP     Type 2 diabetes mellitus   -Continue sliding scale insulin     Bipolar Disorder   -Continue Requip  -Continue Lexapro  -Continue Lamotrigine    Disposition:   Current Living situation: Home  Expected Disposition: Home  Estimated D/C: 2-3 Days    Diet ADULT DIET; Regular; 5 carb choices (75 gm/meal)   DVT Prophylaxis [x] Lovenox, []  Heparin, [] SCDs, [] Ambulation,  [] Eliquis, [] Xarelto, [] Coumadin   Code Status Full Code   Surrogate Decision Maker/ POA Not Active     Personally reviewed Lab Studies and Imaging     History from:     Patient    History of Present Illness:     Chief Complaint: abdominal pain, emesis and diarrhea     Karishma Lee is a 63 y.o. female with a pmh of HTN, HLD, GERD, ANGEL, T2DM, mood disorder, and obesity who presents with abdominal pain, emesis, and diarrhea who is found to

## 2024-05-20 NOTE — CONSULTS
Consult Placed     Who: Luma Munson    Date: 5/20/2024    Time: 11:2AM     Electronically signed by Mandie Laura on 5/20/2024 at 11:19 AM

## 2024-05-20 NOTE — ED NOTES
Patient sent with transport to [facility] with patient belonging's bag in bed with pt, cell phone in pt's hand.

## 2024-05-20 NOTE — ED NOTES
Med rec completed with information provided by pt. All information thought to be true and accurate.

## 2024-05-20 NOTE — ED PROVIDER NOTES
total of 31 minutes of critical care time in obtaining history, performing a physical exam, bedside monitoring of interventions, collecting and interpreting tests and discussion with consultants but excluding time spent performing procedures, treating other patients and teaching time.                   FINAL IMPRESSION      1. Sepsis, due to unspecified organism, unspecified whether acute organ dysfunction present ([facility])    2. Urinary tract infection associated with indwelling urethral catheter, initial encounter ([facility])    3. Lower abdominal pain          DISPOSITION/PLAN   Disposition: DISPOSITION Decision To Admit 05/20/2024 09:37:59 AM    Condition: stable     DISCLAIMER: This chart was created using Dragon dictation software.  Efforts were made by me to ensure accuracy, however some errors may be present due to limitations of this technology and occasionally words are not transcribed correctly.    MD Fabian Sears Erica J, MD  05/20/24 3915

## 2024-05-20 NOTE — ACP (ADVANCE CARE PLANNING)
Advance Care Planning     General Advance Care Planning (ACP) Conversation    Date of Conversation: 5/20/2024  Conducted with: Patient with Decision Making Capacity  Other persons present: None    Healthcare Decision Maker:   Primary Decision Maker: Mayito Lee - [facility] - [phone]  Click here to complete Healthcare Decision Makers including selection of the Healthcare Decision Maker Relationship (ie \"Primary\").   Today we documented Decision Maker(s) consistent with Legal Next of Kin hierarchy.    Content/Action Overview:  Has NO ACP documents-Information provided  Reviewed DNR/DNI and patient elects Full Code (Attempt Resuscitation)      Length of Voluntary ACP Conversation in minutes:  <16 minutes (Non-Billable)    Mirella Hemphill RN

## 2024-05-20 NOTE — ED NOTES
Assisted [facility]  tech in placing pts cornelius catheter. No urine return noted. Double checked placement site which is correct. Bladder scanned pt which showed 0mL. Notified Dr. Peralta.

## 2024-05-20 NOTE — CARE COORDINATION
Case Management Assessment  Initial Evaluation    Date/Time of Evaluation: 5/20/2024 2:29 PM  Assessment Completed by: Mirella Hemphill RN    If patient is discharged prior to next notation, then this note serves as note for discharge by case management.    Patient Name: Karishma Lee                   YOB: 1960  Diagnosis: UTI (urinary tract infection) [N39.0]  Lower abdominal pain [R10.30]  Urinary tract infection associated with indwelling urethral catheter, initial encounter ([facility]) [T83.511A, N39.0]  Sepsis, due to unspecified organism, unspecified whether acute organ dysfunction present (HCC) [A41.9]                   Date / Time: 5/20/2024  7:04 AM    Patient Admission Status: Inpatient   Readmission Risk (Low < 19, Mod (19-27), High > 27): Readmission Risk Score: 15.9    Current PCP: Trina Nixon PA  PCP verified by CM? Yes    Chart Reviewed: Yes      History Provided by: Patient  Patient Orientation: Alert and Oriented, Person, Place, Situation, Self    Patient Cognition: Alert    Hospitalization in the last 30 days (Readmission):  Yes    If yes, Readmission Assessment in CM Navigator will be completed.    Advance Directives:      Code Status: Full Code   Patient's Primary Decision Maker is: Legal Next of Kin    Primary Decision Maker: Mayito Lee - Spouse - [phone]    Discharge Planning:    Patient lives with: Spouse/Significant Other Type of Home: House  Primary Care Giver: Self  Patient Support Systems include: Spouse/Significant Other   Current Financial resources: Medicaid  Current community resources: None  Current services prior to admission: None            Current DME:              Type of Home Care services:  None    ADLS  Prior functional level: Independent in ADLs/IADLs (with RW)  Current functional level: Independent in ADLs/IADLs (same)    PT AM-PAC:   /24  OT AM-PAC:   /24    Family can provide assistance at DC:    Would you like Case Management to discuss the  discharge plan with any other family members/significant others, and if so, who? No  Plans to Return to Present Housing: Yes  Other Identified Issues/Barriers to RETURNING to current housing: none  Potential Assistance needed at discharge: N/A            Potential DME:    Patient expects to discharge to: House  Plan for transportation at discharge:      Financial    Payor: SHELLEY / Plan: SHELLEY [place]A / Product Type: *No Product type* /     Does insurance require precert for SNF: Yes    Potential assistance Purchasing Medications: No  Meds-to-Beds request:        [facility] [id number] - [place], [place] - [address] [facility] [phone] - F [phone]  [address] [place] [address]  Phone: [phone] Fax: [phone]    [id number] [place], [place] - [address] - P [phone] - F [phone]  [address]  [facility] [address]  Phone: [phone] Fax: [phone]    [facility] [place] - [facility] - P [phone] - F [phone]  [facility]  [place] [address]  Phone: [phone] Fax: [phone]      Notes:    Factors facilitating achievement of predicted outcomes: Family support, Motivated, Cooperative, Pleasant, Sense of humor, Good insight into deficits, and Has needed Durable Medical Equipment at home    Barriers to discharge: none    Additional Case Management Notes: spoke with patient. Reported lives in ranch style home with . Recently d/c from ARU. Was provided HEP. Has OP Rx for PT/OT/ST. Has not gone any where yet. Hasn't felt up to it. Saw Dr Shay in office, plan for some sort of procedure she is unclear what that was to be. Urology consult pending. Mirella Hemphill, RN      The Plan for Transition of Care is related to the following treatment goals of UTI (urinary tract infection) [N39.0]  Lower abdominal pain [R10.30]  Urinary tract infection associated with indwelling urethral

## 2024-05-20 NOTE — ED NOTES
Pt left ED via stretcher in stable condition at this time. All belongings sent with pt. Care transferred.

## 2024-05-21 LAB
ANION GAP SERPL CALCULATED.3IONS-SCNC: 12 MMOL/L (ref 3–16)
BASOPHILS # BLD: 0.1 K/UL (ref 0–0.2)
BASOPHILS NFR BLD: 1.2 %
BUN SERPL-MCNC: 7 MG/DL (ref 7–20)
CALCIUM SERPL-MCNC: 9.2 MG/DL (ref 8.3–10.6)
CHLORIDE SERPL-SCNC: 106 MMOL/L (ref 99–110)
CO2 SERPL-SCNC: 18 MMOL/L (ref 21–32)
CREAT SERPL-MCNC: 0.7 MG/DL (ref 0.6–1.2)
DEPRECATED RDW RBC AUTO: 14.1 % (ref 12.4–15.4)
EOSINOPHIL # BLD: 0.4 K/UL (ref 0–0.6)
EOSINOPHIL NFR BLD: 5.5 %
EST. AVERAGE GLUCOSE BLD GHB EST-MCNC: 154.2 MG/DL
GFR SERPLBLD CREATININE-BSD FMLA CKD-EPI: >90 ML/MIN/{1.73_M2}
GLUCOSE BLD-MCNC: 124 MG/DL (ref 70–99)
GLUCOSE BLD-MCNC: 139 MG/DL (ref 70–99)
GLUCOSE BLD-MCNC: 145 MG/DL (ref 70–99)
GLUCOSE BLD-MCNC: 184 MG/DL (ref 70–99)
GLUCOSE SERPL-MCNC: 110 MG/DL (ref 70–99)
HBA1C MFR BLD: 7 %
HCT VFR BLD AUTO: 41.6 % (ref 36–48)
HGB BLD-MCNC: 13.5 G/DL (ref 12–16)
LYMPHOCYTES # BLD: 1.8 K/UL (ref 1–5.1)
LYMPHOCYTES NFR BLD: 27 %
MCH RBC QN AUTO: 30.8 PG (ref 26–34)
MCHC RBC AUTO-ENTMCNC: 32.4 G/DL (ref 31–36)
MCV RBC AUTO: 94.8 FL (ref 80–100)
MONOCYTES # BLD: 0.6 K/UL (ref 0–1.3)
MONOCYTES NFR BLD: 9 %
NEUTROPHILS # BLD: 3.8 K/UL (ref 1.7–7.7)
NEUTROPHILS NFR BLD: 57.3 %
PERFORMED ON: ABNORMAL
PLATELET # BLD AUTO: 154 K/UL (ref 135–450)
PMV BLD AUTO: 9.2 FL (ref 5–10.5)
POTASSIUM SERPL-SCNC: 3.8 MMOL/L (ref 3.5–5.1)
RBC # BLD AUTO: 4.38 M/UL (ref 4–5.2)
SODIUM SERPL-SCNC: 136 MMOL/L (ref 136–145)
WBC # BLD AUTO: 6.6 K/UL (ref 4–11)

## 2024-05-21 PROCEDURE — 97535 SELF CARE MNGMENT TRAINING: CPT

## 2024-05-21 PROCEDURE — 97165 OT EVAL LOW COMPLEX 30 MIN: CPT

## 2024-05-21 PROCEDURE — 6360000002 HC RX W HCPCS

## 2024-05-21 PROCEDURE — 2580000003 HC RX 258

## 2024-05-21 PROCEDURE — 1200000000 HC SEMI PRIVATE

## 2024-05-21 PROCEDURE — 85025 COMPLETE CBC W/AUTO DIFF WBC: CPT

## 2024-05-21 PROCEDURE — 6370000000 HC RX 637 (ALT 250 FOR IP)

## 2024-05-21 PROCEDURE — 97116 GAIT TRAINING THERAPY: CPT

## 2024-05-21 PROCEDURE — 51702 INSERT TEMP BLADDER CATH: CPT

## 2024-05-21 PROCEDURE — 97161 PT EVAL LOW COMPLEX 20 MIN: CPT

## 2024-05-21 PROCEDURE — 36415 COLL VENOUS BLD VENIPUNCTURE: CPT

## 2024-05-21 PROCEDURE — 80048 BASIC METABOLIC PNL TOTAL CA: CPT

## 2024-05-21 RX ADMIN — CEFEPIME 2000 MG: 2 INJECTION, POWDER, FOR SOLUTION INTRAVENOUS at 15:40

## 2024-05-21 RX ADMIN — ESCITALOPRAM OXALATE 10 MG: 10 TABLET ORAL at 10:52

## 2024-05-21 RX ADMIN — ATORVASTATIN CALCIUM 10 MG: 10 TABLET, FILM COATED ORAL at 10:52

## 2024-05-21 RX ADMIN — ENOXAPARIN SODIUM 40 MG: 100 INJECTION SUBCUTANEOUS at 10:52

## 2024-05-21 RX ADMIN — GABAPENTIN 400 MG: 400 CAPSULE ORAL at 10:52

## 2024-05-21 RX ADMIN — Medication 10 ML: at 22:36

## 2024-05-21 RX ADMIN — ASPIRIN 81 MG: 81 TABLET, COATED ORAL at 10:52

## 2024-05-21 RX ADMIN — Medication 10 ML: at 10:10

## 2024-05-21 RX ADMIN — LAMOTRIGINE 100 MG: 100 TABLET ORAL at 22:36

## 2024-05-21 RX ADMIN — ROPINIROLE HYDROCHLORIDE 0.5 MG: 0.5 TABLET, FILM COATED ORAL at 22:36

## 2024-05-21 RX ADMIN — CEFEPIME 2000 MG: 2 INJECTION, POWDER, FOR SOLUTION INTRAVENOUS at 03:54

## 2024-05-21 RX ADMIN — METOPROLOL SUCCINATE 50 MG: 50 TABLET, EXTENDED RELEASE ORAL at 10:52

## 2024-05-21 RX ADMIN — GABAPENTIN 400 MG: 400 CAPSULE ORAL at 22:35

## 2024-05-21 RX ADMIN — MIRTAZAPINE 7.5 MG: 15 TABLET, FILM COATED ORAL at 22:36

## 2024-05-21 RX ADMIN — TAMSULOSIN HYDROCHLORIDE 0.4 MG: 0.4 CAPSULE ORAL at 10:52

## 2024-05-21 RX ADMIN — GABAPENTIN 400 MG: 400 CAPSULE ORAL at 15:26

## 2024-05-21 ASSESSMENT — PAIN SCALES - GENERAL
PAINLEVEL_OUTOF10: 0

## 2024-05-21 NOTE — PROGRESS NOTES
Physical Therapy  Facility/Department: [facility] C3 TELE/MED SURG/ONC  Physical Therapy Initial Assessment and Discharge Summary    Name: Karishma Lee  : 1960  MRN: [id number]  Date of Service: 2024    Discharge Recommendations:  Home with assist PRN, Outpatient PT   PT Equipment Recommendations  Equipment Needed: No (owns RW)      Patient Diagnosis(es): The primary encounter diagnosis was Sepsis, due to unspecified organism, unspecified whether acute organ dysfunction present (HCC). Diagnoses of Urinary tract infection associated with indwelling urethral catheter, initial encounter (HCC) and Lower abdominal pain were also pertinent to this visit.  Past Medical History:  has a past medical history of ADHD (attention deficit hyperactivity disorder), Anxiety, Bipolar disorder (HCC), Depression, Diverticulitis, DJD (degenerative joint disease), Fatty liver, GERD (gastroesophageal reflux disease), Hyperlipidemia, Hypertension, Irritable bowel syndrome, Mood disorder (HCC), Narcotic addiction (HCC), Obesity, ANGEL (obstructive sleep apnea), Primary localized osteoarthritis of right knee, Restless leg syndrome, Status post total right knee replacement, Type 2 diabetes mellitus without complication (HCC), Type II or unspecified type diabetes mellitus without mention of complication, not stated as uncontrolled, and Vision impairment.  Past Surgical History:  has a past surgical history that includes Total abdominal hysterectomy w/ bilateral salpingoophorectomy (2010); Knee arthroscopy (Right); Plantar fascia surgery; Appendectomy; Cholecystectomy (); Cystoscopy (2010); Cystoscopy (2010); Colonoscopy (2004); Knee arthroscopy (Left, 10/2013); Total knee arthroplasty (Right, 2019); joint replacement; knee surgery; Hysterectomy; Knee Arthroplasty; Hysterectomy, total abdominal; and Upper gastrointestinal endoscopy.    Assessment   Body Structures, Functions, Activity Limitations Requiring

## 2024-05-21 NOTE — PROGRESS NOTES
Appendectomy; Cholecystectomy (2001); Cystoscopy (07/09/2010); Cystoscopy (11/18/2010); Colonoscopy (01/2004); Knee arthroscopy (Left, 10/2013); Total knee arthroplasty (Right, 03/29/2019); joint replacement; knee surgery; Hysterectomy; Knee Arthroplasty; Hysterectomy, total abdominal; and Upper gastrointestinal endoscopy.    Assessment   Assessment: Pt received for comprehensive OT evaluation d/t diagnosis of UTI w/ perceived deficits affecting pt's functional baseline. Prior to admit, pt reports being independent w/ ADLs/IADLs and living at home with . Pt has all necessary DME. Currently, pt is functioning at personal baseline and has no acute OT needs. Safe d/c to home w/ assist PRN. Eval & d/c. Co-tx collaboration this date to safely meet goals and will have better occupational performance outcomes with in a co-treatment than 1:1 session.    Prognosis: Good  Decision Making: Medium Complexity  REQUIRES OT FOLLOW-UP: No  Activity Tolerance  Activity Tolerance: Patient Tolerated treatment well        Plan   Occupational Therapy Plan  Times Per Week: 1 time     Restrictions  Restrictions/Precautions  Restrictions/Precautions: Fall Risk, General Precautions  Position Activity Restriction  Other position/activity restrictions: catheter, IV    Subjective   General  Chart Reviewed: Yes  Patient assessed for rehabilitation services?: Yes  Family / Caregiver Present: No  Subjective  Subjective: Pt received for OT session resting in bed, pleasant and agreeable to session. No pain reported.  Mild pain in L side near ribs    Social/Functional History  Social/Functional History  Lives With: Spouse  Type of Home: House  Home Layout: One level  Home Access: Stairs to enter without rails  Entrance Stairs - Number of Steps: 4 CITLALI (landings- walker can fit)  Bathroom Shower/Tub: Walk-in shower  Bathroom Toilet: Handicap height  Bathroom Equipment: Built-in shower seat, Grab bars in shower, Commode  Bathroom Accessibility:

## 2024-05-21 NOTE — PROGRESS NOTES
Pt alert and orientated. Call light within reach. No complaints at this time. Enc pt to call for assistance as needed.Amy Loera RN

## 2024-05-21 NOTE — CONSULTS
Reason for Consult: UTI, chronic cornelius    History of Present Illness: Karishma Lee is a 63 y.o. female who was evaluated last month for urinary retention s/p fall. While in acute rehab she underwent a voiding trial but continued to require intermittent catheterizations for large volumes and ended up discharged with a cornelius catheter in place. She saw Dr. Katz on 5/16/24 who ordered a cystoscopy, possible urethral dilation and possible bladder biopsy which is still needing to be arranged. Urine culture positive E. Coli.       ROS:  General: no fever or chills  CV: No chest pain  Pulm: No SOB  GI: No nausea, vomiting, diarrhea or constipation  Skin: No rash  Neuro: No headaches, dizziness or neurologic symptoms  : as above  Hematologic: no complaints  Endocrine: no complaints  Psych: no complaints    Past Medical History:   Past Medical History:   Diagnosis Date    ADHD (attention deficit hyperactivity disorder)     Anxiety     Bipolar disorder (HCC)     Depression     Diverticulitis 10/19/2021    DJD (degenerative joint disease)     hands/knees/ankles    Fatty liver     GERD (gastroesophageal reflux disease)     Hyperlipidemia     Hypertension     Irritable bowel syndrome     Mood disorder (HCC)     NOS    Narcotic addiction (HCC)     Obesity     ANGEL (obstructive sleep apnea)     no CPAP machine - not recommended per sleep study    Primary localized osteoarthritis of right knee 03/29/2019    Restless leg syndrome     Status post total right knee replacement 03/29/2019    Type 2 diabetes mellitus without complication (HCC)     Type II or unspecified type diabetes mellitus without mention of complication, not stated as uncontrolled     Vision impairment     wears glasses and contacts       Past Surgical History:  Past Surgical History:   Procedure Laterality Date    APPENDECTOMY      CHOLECYSTECTOMY  2001    COLONOSCOPY  01/2004    Due 2014    CYSTOSCOPY  07/09/2010    CYSTOSCOPY  11/18/2010     insertion of sparc mesh sling with cystoscopy    HYSTERECTOMY (CERVIX STATUS UNKNOWN)      HYSTERECTOMY, TOTAL ABDOMINAL (CERVIX REMOVED)      JOINT REPLACEMENT      KNEE ARTHROPLASTY      KNEE ARTHROSCOPY Right     KNEE ARTHROSCOPY Left 10/2013    partial medial menisectomy    KNEE SURGERY      PLANTAR FASCIA SURGERY      bilateral    BRANDI AND BSO (CERVIX REMOVED)  04/2010    DUB    TOTAL KNEE ARTHROPLASTY Right 03/29/2019    RIGHT TOTAL KNEE MAKOPLASTY REPLACEMENT WITH ADDUCTOR CANAL BLOCK FOR PAIN CONTROL                 JOE MCCLENDON  CPT CODE - 12170  performed by Lester Morales MD at [facility] OR    UPPER GASTROINTESTINAL ENDOSCOPY         Social History:  Social History     Socioeconomic History    Marital status:      Spouse name: Not on file    Number of children: Not on file    Years of education: Not on file    Highest education level: Not on file   Occupational History    Not on file   Tobacco Use    Smoking status: Never    Smokeless tobacco: Never   Vaping Use    Vaping Use: Never used   Substance and Sexual Activity    Alcohol use: No    Drug use: No    Sexual activity: Not Currently     Partners: Male   Other Topics Concern    Not on file   Social History Narrative    Not on file     Social Determinants of Health     Financial Resource Strain: Low Risk  (5/13/2024)    Overall Financial Resource Strain (CARDIA)     Difficulty of Paying Living Expenses: Not hard at all   Food Insecurity: No Food Insecurity (5/20/2024)    Hunger Vital Sign     Worried About Running Out of Food in the Last Year: Never true     Ran Out of Food in the Last Year: Never true   Transportation Needs: No Transportation Needs (5/20/2024)    PRAPARE - Transportation     Lack of Transportation (Medical): No     Lack of Transportation (Non-Medical): No   Physical Activity: Not on file   Stress: Not on file   Social Connections: Not on file   Intimate Partner Violence: Not on file   Housing Stability: Low Risk  (5/20/2024)    Housing

## 2024-05-21 NOTE — PROGRESS NOTES
normal.         Judgment: Judgment normal.     Medications:   Medications:    sodium chloride flush  5-40 mL IntraVENous 2 times per day    enoxaparin  40 mg SubCUTAneous Daily    aspirin EC  81 mg Oral Daily    escitalopram  10 mg Oral Daily    gabapentin  400 mg Oral TID    lamoTRIgine  100 mg Oral Nightly    atorvastatin  10 mg Oral Daily    metoprolol succinate  50 mg Oral Daily    mirtazapine  7.5 mg Oral Nightly    rOPINIRole  0.5 mg Oral Nightly    sennosides-docusate sodium  1 tablet Oral BID    tamsulosin  0.4 mg Oral Daily    cefepime  2,000 mg IntraVENous Q12H    insulin lispro  0-8 Units SubCUTAneous TID WC    insulin lispro  0-4 Units SubCUTAneous Nightly      Infusions:    sodium chloride      dextrose       PRN Meds: sodium chloride flush, 5-40 mL, PRN  sodium chloride, , PRN  ondansetron, 4 mg, Q8H PRN   Or  ondansetron, 4 mg, Q6H PRN  acetaminophen, 650 mg, Q6H PRN   Or  acetaminophen, 650 mg, Q6H PRN  polyethylene glycol, 17 g, Daily PRN  glucose, 4 tablet, PRN  dextrose bolus, 125 mL, PRN   Or  dextrose bolus, 250 mL, PRN  glucagon (rDNA), 1 mg, PRN  dextrose, , Continuous PRN        Labs and Imaging   CT ABDOMEN PELVIS W IV CONTRAST Additional Contrast? None    Result Date: 5/20/2024  EXAMINATION: CT OF THE ABDOMEN AND PELVIS WITH CONTRAST 5/20/2024 8:34 am TECHNIQUE: CT of the abdomen and pelvis was performed with the administration of intravenous contrast. Multiplanar reformatted images are provided for review. Automated exposure control, iterative reconstruction, and/or weight based adjustment of the mA/kV was utilized to reduce the radiation dose to as low as reasonably achievable. COMPARISON: 10/25/2021 HISTORY: ORDERING SYSTEM PROVIDED HISTORY: Vomiting, diarrhea, sepsis, lower abdominal pain TECHNOLOGIST PROVIDED HISTORY: Reason for exam:->Vomiting, diarrhea, sepsis, lower abdominal pain Additional Contrast?->None Decision Support Exception - unselect if not a suspected or confirmed  Results   Component Value Date/Time    LABA1C 7.6 02/06/2024 09:11 AM     TSH:   Lab Results   Component Value Date/Time    TSH 1.65 07/24/2023 09:22 AM     Troponin: No results found for: \"TROPONINT\"  Lactic Acid: No results for input(s): \"LACTA\" in the last 72 hours.  BNP: No results for input(s): \"PROBNP\" in the last 72 hours.  UA:  Lab Results   Component Value Date/Time    NITRU POSITIVE 05/20/2024 07:19 AM    COLORU Yellow 05/20/2024 07:19 AM    PHUR 5.5 05/20/2024 07:19 AM    PHUR 5.0 04/14/2023 12:25 PM    PHUR 5.0 10/25/2021 08:28 PM    WBCUA 10-20 05/20/2024 07:19 AM    RBCUA 11-20 05/20/2024 07:19 AM    MUCUS 1+ 05/20/2024 07:19 AM    YEAST Rare Yeast and Budding Yeast 10/23/2012 06:56 PM    BACTERIA 3+ 05/20/2024 07:19 AM    CLARITYU CLOUDY 05/20/2024 07:19 AM    SPECGRAV 1.020 04/14/2023 12:25 PM    LEUKOCYTESUR TRACE 05/20/2024 07:19 AM    UROBILINOGEN 0.2 05/20/2024 07:19 AM    BILIRUBINUR MODERATE 05/20/2024 07:19 AM    BLOODU LARGE 05/20/2024 07:19 AM    GLUCOSEU 250 05/20/2024 07:19 AM    GLUCOSEU >=1000 11/13/2010 09:37 AM    KETUA 15 05/20/2024 07:19 AM     Urine Cultures:   Lab Results   Component Value Date/Time    LABURIN >100,000 CFU/ml  Sensitivity to follow   05/20/2024 07:19 AM     Blood Cultures:   Lab Results   Component Value Date/Time    BC  05/20/2024 07:19 AM     No Growth to date.  Any change in status will be called.     Lab Results   Component Value Date/Time    BLOODCULT2  05/20/2024 07:19 AM     No Growth to date.  Any change in status will be called.     Organism:   Lab Results   Component Value Date/Time    ORG Escherichia coli 05/20/2024 07:19 AM         Electronically signed by Dheeraj Rush DO on 5/21/2024 at 12:17 PM

## 2024-05-21 NOTE — PROGRESS NOTES
Pt a/o. VSS. Shift assessment updated and documented. Nil concerns overnight , Kat in place draining.

## 2024-05-21 NOTE — CARE COORDINATION
Chart reviewed. Care managed by urology and IM. Continuing IVATBX. Await urology input. Kat cath in place. Afebrile. IPTA with RW. Mirella Hemphill RN

## 2024-05-22 VITALS
RESPIRATION RATE: 16 BRPM | SYSTOLIC BLOOD PRESSURE: 128 MMHG | HEART RATE: 92 BPM | DIASTOLIC BLOOD PRESSURE: 69 MMHG | OXYGEN SATURATION: 95 % | HEIGHT: 65 IN | TEMPERATURE: 97.8 F | WEIGHT: 209 LBS | BODY MASS INDEX: 34.82 KG/M2

## 2024-05-22 LAB
ANION GAP SERPL CALCULATED.3IONS-SCNC: 10 MMOL/L (ref 3–16)
BACTERIA UR CULT: ABNORMAL
BASOPHILS # BLD: 0.1 K/UL (ref 0–0.2)
BASOPHILS NFR BLD: 1 %
BUN SERPL-MCNC: 8 MG/DL (ref 7–20)
CALCIUM SERPL-MCNC: 8.8 MG/DL (ref 8.3–10.6)
CHLORIDE SERPL-SCNC: 105 MMOL/L (ref 99–110)
CO2 SERPL-SCNC: 26 MMOL/L (ref 21–32)
CREAT SERPL-MCNC: 0.6 MG/DL (ref 0.6–1.2)
DEPRECATED RDW RBC AUTO: 13.7 % (ref 12.4–15.4)
EOSINOPHIL # BLD: 0.5 K/UL (ref 0–0.6)
EOSINOPHIL NFR BLD: 6.3 %
GFR SERPLBLD CREATININE-BSD FMLA CKD-EPI: >90 ML/MIN/{1.73_M2}
GLUCOSE BLD-MCNC: 142 MG/DL (ref 70–99)
GLUCOSE BLD-MCNC: 163 MG/DL (ref 70–99)
GLUCOSE SERPL-MCNC: 151 MG/DL (ref 70–99)
HCT VFR BLD AUTO: 40.3 % (ref 36–48)
HGB BLD-MCNC: 13.4 G/DL (ref 12–16)
LYMPHOCYTES # BLD: 1.8 K/UL (ref 1–5.1)
LYMPHOCYTES NFR BLD: 23.4 %
MAGNESIUM SERPL-MCNC: 1.8 MG/DL (ref 1.8–2.4)
MCH RBC QN AUTO: 30.7 PG (ref 26–34)
MCHC RBC AUTO-ENTMCNC: 33.4 G/DL (ref 31–36)
MCV RBC AUTO: 92.1 FL (ref 80–100)
MONOCYTES # BLD: 0.7 K/UL (ref 0–1.3)
MONOCYTES NFR BLD: 9 %
NEUTROPHILS # BLD: 4.6 K/UL (ref 1.7–7.7)
NEUTROPHILS NFR BLD: 60.3 %
ORGANISM: ABNORMAL
PERFORMED ON: ABNORMAL
PERFORMED ON: ABNORMAL
PLATELET # BLD AUTO: 266 K/UL (ref 135–450)
PMV BLD AUTO: 8.4 FL (ref 5–10.5)
POTASSIUM SERPL-SCNC: 3.4 MMOL/L (ref 3.5–5.1)
RBC # BLD AUTO: 4.38 M/UL (ref 4–5.2)
SODIUM SERPL-SCNC: 141 MMOL/L (ref 136–145)
WBC # BLD AUTO: 7.6 K/UL (ref 4–11)

## 2024-05-22 PROCEDURE — 2580000003 HC RX 258

## 2024-05-22 PROCEDURE — 87324 CLOSTRIDIUM AG IA: CPT

## 2024-05-22 PROCEDURE — 6370000000 HC RX 637 (ALT 250 FOR IP)

## 2024-05-22 PROCEDURE — 80048 BASIC METABOLIC PNL TOTAL CA: CPT

## 2024-05-22 PROCEDURE — 36415 COLL VENOUS BLD VENIPUNCTURE: CPT

## 2024-05-22 PROCEDURE — 85025 COMPLETE CBC W/AUTO DIFF WBC: CPT

## 2024-05-22 PROCEDURE — 83735 ASSAY OF MAGNESIUM: CPT

## 2024-05-22 PROCEDURE — 6360000002 HC RX W HCPCS

## 2024-05-22 PROCEDURE — 87449 NOS EACH ORGANISM AG IA: CPT

## 2024-05-22 RX ORDER — SULFAMETHOXAZOLE AND TRIMETHOPRIM 800; 160 MG/1; MG/1
1 TABLET ORAL 2 TIMES DAILY
Qty: 6 TABLET | Refills: 0 | Status: SHIPPED | OUTPATIENT
Start: 2024-05-22 | End: 2024-05-25

## 2024-05-22 RX ADMIN — ASPIRIN 81 MG: 81 TABLET, COATED ORAL at 10:09

## 2024-05-22 RX ADMIN — METOPROLOL SUCCINATE 50 MG: 50 TABLET, EXTENDED RELEASE ORAL at 10:09

## 2024-05-22 RX ADMIN — TAMSULOSIN HYDROCHLORIDE 0.4 MG: 0.4 CAPSULE ORAL at 10:55

## 2024-05-22 RX ADMIN — GABAPENTIN 400 MG: 400 CAPSULE ORAL at 10:09

## 2024-05-22 RX ADMIN — GABAPENTIN 400 MG: 400 CAPSULE ORAL at 13:57

## 2024-05-22 RX ADMIN — Medication 10 ML: at 10:55

## 2024-05-22 RX ADMIN — ESCITALOPRAM OXALATE 10 MG: 10 TABLET ORAL at 10:09

## 2024-05-22 RX ADMIN — CEFEPIME 2000 MG: 2 INJECTION, POWDER, FOR SOLUTION INTRAVENOUS at 04:46

## 2024-05-22 RX ADMIN — ATORVASTATIN CALCIUM 10 MG: 10 TABLET, FILM COATED ORAL at 10:09

## 2024-05-22 RX ADMIN — ENOXAPARIN SODIUM 40 MG: 100 INJECTION SUBCUTANEOUS at 10:09

## 2024-05-22 RX ADMIN — POTASSIUM BICARBONATE 20 MEQ: 782 TABLET, EFFERVESCENT ORAL at 11:46

## 2024-05-22 ASSESSMENT — PAIN SCALES - GENERAL: PAINLEVEL_OUTOF10: 0

## 2024-05-22 NOTE — PROGRESS NOTES
Pt Name: Karishma Lee  Medical Record Number: [id number]  Date of Birth 1960   Today's Date: 5/22/2024      Subjective:  Awake in bed, no acute events.     ROS: Constitutional: No fever    Vitals:  Vitals:    05/21/24 1455 05/21/24 1500 05/21/24 2045 05/22/24 0850   BP: (!) 144/80 (!) 144/80 135/78 128/69   Pulse: 95 97 94 92   Resp: 20 17 16   Temp: 98.8 °F (37.1 °C)  97.8 °F (36.6 °C) 97.8 °F (36.6 °C)   TempSrc: Oral  Oral Oral   SpO2: 93% 92% 93% 95%   Weight:       Height:         I/O last 3 completed shifts:  In: 300 [P.O.:300]  Out: 1200 [Urine:1200]    Exam:  General: Awake, oriented, no acute distress  Respiratory: Nonlabored breathing  Abdomen: Soft, non-tender, non-distended, no masses  : cornelius catheter intact with yellow output  Skin: Skin color, texture, turgor normal, no rashes or lesions  Neurologic: no gross deficits    CURRENT MEDICATIONS   Scheduled Meds:   sodium chloride flush  5-40 mL IntraVENous 2 times per day    enoxaparin  40 mg SubCUTAneous Daily    aspirin EC  81 mg Oral Daily    escitalopram  10 mg Oral Daily    gabapentin  400 mg Oral TID    lamoTRIgine  100 mg Oral Nightly    atorvastatin  10 mg Oral Daily    metoprolol succinate  50 mg Oral Daily    mirtazapine  7.5 mg Oral Nightly    rOPINIRole  0.5 mg Oral Nightly    sennosides-docusate sodium  1 tablet Oral BID    tamsulosin  0.4 mg Oral Daily    cefepime  2,000 mg IntraVENous Q12H    insulin lispro  0-8 Units SubCUTAneous TID     insulin lispro  0-4 Units SubCUTAneous Nightly     Continuous Infusions:   sodium chloride      dextrose       PRN Meds:.sodium chloride flush, sodium chloride, ondansetron **OR** ondansetron, acetaminophen **OR** acetaminophen, polyethylene glycol, glucose, dextrose bolus **OR** dextrose bolus, glucagon (rDNA), dextrose    LABS     Recent Labs     05/20/24  0719 05/21/24  0436 05/22/24  0435   WBC 15.0* 6.6 7.6   HGB 14.6 13.5 13.4   HCT 44.0 41.6 40.3    154 266    136 141

## 2024-05-22 NOTE — PROGRESS NOTES
Pt educated on how to switch cornelius bag to leg bag. Pt instruction on how to empty bag. Pt verbalized understanding with no questions or concerns.Amy Loera RN

## 2024-05-22 NOTE — PROGRESS NOTES
Pt remained in bed during the shift with call light and side table within reach. No complaints of pain or discomfort. No significant changes this shift.

## 2024-05-22 NOTE — PLAN OF CARE
Problem: Pain  Goal: Verbalizes/displays adequate comfort level or baseline comfort level  5/22/2024 1443 by Amy Loera RN  Outcome: Completed  5/22/2024 0740 by Fe Patten RN  Outcome: Progressing     Problem: Safety - Adult  Goal: Free from fall injury  5/22/2024 1443 by Amy Loera RN  Outcome: Completed  5/22/2024 0740 by Fe Patten RN  Outcome: Progressing  Flowsheets (Taken 5/21/2024 2310)  Free From Fall Injury: Instruct family/caregiver on patient safety     Problem: ABCDS Injury Assessment  Goal: Absence of physical injury  5/22/2024 1443 by Amy Loera RN  Outcome: Completed  5/22/2024 0740 by Fe Patten RN  Outcome: Progressing  Flowsheets (Taken 5/21/2024 2310)  Absence of Physical Injury: Implement safety measures based on patient assessment     Problem: Chronic Conditions and Co-morbidities  Goal: Patient's chronic conditions and co-morbidity symptoms are monitored and maintained or improved  Outcome: Completed

## 2024-05-22 NOTE — PROGRESS NOTES
Pt alert and orientated. Call light within reach. Pt is eating breakfast with no complaints of pain at this time.Amy Loera RN

## 2024-05-22 NOTE — DISCHARGE SUMMARY
V2.0  Discharge Summary    Name:  Karishma Lee /Age/Sex: 1960 (63 y.o. female)   Admit Date: 2024  Discharge Date: 24    MRN & CSN:  [id number] & [id number] Encounter Date and Time 24 7:15 AM EDT    Attending:  Jadiel Holder MD Discharging Provider: Dheeraj Rush DO       Hospital Course:     Brief HPI: Karishma Lee is a 63 y.o. female with a pmh of  HTN, HLD, GERD, ANGEL, T2DM, Bipolar disorder, and Obesity who presents with UTI (urinary tract infection). Her symptoms started a few days prior to admission, and were associated with a cornelius catheter that wasn't draining properly, and lower abdominal pain. Initial vitals were significant for a blood pressure of 150/97 and a pulse of 111. Physical exam revealed suprapubic tenderness, distended bladder, and bilateral lower abdominal pain. Covid and influenza were negative; CBC, CMP, Lactic acid, and UA were abnormal. CXR was negative, CT showed: Small left pleural effusion. Patient was admitted for further work-up. During hospitalization, Urology was consulted, and urine culture revealed E. Coli. She was started on an antibiotic and advised to follow up outpatient. She was in stable condition at the time of discharge.    Brief Problem Based Course:   Urinary Tract Infection  -UA showed: bilirubin, ketones, blood, bacteria, white blood cells, nitrites, and leukocyte esterase  -Urine Culture: E. Coli  -Discontinued IV Cefepime  [facility]  -Consulted Urology     Abdominal pain  -Likely secondary to UTI  -CT Abdomen: Small left pleural effusion  -CXR: No acute abnormality  -CMP reviewed  -Continued Tylenol as needed  -Monitored condition closely      Left rib fractures 2-11  -S/P mechanical fall at home in 2024  -Continued Neurontin   -Continued Tylenol as needed     Hypertension  -Continued Toprol XL     Hyperlipidemia  -Continued Lipitor     Obstructive sleep apnea  -Home CPAP     Type 2 diabetes mellitus    Component Value Date/Time    ORG Escherichia coli 05/20/2024 07:19 AM       Time Spent Discharging patient 35 minutes    Electronically signed by Dheeraj Rush DO on 5/22/2024 at 2:38 PM

## 2024-05-22 NOTE — PLAN OF CARE
Problem: Pain  Goal: Verbalizes/displays adequate comfort level or baseline comfort level  5/22/2024 0740 by Fe Patten RN  Outcome: Progressing  5/21/2024 2126 by Amy Loera RN  Outcome: Progressing     Problem: Safety - Adult  Goal: Free from fall injury  5/22/2024 0740 by Fe Patten RN  Outcome: Progressing  Flowsheets (Taken 5/21/2024 2310)  Free From Fall Injury: Instruct family/caregiver on patient safety  5/21/2024 2126 by Amy Loera RN  Outcome: Progressing     Problem: ABCDS Injury Assessment  Goal: Absence of physical injury  5/22/2024 0740 by Fe Patten RN  Outcome: Progressing  Flowsheets (Taken 5/21/2024 2310)  Absence of Physical Injury: Implement safety measures based on patient assessment  5/21/2024 2126 by Amy Loera RN  Outcome: Progressing     Problem: Chronic Conditions and Co-morbidities  Goal: Patient's chronic conditions and co-morbidity symptoms are monitored and maintained or improved  5/21/2024 2126 by Amy Loera, RN  Outcome: Progressing

## 2024-05-22 NOTE — PROGRESS NOTES
Physician Progress Note      PATIENT:               ANH BORGES  CSN #:                  [id number]  :                       1960  ADMIT DATE:       2024 7:04 AM  DISCH DATE:  RESPONDING  PROVIDER #:        Jadiel Holder MD          QUERY TEXT:    Patient admitted with UTI. Documentation reflects Urinary tract infection   associated with indwelling urethral catheter in note ED.  If possible, please   document in the progress notes and discharge summary if CAUTI was:    The medical record reflects the following:  Risk Factors: Chronic Cornelius, UTI, Urinary retention  Clinical Indicators:  ED- Urinary tract infection associated with indwelling   urethral catheter, initial encounter, Cornelius catheter will be changed due to   concern that it is not draining appropriately and also for assessment for   urinalysis  Urology Consults - UTI, chronic cornelius  Treatment: IV Cefepime        Thank you,  Francine Mcclendon [facility]  Options provided:  -- CAUTI confirmed after study  -- CAUTI ruled out after study  -- Other - I will add my own diagnosis  -- Disagree - Not applicable / Not valid  -- Disagree - Clinically unable to determine / Unknown  -- Refer to Clinical Documentation Reviewer    PROVIDER RESPONSE TEXT:    CAUTI confirmed after study.    Query created by: Francine Mcclendon on 2024 7:41 AM      Electronically signed by:  Jadiel Holder MD 2024 2:46 PM

## 2024-05-22 NOTE — CARE COORDINATION
CASE MANAGEMENT DISCHARGE SUMMARY      Discharge to: home with OPTherapy, has RX from d/c from ARU.         New Durable Medical Equipment ordered/agency: none    Transportation: private       Confirmed discharge plan with:     Patient: yes     Family:  yes per patient        RN, name:  will educate on cornelius and leg bag.     Berny to transport.   Mirella Hemphill RN

## 2024-05-23 ENCOUNTER — TELEPHONE (OUTPATIENT)
Dept: FAMILY MEDICINE CLINIC | Age: 64
End: 2024-05-23

## 2024-05-23 NOTE — TELEPHONE ENCOUNTER
Left message for patient to call back to schedule a hospital follow up, she was recently discharged from [facility] on 5/22, please schedule with Trnia and document phone encounter using TCMOffice dot phrase.

## 2024-05-24 LAB
BACTERIA BLD CULT ORG #2: NORMAL
BACTERIA BLD CULT: NORMAL

## 2024-05-24 NOTE — TELEPHONE ENCOUNTER
Care Transitions Initial Follow Up Call    Outreach made within 2 business days of discharge: Yes    Patient: Karishma Lee Patient : 1960   MRN: [id number]  Reason for Admission: There are no discharge diagnoses documented for the most recent discharge.  Discharge Date: 24       Spoke with: Karishma Lee    Discharge department/facility: [facility] Patient Contact:  Was patient able to fill all prescriptions: Yes  Was patient instructed to bring all medications to the follow-up visit: Yes  Is patient taking all medications as directed in the discharge summary? Yes  Does patient understand their discharge instructions: Yes  Does patient have questions or concerns that need addressed prior to 7-14 day follow up office visit: no    Scheduled appointment with PCP within 7-14 days    Follow Up  Future Appointments   Date Time Provider Department Center   2024  2:00 PM Trina Nixon PA [facility] [place] [facility]   2024  3:00 PM Lambert Arriaga MD [facility]   2024  1:40 PM Ingrid Varner APRN - CNP [facility]       Zak Nuenz MA

## 2024-05-29 ENCOUNTER — ANESTHESIA EVENT (OUTPATIENT)
Dept: OPERATING ROOM | Age: 64
End: 2024-05-29
Payer: COMMERCIAL

## 2024-05-30 ENCOUNTER — HOSPITAL ENCOUNTER (OUTPATIENT)
Age: 64
Setting detail: OUTPATIENT SURGERY
Discharge: HOME | End: 2024-05-30
Attending: UROLOGY | Admitting: UROLOGY
Payer: COMMERCIAL

## 2024-05-30 ENCOUNTER — APPOINTMENT (OUTPATIENT)
Dept: GENERAL RADIOLOGY | Age: 64
End: 2024-05-30
Attending: UROLOGY
Payer: COMMERCIAL

## 2024-05-30 ENCOUNTER — ANESTHESIA (OUTPATIENT)
Dept: OPERATING ROOM | Age: 64
End: 2024-05-30
Payer: COMMERCIAL

## 2024-05-30 VITALS
SYSTOLIC BLOOD PRESSURE: 131 MMHG | WEIGHT: 210 LBS | OXYGEN SATURATION: 90 % | BODY MASS INDEX: 34.99 KG/M2 | TEMPERATURE: 97.7 F | RESPIRATION RATE: 14 BRPM | HEIGHT: 65 IN | DIASTOLIC BLOOD PRESSURE: 70 MMHG | HEART RATE: 83 BPM

## 2024-05-30 LAB
GLUCOSE BLD-MCNC: 215 MG/DL (ref 70–99)
PERFORMED ON: ABNORMAL

## 2024-05-30 PROCEDURE — 3700000001 HC ADD 15 MINUTES (ANESTHESIA): Performed by: UROLOGY

## 2024-05-30 PROCEDURE — 7100000001 HC PACU RECOVERY - ADDTL 15 MIN: Performed by: UROLOGY

## 2024-05-30 PROCEDURE — 7100000010 HC PHASE II RECOVERY - FIRST 15 MIN: Performed by: UROLOGY

## 2024-05-30 PROCEDURE — 6360000002 HC RX W HCPCS: Performed by: UROLOGY

## 2024-05-30 PROCEDURE — 7100000011 HC PHASE II RECOVERY - ADDTL 15 MIN: Performed by: UROLOGY

## 2024-05-30 PROCEDURE — 2709999900 HC NON-CHARGEABLE SUPPLY: Performed by: UROLOGY

## 2024-05-30 PROCEDURE — 3600000004 HC SURGERY LEVEL 4 BASE: Performed by: UROLOGY

## 2024-05-30 PROCEDURE — 7100000000 HC PACU RECOVERY - FIRST 15 MIN: Performed by: UROLOGY

## 2024-05-30 PROCEDURE — 3700000000 HC ANESTHESIA ATTENDED CARE: Performed by: UROLOGY

## 2024-05-30 PROCEDURE — 2580000003 HC RX 258

## 2024-05-30 PROCEDURE — 2580000003 HC RX 258: Performed by: UROLOGY

## 2024-05-30 PROCEDURE — 6360000002 HC RX W HCPCS

## 2024-05-30 PROCEDURE — 2500000003 HC RX 250 WO HCPCS

## 2024-05-30 PROCEDURE — 3600000014 HC SURGERY LEVEL 4 ADDTL 15MIN: Performed by: UROLOGY

## 2024-05-30 RX ORDER — MEPERIDINE HYDROCHLORIDE 50 MG/ML
12.5 INJECTION INTRAMUSCULAR; INTRAVENOUS; SUBCUTANEOUS EVERY 5 MIN PRN
Status: DISCONTINUED | OUTPATIENT
Start: 2024-05-30 | End: 2024-05-30 | Stop reason: HOSPADM

## 2024-05-30 RX ORDER — DEXAMETHASONE SODIUM PHOSPHATE 4 MG/ML
INJECTION, SOLUTION INTRA-ARTICULAR; INTRALESIONAL; INTRAMUSCULAR; INTRAVENOUS; SOFT TISSUE PRN
Status: DISCONTINUED | OUTPATIENT
Start: 2024-05-30 | End: 2024-05-30 | Stop reason: SDUPTHER

## 2024-05-30 RX ORDER — LABETALOL HYDROCHLORIDE 5 MG/ML
5 INJECTION, SOLUTION INTRAVENOUS EVERY 10 MIN PRN
Status: DISCONTINUED | OUTPATIENT
Start: 2024-05-30 | End: 2024-05-30 | Stop reason: HOSPADM

## 2024-05-30 RX ORDER — SODIUM CHLORIDE 0.9 % (FLUSH) 0.9 %
5-40 SYRINGE (ML) INJECTION EVERY 12 HOURS SCHEDULED
Status: DISCONTINUED | OUTPATIENT
Start: 2024-05-30 | End: 2024-05-30 | Stop reason: HOSPADM

## 2024-05-30 RX ORDER — ONDANSETRON 2 MG/ML
4 INJECTION INTRAMUSCULAR; INTRAVENOUS
Status: DISCONTINUED | OUTPATIENT
Start: 2024-05-30 | End: 2024-05-30 | Stop reason: HOSPADM

## 2024-05-30 RX ORDER — OXYCODONE HYDROCHLORIDE 5 MG/1
5 TABLET ORAL PRN
Status: DISCONTINUED | OUTPATIENT
Start: 2024-05-30 | End: 2024-05-30 | Stop reason: HOSPADM

## 2024-05-30 RX ORDER — LIDOCAINE HYDROCHLORIDE 20 MG/ML
INJECTION, SOLUTION INFILTRATION; PERINEURAL PRN
Status: DISCONTINUED | OUTPATIENT
Start: 2024-05-30 | End: 2024-05-30 | Stop reason: SDUPTHER

## 2024-05-30 RX ORDER — FENTANYL CITRATE 50 UG/ML
INJECTION, SOLUTION INTRAMUSCULAR; INTRAVENOUS PRN
Status: DISCONTINUED | OUTPATIENT
Start: 2024-05-30 | End: 2024-05-30 | Stop reason: SDUPTHER

## 2024-05-30 RX ORDER — SODIUM CHLORIDE 9 MG/ML
INJECTION, SOLUTION INTRAVENOUS PRN
Status: DISCONTINUED | OUTPATIENT
Start: 2024-05-30 | End: 2024-05-30 | Stop reason: HOSPADM

## 2024-05-30 RX ORDER — SODIUM CHLORIDE, SODIUM LACTATE, POTASSIUM CHLORIDE, CALCIUM CHLORIDE 600; 310; 30; 20 MG/100ML; MG/100ML; MG/100ML; MG/100ML
INJECTION, SOLUTION INTRAVENOUS CONTINUOUS PRN
Status: DISCONTINUED | OUTPATIENT
Start: 2024-05-30 | End: 2024-05-30 | Stop reason: SDUPTHER

## 2024-05-30 RX ORDER — PROPOFOL 10 MG/ML
INJECTION, EMULSION INTRAVENOUS PRN
Status: DISCONTINUED | OUTPATIENT
Start: 2024-05-30 | End: 2024-05-30 | Stop reason: SDUPTHER

## 2024-05-30 RX ORDER — DIPHENHYDRAMINE HYDROCHLORIDE 50 MG/ML
12.5 INJECTION INTRAMUSCULAR; INTRAVENOUS
Status: DISCONTINUED | OUTPATIENT
Start: 2024-05-30 | End: 2024-05-30 | Stop reason: HOSPADM

## 2024-05-30 RX ORDER — NALOXONE HYDROCHLORIDE 0.4 MG/ML
INJECTION, SOLUTION INTRAMUSCULAR; INTRAVENOUS; SUBCUTANEOUS PRN
Status: DISCONTINUED | OUTPATIENT
Start: 2024-05-30 | End: 2024-05-30 | Stop reason: HOSPADM

## 2024-05-30 RX ORDER — CEFAZOLIN SODIUM IN 0.9 % NACL 2 G/100 ML
2000 PLASTIC BAG, INJECTION (ML) INTRAVENOUS
Status: COMPLETED | OUTPATIENT
Start: 2024-05-30 | End: 2024-05-30

## 2024-05-30 RX ORDER — SODIUM CHLORIDE 0.9 % (FLUSH) 0.9 %
5-40 SYRINGE (ML) INJECTION PRN
Status: DISCONTINUED | OUTPATIENT
Start: 2024-05-30 | End: 2024-05-30 | Stop reason: HOSPADM

## 2024-05-30 RX ORDER — LIDOCAINE HYDROCHLORIDE 10 MG/ML
0.3 INJECTION, SOLUTION EPIDURAL; INFILTRATION; INTRACAUDAL; PERINEURAL
Status: DISCONTINUED | OUTPATIENT
Start: 2024-05-30 | End: 2024-05-30 | Stop reason: HOSPADM

## 2024-05-30 RX ORDER — OXYCODONE HYDROCHLORIDE 5 MG/1
10 TABLET ORAL PRN
Status: DISCONTINUED | OUTPATIENT
Start: 2024-05-30 | End: 2024-05-30 | Stop reason: HOSPADM

## 2024-05-30 RX ORDER — SODIUM CHLORIDE, SODIUM LACTATE, POTASSIUM CHLORIDE, CALCIUM CHLORIDE 600; 310; 30; 20 MG/100ML; MG/100ML; MG/100ML; MG/100ML
INJECTION, SOLUTION INTRAVENOUS CONTINUOUS
Status: DISCONTINUED | OUTPATIENT
Start: 2024-05-30 | End: 2024-05-30 | Stop reason: HOSPADM

## 2024-05-30 RX ORDER — ONDANSETRON 2 MG/ML
INJECTION INTRAMUSCULAR; INTRAVENOUS PRN
Status: DISCONTINUED | OUTPATIENT
Start: 2024-05-30 | End: 2024-05-30 | Stop reason: SDUPTHER

## 2024-05-30 RX ADMIN — DEXAMETHASONE SODIUM PHOSPHATE 4 MG: 4 INJECTION, SOLUTION INTRAMUSCULAR; INTRAVENOUS at 12:39

## 2024-05-30 RX ADMIN — LIDOCAINE HYDROCHLORIDE 80 MG: 20 INJECTION, SOLUTION INFILTRATION; PERINEURAL at 12:34

## 2024-05-30 RX ADMIN — FENTANYL CITRATE 25 MCG: 50 INJECTION, SOLUTION INTRAMUSCULAR; INTRAVENOUS at 12:50

## 2024-05-30 RX ADMIN — PROPOFOL 150 MG: 10 INJECTION, EMULSION INTRAVENOUS at 12:34

## 2024-05-30 RX ADMIN — Medication 2000 MG: at 12:36

## 2024-05-30 RX ADMIN — PROPOFOL 50 MG: 10 INJECTION, EMULSION INTRAVENOUS at 12:35

## 2024-05-30 RX ADMIN — ONDANSETRON 4 MG: 2 INJECTION INTRAMUSCULAR; INTRAVENOUS at 12:37

## 2024-05-30 RX ADMIN — SODIUM CHLORIDE, SODIUM LACTATE, POTASSIUM CHLORIDE, AND CALCIUM CHLORIDE: .6; .31; .03; .02 INJECTION, SOLUTION INTRAVENOUS at 12:30

## 2024-05-30 RX ADMIN — FENTANYL CITRATE 25 MCG: 50 INJECTION, SOLUTION INTRAMUSCULAR; INTRAVENOUS at 12:41

## 2024-05-30 ASSESSMENT — PAIN SCALES - GENERAL
PAINLEVEL_OUTOF10: 0
PAINLEVEL_OUTOF10: 0

## 2024-05-30 ASSESSMENT — PAIN - FUNCTIONAL ASSESSMENT: PAIN_FUNCTIONAL_ASSESSMENT: 0-10

## 2024-05-30 NOTE — ANESTHESIA POSTPROCEDURE EVALUATION
Department of Anesthesiology  Postprocedure Note    Patient: Karishma Lee  MRN: [id number]  YOB: 1960  Date of evaluation: 5/30/2024    Procedure Summary       Date: 05/30/24 Room / Location: [address]    Anesthesia Start: 1230 Anesthesia Stop: 1309    Procedure: CYSTOSCOPY, URETHRAL DILATION (Bladder) Diagnosis:       Urine retention      (Urine retention [R33.9])    Surgeons: Franc Salvador MD Responsible Provider:     Anesthesia Type: General ASA Status: 3            Anesthesia Type: General    Denia Phase I: Denia Score: 8    Denia Phase II: Denia Score: 10    Anesthesia Post Evaluation    Patient location during evaluation: PACU  Level of consciousness: awake  Airway patency: patent  Nausea & Vomiting: no vomiting  Cardiovascular status: blood pressure returned to baseline  Respiratory status: acceptable  Hydration status: stable  Pain management: adequate    No notable events documented.

## 2024-05-30 NOTE — ANESTHESIA POSTPROCEDURE EVALUATION
Department of Anesthesiology  Postprocedure Note    Patient: Karishma Lee  MRN: [id number]  YOB: 1960  Date of evaluation: 5/30/2024    Procedure Summary       Date: 05/30/24 Room / Location: [address]    Anesthesia Start: 1230 Anesthesia Stop: 1309    Procedure: CYSTOSCOPY, URETHRAL DILATION (Bladder) Diagnosis:       Urine retention      (Urine retention [R33.9])    Surgeons: Farnc Salvador MD Responsible Provider: Delphine Nelson MD    Anesthesia Type: General ASA Status: 3            Anesthesia Type: General    Denia Phase I: Denia Score: 8    Denia Phase II: Denia Score: 10    Anesthesia Post Evaluation    Comments: Postoperative Anesthesia Note    Name:    Karishma Lee  MRN:      [id number]    Patient Vitals in the past 12 hrs:  05/30/24 1404, BP:131/70, Pulse:83, Resp:14, SpO2:90 %  05/30/24 1359, BP:136/73, Pulse:84, Resp:17, SpO2:93 %  05/30/24 1354, BP:126/75, Pulse:82, Resp:20, SpO2:90 %  05/30/24 1349, BP:(!) 133/116, Pulse:82, Resp:12, SpO2:91 %  05/30/24 1344, BP:138/81, Pulse:82, Resp:(!) 9, SpO2:92 %  05/30/24 1339, BP:135/74, Pulse:82, Resp:11, SpO2:92 %  05/30/24 1334, Pulse:84, Resp:13, SpO2:94 %  05/30/24 1329, BP:139/85, Temp:97.7 °F (36.5 °C), Temp src:Oral, Pulse:81, Resp:13, SpO2:93 %  05/30/24 1324, BP:(!) 142/80, Pulse:84, Resp:15, SpO2:94 %  05/30/24 1319, BP:(!) 142/81, Pulse:82, Resp:15, SpO2:92 %  05/30/24 1314, BP:(!) 145/74, Pulse:83, Resp:10, SpO2:92 %  05/30/24 1311, BP:(!) 149/81, Pulse:83, Resp:(!) 9, SpO2:93 %  05/30/24 1306, BP:(!) 150/76, Pulse:84, Resp:(!) 9, SpO2:94 %  05/30/24 1304, Temp:97.6 °F (36.4 °C), Temp src:Oral  05/30/24 1101, BP:137/82, Temp:98.2 °F (36.8 °C), Temp src:Temporal, Pulse:89, SpO2:94 %     LABS:    CBC  Lab Results       Component                Value               Date/Time                  WBC                      7.6                 05/22/2024 04:35 AM        HGB                      13.4

## 2024-05-30 NOTE — ANESTHESIA PRE PROCEDURE
Department of Anesthesiology  Preprocedure Note       Name:  Karishma Lee   Age:  63 y.o.  :  1960                                          MRN:  [id number]         Date:  2024      Surgeon: Surgeon(s):  Franc Salvador MD    Procedure: Procedure(s):  CYSTOSCOPY, URETHRAL DILATION  WITH BLADDER BIOPSY    Medications prior to admission:   Prior to Admission medications    Medication Sig Start Date End Date Taking? Authorizing Provider   OZEMPIC, 1 MG/DOSE, 4 MG/3ML SOPN sc injection Inject 1 mg into the skin every 7 days 24   Provider, MD Douglas   blood glucose monitor strips Use 1 strip to test blood sugar 2 times a day & as needed for symptoms of irregular blood glucose. Dispense as patient and insurance preference. 24   Trina Nixon PA   blood glucose monitor kit and supplies Use as Directed to test for blood sugar. Dispense per insurance and patient preference. 24   Trina Nixon PA   Lancets MISC Use 1 lancet to test blood sugar twice daily. Dispense per insurance and patient preference. 24   Trina Nixon PA   OZEMPIC, 2 MG/DOSE, 8 MG/3ML SOPN sc injection DIAL AND INJECT UNDER THE SKIN 2 MG WEEKLY 24   Trina Nixon PA   metoprolol succinate (TOPROL XL) 50 MG extended release tablet TAKE 1 TABLET BY MOUTH DAILY  Patient taking differently: Take 1 tablet by mouth every morning Take day of 24 surgery. 24   Lambert Arriaga MD   atorvastatin (LIPITOR) 10 MG tablet TAKE 1 TABLET BY MOUTH DAILY  Patient taking differently: Take 1 tablet by mouth every morning 24   Lambert Arriaga MD   methocarbamol 1000 MG TABS Take 1,000 mg by mouth 4 times daily 5/3/24 6/2/24  April Garduno MD   tamsulosin (FLOMAX) 0.4 MG capsule Take 1 capsule by mouth daily  Patient taking differently: Take 1 capsule by mouth every morning 24   April Garduno MD   sennosides-docusate sodium (SENOKOT-S) 8.6-50 MG tablet Take 1 tablet by mouth in the

## 2024-05-30 NOTE — H&P
Urology Attending H&P Note      History: This is a 63 y.o. female who presents today for operative intervention for urethral dilation, poss bladder bx    Family History, Social History, Review of Systems:  Reviewed and agreed to as per chart    Vitals:  Ht 1.651 m (5' 5\")   Wt 95.3 kg (210 lb)   BMI 34.95 kg/m²   No data recorded  No intake or output data in the 24 hours ending 05/30/24 1100      Physical:  Well developed, well nourished in no acute distress  Mood indicates no abnormalities. Pt doesn’t appear depressed  Orientated to time and place  Neck is supple, trachea is midline  Respiratory effort is normal  Cardiovascular show no extremity swelling  Abdomen no masses or hernias are palpated, there is no tenderness. Liver and Spleen appear normal.  Skin show no abnormal lesions  Lymph nodes are not palpated in the inguinal, neck, or axillary area.    Labs:  WBC:    Lab Results   Component Value Date/Time    WBC 7.6 05/22/2024 04:35 AM     Hemoglobin/Hematocrit:    Lab Results   Component Value Date/Time    HGB 13.4 05/22/2024 04:35 AM    HCT 40.3 05/22/2024 04:35 AM     BMP:    Lab Results   Component Value Date/Time     05/22/2024 04:35 AM    K 3.4 05/22/2024 04:35 AM     05/22/2024 04:35 AM    CO2 26 05/22/2024 04:35 AM    BUN 8 05/22/2024 04:35 AM    CREATININE 0.6 05/22/2024 04:35 AM    CALCIUM 8.8 05/22/2024 04:35 AM    GFRAA >60 07/05/2022 09:42 AM    GFRAA >60 11/01/2012 09:36 AM    LABGLOM >90 05/22/2024 04:35 AM    LABGLOM >90 04/29/2024 05:38 AM     PT/INR:    Lab Results   Component Value Date/Time    PROTIME 10.5 01/30/2019 04:43 PM    INR 0.92 01/30/2019 04:43 PM     PTT:    Lab Results   Component Value Date/Time    APTT 32.8 01/30/2019 04:43 PM   [APTT      Impression/Plan:     -- to the OR   -- antibiotics on call  -- discussed with patient - all questions answered    Thank you for the opportunity to be involved in the care of this patient - please call with

## 2024-05-30 NOTE — DISCHARGE INSTRUCTIONS
[address]   [facility] [address]   [phone]          [] DOMINIQUE LEPE MD     Specialty: Urology     Follow up: (when: Monday at [facility] for catheter removal)      Call 911 anytime you think you may need emergency care. For example, call if:    You passed out (lost consciousness).     You have severe trouble breathing.     You have sudden chest pain and shortness of breath, or you cough up blood.     You have severe belly pain.   Call your doctor now or seek immediate medical care if:    You are sick to your stomach or cannot keep fluids down.     Your urine is still red or you see blood clots after you have urinated several times.     You have trouble passing urine or stool, especially if you have pain or swelling in your lower belly.     You have signs of a blood clot, such as:  Pain in your calf, back of the knee, thigh, or groin.  Redness and swelling in your leg or groin.     You develop a fever of 101.0 or greater, or severe chills.     You have pain in your back just below your rib cage. This is called flank pain.       Activity: As Tolerated    Diet:  You can eat your normal diet.   Drink plenty of fluids, water perferred.   If your stomach is upset, try bland, low-fat foods like plain rice, broiled chicken, toast, and yogurt.     Taking Care of Your Bladder:  Limit or avoid alcohol.  Avoid constipation.  Include fruits, vegetables, cooked dry beans, and whole grains in your diet each day  Drink plenty of water, daily  Get at least 30 minutes of daily activity like walking.     YOU WILL HAVE SOME BLOOD IN THE URINE IF YOU HAVE A STENT PLACED - THIS IS NORMAL - PLEASE DRINK A LOT OF WATER (>2L) AND THIS SHOULD HELP    ANESTHESIA DISCHARGE INSTRUCTIONS    You are under the influence of drugs- do not drink alcohol, drive a car, operate machinery(such as power tools, kitchen appliances, etc), sign legal documents, or make any important decisions for 24 hours (or while on pain

## 2024-05-30 NOTE — OP NOTE
Not inserted for procedure 05/05/24 1035   Catheter Indications Urinary retention (acute or chronic), continuous bladder irrigation or bladder outlet obstruction 05/06/24 0838   Site Assessment Swelling;Urethral drainage;Red;Moist 05/06/24 0838   Urine Color Yellow 05/06/24 0838   Urine Appearance Clear 05/06/24 0838   Urine Odor Other (Comment) 05/05/24 2026   Collection Container Standard 05/06/24 0838   Securement Method Securing device (Describe) 05/06/24 0838   Catheter Care  Perineal wipes 05/06/24 0838   Catheter Best Practices  Drainage tube clipped to bed;Tamper seal intact;Catheter secured to thigh;Bag below bladder;Lack of dependent loop in tubing;Bag not on floor;Drainage bag less than half full 05/06/24 0838   Status Draining 05/06/24 0838   Output (mL) 550 mL 05/05/24 2026       [REMOVED] Urinary Catheter 05/20/24 Kat (Removed)   $ Urethral catheter insertion $ Not inserted for procedure 05/21/24 0354   Catheter Indications Urinary retention (acute or chronic), continuous bladder irrigation or bladder outlet obstruction 05/22/24 0948   Site Assessment No urethral drainage 05/22/24 1023   Urine Color Yellow 05/22/24 1023   Urine Appearance Sediment 05/22/24 1023   Urine Odor Malodorous 05/22/24 1023   Collection Container Standard 05/22/24 1023   Securement Method Securing device (Describe) 05/22/24 1023   Catheter Care  Perineal wipes 05/22/24 1023   Catheter Best Practices  Drainage tube clipped to bed;Catheter secured to thigh;Tamper seal intact;Bag below bladder;Bag not on floor;Drainage bag less than half full;Lack of dependent loop in tubing 05/22/24 1023   Status Draining 05/22/24 1023   Output (mL) 150 mL 05/22/24 1433       [REMOVED] External Urinary Catheter (Removed)   Site Assessment Clean,dry & intact 04/24/24 1406   Placement Replaced 04/24/24 1406   Urine Color Yellow 04/25/24 0600   Urine Appearance Clear 04/25/24 0600   Output (mL) 600 mL 04/25/24 0600       Findings:  Infection

## 2024-05-31 ENCOUNTER — OFFICE VISIT (OUTPATIENT)
Dept: FAMILY MEDICINE CLINIC | Age: 64
End: 2024-05-31

## 2024-05-31 VITALS
DIASTOLIC BLOOD PRESSURE: 66 MMHG | TEMPERATURE: 97.9 F | WEIGHT: 208 LBS | OXYGEN SATURATION: 98 % | HEIGHT: 65 IN | SYSTOLIC BLOOD PRESSURE: 118 MMHG | BODY MASS INDEX: 34.66 KG/M2 | HEART RATE: 102 BPM

## 2024-05-31 DIAGNOSIS — Z79.4 TYPE 2 DIABETES MELLITUS WITHOUT COMPLICATION, WITH LONG-TERM CURRENT USE OF INSULIN (HCC): ICD-10-CM

## 2024-05-31 DIAGNOSIS — Z09 HOSPITAL DISCHARGE FOLLOW-UP: Primary | ICD-10-CM

## 2024-05-31 DIAGNOSIS — E11.9 TYPE 2 DIABETES MELLITUS WITHOUT COMPLICATION, WITH LONG-TERM CURRENT USE OF INSULIN (HCC): ICD-10-CM

## 2024-05-31 DIAGNOSIS — N39.0 URINARY TRACT INFECTION ASSOCIATED WITH INDWELLING URETHRAL CATHETER, SEQUELA: ICD-10-CM

## 2024-05-31 DIAGNOSIS — T83.511S URINARY TRACT INFECTION ASSOCIATED WITH INDWELLING URETHRAL CATHETER, SEQUELA: ICD-10-CM

## 2024-05-31 RX ORDER — METHOCARBAMOL 500 MG/1
500 TABLET, FILM COATED ORAL 4 TIMES DAILY
COMMUNITY
Start: 2024-05-15

## 2024-05-31 ASSESSMENT — ENCOUNTER SYMPTOMS
RESPIRATORY NEGATIVE: 1
GASTROINTESTINAL NEGATIVE: 1

## 2024-05-31 NOTE — PROGRESS NOTES
Heart sounds: Normal heart sounds.   Pulmonary:      Effort: Pulmonary effort is normal.      Breath sounds: Normal breath sounds.   Neurological:      General: No focal deficit present.      Mental Status: She is alert and oriented to person, place, and time.            Diagnosis Orders   1. Hospital discharge follow-up  [place] DISCHARGE MEDS RECONCILED W/ CURRENT OUTPATIENT MED LIST    [place] DISCHARGE MEDS RECONCILED W/ CURRENT OUTPATIENT MED LIST      2. Type 2 diabetes mellitus without complication, with long-term current use of insulin (HCC)  Restart ozempic at 0.5 mg week one then to 1mg for a week then back to 2 mg if tolerated      3. Urinary tract infection associated with indwelling urethral catheter, sequela  Continue follow up with urology          An electronic signature was used to authenticate this note.  --PAULA Bryant

## 2024-06-19 PROBLEM — N39.0 UTI (URINARY TRACT INFECTION): Status: RESOLVED | Noted: 2024-05-20 | Resolved: 2024-06-19

## 2024-06-24 ENCOUNTER — OFFICE VISIT (OUTPATIENT)
Dept: CARDIOLOGY CLINIC | Age: 64
End: 2024-06-24
Payer: COMMERCIAL

## 2024-06-24 ENCOUNTER — ANCILLARY PROCEDURE (OUTPATIENT)
Dept: CARDIOLOGY CLINIC | Age: 64
End: 2024-06-24
Payer: COMMERCIAL

## 2024-06-24 ENCOUNTER — TELEPHONE (OUTPATIENT)
Dept: CARDIOLOGY CLINIC | Age: 64
End: 2024-06-24

## 2024-06-24 VITALS
HEART RATE: 100 BPM | DIASTOLIC BLOOD PRESSURE: 90 MMHG | HEIGHT: 65 IN | WEIGHT: 213 LBS | SYSTOLIC BLOOD PRESSURE: 134 MMHG | BODY MASS INDEX: 35.49 KG/M2 | OXYGEN SATURATION: 95 %

## 2024-06-24 DIAGNOSIS — R55 SYNCOPE, UNSPECIFIED SYNCOPE TYPE: ICD-10-CM

## 2024-06-24 DIAGNOSIS — E78.00 PURE HYPERCHOLESTEROLEMIA: Primary | ICD-10-CM

## 2024-06-24 DIAGNOSIS — I10 ESSENTIAL HYPERTENSION: ICD-10-CM

## 2024-06-24 DIAGNOSIS — I25.10 CORONARY ARTERY DISEASE INVOLVING NATIVE CORONARY ARTERY OF NATIVE HEART WITHOUT ANGINA PECTORIS: ICD-10-CM

## 2024-06-24 PROCEDURE — 3080F DIAST BP >= 90 MM HG: CPT | Performed by: INTERNAL MEDICINE

## 2024-06-24 PROCEDURE — 99214 OFFICE O/P EST MOD 30 MIN: CPT | Performed by: INTERNAL MEDICINE

## 2024-06-24 PROCEDURE — 3075F SYST BP GE 130 - 139MM HG: CPT | Performed by: INTERNAL MEDICINE

## 2024-06-24 RX ORDER — VALSARTAN 80 MG/1
80 TABLET ORAL DAILY
Qty: 30 TABLET | Refills: 5 | Status: SHIPPED | OUTPATIENT
Start: 2024-06-24

## 2024-06-24 NOTE — PATIENT INSTRUCTIONS
Plan:  ~Start Valsartan 80 mg nightly   ~Cardiac event monitor for 4 weeks   Cardiac medications reviewed including indications and pertinent side effects. Medication list updated at this visit.   Patient verbalizes understanding of the need for treatment and education has been provided at today's visit. Additional education material will be provided in after visit summary.    Check blood pressure and heart rate at home daily- keep a log with dates and times and bring to office visit    ~Blood pressure goal after being on Valsartan for 3 weeks should be around 120-130/70-80's, rarely over 140/90. We may have to increase Valsartan if blood pressure is not at goal.   Regular exercise and following a healthy diet encouraged   Follow up with me in 8 weeks

## 2024-06-24 NOTE — TELEPHONE ENCOUNTER
Monitor placed by [employer]  Monitor company   Length of monitor 30 days  Monitor ordered by [facility]  Phone ID [id number]  Kit ID [id number], [id number], [id number]  Activation successful prior to pt leaving office? Yes

## 2024-06-24 NOTE — PROGRESS NOTES
diabetes e11.9 8/26/19  Yes Delfino Carrillo MD   Blood Glucose Monitoring Suppl (ONE TOUCH ULTRA 2) w/Device KIT 1 kit by Does not apply route daily 8/26/19  Yes Delfino Carrillo MD   Lancets MISC Test once daily for diabetes e11.9 7/10/18  Yes Delfino Carrillo MD   Blood Glucose Monitoring Suppl CONCETTA Test once daily for diabetes e11.9 2/5/18  Yes Delfino Carrillo MD   escitalopram (LEXAPRO) 20 MG tablet Take 0.5 tablets by mouth every morning   Yes ProviderDouglas MD   tamsulosin (FLOMAX) 0.4 MG capsule Take 1 capsule by mouth daily  Patient taking differently: Take 1 capsule by mouth every morning 5/4/24   April Garduno MD   sennosides-docusate sodium (SENOKOT-S) 8.6-50 MG tablet Take 1 tablet by mouth in the morning and at bedtime 5/3/24   April Garduno MD   gabapentin (NEURONTIN) 400 MG capsule Take 1 capsule by mouth 3 times daily for 30 days. 5/3/24 6/2/24  April Garduno MD        Allergies:  Lipitor [atorvastatin] and Morphine     Review of Systems:   Constitutional: there has been no unanticipated weight loss. There's been no change in energy level, sleep pattern, or activity level.     Eyes: No visual changes or diplopia. No scleral icterus.  ENT: No Headaches, hearing loss or vertigo. No mouth sores or sore throat.  Cardiovascular: Reviewed in HPI  Respiratory: No cough or wheezing, no sputum production. No hematemesis.    Gastrointestinal: No abdominal pain, appetite loss, blood in stools. No change in bowel or bladder habits.  Genitourinary: No dysuria, trouble voiding, or hematuria.  Musculoskeletal:  No gait disturbance, weakness or joint complaints.  Integumentary: No rash or pruritis.  Neurological: No headache, diplopia, change in muscle strength, numbness or tingling. No change in gait, balance, coordination, mood, affect, memory, mentation, behavior.  Psychiatric: No anxiety, no depression.  Endocrine: No malaise, fatigue or temperature intolerance. No

## 2024-06-26 NOTE — TELEPHONE ENCOUNTER
Karishma Lee [phone] (home)    is requesting refill(s) of medication Metformin 1000 mg Tablet to preferred [facility]    Last OV 05/31/24 (pertaining to medication)   Last refill 01/24/24 (per medication requested)  Next office visit scheduled or attempted Yes  Pt states she will call back to schedule 6 months DM follow up in November.

## 2024-06-27 ENCOUNTER — HOSPITAL ENCOUNTER (OUTPATIENT)
Dept: PHYSICAL THERAPY | Age: 64
Setting detail: THERAPIES SERIES
Discharge: HOME | End: 2024-06-27
Payer: COMMERCIAL

## 2024-06-27 DIAGNOSIS — R26.89 IMBALANCE: Primary | ICD-10-CM

## 2024-06-27 PROCEDURE — 97162 PT EVAL MOD COMPLEX 30 MIN: CPT

## 2024-06-27 PROCEDURE — 97112 NEUROMUSCULAR REEDUCATION: CPT

## 2024-06-27 NOTE — PLAN OF CARE
conditions  [] Vertigo  [] Syncope  [] Kidney Failure  [] Cancer  [] Pregnancy  [] Incontinence   Other Co-morbidities not listed: fatty liver, cornelius catheter (since fall), narcotic addiction, ANGEL, R TKR, IBS      OBJECTIVE EXAMINATION     Observations:  Posture: forward head and increased thoracic kyphosis  Bandages/Dressings/Incisions: no  Edema: no    Cognitive Status:    A&O to   x4  Able to follow:   2 step commands    Communication:   WNL  Comments:     Vision:     wears glasses for reading    Hearing:     WNL    Cardiopulmonary Status   [x]NT  Blood pressure:   Heart Rate: bpm  SpO2: %    Tone     WNL    Trunk Control      Good    Sensation       Light touch:     WNL L LE and R LE  Proprioception:   WFL L LE and R LE    Strength/ROM    [x]All ROM WNL except as marked below    [x]All strength measured on 5 point scale    [x]UE ROM/strength deferred to OT    \"*\" Indicates pain with movement      Right  Left      PROM AROM MMT Notes PROM AROM MMT Notes     CERVICAL Flexion            Extension            Sidebending            Rotation               SHOULDER Flexion (0-180)            Extension (0-45)            Abduction -C5 (0-80)            ER (0-90)            IR (0-70)             ELBOW Flexion - C6 (0-145)            Extension - C7 (145-0)            Pronation (0-90)            Supination (0-90)               WRIST Flexion - C7 (0-80)            Extension - C6 (0-70)            Radial Deviation (0-20)            Ulnar Deviation (0-45)             LUMBAR Flexion            Extension            Sidebending            Rotation             HIP Flexion   4-    4-     Extension   4-    4-     Abduction   4-    4-     Adduction            External Rotation   3+    3+     Internal Rotation   3+    3+    KNEE Flexion   4    4     Extension   4    4      ANKLE Dorsiflexion            Plantarflexion            Inversion            Eversion               Reflexes  Not tested    Coordination Testing:       Finger to

## 2024-07-04 ENCOUNTER — HOSPITAL ENCOUNTER (EMERGENCY)
Age: 64
Discharge: HOME | End: 2024-07-04
Payer: COMMERCIAL

## 2024-07-04 VITALS
DIASTOLIC BLOOD PRESSURE: 83 MMHG | OXYGEN SATURATION: 96 % | RESPIRATION RATE: 18 BRPM | HEART RATE: 77 BPM | TEMPERATURE: 98.1 F | HEIGHT: 65 IN | SYSTOLIC BLOOD PRESSURE: 152 MMHG | BODY MASS INDEX: 35.82 KG/M2 | WEIGHT: 215 LBS

## 2024-07-04 DIAGNOSIS — H43.392 VITREOUS FLOATERS OF LEFT EYE: Primary | ICD-10-CM

## 2024-07-04 PROCEDURE — 6370000000 HC RX 637 (ALT 250 FOR IP): Performed by: PHYSICIAN ASSISTANT

## 2024-07-04 PROCEDURE — 99283 EMERGENCY DEPT VISIT LOW MDM: CPT

## 2024-07-04 RX ORDER — TETRACAINE HYDROCHLORIDE 5 MG/ML
1 SOLUTION OPHTHALMIC ONCE
Status: COMPLETED | OUTPATIENT
Start: 2024-07-04 | End: 2024-07-04

## 2024-07-04 RX ADMIN — TETRACAINE HYDROCHLORIDE 1 DROP: 5 SOLUTION OPHTHALMIC at 12:53

## 2024-07-04 RX ADMIN — FLUORESCEIN SODIUM 1 MG: 1 STRIP OPHTHALMIC at 12:52

## 2024-07-04 ASSESSMENT — LIFESTYLE VARIABLES
HOW MANY STANDARD DRINKS CONTAINING ALCOHOL DO YOU HAVE ON A TYPICAL DAY: PATIENT DOES NOT DRINK
HOW OFTEN DO YOU HAVE A DRINK CONTAINING ALCOHOL: NEVER

## 2024-07-04 ASSESSMENT — PAIN - FUNCTIONAL ASSESSMENT: PAIN_FUNCTIONAL_ASSESSMENT: NONE - DENIES PAIN

## 2024-07-04 ASSESSMENT — VISUAL ACUITY
OU: 1
OD: 20/50
OS: 20/40
OU: 20/40

## 2024-07-04 NOTE — ED PROVIDER NOTES
[facility]  ED  EMERGENCY DEPARTMENT ENCOUNTER        Pt Name: Karishma Lee  MRN: [id number]  Birthdate 1960  Date of evaluation: 7/4/2024  Provider: PAULA Greene  PCP: Trina Nixon PA  Note Started: 12:45 PM EDT 7/4/24      TRISH. I have evaluated this patient.        CHIEF COMPLAINT       Chief Complaint   Patient presents with    Foreign Body in Eye     States she believes there is something in her left eye since yesterday. States there is a \"spot in her vision\"       HISTORY OF PRESENT ILLNESS: 1 or more Elements     History from : Patient    Limitations to history : None    Karishma Lee is a 63 y.o. female who presents to the emergency department for a black spot in her left eye.  Patient states she noticed the black spot 2 days ago.  She states it does move around.  It is worse than her lateral periphery.  She denies any injuries.  Denies any known foreign bodies.  Does not have any pain in her eye.  She has no loss of vision.  She does wear glasses.    Nursing Notes were all reviewed and agreed with or any disagreements were addressed in the HPI.    REVIEW OF SYSTEMS :      Review of Systems    Positives and Pertinent negatives as per HPI.     SURGICAL HISTORY     Past Surgical History:   Procedure Laterality Date    APPENDECTOMY      CHOLECYSTECTOMY  2001    COLONOSCOPY  01/2004    Due 2014    CYSTOSCOPY  07/09/2010    CYSTOSCOPY  11/18/2010    insertion of sparc mesh sling with cystoscopy    CYSTOSCOPY N/A 5/30/2024    CYSTOSCOPY, URETHRAL DILATION performed by Franc Salvador MD at [facility] OR    HYSTERECTOMY (CERVIX STATUS UNKNOWN)      HYSTERECTOMY, TOTAL ABDOMINAL (CERVIX REMOVED)      JOINT REPLACEMENT      KNEE ARTHROPLASTY      KNEE ARTHROSCOPY Right     KNEE ARTHROSCOPY Left 10/2013    partial medial menisectomy    KNEE SURGERY      PLANTAR FASCIA SURGERY      bilateral    BRANDI AND BSO (CERVIX REMOVED)  04/2010    DUB    TOTAL KNEE ARTHROPLASTY Right 03/29/2019

## 2024-07-09 LAB — DIABETIC RETINOPATHY: NEGATIVE

## 2024-07-12 ENCOUNTER — OFFICE VISIT (OUTPATIENT)
Dept: FAMILY MEDICINE CLINIC | Age: 64
End: 2024-07-12
Payer: COMMERCIAL

## 2024-07-12 VITALS
BODY MASS INDEX: 35.06 KG/M2 | SYSTOLIC BLOOD PRESSURE: 124 MMHG | HEART RATE: 102 BPM | HEIGHT: 65 IN | WEIGHT: 210.4 LBS | DIASTOLIC BLOOD PRESSURE: 78 MMHG | OXYGEN SATURATION: 96 % | RESPIRATION RATE: 16 BRPM

## 2024-07-12 DIAGNOSIS — R07.81 RIB PAIN: ICD-10-CM

## 2024-07-12 DIAGNOSIS — E78.5 TYPE 2 DIABETES MELLITUS WITH HYPERLIPIDEMIA (HCC): Primary | ICD-10-CM

## 2024-07-12 DIAGNOSIS — E11.69 TYPE 2 DIABETES MELLITUS WITH HYPERLIPIDEMIA (HCC): Primary | ICD-10-CM

## 2024-07-12 PROCEDURE — 3051F HG A1C>EQUAL 7.0%<8.0%: CPT | Performed by: PHYSICIAN ASSISTANT

## 2024-07-12 PROCEDURE — 99214 OFFICE O/P EST MOD 30 MIN: CPT | Performed by: PHYSICIAN ASSISTANT

## 2024-07-12 PROCEDURE — 3074F SYST BP LT 130 MM HG: CPT | Performed by: PHYSICIAN ASSISTANT

## 2024-07-12 PROCEDURE — 3078F DIAST BP <80 MM HG: CPT | Performed by: PHYSICIAN ASSISTANT

## 2024-07-12 ASSESSMENT — PATIENT HEALTH QUESTIONNAIRE - PHQ9
SUM OF ALL RESPONSES TO PHQ QUESTIONS 1-9: 14
SUM OF ALL RESPONSES TO PHQ QUESTIONS 1-9: 14
2. FEELING DOWN, DEPRESSED OR HOPELESS: SEVERAL DAYS
4. FEELING TIRED OR HAVING LITTLE ENERGY: NOT AT ALL
6. FEELING BAD ABOUT YOURSELF - OR THAT YOU ARE A FAILURE OR HAVE LET YOURSELF OR YOUR FAMILY DOWN: NEARLY EVERY DAY
8. MOVING OR SPEAKING SO SLOWLY THAT OTHER PEOPLE COULD HAVE NOTICED. OR THE OPPOSITE, BEING SO FIGETY OR RESTLESS THAT YOU HAVE BEEN MOVING AROUND A LOT MORE THAN USUAL: NOT AT ALL
9. THOUGHTS THAT YOU WOULD BE BETTER OFF DEAD, OR OF HURTING YOURSELF: NOT AT ALL
3. TROUBLE FALLING OR STAYING ASLEEP: NEARLY EVERY DAY
SUM OF ALL RESPONSES TO PHQ QUESTIONS 1-9: 14
SUM OF ALL RESPONSES TO PHQ QUESTIONS 1-9: 14
5. POOR APPETITE OR OVEREATING: NEARLY EVERY DAY
7. TROUBLE CONCENTRATING ON THINGS, SUCH AS READING THE NEWSPAPER OR WATCHING TELEVISION: SEVERAL DAYS
10. IF YOU CHECKED OFF ANY PROBLEMS, HOW DIFFICULT HAVE THESE PROBLEMS MADE IT FOR YOU TO DO YOUR WORK, TAKE CARE OF THINGS AT HOME, OR GET ALONG WITH OTHER PEOPLE: NOT DIFFICULT AT ALL
SUM OF ALL RESPONSES TO PHQ9 QUESTIONS 1 & 2: 4
1. LITTLE INTEREST OR PLEASURE IN DOING THINGS: NEARLY EVERY DAY

## 2024-07-12 ASSESSMENT — ENCOUNTER SYMPTOMS: RESPIRATORY NEGATIVE: 1

## 2024-07-12 NOTE — PROGRESS NOTES
Subjective   Patient ID: Karishma Lee is a 63 y.o. female.    HPI  Patient has continued with rib pain since fall in April with rib fractures. Worse at night wth rolling over. She has started PT but only had one visit so far.     She is having a lot of GI side effects with the ozempic and would like to go back on an oral medication. Last A1c 7.0 in May.     Review of Systems   Constitutional: Negative.    Respiratory: Negative.     Cardiovascular: Negative.    Musculoskeletal:         Left rib pain          Objective   Physical Exam  Constitutional:       Appearance: Normal appearance.   Cardiovascular:      Rate and Rhythm: Normal rate and regular rhythm.      Heart sounds: Normal heart sounds.   Pulmonary:      Effort: Pulmonary effort is normal.      Breath sounds: Normal breath sounds.   Neurological:      General: No focal deficit present.      Mental Status: She is alert and oriented to person, place, and time.            Assessment /Plan:     Diagnosis Orders   1. Type 2 diabetes mellitus with hyperlipidemia (HCC)  Will stop the ozempic and start jardiance 25mg.       2. Rib pain  Tylenol as needed, continue PT              PAULA Bryant

## 2024-07-31 LAB — ECHO BSA: 2.1 M2

## 2024-07-31 PROCEDURE — 93228 REMOTE 30 DAY ECG REV/REPORT: CPT | Performed by: INTERNAL MEDICINE

## 2024-08-01 ENCOUNTER — HOSPITAL ENCOUNTER (OUTPATIENT)
Dept: PHYSICAL THERAPY | Age: 64
Setting detail: THERAPIES SERIES
Discharge: HOME | End: 2024-08-01
Payer: COMMERCIAL

## 2024-08-01 ENCOUNTER — TELEPHONE (OUTPATIENT)
Dept: SLEEP MEDICINE | Age: 64
End: 2024-08-01

## 2024-08-01 ENCOUNTER — OFFICE VISIT (OUTPATIENT)
Dept: SLEEP MEDICINE | Age: 64
End: 2024-08-01
Payer: COMMERCIAL

## 2024-08-01 ENCOUNTER — TELEPHONE (OUTPATIENT)
Dept: CARDIOLOGY CLINIC | Age: 64
End: 2024-08-01

## 2024-08-01 VITALS
DIASTOLIC BLOOD PRESSURE: 70 MMHG | HEIGHT: 66 IN | OXYGEN SATURATION: 96 % | WEIGHT: 211 LBS | BODY MASS INDEX: 33.91 KG/M2 | HEART RATE: 108 BPM | SYSTOLIC BLOOD PRESSURE: 118 MMHG

## 2024-08-01 DIAGNOSIS — G47.33 MILD OBSTRUCTIVE SLEEP APNEA: Primary | ICD-10-CM

## 2024-08-01 DIAGNOSIS — G25.81 RLS (RESTLESS LEGS SYNDROME): ICD-10-CM

## 2024-08-01 DIAGNOSIS — E66.9 OBESITY (BMI 30-39.9): ICD-10-CM

## 2024-08-01 DIAGNOSIS — I10 ESSENTIAL HYPERTENSION: ICD-10-CM

## 2024-08-01 DIAGNOSIS — Z71.89 CPAP USE COUNSELING: ICD-10-CM

## 2024-08-01 PROCEDURE — 3074F SYST BP LT 130 MM HG: CPT | Performed by: NURSE PRACTITIONER

## 2024-08-01 PROCEDURE — 95992 CANALITH REPOSITIONING PROC: CPT

## 2024-08-01 PROCEDURE — 3078F DIAST BP <80 MM HG: CPT | Performed by: NURSE PRACTITIONER

## 2024-08-01 PROCEDURE — 97112 NEUROMUSCULAR REEDUCATION: CPT

## 2024-08-01 PROCEDURE — 99214 OFFICE O/P EST MOD 30 MIN: CPT | Performed by: NURSE PRACTITIONER

## 2024-08-01 RX ORDER — ROPINIROLE 0.5 MG/1
0.5 TABLET, FILM COATED ORAL NIGHTLY
Qty: 30 TABLET | Refills: 5 | Status: SHIPPED | OUTPATIENT
Start: 2024-08-01

## 2024-08-01 RX ORDER — BUPROPION HYDROCHLORIDE 100 MG/1
100 TABLET ORAL DAILY
COMMUNITY
Start: 2024-07-16

## 2024-08-01 ASSESSMENT — SLEEP AND FATIGUE QUESTIONNAIRES
HOW LIKELY ARE YOU TO NOD OFF OR FALL ASLEEP WHILE SITTING AND TALKING TO SOMEONE: WOULD NEVER DOZE
HOW LIKELY ARE YOU TO NOD OFF OR FALL ASLEEP WHILE LYING DOWN TO REST IN THE AFTERNOON WHEN CIRCUMSTANCES PERMIT: WOULD NEVER DOZE
HOW LIKELY ARE YOU TO NOD OFF OR FALL ASLEEP WHILE SITTING AND READING: SLIGHT CHANCE OF DOZING
ESS TOTAL SCORE: 2
HOW LIKELY ARE YOU TO NOD OFF OR FALL ASLEEP WHILE WATCHING TV: SLIGHT CHANCE OF DOZING
HOW LIKELY ARE YOU TO NOD OFF OR FALL ASLEEP WHILE SITTING QUIETLY AFTER LUNCH WITHOUT ALCOHOL: WOULD NEVER DOZE
HOW LIKELY ARE YOU TO NOD OFF OR FALL ASLEEP IN A CAR, WHILE STOPPED FOR A FEW MINUTES IN TRAFFIC: WOULD NEVER DOZE
HOW LIKELY ARE YOU TO NOD OFF OR FALL ASLEEP WHEN YOU ARE A PASSENGER IN A CAR FOR AN HOUR WITHOUT A BREAK: WOULD NEVER DOZE
HOW LIKELY ARE YOU TO NOD OFF OR FALL ASLEEP WHILE SITTING INACTIVE IN A PUBLIC PLACE: WOULD NEVER DOZE

## 2024-08-01 NOTE — FLOWSHEET NOTE
and progression per patient tolerance.   [] Progressing: [] Met: [] Not Met: [] Adjusted  2. Pt will improve TUG /10m walk to <15 seconds improve functional ambulation.   [] Progressing: [] Met: [] Not Met: [] Adjusted  3. Pt to perform transfers via stepping/ambulating technique with use of no AD Independent  [] Progressing: [] Met: [] Not Met: [] Adjusted  4. Pt will improve Goldman to 40/56 to demonstrate reduced fall risk.   [] Progressing: [] Met: [] Not Met: [] Adjusted    Long Term Goals: To be achieved in: 8 weeks  1. Pt will improve LEFS score to 35/80 or better  [] Progressing: [] Met: [] Not Met: [] Adjusted  2. Pt to negotiate up/down 1 curb step with No Device Supervision   [] Progressing: [] Met: [] Not Met: [] Adjusted  3. Pt to ambulate functional community distances of 150-250 ft or more with use of no AD Independent  [] Progressing: [] Met: [] Not Met: [] Adjusted  4. Pt will improve Goldman to 45/56 or better to demonstrate reduced fall risk.   [] Progressing: [] Met: [] Not Met: [] Adjusted  5. Pt independent with HEP.  [] Progressing: [] Met: [] Not Met: [] Adjusted  6.  Patient will perform all bed mobility without symptoms    [] Progressing: [] Met: [] Not Met: [] Adjusted    Overall Progression Towards Functional goals/ Treatment Progress Update:  [] Patient is progressing as expected towards functional goals listed.    [] Progression is slowed due to complexities/Impairments listed.  [] Progression has been slowed due to co-morbidities.  [x] Plan just implemented, too soon (<30days) to assess goals progression   [] Goals require adjustment due to lack of progress  [] Patient is not progressing as expected and requires additional follow up with physician  [] Other:     TREATMENT PLAN     Frequency/Duration: 2x/week for 8 weeks for the following treatment interventions:    Interventions:  Therapeutic Exercise (59659) including: strength training, ROM, and functional mobility  Therapeutic Activities

## 2024-08-01 NOTE — TELEPHONE ENCOUNTER
Pt sts she received call from office. Found message under Cardiology, but unable to access, and not under test results.     Please notify patient that their monitor confirms rapid heart rate and rhythms  Cont current tx plan  Will discuss in detail at OV    Results given. Pt PAPITO.

## 2024-08-01 NOTE — PROGRESS NOTES
Patient ID: Karishma Lee is a 63 y.o. female who is being seen today for   Chief Complaint   Patient presents with    Follow-up     Meds/ANGEL     Referring: Dr. Lambert Arriaga    HPI:     Karishma Lee is a 63 y.o. female in office with  for ANGEL/RLS follow up.  States she fell down the stairs in April and was in ICU, hospital, rehab.  States was in hospital for about 23 days.  States she is having mask fitting issues.  Had not been using CPAP as manage has increased use of her past 2 weeks.  Patient is using CPAP 2-3 hrs/night.  States she forgets to put CPAP back on after bathroom.  Using humidifier. No snoring on CPAP. The pressure feels too low. The mask is comfortable-full face mask.  +mask leak. States needs new supplies. No significant daytime sleepiness. No nodding off when driving. No dry nose or throat. No fatigue. Bedtime is 9-930 pm and rise time is 9 am. Sleep onset is 30-45 minutes. Wakes up 4-5 times at night total. 4-5 nocturia. It takes few minutes to fall back a sleep. No naps during the day. No headache in am. No weight gain. 1-2 caffienated beverages during the day. No alcohol. ESS is 2    States she got a new psychiatrist and has some medication changes.      She continues Requip 0.5 mg.  States she is doing well with requip denies negative side effects of medication.          Previous HPI 2/1/24  Karishma Lee is a 63 y.o. female in office for ANGEL/RLS follow up. States she is doing okay with CPAP   Patient is using CPAP   4-5 hrs/night. Using humidifier. No snoring on CPAP. The pressure is well tolerated. The mask is comfortable-full face. No mask leak. No significant daytime sleepiness. No nodding off when driving. No dry nose or throat. Minimal fatigue. Bedtime is 10 pm and rise time is 9-10 am. Sleep onset is 60 minutes. States switched from trazodone to Remeron per psychiatry.   Wakes up 3 times at night total. 3 nocturia. It takes few minutes to fall back a sleep. No naps

## 2024-08-06 ENCOUNTER — APPOINTMENT (OUTPATIENT)
Dept: PHYSICAL THERAPY | Age: 64
End: 2024-08-06
Payer: COMMERCIAL

## 2024-08-07 RX ORDER — ROPINIROLE 0.5 MG/1
0.5 TABLET, FILM COATED ORAL NIGHTLY
Qty: 90 TABLET | OUTPATIENT
Start: 2024-08-07

## 2024-08-08 ENCOUNTER — HOSPITAL ENCOUNTER (OUTPATIENT)
Dept: PHYSICAL THERAPY | Age: 64
Setting detail: THERAPIES SERIES
Discharge: HOME | End: 2024-08-08
Payer: COMMERCIAL

## 2024-08-08 PROCEDURE — 95992 CANALITH REPOSITIONING PROC: CPT

## 2024-08-08 PROCEDURE — 97112 NEUROMUSCULAR REEDUCATION: CPT

## 2024-08-08 NOTE — FLOWSHEET NOTE
Lois Perez, PT  Date: 08/08/2024      Note: Portions of this note have been templated and/or copied from initial evaluation, reassessments and prior notes for documentation efficiency.      Note: If patient does not return for scheduled/recommended follow up visits, this note will serve as a discharge from care along with the most recent update on progress.    Neuro Evaluation

## 2024-08-13 ENCOUNTER — HOSPITAL ENCOUNTER (OUTPATIENT)
Dept: PHYSICAL THERAPY | Age: 64
Setting detail: THERAPIES SERIES
Discharge: HOME | End: 2024-08-13
Payer: COMMERCIAL

## 2024-08-13 PROCEDURE — 95992 CANALITH REPOSITIONING PROC: CPT

## 2024-08-13 PROCEDURE — 97112 NEUROMUSCULAR REEDUCATION: CPT

## 2024-08-13 NOTE — FLOWSHEET NOTE
Test none  none    Left Sidelying Test none   yes 5 second delay, 10 seconds of symptoms   Right Roll Test NT        Left Roll Test Nt                     ** Interestingly, pt lies down into left side of bed. Expected R to be negative and it was positive. Will assess L once R is negative. **     Exercises/Interventions     Therapeutic Ex (87352)  Resistance Sets/time Reps Notes/Cues/Progressions                                      NMR re-education (36879)              Walking over cones NV       Airex NV       Walking with head turns NV                            Therapeutic Activity (00917)                            Canalith Repositioning Untimed   CRP via Semont maneuver x2 rounds, one for each side. Completed R side first and was largely negative. Completed L side second, and had mild onset of dizziness in each position that lasted <10 seconds.              Modalities:    No modalities applied this session    Education/Home Exercise Program: Patient HEP program created electronically.  Refer to [employer] access code: [email or web address]        ASSESSMENT     Today's Assessment:  Pt was positive for L PSC canalithiasis via symptoms only as no nystagmus present. R PSC canalithiasis from previous visit was resolved. Treated with two rounds of Semont maneuver this date, one to each side, with good tolerance from patient.       Medical Necessity Documentation:  I certify that this patient meets the below criteria necessary for medical necessity for care and/or justification of therapy services:  The patient has functional impairments and/or activity limitations and would benefit from continued outpatient therapy services to address the deficits outlined in the patients goals  The patient has a complexity identified by an ICD-10 code that has a direct and significant impact on the need for therapy.  (Significantly impacts the rate of recovery and is associated with a primary condition.)     Prognosis/Rehab Potential:

## 2024-08-15 ENCOUNTER — APPOINTMENT (OUTPATIENT)
Dept: PHYSICAL THERAPY | Age: 64
End: 2024-08-15
Payer: COMMERCIAL

## 2024-08-20 ENCOUNTER — HOSPITAL ENCOUNTER (OUTPATIENT)
Dept: PHYSICAL THERAPY | Age: 64
Setting detail: THERAPIES SERIES
Discharge: HOME | End: 2024-08-20
Payer: COMMERCIAL

## 2024-08-20 PROCEDURE — 97112 NEUROMUSCULAR REEDUCATION: CPT

## 2024-08-20 NOTE — FLOWSHEET NOTE
[facility]- Outpatient Rehabilitation and Therapy  [address] [place] [address] office: [phone] fax: [phone]       Physical Therapy: TREATMENT/PROGRESS NOTE   Patient: Karishma Lee (63 y.o. female)   Examination Date: 2024   :  1960 MRN: [id number]   Visit #: 5   Insurance Allowable Auth Needed   20v - hard max [x]Yes - Loan Bal    []No    Insurance: Payor: LOAN / Plan: LOAN BAL PAULA / Product Type: *No Product type* /   Insurance ID: [id number] - (Commercial)  Secondary Insurance (if applicable):    Treatment Diagnosis:     ICD-10-CM    1. Imbalance  R26.89          Medical Diagnosis:  CVA (cerebral vascular accident) (HCC) [I63.9]   Referring Physician: April Garduno MD  PCP: Trina Nixon PA       Plan of care signed (Y/N): Y    Date of Patient follow up with Physician: unknown     Progress Report/POC: NO  POC update due: (12 visits /OR AUTH LIMITS, whichever is less) 12 visits                                             Precautions/ Contra-indications:           Latex allergy:  NO  Pacemaker:    NO  Contraindications for Manipulation: NA  Date of Surgery: NA  Other:    Red Flags:  None    C-SSRS Triggered by Intake questionnaire:   Yes, C-SSRS screen completed and patient determined to be LOW RISK     Preferred Language for Healthcare:   [x] English       [] other:    SUBJECTIVE EXAMINATION     Patient stated complaint/comment: Pt reports that her dizziness is eliminated. Still feels unsteady and would like to work on her balance to prevent falls.        Test used Initial score  2024   Pain Summary VAS 4-5/10    Functional questionnaire LEFS 25/80 = 69%    Other:                OBJECTIVE EXAMINATION     See Eval 24 for additional details           Positional Testing     Nystagmus Direction Duration Vertigo Duration   Right Loaded Dylon Hallpike none  none     Left Loaded [place] Hallpike none  none     Right

## 2024-08-22 ENCOUNTER — HOSPITAL ENCOUNTER (OUTPATIENT)
Dept: PHYSICAL THERAPY | Age: 64
Setting detail: THERAPIES SERIES
Discharge: HOME | End: 2024-08-22
Payer: COMMERCIAL

## 2024-08-22 PROCEDURE — 97112 NEUROMUSCULAR REEDUCATION: CPT

## 2024-08-22 NOTE — FLOWSHEET NOTE
mobility retraining as she is still functioning well below baseline. She has improved her TUG, Goldman, and LEFS by significant amounts, but also still demonstrating deficit and skilled need.       Medical Necessity Documentation:  I certify that this patient meets the below criteria necessary for medical necessity for care and/or justification of therapy services:  The patient has functional impairments and/or activity limitations and would benefit from continued outpatient therapy services to address the deficits outlined in the patients goals  The patient has a complexity identified by an ICD-10 code that has a direct and significant impact on the need for therapy.  (Significantly impacts the rate of recovery and is associated with a primary condition.)     Prognosis/Rehab Potential: Good    Patient requires continued skilled intervention: [x] Yes  [] No      CHARGE CAPTURE     PT CHARGE GRID   CPT Code (TIMED) minutes # CPT Code (UNTIMED) #     Therex (41214)     EVAL:MODERATE (14715 - Typically 30 minutes face-to-face)     Neuromusc. Re-ed (00282) 30 2  Re-Eval (15540)     Manual (92716)    Estim Unattended (56730)     Ther. Act (17243)    Mech. Traction (10748)     Gait (83046)    Dry Needle 1-2 muscle (60849)     Aquatic Therex (16978)    Dry Needle 3+ muscle (20561)     Iontophoresis (54422)    VASO (12795)     Ultrasound (45088)    Group Therapy (50124)     Estim Attended (77147)    Canalith Repositioning (47547)     Other:    Other:    Total Timed Code Tx Minutes 30 2       Total Treatment Minutes 30        Charge Justification:  (90469) NEUROMUSCULAR RE-EDUCATION - Therapeutic procedure, 1 or more areas, each 15 minutes; neuromuscular reeducation of movement, balance, coordination, kinesthetic sense, posture, and/or proprioception for sitting and/or standing activities  (14793) CANALITH REPOSITIONING TECHNIQUE - Provided a non-invasive treatment that involved moving the patients head and/or body into

## 2024-08-26 ENCOUNTER — OFFICE VISIT (OUTPATIENT)
Dept: CARDIOLOGY CLINIC | Age: 64
End: 2024-08-26
Payer: COMMERCIAL

## 2024-08-26 VITALS
SYSTOLIC BLOOD PRESSURE: 116 MMHG | WEIGHT: 209 LBS | HEART RATE: 89 BPM | DIASTOLIC BLOOD PRESSURE: 78 MMHG | HEIGHT: 66 IN | OXYGEN SATURATION: 98 % | BODY MASS INDEX: 33.59 KG/M2

## 2024-08-26 DIAGNOSIS — I25.10 CORONARY ARTERY DISEASE INVOLVING NATIVE CORONARY ARTERY OF NATIVE HEART WITHOUT ANGINA PECTORIS: Primary | ICD-10-CM

## 2024-08-26 DIAGNOSIS — E78.00 PURE HYPERCHOLESTEROLEMIA: ICD-10-CM

## 2024-08-26 DIAGNOSIS — I10 ESSENTIAL HYPERTENSION: ICD-10-CM

## 2024-08-26 PROCEDURE — 3078F DIAST BP <80 MM HG: CPT | Performed by: INTERNAL MEDICINE

## 2024-08-26 PROCEDURE — 99214 OFFICE O/P EST MOD 30 MIN: CPT | Performed by: INTERNAL MEDICINE

## 2024-08-26 PROCEDURE — 3074F SYST BP LT 130 MM HG: CPT | Performed by: INTERNAL MEDICINE

## 2024-08-26 NOTE — PATIENT INSTRUCTIONS
Plan:  ~Labs- fasting lipids and CMP when you see Trina Nixon PA     Cardiac medications reviewed including indications and pertinent side effects. Medication list updated at this visit.   Patient verbalizes understanding of the need for treatment and education has been provided at today's visit. Additional education material will be provided in after visit summary.    Check blood pressure and heart rate at home a few times per week- keep a log with dates and times and bring to office visit   Regular exercise and following a healthy diet encouraged   Follow up with me in 1 year

## 2024-08-26 NOTE — PROGRESS NOTES
[facility]   Cardiac Follow up     Referring Provider:  Trina Nixon PA     Chief Complaint   Patient presents with    Follow-up    Hypertension    Coronary Artery Disease      Karishma Lee   1960    History of Present Illness:    Karishma Lee is a 63 y.o. female who is here today for follow up for a past medical history of coronary artery disease, hypertension, hyperlipidemia, sleep apnea- on CPAP therapy, and diabetes mellitus. Previous testing includes a stress test from 2013 which showed no large areas of reversibility to suggest ischemia. CT chest 10/25/2021 showed Cardiomegaly. An echocardiogram from 11/24/221 showed an EF of 55%. Stress test 12/17/2021 showed a small inferoapical/anteroapical defect suggestive of ischemia. She wore a cardiac event monitor showed rare PVC's/PAC's. She underwent a cardiac CTA which showed a CT calcium score of 22, Right coronary artery is a tiny vessel, which may simply be due to the left coronary artery dominance rather than chronic narrowing of the right coronary artery. Mild thickening of the left ventricular apex.   She was admitted to  4/2024 after a fall which resulted in Subdural hematoma. Second interval head CT stable. Recommended no AC or antiplatelet agents for 14 days. Patient also had L 1, 3-11 rib fractures and Pneumomediastinum. Patient admitted again to the hospital 5/2024 with a urinary tract infection and was treated with IV cefepim .    Cardiac event monitor worn from 6/24-7/22/2024 showed an average heart rate of 88(), possible IST. Today she states she has been feeling well since her last visit. Palpitations have decreased. She monitors her blood pressure nightly prior to going to bed and it is running- /70's. No dizziness and feeling light headed. She is tolerating her medications and is taking them as prescribed. Patient currently denies any weight gain, edema, palpitations, chest pain, shortness of breath, dizziness,  Murmur  Peripheral pulses are symmetrical and full  There is no clubbing, cyanosis of the extremities.  No edema  Femoral Arteries: 2+ and equal  Pedal Pulses: 2+ and equal   Abdomen:  No masses or tenderness  Liver/Spleen: No Abnormalities Noted  Neurological/Psychiatric:  Alert and oriented in all spheres  Moves all extremities well  Exhibits normal gait balance and coordination  No abnormalities of mood, affect, memory, mentation, or behavior are noted      Stress test 7/11/2013  Impression: 1. No large areas of reversibility to suggest ischemia. 2. Left ventricular ejection fraction is approximately 56%.     EKG 10/25/2021  Baseline artifact  Normal sinus rhythm    CT chest 10/25/2021  Mediastinum: The thoracic aorta is normal in course and caliber.  Mild cardiomegaly with no pericardial effusion.  There is no pathologic hilar or mediastinal adenopathy.  Heterogeneity of the thyroid gland is noted with no discrete nodule.  The visualized esophagus is unremarkable.     EKG today   Sinus tachycardia 132     Echocardiogram 11/24/221   Summary   Normal left ventricle systolic function with an estimated ejection fraction   of 55%.   No regional wall motion abnormalities are seen.   Normal left ventricular diastolic filling pressures.   Mild mitral regurgitation.   Inadequate tricuspid regurgitation to estimate systolic pulmonary artery   pressure.   IVC is normal in size (< 2.1 cm) and collapses > 50% with respiration   consistent with normal right atrial pressure (3 mmHg).   Lipomatous hypertrophy of the interatrial septum.    Stress test 12/17/2021  Conclusions    Summary  Normal LVEF >60%  Normal wall motion  Small inferoapical/anteroapical defect suggestive of ischemia    Overall, this would be considered an abnormal, low to intermediate risk,  study         Cardiac event monitor 11/15-11/29/2021  Rare NSR with PAC's and PVC's       Cardiac CTA 12/30/2021  Impression Mildly limited by phase misregistration

## 2024-08-27 ENCOUNTER — HOSPITAL ENCOUNTER (OUTPATIENT)
Dept: PHYSICAL THERAPY | Age: 64
Setting detail: THERAPIES SERIES
Discharge: HOME | End: 2024-08-27
Payer: COMMERCIAL

## 2024-08-27 PROCEDURE — 97112 NEUROMUSCULAR REEDUCATION: CPT

## 2024-08-27 NOTE — FLOWSHEET NOTE
[facility]- Outpatient Rehabilitation and Therapy  [address] [place] [address] office: [phone] fax: [phone]       Physical Therapy: TREATMENT/PROGRESS NOTE   Patient: Karishma Lee (63 y.o. female)   Examination Date: 2024   :  1960 MRN: [id number]   Visit #: 7   Insurance Allowable Auth Needed   20v - hard max [x]Yes - Loan Bal    []No    Insurance: Payor: LOAN / Plan: LOAN BAL PAULA / Product Type: *No Product type* /   Insurance ID: [id number] - (Commercial)  Secondary Insurance (if applicable):    Treatment Diagnosis:     ICD-10-CM    1. Imbalance  R26.89          Medical Diagnosis:  CVA (cerebral vascular accident) (HCC) [I63.9]   Referring Physician: April Garduno MD  PCP: Trina Nixon PA       Plan of care signed (Y/N): Y    Date of Patient follow up with Physician: unknown     Progress Report/POC: NO  POC update due: (12 visits /OR AUTH LIMITS, whichever is less) 10v through 24                                            Precautions/ Contra-indications:           Latex allergy:  NO  Pacemaker:    NO  Contraindications for Manipulation: NA  Date of Surgery: NA  Other:    Red Flags:  None    C-SSRS Triggered by Intake questionnaire:   Yes, C-SSRS screen completed and patient determined to be LOW RISK     Preferred Language for Healthcare:   [x] English       [] other:    SUBJECTIVE EXAMINATION     Patient stated complaint/comment: Pt continues to report that her dizziness is eliminated with getting into/out of bed. Still feeling unsteady on her feet. Would like to be able to visit her daughter who has stairs without a handrail.        Test used Initial score  2024   Pain Summary VAS 4-5/10 denies   Functional questionnaire LEFS 25/80 = 69%    Other:                OBJECTIVE EXAMINATION     From progress note on 24:      Score Comments   Timed Up and Go (TUG)   15.2 seconds  (22.8 seconds at eval)  20-29

## 2024-08-29 ENCOUNTER — HOSPITAL ENCOUNTER (OUTPATIENT)
Dept: PHYSICAL THERAPY | Age: 64
Setting detail: THERAPIES SERIES
Discharge: HOME | End: 2024-08-29
Payer: COMMERCIAL

## 2024-08-29 PROCEDURE — 97110 THERAPEUTIC EXERCISES: CPT

## 2024-08-29 PROCEDURE — 97112 NEUROMUSCULAR REEDUCATION: CPT

## 2024-08-29 NOTE — FLOWSHEET NOTE
same at eval       Time to complete 5 reps 20 seconds - same at eval     Goldman Balance Scale   Goldman Balance Score: 44 (34 at eval)    21-40 = moderate risk for falls      Modified CTSIB   6/27/24 8/22/24          Standing on level surface, NBOS, EO 30 seconds 30   Standing on level surface, NBOS, EC 20 seconds 30   Standing on Airex, EO 20 seconds 30   Standing on Airex, EC 20 seconds 15              Positional Testing     Nystagmus Direction Duration Vertigo Duration   Right Loaded Dylon Hallpike none  none     Left Loaded [place] Hallpike none  none     Right Sidelying Test NT      Left Sidelying Test NT      Right Roll Test NT        Left Roll Test NT                        Exercises/Interventions     Therapeutic Ex (41781)  Resistance Sets/time Reps Notes/Cues/Progressions   Side-step with green loop  2 laps     Standing hip ABD Green loop 1 10x B    Stairs with part practice reciprocal up/down (retro)  1 10x B Early fatigue; improving with repetition; imbalance noted                 NMR re-education (04807)              Romberg stance EC    HEP   Wide stance EC head turns/nods    HEP   Staggered stance rocking    HEP; mod difficulty    Heel raises/toe raises    HEP; B UE assist for balance   rockerboard  5 min  Rocking only, both directions; max difficulty and apprehension   Cone taps to 2 cones  3 min  Mod difficulty   Walking over cones: fwd and lateral  X3 laps each direction     Airex;   - EO/EC   - EC head turns   - EC head nods   - EO ball circles    - step back and on    2x30\"  10x B  10x B  10x B  10x B    CGA for safety with all                        Therapeutic Activity (26222)                            Canalith Repositioning Untimed   Not necessary this date              Modalities:    No modalities applied this session    Education/Home Exercise Program: Patient HEP program created electronically.  Refer to [employer] access code: [email or web address]        ASSESSMENT     Today's Assessment:  Pt continues to  treatment interventions:    Interventions:  Therapeutic Exercise (23589) including: strength training, ROM, and functional mobility  Therapeutic Activities (50009) including: functional mobility training and education.  Neuromuscular Re-education (26057) activation and proprioception, including postural re-education.    Gait Training (67667) for normalization of ambulation patterns and AD training.   Patient education on vestibular fuction/BPPV and balance domains  CRP for assessment, treatment and education of BPPV    Plan:  Progress balance as pt tolerates.     Electronically Signed by Lois Perez PT  Date: 08/29/2024      Note: Portions of this note have been templated and/or copied from initial evaluation, reassessments and prior notes for documentation efficiency.      Note: If patient does not return for scheduled/recommended follow up visits, this note will serve as a discharge from care along with the most recent update on progress.    Neuro Evaluation

## 2024-09-03 ENCOUNTER — HOSPITAL ENCOUNTER (OUTPATIENT)
Dept: PHYSICAL THERAPY | Age: 64
Setting detail: THERAPIES SERIES
Discharge: HOME | End: 2024-09-03
Payer: COMMERCIAL

## 2024-09-03 ENCOUNTER — OFFICE VISIT (OUTPATIENT)
Dept: FAMILY MEDICINE CLINIC | Age: 64
End: 2024-09-03
Payer: COMMERCIAL

## 2024-09-03 VITALS
DIASTOLIC BLOOD PRESSURE: 72 MMHG | BODY MASS INDEX: 33.56 KG/M2 | HEART RATE: 79 BPM | SYSTOLIC BLOOD PRESSURE: 112 MMHG | RESPIRATION RATE: 16 BRPM | WEIGHT: 208.8 LBS | OXYGEN SATURATION: 96 % | HEIGHT: 66 IN

## 2024-09-03 DIAGNOSIS — E78.5 TYPE 2 DIABETES MELLITUS WITH HYPERLIPIDEMIA (HCC): Primary | ICD-10-CM

## 2024-09-03 DIAGNOSIS — E11.69 TYPE 2 DIABETES MELLITUS WITH HYPERLIPIDEMIA (HCC): Primary | ICD-10-CM

## 2024-09-03 LAB — HBA1C MFR BLD: 11.4 %

## 2024-09-03 PROCEDURE — 99213 OFFICE O/P EST LOW 20 MIN: CPT | Performed by: PHYSICIAN ASSISTANT

## 2024-09-03 PROCEDURE — 83036 HEMOGLOBIN GLYCOSYLATED A1C: CPT | Performed by: PHYSICIAN ASSISTANT

## 2024-09-03 PROCEDURE — 3046F HEMOGLOBIN A1C LEVEL >9.0%: CPT | Performed by: PHYSICIAN ASSISTANT

## 2024-09-03 PROCEDURE — 97112 NEUROMUSCULAR REEDUCATION: CPT

## 2024-09-03 PROCEDURE — 3074F SYST BP LT 130 MM HG: CPT | Performed by: PHYSICIAN ASSISTANT

## 2024-09-03 PROCEDURE — 97110 THERAPEUTIC EXERCISES: CPT

## 2024-09-03 PROCEDURE — 3078F DIAST BP <80 MM HG: CPT | Performed by: PHYSICIAN ASSISTANT

## 2024-09-03 PROCEDURE — G2211 COMPLEX E/M VISIT ADD ON: HCPCS | Performed by: PHYSICIAN ASSISTANT

## 2024-09-03 RX ORDER — GLYBURIDE 2.5 MG/1
2.5 TABLET ORAL
Qty: 30 TABLET | Refills: 3 | Status: SHIPPED | OUTPATIENT
Start: 2024-09-03

## 2024-09-03 ASSESSMENT — PATIENT HEALTH QUESTIONNAIRE - PHQ9
9. THOUGHTS THAT YOU WOULD BE BETTER OFF DEAD, OR OF HURTING YOURSELF: NOT AT ALL
SUM OF ALL RESPONSES TO PHQ QUESTIONS 1-9: 9
5. POOR APPETITE OR OVEREATING: NEARLY EVERY DAY
6. FEELING BAD ABOUT YOURSELF - OR THAT YOU ARE A FAILURE OR HAVE LET YOURSELF OR YOUR FAMILY DOWN: SEVERAL DAYS
8. MOVING OR SPEAKING SO SLOWLY THAT OTHER PEOPLE COULD HAVE NOTICED. OR THE OPPOSITE, BEING SO FIGETY OR RESTLESS THAT YOU HAVE BEEN MOVING AROUND A LOT MORE THAN USUAL: NOT AT ALL
7. TROUBLE CONCENTRATING ON THINGS, SUCH AS READING THE NEWSPAPER OR WATCHING TELEVISION: NOT AT ALL
SUM OF ALL RESPONSES TO PHQ9 QUESTIONS 1 & 2: 2
10. IF YOU CHECKED OFF ANY PROBLEMS, HOW DIFFICULT HAVE THESE PROBLEMS MADE IT FOR YOU TO DO YOUR WORK, TAKE CARE OF THINGS AT HOME, OR GET ALONG WITH OTHER PEOPLE: NOT DIFFICULT AT ALL
4. FEELING TIRED OR HAVING LITTLE ENERGY: NOT AT ALL
SUM OF ALL RESPONSES TO PHQ QUESTIONS 1-9: 9
1. LITTLE INTEREST OR PLEASURE IN DOING THINGS: SEVERAL DAYS
3. TROUBLE FALLING OR STAYING ASLEEP: NEARLY EVERY DAY
SUM OF ALL RESPONSES TO PHQ QUESTIONS 1-9: 9
2. FEELING DOWN, DEPRESSED OR HOPELESS: SEVERAL DAYS
SUM OF ALL RESPONSES TO PHQ QUESTIONS 1-9: 9

## 2024-09-03 NOTE — FLOWSHEET NOTE
seconds  (22.8 seconds at eval)  20-29  = variable mobility Device used: no AD   Sit to Stand 30 second test 7 - same at eval       Time to complete 5 reps 20 seconds - same at eval     Goldman Balance Scale   Goldman Balance Score: 44 (34 at eval)    21-40 = moderate risk for falls      Modified CTSIB   6/27/24 8/22/24          Standing on level surface, NBOS, EO 30 seconds 30   Standing on level surface, NBOS, EC 20 seconds 30   Standing on Airex, EO 20 seconds 30   Standing on Airex, EC 20 seconds 15              Positional Testing     Nystagmus Direction Duration Vertigo Duration   Right Loaded Dylon Hallpike none  none     Left Loaded [place] Hallpike none  none     Right Sidelying Test NT      Left Sidelying Test NT      Right Roll Test NT        Left Roll Test NT                        Exercises/Interventions     Therapeutic Ex (71323)  Resistance Sets/time Reps Notes/Cues/Progressions   Side-step with green loop  2 laps     Standing hip ABD Green loop 1 10x B    Stairs with part practice reciprocal up/down (retro)  1 10x B Early fatigue; improving with repetition; imbalance noted                 NMR re-education (73069)              Romberg stance EC    HEP   Wide stance EC head turns/nods    HEP   Staggered stance rocking    HEP; mod difficulty    Heel raises/toe raises    HEP; B UE assist for balance   rockerboard  5 min  Rocking only, both directions; max difficulty and apprehension   Cone taps to 2 balls  - with alphabet rolls  3 min  Max difficulty with alphabet rolls   Walking over cones: fwd and lateral  X3 laps each direction     Airex;   - EO/EC   - EC head turns   - EC head nods   - EO ball circles    - step back and on    2x30\"  10x B  10x B  10x B  10x B    CGA for safety with all                        Therapeutic Activity (53667)                            Canalith Repositioning Untimed   Not necessary this date              Modalities:    No modalities applied this session    Education/Home Exercise

## 2024-09-03 NOTE — PROGRESS NOTES
SUBJECTIVE:  63 y.o. female for follow up of diabetes. Diabetic Review of Systems - medication compliance: compliant all of the time, diabetic diet compliance: noncompliant some of the time, home glucose monitoring: fasting values range 200-300  Has noted increased appetite and urinary frequency.     Current Outpatient Medications   Medication Sig Dispense Refill    buPROPion (WELLBUTRIN) 100 MG tablet Take 1 tablet by mouth daily      rOPINIRole (REQUIP) 0.5 MG tablet Take 1 tablet by mouth nightly 30 tablet 5    empagliflozin (JARDIANCE) 25 MG tablet Take 1 tablet by mouth daily 90 tablet 1    metFORMIN (GLUCOPHAGE) 1000 MG tablet Take 1 tablet by mouth daily 30 tablet 5    valsartan (DIOVAN) 80 MG tablet Take 1 tablet by mouth daily 30 tablet 5    blood glucose monitor kit and supplies Use as Directed to test for blood sugar. Dispense per insurance and patient preference. 1 kit 0    Lancets MISC Use 1 lancet to test blood sugar twice daily. Dispense per insurance and patient preference. 100 each 5    metoprolol succinate (TOPROL XL) 50 MG extended release tablet TAKE 1 TABLET BY MOUTH DAILY (Patient taking differently: Take 1 tablet by mouth every morning Take day of 5/30/24 surgery.) 90 tablet 1    atorvastatin (LIPITOR) 10 MG tablet TAKE 1 TABLET BY MOUTH DAILY (Patient taking differently: Take 1 tablet by mouth every morning) 90 tablet 1    Bacillus Coagulans-Inulin (PROBIOTIC) 1-250 BILLION-MG CAPS Take by mouth every evening      mirtazapine (REMERON) 15 MG tablet Take 0.5 tablets by mouth nightly      lamoTRIgine (LAMICTAL) 100 MG tablet Take 1 tablet by mouth at bedtime      blood glucose test strips (ASCENSIA AUTODISC VI;ONE TOUCH ULTRA TEST VI) strip 1 each by In Vitro route daily As needed. 100 each 3    aspirin EC 81 MG EC tablet Take 1 tablet by mouth every morning      blood glucose monitor strips Test once dialy for diabetes e11.9 100 strip 5    Blood Glucose Monitoring Suppl (ONE TOUCH ULTRA 2)

## 2024-09-04 LAB
CREAT UR-MCNC: 48.5 MG/DL (ref 28–259)
MICROALBUMIN UR DL<=1MG/L-MCNC: <1.2 MG/DL
MICROALBUMIN/CREAT UR: NORMAL MG/G (ref 0–30)

## 2024-09-05 NOTE — TELEPHONE ENCOUNTER
CPAP download report from 8/6/2024 - 9/4/2024 on CPAP 7 cm H2O reviewed.  Compliance is suboptimal 67%.  AHI is slightly elevated 5.7.    Continue at current pressure for now.    Please call patient and inform should use CPAP at least 4 hours a night and ideally all night every night for maximum benefit     Please get new download report in about a month

## 2024-09-09 ENCOUNTER — TELEPHONE (OUTPATIENT)
Dept: PULMONOLOGY | Age: 64
End: 2024-09-09

## 2024-09-09 NOTE — TELEPHONE ENCOUNTER
Payton wilson14 minutes ago (11:40 AM)     MIGUEL  Per RICHARD hernandez relied Anaya message  form 9/05/24. Pt was agreeable and didn't have any questions.          Note

## 2024-09-10 ENCOUNTER — HOSPITAL ENCOUNTER (OUTPATIENT)
Dept: PHYSICAL THERAPY | Age: 64
Setting detail: THERAPIES SERIES
Discharge: HOME | End: 2024-09-10
Payer: COMMERCIAL

## 2024-09-10 PROCEDURE — 97112 NEUROMUSCULAR REEDUCATION: CPT

## 2024-09-10 PROCEDURE — 97110 THERAPEUTIC EXERCISES: CPT

## 2024-10-01 ENCOUNTER — OFFICE VISIT (OUTPATIENT)
Dept: SURGERY | Age: 64
End: 2024-10-01
Payer: COMMERCIAL

## 2024-10-01 ENCOUNTER — HOSPITAL ENCOUNTER (OUTPATIENT)
Dept: MAMMOGRAPHY | Age: 64
Discharge: HOME | End: 2024-10-01
Payer: COMMERCIAL

## 2024-10-01 VITALS — RESPIRATION RATE: 17 BRPM | HEIGHT: 66 IN | OXYGEN SATURATION: 96 % | HEART RATE: 92 BPM | BODY MASS INDEX: 34.09 KG/M2

## 2024-10-01 VITALS — BODY MASS INDEX: 33.43 KG/M2 | WEIGHT: 208 LBS | HEIGHT: 66 IN

## 2024-10-01 DIAGNOSIS — Z12.31 VISIT FOR SCREENING MAMMOGRAM: ICD-10-CM

## 2024-10-01 DIAGNOSIS — Z91.89 AT HIGH RISK FOR BREAST CANCER: Primary | ICD-10-CM

## 2024-10-01 DIAGNOSIS — R92.333 HETEROGENEOUSLY DENSE TISSUE OF BOTH BREASTS ON MAMMOGRAPHY: ICD-10-CM

## 2024-10-01 DIAGNOSIS — Z80.3 FAMILY HISTORY OF BREAST CANCER: ICD-10-CM

## 2024-10-01 PROCEDURE — 99213 OFFICE O/P EST LOW 20 MIN: CPT | Performed by: SURGERY

## 2024-10-01 PROCEDURE — 77063 BREAST TOMOSYNTHESIS BI: CPT

## 2024-10-01 PROCEDURE — G2211 COMPLEX E/M VISIT ADD ON: HCPCS | Performed by: SURGERY

## 2024-10-01 NOTE — PATIENT INSTRUCTIONS
Mammogram reviewed, no concerning findings  Breast exam performed, no palpable masses.    Continue self breast exams    Healthy Lifestyle Recommendations: healthy diet (decrease consumption of red meat, increase fresh fruits and vegetables), decreased alcohol consumption (less than 4 drinks/week), adequate sleep (goal 6-8 hours), routine exercise (goal 150 minutes/week or greater), weight control.     Return: 6 months with MRI for dense breast tissue    1yr with bilateral screening mammogram and breast check

## 2024-10-01 NOTE — PROGRESS NOTES
exam, f/u with me in 1 year with mammogram.      I have spent 20 minutes reviewing previous notes, test results, and face to face with the patient discussing the diagnosis and importance of compliance with the treatment plan as well as documenting on the day of the visit 10/1/2024   An  electronic signature was used to authenticate this note.    --ELIN RAMOS MD on 10/1/2024 at 2:13 PM

## 2024-10-21 ENCOUNTER — HOSPITAL ENCOUNTER (OUTPATIENT)
Age: 64
Discharge: HOME | End: 2024-10-21
Payer: COMMERCIAL

## 2024-10-21 DIAGNOSIS — E78.00 PURE HYPERCHOLESTEROLEMIA: ICD-10-CM

## 2024-10-21 LAB
ALBUMIN SERPL-MCNC: 4.9 G/DL (ref 3.4–5)
ALBUMIN/GLOB SERPL: 1.7 {RATIO} (ref 1.1–2.2)
ALP SERPL-CCNC: 124 U/L (ref 40–129)
ALT SERPL-CCNC: 23 U/L (ref 10–40)
ANION GAP SERPL CALCULATED.3IONS-SCNC: 12 MMOL/L (ref 3–16)
AST SERPL-CCNC: 22 U/L (ref 15–37)
BILIRUB SERPL-MCNC: 0.4 MG/DL (ref 0–1)
BUN SERPL-MCNC: 15 MG/DL (ref 7–20)
CALCIUM SERPL-MCNC: 10.3 MG/DL (ref 8.3–10.6)
CHLORIDE SERPL-SCNC: 99 MMOL/L (ref 99–110)
CHOLEST SERPL-MCNC: 159 MG/DL (ref 0–199)
CO2 SERPL-SCNC: 25 MMOL/L (ref 21–32)
CREAT SERPL-MCNC: 0.9 MG/DL (ref 0.6–1.2)
GFR SERPLBLD CREATININE-BSD FMLA CKD-EPI: 72 ML/MIN/{1.73_M2}
GLUCOSE SERPL-MCNC: 205 MG/DL (ref 70–99)
HDLC SERPL-MCNC: 63 MG/DL (ref 40–60)
LDLC SERPL CALC-MCNC: 69 MG/DL
POTASSIUM SERPL-SCNC: 4.5 MMOL/L (ref 3.5–5.1)
PROT SERPL-MCNC: 7.8 G/DL (ref 6.4–8.2)
SODIUM SERPL-SCNC: 136 MMOL/L (ref 136–145)
TRIGL SERPL-MCNC: 135 MG/DL (ref 0–150)
VLDLC SERPL CALC-MCNC: 27 MG/DL

## 2024-10-21 PROCEDURE — 80053 COMPREHEN METABOLIC PANEL: CPT

## 2024-10-21 PROCEDURE — 36415 COLL VENOUS BLD VENIPUNCTURE: CPT

## 2024-10-21 PROCEDURE — 80061 LIPID PANEL: CPT

## 2024-10-29 ENCOUNTER — OFFICE VISIT (OUTPATIENT)
Dept: FAMILY MEDICINE CLINIC | Age: 64
End: 2024-10-29

## 2024-10-29 VITALS
DIASTOLIC BLOOD PRESSURE: 72 MMHG | HEART RATE: 92 BPM | RESPIRATION RATE: 16 BRPM | HEIGHT: 66 IN | OXYGEN SATURATION: 99 % | SYSTOLIC BLOOD PRESSURE: 128 MMHG | WEIGHT: 214.4 LBS | BODY MASS INDEX: 34.46 KG/M2

## 2024-10-29 DIAGNOSIS — Z23 FLU VACCINE NEED: ICD-10-CM

## 2024-10-29 DIAGNOSIS — E78.5 TYPE 2 DIABETES MELLITUS WITH HYPERLIPIDEMIA (HCC): Primary | ICD-10-CM

## 2024-10-29 DIAGNOSIS — E11.69 TYPE 2 DIABETES MELLITUS WITH HYPERLIPIDEMIA (HCC): Primary | ICD-10-CM

## 2024-10-29 LAB — HBA1C MFR BLD: 8.2 %

## 2024-10-29 RX ORDER — ESCITALOPRAM OXALATE 10 MG/1
5 TABLET ORAL DAILY
COMMUNITY
Start: 2024-10-28

## 2024-10-29 RX ORDER — GLYBURIDE 2.5 MG/1
2.5 TABLET ORAL 2 TIMES DAILY WITH MEALS
Qty: 60 TABLET | Refills: 2 | Status: SHIPPED | OUTPATIENT
Start: 2024-10-29

## 2024-10-29 RX ORDER — MIRTAZAPINE 7.5 MG/1
3.75 TABLET, FILM COATED ORAL NIGHTLY
COMMUNITY
Start: 2024-10-28

## 2024-10-29 NOTE — PROGRESS NOTES
SUBJECTIVE:  64 y.o. female for follow up of diabetes. Diabetic Review of Systems - medication compliance: compliant all of the time, diabetic diet compliance: noncompliant much of the time, home glucose monitoring: fasting values range .      Current Outpatient Medications   Medication Sig Dispense Refill    escitalopram (LEXAPRO) 10 MG tablet Take 0.5 tablets by mouth daily      mirtazapine (REMERON) 7.5 MG tablet Take 0.5 tablets by mouth nightly      metFORMIN (GLUCOPHAGE) 1000 MG tablet Take 1 tablet by mouth 2 times daily (with meals) 60 tablet 5    glyBURIDE (DIABETA) 2.5 MG tablet Take 1 tablet by mouth daily (with breakfast) 30 tablet 3    buPROPion (WELLBUTRIN) 100 MG tablet Take 1 tablet by mouth daily      rOPINIRole (REQUIP) 0.5 MG tablet Take 1 tablet by mouth nightly 30 tablet 5    empagliflozin (JARDIANCE) 25 MG tablet Take 1 tablet by mouth daily 90 tablet 1    valsartan (DIOVAN) 80 MG tablet Take 1 tablet by mouth daily 30 tablet 5    blood glucose monitor kit and supplies Use as Directed to test for blood sugar. Dispense per insurance and patient preference. 1 kit 0    Lancets MISC Use 1 lancet to test blood sugar twice daily. Dispense per insurance and patient preference. 100 each 5    metoprolol succinate (TOPROL XL) 50 MG extended release tablet TAKE 1 TABLET BY MOUTH DAILY (Patient taking differently: Take 1 tablet by mouth every morning Take day of 5/30/24 surgery.) 90 tablet 1    atorvastatin (LIPITOR) 10 MG tablet TAKE 1 TABLET BY MOUTH DAILY (Patient taking differently: Take 1 tablet by mouth every morning) 90 tablet 1    Bacillus Coagulans-Inulin (PROBIOTIC) 1-250 BILLION-MG CAPS Take by mouth every evening      lamoTRIgine (LAMICTAL) 100 MG tablet Take 1 tablet by mouth at bedtime      blood glucose test strips (ASCENSIA AUTODISC VI;ONE TOUCH ULTRA TEST VI) strip 1 each by In Vitro route daily As needed. 100 each 3    aspirin EC 81 MG EC tablet Take 1 tablet by mouth every morning

## 2024-11-06 DIAGNOSIS — E78.00 PURE HYPERCHOLESTEROLEMIA: ICD-10-CM

## 2024-11-06 RX ORDER — METOPROLOL SUCCINATE 50 MG/1
50 TABLET, EXTENDED RELEASE ORAL DAILY
Qty: 90 TABLET | Refills: 2 | Status: SHIPPED | OUTPATIENT
Start: 2024-11-06

## 2024-11-06 RX ORDER — ATORVASTATIN CALCIUM 10 MG/1
10 TABLET, FILM COATED ORAL DAILY
Qty: 90 TABLET | Refills: 2 | Status: SHIPPED | OUTPATIENT
Start: 2024-11-06

## 2024-11-06 NOTE — TELEPHONE ENCOUNTER
Last Office Visit: 8/26/2024 [place] [facility]  Is provider OOT? No    Next Office Visit: 9/2/25 Provider: [facility]      LAST LABS:   CMP:   Lab Results   Component Value Date     10/21/2024    K 4.5 10/21/2024    CL 99 10/21/2024    CO2 25 10/21/2024    BUN 15 10/21/2024    CREATININE 0.9 10/21/2024    GLUCOSE 205 (H) 10/21/2024    CALCIUM 10.3 10/21/2024    BILITOT 0.4 10/21/2024    ALKPHOS 124 10/21/2024    AST 22 10/21/2024    ALT 23 10/21/2024    LABGLOM 72 10/21/2024    GFRAA >60 07/05/2022    AGRATIO 1.7 10/21/2024    GLOB 2.7 10/25/2021          Lipid:   Lab Results   Component Value Date    CHOL 159 10/21/2024    TRIG 135 10/21/2024    HDL 63 (H) 10/21/2024    LDL 69 10/21/2024    VLDL 27 10/21/2024       Requested Prescriptions     Pending Prescriptions Disp Refills    atorvastatin (LIPITOR) 10 MG tablet [Pharmacy Med Name: ATORVASTATIN 10 MG TABLET] 90 tablet 1     Sig: TAKE 1 TABLET BY MOUTH DAILY    metoprolol succinate (TOPROL XL) 50 MG extended release tablet [Pharmacy Med Name: METOPROLOL SUCC ER 50 MG TAB] 90 tablet 1     Sig: TAKE 1 TABLET BY MOUTH DAILY

## 2024-11-06 NOTE — TELEPHONE ENCOUNTER
Karishma Lee is requesting refill(s)   Last OV 10/29/2024 (pertaining to medication)  LR 01/06/2023 (per medication requested)  Next office visit scheduled or attempted Yes

## 2024-11-13 ENCOUNTER — OFFICE VISIT (OUTPATIENT)
Age: 64
End: 2024-11-13

## 2024-11-13 VITALS
HEART RATE: 91 BPM | OXYGEN SATURATION: 95 % | DIASTOLIC BLOOD PRESSURE: 82 MMHG | TEMPERATURE: 98.6 F | SYSTOLIC BLOOD PRESSURE: 134 MMHG

## 2024-11-13 DIAGNOSIS — S69.91XA WRIST INJURY, RIGHT, INITIAL ENCOUNTER: Primary | ICD-10-CM

## 2024-11-13 DIAGNOSIS — R52 PAIN: ICD-10-CM

## 2024-11-13 NOTE — PROGRESS NOTES
pick it up or when she does things like push herself up out of a chair.   Minimal pain with simple motion of the wrist with no resistance.   The pain is mostly to the ventral surface of the wrist and does radiate into the palm at times.   Denies numbness or tingling. No weakness.   Denies swelling or skin changes.           Vitals:    11/13/24 1420   BP: 137/84   Pulse: 91   Temp: 98.6 °F (37 °C)   TempSrc: Temporal   SpO2: 95%           Physical Exam  Vitals and nursing note reviewed.   Constitutional:       Appearance: Normal appearance.   HENT:      Head: Atraumatic.      Mouth/Throat:      Mouth: Mucous membranes are moist.   Eyes:      Extraocular Movements: Extraocular movements intact.      Conjunctiva/sclera: Conjunctivae normal.   Cardiovascular:      Rate and Rhythm: Normal rate and regular rhythm.   Pulmonary:      Effort: Pulmonary effort is normal. No respiratory distress.   Musculoskeletal:      Comments: Right wrist: Normal appearance without deformity or angulation. No edema, erythema, rashes or lesions. No warmth or induration. Non-tender. Full ROM. Radial pulse 2+. Negative Tinel. Negative phalen and reverse phalens. No snuff box tenderness.      Skin:     General: Skin is warm.   Neurological:      Mental Status: She is alert.          An electronic signature was used to authenticate this note.    --Silvestre Prado PA-C

## 2024-11-13 NOTE — PATIENT INSTRUCTIONS
Maintain wrist brace as recommended.     Apply ice to the affected area x 20 minutes several times a day. (20 minutes on followed by 20 minutes off).     Advil or Aleve as directed.     Follow-up with orthopaedics. Call office. Explain you were seen at urgent care for ongoing wrist pain from an injury 2 months ago and were advised follow-up with them. They will schedule you appropriately.

## 2024-11-23 SDOH — HEALTH STABILITY: PHYSICAL HEALTH: ON AVERAGE, HOW MANY DAYS PER WEEK DO YOU ENGAGE IN MODERATE TO STRENUOUS EXERCISE (LIKE A BRISK WALK)?: 0 DAYS

## 2024-11-25 ENCOUNTER — TELEPHONE (OUTPATIENT)
Dept: ORTHOPEDIC SURGERY | Age: 64
End: 2024-11-25

## 2024-11-25 ENCOUNTER — OFFICE VISIT (OUTPATIENT)
Dept: ORTHOPEDIC SURGERY | Age: 64
End: 2024-11-25
Payer: COMMERCIAL

## 2024-11-25 VITALS — BODY MASS INDEX: 35.65 KG/M2 | HEIGHT: 65 IN | WEIGHT: 214 LBS

## 2024-11-25 DIAGNOSIS — S63.501A SPRAIN OF RIGHT WRIST, INITIAL ENCOUNTER: ICD-10-CM

## 2024-11-25 DIAGNOSIS — R52 PAIN: Primary | ICD-10-CM

## 2024-11-25 PROCEDURE — 99204 OFFICE O/P NEW MOD 45 MIN: CPT | Performed by: PHYSICIAN ASSISTANT

## 2024-11-25 RX ORDER — VALSARTAN 80 MG/1
80 TABLET ORAL DAILY
Qty: 90 TABLET | Refills: 2 | Status: SHIPPED | OUTPATIENT
Start: 2024-11-25

## 2024-11-25 NOTE — TELEPHONE ENCOUNTER
Last Office Visit: 8/26/2024 Provider: [facility]  Is provider OOT? No    Next Office Visit: 9/2/25 Provider: [facility]      LAST LABS:   CMP:   Lab Results   Component Value Date     10/21/2024    K 4.5 10/21/2024    CL 99 10/21/2024    CO2 25 10/21/2024    BUN 15 10/21/2024    CREATININE 0.9 10/21/2024    GLUCOSE 205 (H) 10/21/2024    CALCIUM 10.3 10/21/2024    BILITOT 0.4 10/21/2024    ALKPHOS 124 10/21/2024    AST 22 10/21/2024    ALT 23 10/21/2024    LABGLOM 72 10/21/2024    GFRAA >60 07/05/2022    AGRATIO 1.7 10/21/2024    GLOB 2.7 10/25/2021       Is encounter provider correct?   Yes  Does refill dosage match last filled?   Yes  Changes to script from Tele Encounters or LOV Plan?   no  Adjust amount or refills to reflect last and upcoming OV?  yes     Requested Prescriptions     Pending Prescriptions Disp Refills    valsartan (DIOVAN) 80 MG tablet [Pharmacy Med Name: VALSARTAN 80 MG TABLET] 90 tablet      Sig: TAKE 1 TABLET BY MOUTH DAILY

## 2024-11-25 NOTE — TELEPHONE ENCOUNTER
MRI RIGHT WRIST approved Auth# [id number]     Was approved for [facility]   Valid 11/25/2024 - 12/25/2024

## 2024-11-25 NOTE — PROGRESS NOTES
Chief Complaint    Wrist Pain (N Right Wrist (DOI 9/2/2024) )      History of Present Illness:  Karishma Lee is a 64 y.o. female who reports to the office today for a new problem.  Patient with chief complaint of right wrist pain.  She did fall on September 2, 2024.  Pain is concentrated over the palmar aspect of the distal radius and ulna.  She denies any anatomical snuffbox pain.  She has been wearing a brace.  She was seen in urgent care and told there was no fracture.       Medical History:  Patient's medications, allergies, past medical, surgical, social and family histories were reviewed and updated as appropriate.    Review of Systems:  Pertinent items are noted in HPI  Review of systems reviewed from Patient History Form dated on 11/25/2024 and available in the patient's chart under the Media tab.     Vital Signs:  Ht 1.651 m (5' 5\")   Wt 97.1 kg (214 lb)   BMI 35.61 kg/m²     General Exam:   Constitutional: Patient is adequately groomed with no evidence of malnutrition  DTRs: Deep tendon reflexes are intact  Mental Status: The patient is oriented to time, place and person.  The patient's mood and affect are appropriate.  Lymphatic: The lymphatic examination bilaterally reveals all areas to be without enlargement or induration.  Vascular: Examination reveals no swelling or calf tenderness.  Peripheral pulses are palpable and 2+.  Neurological: The patient has good coordination.  There is no weakness or sensory deficit.    Right wrist Examination:    Inspection: No obvious swelling or ecchymosis    Palpation: Tenderness to patient over the palmar surface of the distal radius and ulna.    Range of Motion: Full but painful    Strength: 5/5  and pinch strength    Special Tests: No pain over anatomical snuffbox    Skin: There are no rashes, ulcerations or lesions.    Gait: Normal    Reflex +2    Additional Comments:       Additional Examinations:         Left Upper Extremity: Examination of the left upper

## 2024-12-16 ENCOUNTER — OFFICE VISIT (OUTPATIENT)
Dept: ORTHOPEDIC SURGERY | Age: 64
End: 2024-12-16
Payer: COMMERCIAL

## 2024-12-16 VITALS — HEIGHT: 65 IN | BODY MASS INDEX: 35.65 KG/M2 | WEIGHT: 214 LBS

## 2024-12-16 DIAGNOSIS — S63.501A SPRAIN OF RIGHT WRIST, INITIAL ENCOUNTER: Primary | ICD-10-CM

## 2024-12-16 DIAGNOSIS — M15.4: ICD-10-CM

## 2024-12-16 PROCEDURE — 99213 OFFICE O/P EST LOW 20 MIN: CPT | Performed by: PHYSICIAN ASSISTANT

## 2024-12-16 NOTE — PROGRESS NOTES
Chief Complaint    Follow-up (TR MRI RIght Wrist )      History of Present Illness:  Karishma Lee is a 64 y.o. female who presents to the office today for a follow-up visit.  Patient here with continued right wrist pain.  We did order her an MRI at her last visit.  She is here to review results.  She continues to complain of pain volar wrist.    Previous history:  Patient who reports to the office today for a new problem.  Patient with chief complaint of right wrist pain.  She did fall on September 2, 2024.  Pain is concentrated over the palmar aspect of the distal radius and ulna.  She denies any anatomical snuffbox pain.  She has been wearing a brace.  She was seen in urgent care and told there was no fracture.  Pain Assessment  Location of Pain: Wrist  Location Modifiers: Right  Severity of Pain: 3    Medical History:  Patient's medications, allergies, past medical, surgical, social and family histories were reviewed and updated as appropriate.    Review of Systems:  Pertinent items are noted in HPI  Review of systems reviewed from Patient History Form dated on 11/25/2024 and available in the patient's chart under the Media tab.     Vital Signs:  Ht 1.651 m (5' 5\")   Wt 97.1 kg (214 lb)   BMI 35.61 kg/m²     General Exam:   Constitutional: Patient is adequately groomed with no evidence of malnutrition  DTRs: Deep tendon reflexes are intact  Mental Status: The patient is oriented to time, place and person.  The patient's mood and affect are appropriate.  Lymphatic: The lymphatic examination bilaterally reveals all areas to be without enlargement or induration.  Vascular: Examination reveals no swelling or calf tenderness.  Peripheral pulses are palpable and 2+.  Neurological: The patient has good coordination.  There is no weakness or sensory deficit.    Right wrist Examination:    Inspection: No obvious swelling or ecchymosis    Palpation: Tenderness to patient over the palmar surface of the distal radius and

## 2024-12-19 ENCOUNTER — HOSPITAL ENCOUNTER (OUTPATIENT)
Dept: MRI IMAGING | Age: 64
Discharge: HOME | End: 2024-12-19
Payer: COMMERCIAL

## 2024-12-19 DIAGNOSIS — Z80.3 FAMILY HISTORY OF BREAST CANCER: ICD-10-CM

## 2024-12-19 DIAGNOSIS — R92.333 HETEROGENEOUSLY DENSE TISSUE OF BOTH BREASTS ON MAMMOGRAPHY: ICD-10-CM

## 2024-12-19 DIAGNOSIS — Z91.89 AT HIGH RISK FOR BREAST CANCER: ICD-10-CM

## 2024-12-19 LAB
PERFORMED ON: NORMAL
POC CREATININE: 1 MG/DL (ref 0.6–1.2)
POC SAMPLE TYPE: NORMAL

## 2024-12-19 PROCEDURE — 6360000004 HC RX CONTRAST MEDICATION: Performed by: SURGERY

## 2024-12-19 PROCEDURE — A9579 GAD-BASE MR CONTRAST NOS,1ML: HCPCS | Performed by: SURGERY

## 2024-12-19 PROCEDURE — C8908 MRI W/O FOL W/CONT, BREAST,: HCPCS

## 2024-12-19 PROCEDURE — 82565 ASSAY OF CREATININE: CPT

## 2024-12-19 RX ADMIN — GADOTERIDOL 19 ML: 279.3 INJECTION, SOLUTION INTRAVENOUS at 10:13

## 2024-12-20 ENCOUNTER — TELEPHONE (OUTPATIENT)
Dept: WOMENS IMAGING | Age: 64
End: 2024-12-20

## 2024-12-20 NOTE — TELEPHONE ENCOUNTER
[facility]  [address], [address]  [place], [place] [address]   Phone: [phone]          NAME:  Karishma Lee  YOB: 1960  MEDICAL RECORD NUMBER:  [id number]  TODAY'S DATE:  12/20/2024      Referring Physician: Dr. Stratton    Procedure: MRI-guided Breast Biopsy    Right Breast    Date of biopsy: 1/22/25    Patient taking blood thinners: no    Medicine allergies: yes - see chart    Special Instructions: n/a    Reviewed pre and post biopsy instructions/information with patient.  Pt verbalized understanding.     Biopsy order form faxed to referring MD.      Nurse Navigator reviewed the education with the patient regarding an MRI biopsy:  *The technologist or a nurse may ask if you have allergies of any kind, such as an allergy to iodine or x-ray contrast material, drugs, food, or the environment, or if you have asthma. The contrast material most commonly used for an MRI exam contains a metal called gadolinium.  It is fine to eat and drink prior to the procedure and we prefer that you do so.  Bring a written list of the medications you are taking.  Plan on being at the breast center for 2 -3 hours.   Wear a bra with good support (like a sports bra) and a two-piece comfortable outfit for the procedure.   You can bring someone with you but it is not required, since this is done with local anesthetic.  You may drive yourself, or bring a family member or friend, whatever is comfortable and supportive.    If you have claustrophobia (fear of enclosed spaces) or anxiety, you may want to ask your physician for a prescription for a mild sedative prior to your scheduled examination.  If this is done, please have someone drive you to the procedure.  During your biopsy:  You will be lying on your stomach for this procedure just as when you had the MRI.   If a contrast material will be used in the MRI exam, the technologist will insert an intravenous (IV) catheter,

## 2024-12-26 NOTE — TELEPHONE ENCOUNTER
Karishma Lee is requesting refill(s) metformin  Last OV 10/29/24 (pertaining to medication)  LR 9/3/24 (30 day supply, pt wants 90 day supply now) (per medication requested)  Next office visit scheduled or attempted Yes   If no, reason:  2/11/25

## 2025-01-03 ENCOUNTER — OFFICE VISIT (OUTPATIENT)
Dept: ORTHOPEDIC SURGERY | Age: 65
End: 2025-01-03

## 2025-01-03 VITALS — HEIGHT: 64 IN | BODY MASS INDEX: 36.54 KG/M2 | WEIGHT: 214 LBS

## 2025-01-03 DIAGNOSIS — M25.531 RIGHT WRIST PAIN: Primary | ICD-10-CM

## 2025-01-03 RX ORDER — MELOXICAM 15 MG/1
15 TABLET ORAL DAILY
Qty: 30 TABLET | Refills: 0 | Status: SHIPPED | OUTPATIENT
Start: 2025-01-03

## 2025-01-03 NOTE — PROGRESS NOTES
Hand, Upper Extremity and Reconstructive Surgery                Jade Hinton MD                                           Summary of Upper Extremity Problems and Interventions     Diagnosis: Right wrist pain    History of Present Illness     Karishma Lee is a 64 y.o. right hand dominant female who presents with right wrist pain.  Patient sustained a fall approximately 4 months ago when she fell onto her outstretched right hand.  She reports pain on the dorsal ulnar aspect of her wrist.  She has been seen by PAULA Jackman.  He sent her for an MRI of the right wrist which she is here to review today.  She has been wearing a splint intermittently.  She does use her cane with that wrist which has been difficult while she is wearing the splint.  Denies prior injury or surgery to the wrist before this.  Denies wrist clicking or popping.  Denies numbness and tingling.  Pain is most significant with forearm rotation.  She does have history of diabetes.  Has not anticoagulation.    Occupation: retired    Allergies     Allergies   Allergen Reactions    Lipitor [Atorvastatin]      GI upset at dose > 10 mg daily     Morphine Hives       Home Medications     Current Outpatient Medications   Medication Instructions    aspirin EC 81 mg, Oral, EVERY MORNING    atorvastatin (LIPITOR) 10 mg, Oral, DAILY    Bacillus Coagulans-Inulin (PROBIOTIC) 1-250 BILLION-MG CAPS Oral, EVERY EVENING    blood glucose monitor kit and supplies Use as Directed to test for blood sugar. Dispense per insurance and patient preference.    blood glucose monitor strips Test once dialy for diabetes e11.9    Blood Glucose Monitoring Suppl (ONE TOUCH ULTRA 2) w/Device KIT 1 kit, Does not apply, DAILY    Blood Glucose Monitoring Suppl CONCETTA Test once daily for diabetes e11.9    blood glucose test strips (ASCENSIA AUTODISC VI;ONE TOUCH ULTRA TEST VI) strip 1 each, In Vitro, DAILY, As needed.    buPROPion (WELLBUTRIN) 100

## 2025-01-09 RX ORDER — EMPAGLIFLOZIN 25 MG/1
25 TABLET, FILM COATED ORAL DAILY
Qty: 90 TABLET | Refills: 1 | Status: SHIPPED | OUTPATIENT
Start: 2025-01-09

## 2025-01-09 NOTE — TELEPHONE ENCOUNTER
Karishma Lee is requesting refill(s) empaglifozin  Last OV 10/29/24 (pertaining to medication)  LR 7/12/24 (per medication requested)  Next office visit scheduled or attempted Yes   If no, reason:  2/11/25

## 2025-01-22 ENCOUNTER — HOSPITAL ENCOUNTER (OUTPATIENT)
Dept: WOMENS IMAGING | Age: 65
Discharge: HOME | End: 2025-01-22
Attending: SURGERY
Payer: COMMERCIAL

## 2025-01-22 ENCOUNTER — HOSPITAL ENCOUNTER (OUTPATIENT)
Dept: MRI IMAGING | Age: 65
Discharge: HOME | End: 2025-01-22
Payer: COMMERCIAL

## 2025-01-22 DIAGNOSIS — R92.8 ABNORMAL MAMMOGRAM: ICD-10-CM

## 2025-01-22 PROCEDURE — 77065 DX MAMMO INCL CAD UNI: CPT

## 2025-01-22 PROCEDURE — A9579 GAD-BASE MR CONTRAST NOS,1ML: HCPCS | Performed by: SURGERY

## 2025-01-22 PROCEDURE — 88305 TISSUE EXAM BY PATHOLOGIST: CPT

## 2025-01-22 PROCEDURE — 88342 IMHCHEM/IMCYTCHM 1ST ANTB: CPT

## 2025-01-22 PROCEDURE — 2720000010 MRI BIOPSY BREAST W LOC DEVICE RIGHT 1ST LESION

## 2025-01-22 PROCEDURE — 6360000004 HC RX CONTRAST MEDICATION: Performed by: SURGERY

## 2025-01-22 RX ADMIN — GADOTERIDOL 19 ML: 279.3 INJECTION, SOLUTION INTRAVENOUS at 13:24

## 2025-01-22 NOTE — PROGRESS NOTES
[facility]. Radiologist reviewed procedure with patient, consent signed. Patient tolerated procedure well. Compression held. Site cleansed with chloraprep, steri strips and dry dressing applied. Ice pack provided. Reviewed discharge instructions with patient. Patient verbalized understanding and agreed to contact the Breast Navigator with any questions. Patient was A&Ox3 and steady on feet and discharged to waiting area.      [facility]   Discharge Instructions  [facility]  [address]  [address]  Telephone: [phone]   FAX [phone]    NAME:  Karishma Lee  YOB: 1960  GENDER: female  MEDICAL RECORD NUMBER:  [id number]  TODAY'S DATE:  1/22/2025    Discharge Instructions Breast Center:    Post Breast Biopsy Instructions     [x] You may remove your outer dressing in 24 hours and you may shower. The surgical glue will fall off after 7 days. Do not pick, scratch, or rub the film.     [x] Place a cold pack inside your bra on top of the dressing for at least 3 hours, removing it every 15 minutes for 15 minutes after your biopsy.     [x] Wear a firm fitting bra for at least 24 hours after your biopsy, including while you sleep.    [x] Place an ice pack inside your bra on top of the dressing for at least 3 hours, removing it every 15 minutes for 15 minutes after your biopsy.    [x] You may resume your held medications tomorrow, unless otherwise directed by your physician.    [x] Your physician has instructed you to take Tylenol (Acetaminophen) the day of your biopsy for any discomfort.    [x] Watch for excessive bleeding. If bleeding occurs apply pressure to site. Watch for signs of infection, increased pain, redness, swelling and heat.  If this occurs call your physician.     [x] Do not participate in any strenuous exercise for 48 hours after your biopsy and do not lift, pull or push anything over 5-10 lbs.      [x] Results

## 2025-01-24 ENCOUNTER — TELEPHONE (OUTPATIENT)
Dept: WOMENS IMAGING | Age: 65
End: 2025-01-24

## 2025-01-24 NOTE — TELEPHONE ENCOUNTER
Pathology for breast biopsy complete, radiologist confirms concordance.     Reviewed breast biopsy results with patient and answered all questions. The radiologist is recommending that the patient see a breast surgeon regarding biopsy results.  Patient sees Dr. Hwang for her breast care needs but Dr. Stratton is unavailable until March 2025. Pt prefers to see Dr. Hernandez with [facility] in the interim. Referral made to [facility] BS.  Breast Navigator will remain available for any questions. Pt verbalized understanding.      Leslie Carter RN

## 2025-01-27 RX ORDER — GLYBURIDE 2.5 MG/1
TABLET ORAL
Qty: 60 TABLET | Refills: 2 | Status: SHIPPED | OUTPATIENT
Start: 2025-01-27

## 2025-01-27 NOTE — TELEPHONE ENCOUNTER
Karishma Lee is requesting refill(s) glyburide  Last OV 10/29/24 (pertaining to medication)  LR 10/29/24 (per medication requested)  Next office visit scheduled or attempted Yes   If no, reason:  2/11/25

## 2025-01-29 ENCOUNTER — TELEPHONE (OUTPATIENT)
Dept: SURGERY | Age: 65
End: 2025-01-29

## 2025-01-29 NOTE — TELEPHONE ENCOUNTER
The patient called in to report that she will no longer be following Dr. Hwang for her breast care. She has transitioned to seeing Dr. Hernandez at [facility] and will continue to see her in the future as her office is closer to the patient's house.

## 2025-01-30 NOTE — PROGRESS NOTES
Karishma PALMER Lee    Age 64 y.o.    female    1960    MRN [id number]    2/18/2025  Arrival Time_____________  OR Time____________100 Min     Procedure(s):  RIGHT RADIOFREQUENCY IDENTIFICATION TAG LOCALIZED EXCISIONAL BREAST BIOPSY                      General   Surgeon(s):  Rach Hernandez, MD      DAY ADMIT ___  SDS/OP ___  OUTPT IN BED ___        Phone [phone] (home) [phone] (work)                 PCP _____________________ Phone_________________ Epic ( ) Epic CE ( ) Appt ________    NOTES: _________________________________________ Consult/Cardio _______________    ____________________________________________________________________________    ____________________________________________________________________________  PAT APPT DATE:________ TIME: ________  FAXED QAD: _______  (__) H&P w/ Hospitalist    (__) PAT orders in EPIC    (__) Meet with PAT nurse  __________________________________________________________________________  Preop Nurse phone screen complete: _____________  (__) CBC     (__) W/ DIFF ___________  (__) CT CHEST  __________   (__) Hgb A1C    ___________  (__) CHEST X RAY   __________  (__) LIPID PROFILE  ___________  (__) EKG   __________  (__) PT-INR / APTT  ___________  (__) PFT's   __________  (__) BMP   ___________  (__) CAROTIDS  __________  (__) CMP   ___________  (__) VEIN MAPPING  __________  (__) U/A   ___________  ( X ) HISTORY & PHYSICAL __________  (__) URINE C & S  ___________  (__) CARDIAC CLEARANCE __________  (__) U/A W/ FLEX  ___________  (__) PULM. CLEARANCE __________  (__) SERUM PREGNANCY ___________  (__) Preop Orders in EPIC __________  (__) TYPE & SCREEN __________repeat ( ) (__)  __________________ __________  (__) Albumin   ___________  (__)  __________________ __________  (__) TRANSFERRIN  ___________  (__)  __________________ __________  (__) LIVER PROFILE  ___________  (__) URINE PREG DOS __________  (__) MRSA NASAL SWAB ___________  (__) BLOOD SUGAR

## 2025-02-03 ENCOUNTER — OFFICE VISIT (OUTPATIENT)
Dept: FAMILY MEDICINE CLINIC | Age: 65
End: 2025-02-03
Payer: COMMERCIAL

## 2025-02-03 VITALS
RESPIRATION RATE: 16 BRPM | HEIGHT: 64 IN | HEART RATE: 91 BPM | DIASTOLIC BLOOD PRESSURE: 86 MMHG | SYSTOLIC BLOOD PRESSURE: 112 MMHG | BODY MASS INDEX: 37.52 KG/M2 | WEIGHT: 219.8 LBS | OXYGEN SATURATION: 94 %

## 2025-02-03 DIAGNOSIS — Z01.818 PREOP EXAMINATION: ICD-10-CM

## 2025-02-03 DIAGNOSIS — E78.5 TYPE 2 DIABETES MELLITUS WITH HYPERLIPIDEMIA (HCC): Primary | ICD-10-CM

## 2025-02-03 DIAGNOSIS — N60.99 ATYPICAL DUCTAL HYPERPLASIA OF BREAST: ICD-10-CM

## 2025-02-03 DIAGNOSIS — E11.69 TYPE 2 DIABETES MELLITUS WITH HYPERLIPIDEMIA (HCC): Primary | ICD-10-CM

## 2025-02-03 PROCEDURE — 3046F HEMOGLOBIN A1C LEVEL >9.0%: CPT | Performed by: PHYSICIAN ASSISTANT

## 2025-02-03 PROCEDURE — G8427 DOCREV CUR MEDS BY ELIG CLIN: HCPCS | Performed by: PHYSICIAN ASSISTANT

## 2025-02-03 PROCEDURE — G8417 CALC BMI ABV UP PARAM F/U: HCPCS | Performed by: PHYSICIAN ASSISTANT

## 2025-02-03 PROCEDURE — 2022F DILAT RTA XM EVC RTNOPTHY: CPT | Performed by: PHYSICIAN ASSISTANT

## 2025-02-03 PROCEDURE — 99214 OFFICE O/P EST MOD 30 MIN: CPT | Performed by: PHYSICIAN ASSISTANT

## 2025-02-03 PROCEDURE — 1036F TOBACCO NON-USER: CPT | Performed by: PHYSICIAN ASSISTANT

## 2025-02-03 PROCEDURE — 3017F COLORECTAL CA SCREEN DOC REV: CPT | Performed by: PHYSICIAN ASSISTANT

## 2025-02-03 PROCEDURE — 3074F SYST BP LT 130 MM HG: CPT | Performed by: PHYSICIAN ASSISTANT

## 2025-02-03 PROCEDURE — 3079F DIAST BP 80-89 MM HG: CPT | Performed by: PHYSICIAN ASSISTANT

## 2025-02-03 PROCEDURE — 93000 ELECTROCARDIOGRAM COMPLETE: CPT | Performed by: PHYSICIAN ASSISTANT

## 2025-02-03 SDOH — ECONOMIC STABILITY: FOOD INSECURITY: WITHIN THE PAST 12 MONTHS, YOU WORRIED THAT YOUR FOOD WOULD RUN OUT BEFORE YOU GOT MONEY TO BUY MORE.: NEVER TRUE

## 2025-02-03 SDOH — ECONOMIC STABILITY: FOOD INSECURITY: WITHIN THE PAST 12 MONTHS, THE FOOD YOU BOUGHT JUST DIDN'T LAST AND YOU DIDN'T HAVE MONEY TO GET MORE.: NEVER TRUE

## 2025-02-03 ASSESSMENT — PATIENT HEALTH QUESTIONNAIRE - PHQ9
1. LITTLE INTEREST OR PLEASURE IN DOING THINGS: NEARLY EVERY DAY
8. MOVING OR SPEAKING SO SLOWLY THAT OTHER PEOPLE COULD HAVE NOTICED. OR THE OPPOSITE, BEING SO FIGETY OR RESTLESS THAT YOU HAVE BEEN MOVING AROUND A LOT MORE THAN USUAL: NOT AT ALL
10. IF YOU CHECKED OFF ANY PROBLEMS, HOW DIFFICULT HAVE THESE PROBLEMS MADE IT FOR YOU TO DO YOUR WORK, TAKE CARE OF THINGS AT HOME, OR GET ALONG WITH OTHER PEOPLE: NOT DIFFICULT AT ALL
9. THOUGHTS THAT YOU WOULD BE BETTER OFF DEAD, OR OF HURTING YOURSELF: NOT AT ALL
5. POOR APPETITE OR OVEREATING: NEARLY EVERY DAY
SUM OF ALL RESPONSES TO PHQ QUESTIONS 1-9: 16
2. FEELING DOWN, DEPRESSED OR HOPELESS: SEVERAL DAYS
7. TROUBLE CONCENTRATING ON THINGS, SUCH AS READING THE NEWSPAPER OR WATCHING TELEVISION: NEARLY EVERY DAY
4. FEELING TIRED OR HAVING LITTLE ENERGY: MORE THAN HALF THE DAYS
SUM OF ALL RESPONSES TO PHQ QUESTIONS 1-9: 16
SUM OF ALL RESPONSES TO PHQ9 QUESTIONS 1 & 2: 4
6. FEELING BAD ABOUT YOURSELF - OR THAT YOU ARE A FAILURE OR HAVE LET YOURSELF OR YOUR FAMILY DOWN: SEVERAL DAYS
SUM OF ALL RESPONSES TO PHQ QUESTIONS 1-9: 16
SUM OF ALL RESPONSES TO PHQ QUESTIONS 1-9: 16
3. TROUBLE FALLING OR STAYING ASLEEP: NEARLY EVERY DAY

## 2025-02-03 NOTE — PROGRESS NOTES
[facility]   Pre-operative History and Physical      DIAGNOSIS:     Diagnosis Orders   1. Type 2 diabetes mellitus with hyperlipidemia (HCC)        2. Preop examination        3. Atypical ductal hyperplasia of breast              PROCEDURE:  breast excision      History Obtained From:  patient    HISTORY OF PRESENT ILLNESS:    Karishma Lee 1960 is a 64 y.o. female who presents for pre-operative evaluation for her DM prior to surgery.     Past Medical History:    Past Medical History:   Diagnosis Date    Anxiety     Bipolar disorder (HCC)     BRCA1 negative     BRCA2 negative     Depression     Diverticulitis 10/19/2021    DJD (degenerative joint disease)     hands/knees/ankles    Fatty liver     Kat catheter in place     GERD (gastroesophageal reflux disease)     Head contusion 04/2024    Hyperlipidemia     Hypertension     Irritable bowel syndrome     Mood disorder (HCC)     NOS    Narcotic addiction (HCC)     Obesity     ANGEL (obstructive sleep apnea)     CPAP machine    Primary localized osteoarthritis of right knee 03/29/2019    Restless leg syndrome     Ribs, multiple fractures 04/2024    Status post total right knee replacement 03/29/2019    Type 2 diabetes mellitus without complication (HCC)     Type II or unspecified type diabetes mellitus without mention of complication, not stated as uncontrolled     Vision impairment     glasses     Past Surgical History:    Past Surgical History:   Procedure Laterality Date    APPENDECTOMY      CHOLECYSTECTOMY  2001    COLONOSCOPY  01/2004    Due 2014    CYSTOSCOPY  07/09/2010    CYSTOSCOPY  11/18/2010    insertion of sparc mesh sling with cystoscopy    CYSTOSCOPY N/A 5/30/2024    CYSTOSCOPY, URETHRAL DILATION performed by Franc Salvador MD at [facility] OR    HYSTERECTOMY (CERVIX STATUS UNKNOWN)      HYSTERECTOMY, TOTAL ABDOMINAL (CERVIX REMOVED)      JOINT REPLACEMENT      KNEE ARTHROPLASTY      KNEE ARTHROSCOPY Right     KNEE ARTHROSCOPY Left

## 2025-02-06 RX ORDER — ROPINIROLE 0.5 MG/1
0.5 TABLET, FILM COATED ORAL NIGHTLY
Qty: 90 TABLET | OUTPATIENT
Start: 2025-02-06

## 2025-02-06 NOTE — TELEPHONE ENCOUNTER
Pt called back and said she does have enough to get to her visit on 2/13/25.  Pt aware it will be refilled at that office visit.

## 2025-02-07 ENCOUNTER — TELEPHONE (OUTPATIENT)
Dept: FAMILY MEDICINE CLINIC | Age: 65
End: 2025-02-07

## 2025-02-07 NOTE — TELEPHONE ENCOUNTER
Pt called and needed a PA for Jardiance 25 mg. This was started by NK today. Pt has one pill left and is wanting to know what she can do in the mean time. Any recommendations?

## 2025-02-11 ENCOUNTER — OFFICE VISIT (OUTPATIENT)
Dept: FAMILY MEDICINE CLINIC | Age: 65
End: 2025-02-11
Payer: COMMERCIAL

## 2025-02-11 VITALS
HEART RATE: 81 BPM | DIASTOLIC BLOOD PRESSURE: 84 MMHG | RESPIRATION RATE: 16 BRPM | SYSTOLIC BLOOD PRESSURE: 116 MMHG | WEIGHT: 219.2 LBS | BODY MASS INDEX: 35.23 KG/M2 | HEIGHT: 66 IN | OXYGEN SATURATION: 97 %

## 2025-02-11 DIAGNOSIS — E11.69 TYPE 2 DIABETES MELLITUS WITH HYPERLIPIDEMIA (HCC): Primary | ICD-10-CM

## 2025-02-11 DIAGNOSIS — E78.5 TYPE 2 DIABETES MELLITUS WITH HYPERLIPIDEMIA (HCC): Primary | ICD-10-CM

## 2025-02-11 LAB — HBA1C MFR BLD: 7.6 %

## 2025-02-11 PROCEDURE — 3051F HG A1C>EQUAL 7.0%<8.0%: CPT | Performed by: PHYSICIAN ASSISTANT

## 2025-02-11 PROCEDURE — 1036F TOBACCO NON-USER: CPT | Performed by: PHYSICIAN ASSISTANT

## 2025-02-11 PROCEDURE — G8417 CALC BMI ABV UP PARAM F/U: HCPCS | Performed by: PHYSICIAN ASSISTANT

## 2025-02-11 PROCEDURE — 3079F DIAST BP 80-89 MM HG: CPT | Performed by: PHYSICIAN ASSISTANT

## 2025-02-11 PROCEDURE — 2022F DILAT RTA XM EVC RTNOPTHY: CPT | Performed by: PHYSICIAN ASSISTANT

## 2025-02-11 PROCEDURE — 99213 OFFICE O/P EST LOW 20 MIN: CPT | Performed by: PHYSICIAN ASSISTANT

## 2025-02-11 PROCEDURE — G2211 COMPLEX E/M VISIT ADD ON: HCPCS | Performed by: PHYSICIAN ASSISTANT

## 2025-02-11 PROCEDURE — G8427 DOCREV CUR MEDS BY ELIG CLIN: HCPCS | Performed by: PHYSICIAN ASSISTANT

## 2025-02-11 PROCEDURE — 3074F SYST BP LT 130 MM HG: CPT | Performed by: PHYSICIAN ASSISTANT

## 2025-02-11 PROCEDURE — 83036 HEMOGLOBIN GLYCOSYLATED A1C: CPT | Performed by: PHYSICIAN ASSISTANT

## 2025-02-11 PROCEDURE — 3017F COLORECTAL CA SCREEN DOC REV: CPT | Performed by: PHYSICIAN ASSISTANT

## 2025-02-11 NOTE — PROGRESS NOTES
faSUBJECTIVE:  64 y.o. female for follow up of diabetes. Diabetic Review of Systems - medication compliance: compliant all of the time, diabetic diet compliance: compliant most of the time, home glucose monitoring: fasting values range .  Other symptoms and concerns: none today.    Current Outpatient Medications   Medication Sig Dispense Refill    glyBURIDE (DIABETA) 2.5 MG tablet TAKE 1 TABLET BY MOUTH TWICE A DAY WITH A MEAL 60 tablet 2    JARDIANCE 25 MG tablet TAKE 1 TABLET BY MOUTH DAILY 90 tablet 1    meloxicam (MOBIC) 15 MG tablet Take 1 tablet by mouth daily 30 tablet 0    metFORMIN (GLUCOPHAGE) 1000 MG tablet TAKE 1 TABLET BY MOUTH DAILY WITH BREAKFAST 90 tablet 1    valsartan (DIOVAN) 80 MG tablet TAKE 1 TABLET BY MOUTH DAILY 90 tablet 2    blood glucose test strips (ASCENSIA AUTODISC VI;ONE TOUCH ULTRA TEST VI) strip 1 each by In Vitro route daily As needed. 100 each 3    atorvastatin (LIPITOR) 10 MG tablet TAKE 1 TABLET BY MOUTH DAILY 90 tablet 2    metoprolol succinate (TOPROL XL) 50 MG extended release tablet TAKE 1 TABLET BY MOUTH DAILY 90 tablet 2    escitalopram (LEXAPRO) 10 MG tablet Take 0.5 tablets by mouth daily      mirtazapine (REMERON) 7.5 MG tablet Take 0.5 tablets by mouth nightly      buPROPion (WELLBUTRIN) 100 MG tablet Take 1 tablet by mouth daily      rOPINIRole (REQUIP) 0.5 MG tablet Take 1 tablet by mouth nightly 30 tablet 5    blood glucose monitor kit and supplies Use as Directed to test for blood sugar. Dispense per insurance and patient preference. 1 kit 0    Lancets MISC Use 1 lancet to test blood sugar twice daily. Dispense per insurance and patient preference. 100 each 5    Bacillus Coagulans-Inulin (PROBIOTIC) 1-250 BILLION-MG CAPS Take by mouth every evening      lamoTRIgine (LAMICTAL) 100 MG tablet Take 1 tablet by mouth at bedtime      aspirin EC 81 MG EC tablet Take 1 tablet by mouth every morning      blood glucose monitor strips Test once dialy for diabetes e11.9

## 2025-02-11 NOTE — TELEPHONE ENCOUNTER
Left voice message for drug rep that PAULA Covington recommended to call back for Jardiance samples.

## 2025-02-11 NOTE — TELEPHONE ENCOUNTER
There are no drug reps for Jardiance in our area. Drug reps in our area either laid off or quit. Only way to get samples is to fill form on [employer] website for Jardiance. Not sure how long that will take. Do you want me to fill the form to get samples?

## 2025-02-12 ENCOUNTER — HOSPITAL ENCOUNTER (OUTPATIENT)
Dept: WOMENS IMAGING | Age: 65
Discharge: HOME | End: 2025-02-12
Attending: SURGERY
Payer: COMMERCIAL

## 2025-02-12 DIAGNOSIS — R92.8 ABNORMAL MAMMOGRAM: ICD-10-CM

## 2025-02-12 PROCEDURE — A4648 IMPLANTABLE TISSUE MARKER: HCPCS

## 2025-02-12 PROCEDURE — 77065 DX MAMMO INCL CAD UNI: CPT

## 2025-02-12 NOTE — PROGRESS NOTES
[facility] in [facility] for breast RFID placement. Radiologist reviewed procedure with patient, consent signed. Patient tolerated procedure well. Compression held. Site cleansed with chloraprep, steri strips and dry dressing applied. Ice pack provided. Reviewed discharge instructions with patient. Patient verbalized understanding and agreed to contact the Breast Navigator with any questions. Patient was A&O x 3, steady on feet, and discharged to waiting area.      [facility]   Discharge Instructions  [facility]  [address] [place]  [address]  Telephone: [phone]   FAX [phone]     Apply an ice pack for 15-20 minutes after your procedure for at least one hour.     You may remove your outer dressing in 24 hours and you may shower. The surgical glue will fall off after 7 days. Do not pick, scratch, or rub the film.     You may return to work after the tag placement with the following restrictions: no lifting, pulling, or pushing anything over 5 lbs for the rest of the day. Please refrain from repetitive movement on the affected side for the rest of the day.     Watch for signs of infection: swelling, pain fever, tenderness, heat or redness around the site. Do not soak in a hot tub, pool, or bath until the site has healed.     The local anesthetic wears off about 3 hours after the procedure. You may use Tylenol for discomfort if needed.  Bruising and tenderness are normal following the procedure.     You may resume all stopped medications unless otherwise indicated by your Breast Surgeon.       For any questions, please call the Nurse Navigator, Leslie Carter, for any issues at: [phone].     Thank you! -[facility]    
Statement Selected

## 2025-02-12 NOTE — TELEPHONE ENCOUNTER
Submitted PA for Jardiance 25MG tablets   [facility] Key: [id number]  STATUS: PENDING.    Follow up done daily; if no decision with in three days we will refax.  If another three days goes by with no decision will call the insurance for status.

## 2025-02-12 NOTE — PROGRESS NOTES
Surgery Date and Time:  2/18/25 @ 12:30 pm    Arrival Time:  10:30 am        The instructions given when and if a patient needs to stop oral intake prior to surgery varies. Follow the instructions you were      given by your Surgeon or RN during the Pre-op call.      __X__Do not eat or drink anything after Midnight the night before the surgery. NO gum, mints, candy or ice chips the day of surgery.        Only take the following medications with a small sip of water the morning of surgery:  metoprolol       Hold the following medications (per anesthesia or surgeon request):   valsartan     Last Dose:  2/16/25 pm        Aspirin, Ibuprofen, Advil, Naproxen, Vitamin E and other Anti-inflammatory products and supplements should be stopped       for 5 -7days before surgery or as directed by your physician.                  NO DIABETES MEDICATIONS DAY OF SURGERY.  JARDIANCE LAST DOSE 2/14/25.                 - If you take a long acting-insulin, take your normal dose the night before surgery & half the dose if taken in the morning.     - Do not smoke or vape, and do not drink any alcoholic beverages 24 hours prior to surgery, this includes NA Beer. Refrain from using     any recreational drugs, including non-prescribed prescription drugs.     -You may brush your teeth and gargle the morning of surgery.  DO NOT SWALLOW WATER.    -You MUST plan for a responsible adult to stay on site while you are here and take you home after your surgery. You will not be allowed                to leave alone or drive yourself home. It is requested someone stay with you the first 24 hrs. Your surgery will be cancelled if you do not                have a ride home with a responsible adult.    -A parent/legal guardian must accompany a child scheduled for surgery and plan to stay at the hospital until the child                is discharged. Please do not bring other children with you.    -Please wear simple, loose-fitting clothing to the

## 2025-02-13 ENCOUNTER — OFFICE VISIT (OUTPATIENT)
Dept: SLEEP MEDICINE | Age: 65
End: 2025-02-13
Payer: COMMERCIAL

## 2025-02-13 VITALS
SYSTOLIC BLOOD PRESSURE: 108 MMHG | HEART RATE: 91 BPM | WEIGHT: 219.8 LBS | OXYGEN SATURATION: 96 % | BODY MASS INDEX: 33.31 KG/M2 | HEIGHT: 68 IN | DIASTOLIC BLOOD PRESSURE: 72 MMHG

## 2025-02-13 DIAGNOSIS — Z72.821 POOR SLEEP HYGIENE: ICD-10-CM

## 2025-02-13 DIAGNOSIS — Z71.89 CPAP USE COUNSELING: ICD-10-CM

## 2025-02-13 DIAGNOSIS — G47.33 MILD OBSTRUCTIVE SLEEP APNEA: Primary | ICD-10-CM

## 2025-02-13 DIAGNOSIS — I10 ESSENTIAL HYPERTENSION: ICD-10-CM

## 2025-02-13 DIAGNOSIS — F51.04 PSYCHOPHYSIOLOGICAL INSOMNIA: ICD-10-CM

## 2025-02-13 DIAGNOSIS — G25.81 RLS (RESTLESS LEGS SYNDROME): ICD-10-CM

## 2025-02-13 DIAGNOSIS — E66.9 OBESITY (BMI 30-39.9): ICD-10-CM

## 2025-02-13 PROCEDURE — 1036F TOBACCO NON-USER: CPT | Performed by: NURSE PRACTITIONER

## 2025-02-13 PROCEDURE — 3078F DIAST BP <80 MM HG: CPT | Performed by: NURSE PRACTITIONER

## 2025-02-13 PROCEDURE — 3074F SYST BP LT 130 MM HG: CPT | Performed by: NURSE PRACTITIONER

## 2025-02-13 PROCEDURE — 3017F COLORECTAL CA SCREEN DOC REV: CPT | Performed by: NURSE PRACTITIONER

## 2025-02-13 PROCEDURE — 99214 OFFICE O/P EST MOD 30 MIN: CPT | Performed by: NURSE PRACTITIONER

## 2025-02-13 PROCEDURE — G8417 CALC BMI ABV UP PARAM F/U: HCPCS | Performed by: NURSE PRACTITIONER

## 2025-02-13 PROCEDURE — G8427 DOCREV CUR MEDS BY ELIG CLIN: HCPCS | Performed by: NURSE PRACTITIONER

## 2025-02-13 RX ORDER — ROPINIROLE 0.5 MG/1
0.5 TABLET, FILM COATED ORAL NIGHTLY
Qty: 30 TABLET | Refills: 5 | Status: SHIPPED | OUTPATIENT
Start: 2025-02-13

## 2025-02-13 ASSESSMENT — SLEEP AND FATIGUE QUESTIONNAIRES
HOW LIKELY ARE YOU TO NOD OFF OR FALL ASLEEP WHILE WATCHING TV: WOULD NEVER DOZE
ESS TOTAL SCORE: 4
HOW LIKELY ARE YOU TO NOD OFF OR FALL ASLEEP WHILE SITTING INACTIVE IN A PUBLIC PLACE: WOULD NEVER DOZE
HOW LIKELY ARE YOU TO NOD OFF OR FALL ASLEEP WHEN YOU ARE A PASSENGER IN A CAR FOR AN HOUR WITHOUT A BREAK: WOULD NEVER DOZE
HOW LIKELY ARE YOU TO NOD OFF OR FALL ASLEEP IN A CAR, WHILE STOPPED FOR A FEW MINUTES IN TRAFFIC: WOULD NEVER DOZE
HOW LIKELY ARE YOU TO NOD OFF OR FALL ASLEEP WHILE LYING DOWN TO REST IN THE AFTERNOON WHEN CIRCUMSTANCES PERMIT: MODERATE CHANCE OF DOZING
HOW LIKELY ARE YOU TO NOD OFF OR FALL ASLEEP WHILE SITTING QUIETLY AFTER LUNCH WITHOUT ALCOHOL: WOULD NEVER DOZE
HOW LIKELY ARE YOU TO NOD OFF OR FALL ASLEEP WHILE SITTING AND TALKING TO SOMEONE: WOULD NEVER DOZE
HOW LIKELY ARE YOU TO NOD OFF OR FALL ASLEEP WHILE SITTING AND READING: MODERATE CHANCE OF DOZING

## 2025-02-13 NOTE — PROGRESS NOTES
Patient ID: Karishma Lee is a 64 y.o. female who is being seen today for   Chief Complaint   Patient presents with    Follow-up    Sleep Apnea     Referring: Dr. Lambert Arriaga    HPI:   Karishma Lee is a 64 y.o. female in office with  for ANGEL follow up.  Patient is using CPAP   6 hrs/night. Using humidifier. No snoring on CPAP. The pressure is well tolerated. The mask is comfortable- full face. No mask leak. No significant daytime sleepiness. No nodding off when driving. No dry nose or throat. Some fatigue. Bedtime is 9-10 pm and rise time is 10 am. Sleep onset is 45 minutes. Wakes up 2 times at night total. 2 nocturia. It takes few minutes to fall back a sleep. No naps during the day. No headache in am. No weight gain. 0-1 caffienated beverages during the day. No alcohol. ESS is 4    She continues Requip 0.5 mg. She denies negative side effects of medication, states it is doing well.         Previous HPI 8/1/24  Karishma Lee is a 64 y.o. female in office with  for ANGEL/RLS follow up.  States she fell down the stairs in April and was in ICU, hospital, rehab.  States was in hospital for about 23 days.  States she is having mask fitting issues.  Had not been using CPAP as manage has increased use of her past 2 weeks.  Patient is using CPAP 2-3 hrs/night.  States she forgets to put CPAP back on after bathroom.  Using humidifier. No snoring on CPAP. The pressure feels too low. The mask is comfortable-full face mask.  +mask leak. States needs new supplies. No significant daytime sleepiness. No nodding off when driving. No dry nose or throat. No fatigue. Bedtime is 9-930 pm and rise time is 9 am. Sleep onset is 30-45 minutes. Wakes up 4-5 times at night total. 4-5 nocturia. It takes few minutes to fall back a sleep. No naps during the day. No headache in am. No weight gain. 1-2 caffienated beverages during the day. No alcohol. ESS is 2    States she got a new psychiatrist and has some medication

## 2025-02-14 NOTE — TELEPHONE ENCOUNTER
The medication is APPROVED THROUGH 02/07/2026.    If this requires a response please respond to the pool ( [facility] MEDICATION PRE-AUTH).      Thank you please advise patient.

## 2025-02-17 ENCOUNTER — ANESTHESIA EVENT (OUTPATIENT)
Dept: OPERATING ROOM | Age: 65
End: 2025-02-17
Payer: COMMERCIAL

## 2025-02-18 ENCOUNTER — HOSPITAL ENCOUNTER (OUTPATIENT)
Dept: WOMENS IMAGING | Age: 65
Discharge: HOME | End: 2025-02-18
Payer: COMMERCIAL

## 2025-02-18 ENCOUNTER — HOSPITAL ENCOUNTER (OUTPATIENT)
Age: 65
Setting detail: OUTPATIENT SURGERY
Discharge: HOME | End: 2025-02-18
Attending: SURGERY | Admitting: SURGERY
Payer: COMMERCIAL

## 2025-02-18 ENCOUNTER — ANESTHESIA (OUTPATIENT)
Dept: OPERATING ROOM | Age: 65
End: 2025-02-18
Payer: COMMERCIAL

## 2025-02-18 VITALS
DIASTOLIC BLOOD PRESSURE: 70 MMHG | HEIGHT: 68 IN | BODY MASS INDEX: 33.19 KG/M2 | RESPIRATION RATE: 18 BRPM | TEMPERATURE: 97 F | HEART RATE: 77 BPM | WEIGHT: 219 LBS | SYSTOLIC BLOOD PRESSURE: 129 MMHG | OXYGEN SATURATION: 94 %

## 2025-02-18 DIAGNOSIS — R92.8 ABNORMAL MAMMOGRAM: ICD-10-CM

## 2025-02-18 LAB
GLUCOSE BLD-MCNC: 141 MG/DL (ref 70–99)
GLUCOSE BLD-MCNC: 179 MG/DL (ref 70–99)
PERFORMED ON: ABNORMAL
PERFORMED ON: ABNORMAL
REASON FOR REJECTION: NORMAL
REJECTED TEST: NORMAL

## 2025-02-18 PROCEDURE — 88307 TISSUE EXAM BY PATHOLOGIST: CPT

## 2025-02-18 PROCEDURE — 2500000003 HC RX 250 WO HCPCS

## 2025-02-18 PROCEDURE — 2709999900 HC NON-CHARGEABLE SUPPLY: Performed by: SURGERY

## 2025-02-18 PROCEDURE — 6360000002 HC RX W HCPCS: Performed by: SURGERY

## 2025-02-18 PROCEDURE — 3700000001 HC ADD 15 MINUTES (ANESTHESIA): Performed by: SURGERY

## 2025-02-18 PROCEDURE — 7100000011 HC PHASE II RECOVERY - ADDTL 15 MIN: Performed by: SURGERY

## 2025-02-18 PROCEDURE — 6360000002 HC RX W HCPCS

## 2025-02-18 PROCEDURE — 7100000000 HC PACU RECOVERY - FIRST 15 MIN: Performed by: SURGERY

## 2025-02-18 PROCEDURE — 2720000010 HC SURG SUPPLY STERILE: Performed by: SURGERY

## 2025-02-18 PROCEDURE — 76098 X-RAY EXAM SURGICAL SPECIMEN: CPT

## 2025-02-18 PROCEDURE — 2580000003 HC RX 258: Performed by: ANESTHESIOLOGY

## 2025-02-18 PROCEDURE — 3600000004 HC SURGERY LEVEL 4 BASE: Performed by: SURGERY

## 2025-02-18 PROCEDURE — 3600000014 HC SURGERY LEVEL 4 ADDTL 15MIN: Performed by: SURGERY

## 2025-02-18 PROCEDURE — 7100000010 HC PHASE II RECOVERY - FIRST 15 MIN: Performed by: SURGERY

## 2025-02-18 PROCEDURE — 2580000003 HC RX 258

## 2025-02-18 PROCEDURE — 2500000003 HC RX 250 WO HCPCS: Performed by: SURGERY

## 2025-02-18 PROCEDURE — 3700000000 HC ANESTHESIA ATTENDED CARE: Performed by: SURGERY

## 2025-02-18 PROCEDURE — 7100000001 HC PACU RECOVERY - ADDTL 15 MIN: Performed by: SURGERY

## 2025-02-18 RX ORDER — PROPOFOL 10 MG/ML
INJECTION, EMULSION INTRAVENOUS
Status: DISCONTINUED | OUTPATIENT
Start: 2025-02-18 | End: 2025-02-18 | Stop reason: SDUPTHER

## 2025-02-18 RX ORDER — FENTANYL CITRATE 50 UG/ML
INJECTION, SOLUTION INTRAMUSCULAR; INTRAVENOUS
Status: DISCONTINUED | OUTPATIENT
Start: 2025-02-18 | End: 2025-02-18 | Stop reason: SDUPTHER

## 2025-02-18 RX ORDER — MIDAZOLAM HYDROCHLORIDE 1 MG/ML
2 INJECTION, SOLUTION INTRAMUSCULAR; INTRAVENOUS
Status: DISCONTINUED | OUTPATIENT
Start: 2025-02-18 | End: 2025-02-18 | Stop reason: HOSPADM

## 2025-02-18 RX ORDER — OXYCODONE HYDROCHLORIDE 5 MG/1
10 TABLET ORAL PRN
Status: DISCONTINUED | OUTPATIENT
Start: 2025-02-18 | End: 2025-02-18 | Stop reason: HOSPADM

## 2025-02-18 RX ORDER — SODIUM CHLORIDE 0.9 % (FLUSH) 0.9 %
5-40 SYRINGE (ML) INJECTION PRN
Status: DISCONTINUED | OUTPATIENT
Start: 2025-02-18 | End: 2025-02-18 | Stop reason: HOSPADM

## 2025-02-18 RX ORDER — SODIUM CHLORIDE 0.9 % (FLUSH) 0.9 %
5-40 SYRINGE (ML) INJECTION EVERY 12 HOURS SCHEDULED
Status: DISCONTINUED | OUTPATIENT
Start: 2025-02-18 | End: 2025-02-18 | Stop reason: HOSPADM

## 2025-02-18 RX ORDER — OXYCODONE HYDROCHLORIDE 5 MG/1
5 TABLET ORAL PRN
Status: DISCONTINUED | OUTPATIENT
Start: 2025-02-18 | End: 2025-02-18 | Stop reason: HOSPADM

## 2025-02-18 RX ORDER — LIDOCAINE HYDROCHLORIDE 20 MG/ML
INJECTION, SOLUTION INFILTRATION; PERINEURAL
Status: DISCONTINUED | OUTPATIENT
Start: 2025-02-18 | End: 2025-02-18 | Stop reason: SDUPTHER

## 2025-02-18 RX ORDER — MAGNESIUM HYDROXIDE 1200 MG/15ML
LIQUID ORAL CONTINUOUS PRN
Status: COMPLETED | OUTPATIENT
Start: 2025-02-18 | End: 2025-02-18

## 2025-02-18 RX ORDER — DEXAMETHASONE SODIUM PHOSPHATE 4 MG/ML
INJECTION, SOLUTION INTRA-ARTICULAR; INTRALESIONAL; INTRAMUSCULAR; INTRAVENOUS; SOFT TISSUE
Status: DISCONTINUED | OUTPATIENT
Start: 2025-02-18 | End: 2025-02-18 | Stop reason: SDUPTHER

## 2025-02-18 RX ORDER — SODIUM CHLORIDE 9 MG/ML
INJECTION, SOLUTION INTRAVENOUS PRN
Status: DISCONTINUED | OUTPATIENT
Start: 2025-02-18 | End: 2025-02-18 | Stop reason: HOSPADM

## 2025-02-18 RX ORDER — ONDANSETRON 2 MG/ML
4 INJECTION INTRAMUSCULAR; INTRAVENOUS EVERY 30 MIN PRN
Status: DISCONTINUED | OUTPATIENT
Start: 2025-02-18 | End: 2025-02-18 | Stop reason: HOSPADM

## 2025-02-18 RX ORDER — ONDANSETRON 2 MG/ML
INJECTION INTRAMUSCULAR; INTRAVENOUS
Status: DISCONTINUED | OUTPATIENT
Start: 2025-02-18 | End: 2025-02-18 | Stop reason: SDUPTHER

## 2025-02-18 RX ORDER — LABETALOL HYDROCHLORIDE 5 MG/ML
10 INJECTION, SOLUTION INTRAVENOUS
Status: DISCONTINUED | OUTPATIENT
Start: 2025-02-18 | End: 2025-02-18 | Stop reason: HOSPADM

## 2025-02-18 RX ORDER — SODIUM CHLORIDE, SODIUM LACTATE, POTASSIUM CHLORIDE, CALCIUM CHLORIDE 600; 310; 30; 20 MG/100ML; MG/100ML; MG/100ML; MG/100ML
INJECTION, SOLUTION INTRAVENOUS CONTINUOUS
Status: DISCONTINUED | OUTPATIENT
Start: 2025-02-18 | End: 2025-02-18 | Stop reason: HOSPADM

## 2025-02-18 RX ORDER — ROCURONIUM BROMIDE 10 MG/ML
INJECTION, SOLUTION INTRAVENOUS
Status: DISCONTINUED | OUTPATIENT
Start: 2025-02-18 | End: 2025-02-18 | Stop reason: SDUPTHER

## 2025-02-18 RX ORDER — BUPIVACAINE HYDROCHLORIDE 5 MG/ML
INJECTION, SOLUTION EPIDURAL; INTRACAUDAL PRN
Status: DISCONTINUED | OUTPATIENT
Start: 2025-02-18 | End: 2025-02-18 | Stop reason: ALTCHOICE

## 2025-02-18 RX ORDER — NALOXONE HYDROCHLORIDE 0.4 MG/ML
INJECTION, SOLUTION INTRAMUSCULAR; INTRAVENOUS; SUBCUTANEOUS PRN
Status: DISCONTINUED | OUTPATIENT
Start: 2025-02-18 | End: 2025-02-18 | Stop reason: HOSPADM

## 2025-02-18 RX ORDER — MIDAZOLAM HYDROCHLORIDE 1 MG/ML
INJECTION, SOLUTION INTRAMUSCULAR; INTRAVENOUS
Status: DISCONTINUED | OUTPATIENT
Start: 2025-02-18 | End: 2025-02-18 | Stop reason: SDUPTHER

## 2025-02-18 RX ADMIN — DEXAMETHASONE SODIUM PHOSPHATE 4 MG: 4 INJECTION, SOLUTION INTRAMUSCULAR; INTRAVENOUS at 11:57

## 2025-02-18 RX ADMIN — LIDOCAINE HYDROCHLORIDE 80 MG: 20 INJECTION, SOLUTION INFILTRATION; PERINEURAL at 11:43

## 2025-02-18 RX ADMIN — ONDANSETRON 4 MG: 2 INJECTION INTRAMUSCULAR; INTRAVENOUS at 11:57

## 2025-02-18 RX ADMIN — DEXMEDETOMIDINE HYDROCHLORIDE 4 MCG: 100 INJECTION, SOLUTION INTRAVENOUS at 12:28

## 2025-02-18 RX ADMIN — MIDAZOLAM 2 MG: 1 INJECTION INTRAMUSCULAR; INTRAVENOUS at 11:31

## 2025-02-18 RX ADMIN — ROCURONIUM BROMIDE 50 MG: 50 INJECTION, SOLUTION INTRAVENOUS at 11:44

## 2025-02-18 RX ADMIN — PROPOFOL 130 MG: 10 INJECTION, EMULSION INTRAVENOUS at 11:43

## 2025-02-18 RX ADMIN — CEFAZOLIN 2000 MG: 2 INJECTION, POWDER, FOR SOLUTION INTRAVENOUS at 11:49

## 2025-02-18 RX ADMIN — FENTANYL CITRATE 50 MCG: 50 INJECTION, SOLUTION INTRAMUSCULAR; INTRAVENOUS at 11:59

## 2025-02-18 RX ADMIN — SUGAMMADEX 200 MG: 100 INJECTION, SOLUTION INTRAVENOUS at 12:32

## 2025-02-18 RX ADMIN — SODIUM CHLORIDE: 9 INJECTION, SOLUTION INTRAVENOUS at 11:34

## 2025-02-18 RX ADMIN — FENTANYL CITRATE 50 MCG: 50 INJECTION, SOLUTION INTRAMUSCULAR; INTRAVENOUS at 11:43

## 2025-02-18 ASSESSMENT — PAIN - FUNCTIONAL ASSESSMENT: PAIN_FUNCTIONAL_ASSESSMENT: 0-10

## 2025-02-18 NOTE — PROGRESS NOTES
Discharge instructions given to pt and family. Verbalized understanding. PIV removed. Pt dressed and wheeled out and discharged to the care of their family in stable condition.

## 2025-02-18 NOTE — DISCHARGE INSTRUCTIONS
addition to your pills, like a condom, for 1 month after your procedure to prevent unwanted pregnancy.    The following instructions are to be followed if you have a known history or diagnosis of sleep apnea:  For all sleep apnea patients:  ? Sleep on your side or sitting up in a chair whenever possible, especially the first 24 hours after surgery.  ? Use only medicines prescribed by your doctor.    ? Do not drink alcohol.  ? If you have a dental device to assist you while at rest, use it at all times for the first 24 hours.  For patients using CPAP machines:  ? Use your CPAP machine during all periods of sleep as usual.  ? Use your CPAP machine during all periods of daytime rest while on pain medicines.  ** Follow up with your primary care doctor for continued care.    IF YOU DO NOT TAKE ALL OF YOUR NARCOTIC PAIN MEDICATION, please dispose of them responsibly. There are drop off boxes in the Emergency Departments 24/7 at both [facility]. If these locations are not convenient, other options for discarding them can be found at:  [email or web address]    Hospital or office staff may NOT accept any medications to drop off in the cabinet for you.     What is a Surgical Site Infection or  (SSI)?        A surgical site infection (SSI) is an infection that occurs after surgery in the part of the body where the surgery took place. Most patients who have surgery do not develop an infection. However, infections can develop in about 1-3 cases for every 100 patients who have had surgery.  Our goal is for you to NOT experience any complications and be completely satisfied with your care!    However, some signs or symptoms to look for and report immediately to your doctor are:   1. Fever above 101 degrees    2. Redness and increasing pain around the area  where you had surgery   3. Drainage of cloudy fluid or pus coming from the surgical area    Some of the things we/ you can do to prevent SSI's are:   1.

## 2025-02-18 NOTE — ANESTHESIA PRE PROCEDURE
JOINT REPLACEMENT     • KNEE ARTHROPLASTY     • KNEE ARTHROSCOPY Right    • KNEE ARTHROSCOPY Left 10/2013    partial medial menisectomy   • KNEE SURGERY     • MRI BIOPSY BREAST W LOC DEVICE RIGHT 1ST LESION Right 1/22/2025    MRI BIOPSY BREAST W LOC DEVICE RIGHT 1ST LESION 1/22/2025 [facility] MRI   • PLANTAR FASCIA SURGERY      bilateral   • BRANDI AND BSO (CERVIX REMOVED)  04/2010    DUB   • TOTAL KNEE ARTHROPLASTY Right 03/29/2019    RIGHT TOTAL KNEE MAKOPLASTY REPLACEMENT WITH ADDUCTOR CANAL BLOCK FOR PAIN CONTROL                 JOE MCCLENDON  CPT CODE - 39014  performed by Lester Morales MD at [facility] OR   • UPPER GASTROINTESTINAL ENDOSCOPY         Social History:    Social History     Tobacco Use   • Smoking status: Never   • Smokeless tobacco: Never   Substance Use Topics   • Alcohol use: No                                Counseling given: Not Answered      Vital Signs (Current):   Vitals:    02/12/25 1327 02/18/25 1050   BP:  131/75   Pulse:  78   Resp:  18   Temp:  98.5 °F (36.9 °C)   TempSrc:  Oral   SpO2:  95%   Weight: 99.3 kg (219 lb)    Height: 1.727 m (5' 8\")                                               BP Readings from Last 3 Encounters:   02/18/25 131/75   02/13/25 108/72   02/11/25 116/84       NPO Status: Time of last liquid consumption: 0900 (sip with medication)                        Time of last solid consumption: 2100                        Date of last liquid consumption: 02/18/25                        Date of last solid food consumption: 02/17/25    BMI:   Wt Readings from Last 3 Encounters:   02/12/25 99.3 kg (219 lb)   02/13/25 99.7 kg (219 lb 12.8 oz)   02/11/25 99.4 kg (219 lb 3.2 oz)     Body mass index is 33.3 kg/m².    CBC:   Lab Results   Component Value Date/Time    WBC 7.6 05/22/2024 04:35 AM    RBC 4.38 05/22/2024 04:35 AM    HGB 13.4 05/22/2024 04:35 AM    HCT 40.3 05/22/2024 04:35 AM    MCV 92.1 05/22/2024 04:35 AM    RDW 13.7 05/22/2024 04:35 AM     05/22/2024 04:35 AM

## 2025-02-18 NOTE — H&P
H&P Update    The patient's History and Physical was reviewed with the patient and I examined the patient. There was no change. The surgical site was confirmed by the patient and me. Plan: The risks, benefits, expected outcome, and alternative to the recommended procedure have been discussed with the patient. Patient understands and wants to proceed with the procedure    Rach Hernandez MD

## 2025-02-18 NOTE — ANESTHESIA POSTPROCEDURE EVALUATION
Department of Anesthesiology  Postprocedure Note    Patient: Karishma Lee  MRN: [id number]  YOB: 1960  Date of evaluation: 2/18/2025    Procedure Summary       Date: 02/18/25 Room / Location: [address]    Anesthesia Start: 1134 Anesthesia Stop: 1245    Procedure: RIGHT RADIOFREQUENCY IDENTIFICATION TAG LOCALIZED EXCISIONAL BREAST BIOPSY (Right: Breast) Diagnosis:       Abnormal mammogram      (Abnormal mammogram [R92.8])    Surgeons: Rach Hernandez MD Responsible Provider: Delfino Sandoval MD    Anesthesia Type: general ASA Status: 3            Anesthesia Type: No value filed.    Denia Phase I: Denia Score: 10    Denia Phase II: Denia Score: 10    Anesthesia Post Evaluation    Patient location during evaluation: PACU  Level of consciousness: awake  Airway patency: patent  Nausea & Vomiting: no vomiting  Cardiovascular status: blood pressure returned to baseline  Respiratory status: acceptable  Hydration status: stable  Pain management: adequate    No notable events documented.

## 2025-02-18 NOTE — OP NOTE
Operative Note    Karishma Lee  YOB: 1960  MRN: [id number]    PREOPERATIVE DIAGNOSIS  right abnormal mammogram     POSTOPERATIVE DIAGNOSIS  right abnormal mammogram    PROCEDURE  right radiofrequency identification tag localized excisional breast biopsy    ANESTHESIA  General anesthesia    SURGEON  Rach Hernandez MD     ASSISTANT  Lambert Jaquez    ESTIMATED BLOOD LOSS  less than 50  ml    COMPLICATIONS  None apparent by completion of procedure    SPECIMENS REMOVED  1. The right breast tissue which was marked with a marked with a short stitch on the superior margin and a long stitch on the lateral margin.    FINDING  The right breast tissue was excised using radiofrequency identification tag localization.  Specimen radiograph demonstrated that the lesion and clip and tag were located within the specimen.    POST-OP CONDITION  Stable    DISPOSITION  To recovery room    INDICATION FOR PROCEDURE  Karishma Lee is a 64 y.o. woman who was found to have an abnormality in her right breast on imaging.  A core biopsy was performed and revealed atypical ductal hyperplasia.  Due to the upgrade risk for malignancy, I did recommend excision.  She presents today for that purpose. The indications for the planned procedure, along with the potential benefits and risks which include but are not limited to: anesthetic risk, bleeding, infection, wound complications, sensation changes, unappealing cosmetics, and the need to return to the operating room for a second procedure based on final pathology were reviewed. All questions were answered, and she agrees to proceed.    DESCRIPTION OF PROCEDURE   Karishma Lee underwent an image guided localization procedure preoperatively in the radiology department.  She was then brought to the operating room and placed supine on the operating room table with her arms extended on boards. She was appropriately positioned and padded. Bilateral sequential compression

## 2025-02-18 NOTE — PROGRESS NOTES
Pt brought to PACU. Report obtained from OR RN and anesthesia. Pt placed on monitor NSR and O2 97% at 4L simple mask. Oral airway in place

## 2025-05-05 RX ORDER — GLYBURIDE 2.5 MG/1
TABLET ORAL
Qty: 60 TABLET | Refills: 2 | Status: SHIPPED | OUTPATIENT
Start: 2025-05-05

## 2025-05-05 NOTE — TELEPHONE ENCOUNTER
Karishma Lee is requesting refill(s) glyburide  Last OV 2/11/25 (pertaining to medication)  LR 1/27/25 (per medication requested)  Next office visit scheduled or attempted Yes   If no, reason:  5/20/25

## 2025-05-06 NOTE — TELEPHONE ENCOUNTER
Pt states she was told to increase Metformin from 1 to 2 tablets daily last office visit so pt needs prescription with new directions.  Karishma Lee [phone] (home) [phone] (work)   is requesting refill(s) of medication Metformin 1000 mg tablet to preferred pharmacy [facility]    Last OV 02/11/25 (pertaining to medication)   Last refill 12/27/24 (per medication requested)  Next office visit scheduled or attempted Yes  Date 05/20/25

## 2025-05-20 ENCOUNTER — OFFICE VISIT (OUTPATIENT)
Dept: FAMILY MEDICINE CLINIC | Age: 65
End: 2025-05-20
Payer: COMMERCIAL

## 2025-05-20 VITALS
HEART RATE: 89 BPM | SYSTOLIC BLOOD PRESSURE: 116 MMHG | WEIGHT: 219.6 LBS | HEIGHT: 68 IN | BODY MASS INDEX: 33.28 KG/M2 | OXYGEN SATURATION: 95 % | DIASTOLIC BLOOD PRESSURE: 82 MMHG | RESPIRATION RATE: 16 BRPM

## 2025-05-20 DIAGNOSIS — E11.69 TYPE 2 DIABETES MELLITUS WITH HYPERLIPIDEMIA (HCC): Primary | ICD-10-CM

## 2025-05-20 DIAGNOSIS — E78.5 TYPE 2 DIABETES MELLITUS WITH HYPERLIPIDEMIA (HCC): Primary | ICD-10-CM

## 2025-05-20 DIAGNOSIS — E78.00 PURE HYPERCHOLESTEROLEMIA: ICD-10-CM

## 2025-05-20 DIAGNOSIS — I10 ESSENTIAL HYPERTENSION: ICD-10-CM

## 2025-05-20 LAB — HBA1C MFR BLD: 7.7 %

## 2025-05-20 PROCEDURE — 3074F SYST BP LT 130 MM HG: CPT | Performed by: PHYSICIAN ASSISTANT

## 2025-05-20 PROCEDURE — G9899 SCRN MAM PERF RSLTS DOC: HCPCS | Performed by: PHYSICIAN ASSISTANT

## 2025-05-20 PROCEDURE — 83036 HEMOGLOBIN GLYCOSYLATED A1C: CPT | Performed by: PHYSICIAN ASSISTANT

## 2025-05-20 PROCEDURE — G8427 DOCREV CUR MEDS BY ELIG CLIN: HCPCS | Performed by: PHYSICIAN ASSISTANT

## 2025-05-20 PROCEDURE — 99214 OFFICE O/P EST MOD 30 MIN: CPT | Performed by: PHYSICIAN ASSISTANT

## 2025-05-20 PROCEDURE — 36415 COLL VENOUS BLD VENIPUNCTURE: CPT | Performed by: PHYSICIAN ASSISTANT

## 2025-05-20 PROCEDURE — 1036F TOBACCO NON-USER: CPT | Performed by: PHYSICIAN ASSISTANT

## 2025-05-20 PROCEDURE — 3017F COLORECTAL CA SCREEN DOC REV: CPT | Performed by: PHYSICIAN ASSISTANT

## 2025-05-20 PROCEDURE — 3079F DIAST BP 80-89 MM HG: CPT | Performed by: PHYSICIAN ASSISTANT

## 2025-05-20 PROCEDURE — G8417 CALC BMI ABV UP PARAM F/U: HCPCS | Performed by: PHYSICIAN ASSISTANT

## 2025-05-20 PROCEDURE — 2022F DILAT RTA XM EVC RTNOPTHY: CPT | Performed by: PHYSICIAN ASSISTANT

## 2025-05-20 PROCEDURE — 3051F HG A1C>EQUAL 7.0%<8.0%: CPT | Performed by: PHYSICIAN ASSISTANT

## 2025-05-20 RX ORDER — ONDANSETRON 8 MG/1
8 TABLET, FILM COATED ORAL EVERY 8 HOURS PRN
COMMUNITY
Start: 2025-05-15

## 2025-05-20 ASSESSMENT — PATIENT HEALTH QUESTIONNAIRE - PHQ9
SUM OF ALL RESPONSES TO PHQ QUESTIONS 1-9: 9
8. MOVING OR SPEAKING SO SLOWLY THAT OTHER PEOPLE COULD HAVE NOTICED. OR THE OPPOSITE, BEING SO FIGETY OR RESTLESS THAT YOU HAVE BEEN MOVING AROUND A LOT MORE THAN USUAL: NOT AT ALL
4. FEELING TIRED OR HAVING LITTLE ENERGY: NEARLY EVERY DAY
SUM OF ALL RESPONSES TO PHQ QUESTIONS 1-9: 9
6. FEELING BAD ABOUT YOURSELF - OR THAT YOU ARE A FAILURE OR HAVE LET YOURSELF OR YOUR FAMILY DOWN: NOT AT ALL
7. TROUBLE CONCENTRATING ON THINGS, SUCH AS READING THE NEWSPAPER OR WATCHING TELEVISION: NOT AT ALL
2. FEELING DOWN, DEPRESSED OR HOPELESS: SEVERAL DAYS
1. LITTLE INTEREST OR PLEASURE IN DOING THINGS: SEVERAL DAYS
3. TROUBLE FALLING OR STAYING ASLEEP: NEARLY EVERY DAY
10. IF YOU CHECKED OFF ANY PROBLEMS, HOW DIFFICULT HAVE THESE PROBLEMS MADE IT FOR YOU TO DO YOUR WORK, TAKE CARE OF THINGS AT HOME, OR GET ALONG WITH OTHER PEOPLE: NOT DIFFICULT AT ALL
SUM OF ALL RESPONSES TO PHQ QUESTIONS 1-9: 9
9. THOUGHTS THAT YOU WOULD BE BETTER OFF DEAD, OR OF HURTING YOURSELF: NOT AT ALL
SUM OF ALL RESPONSES TO PHQ QUESTIONS 1-9: 9
5. POOR APPETITE OR OVEREATING: SEVERAL DAYS

## 2025-05-20 NOTE — PROGRESS NOTES
SUBJECTIVE:  64 y.o. female for follow up of diabetes. Diabetic Review of Systems - medication compliance: compliant all of the time, diabetic diet compliance: noncompliant some of the time, home glucose monitoring: fasting values range 140-160.  Other symptoms and concerns: stopped lexapro and remeron.     Current Outpatient Medications   Medication Sig Dispense Refill    ondansetron (ZOFRAN) 8 MG tablet Take 1 tablet by mouth every 8 hours as needed for Nausea or Vomiting      metFORMIN (GLUCOPHAGE) 1000 MG tablet Take 1 tablet by mouth 2 times daily (with meals) 180 tablet 1    glyBURIDE (DIABETA) 2.5 MG tablet TAKE 1 TABLET BY MOUTH TWICE A DAY WITH A MEAL 60 tablet 2    rOPINIRole (REQUIP) 0.5 MG tablet Take 1 tablet by mouth nightly 30 tablet 5    empagliflozin (JARDIANCE) 25 MG tablet Take 1 tablet by mouth daily 90 tablet 1    valsartan (DIOVAN) 80 MG tablet TAKE 1 TABLET BY MOUTH DAILY 90 tablet 2    blood glucose test strips (ASCENSIA AUTODISC VI;ONE TOUCH ULTRA TEST VI) strip 1 each by In Vitro route daily As needed. 100 each 3    atorvastatin (LIPITOR) 10 MG tablet TAKE 1 TABLET BY MOUTH DAILY 90 tablet 2    metoprolol succinate (TOPROL XL) 50 MG extended release tablet TAKE 1 TABLET BY MOUTH DAILY 90 tablet 2    buPROPion (WELLBUTRIN) 100 MG tablet Take 1 tablet by mouth daily      blood glucose monitor kit and supplies Use as Directed to test for blood sugar. Dispense per insurance and patient preference. 1 kit 0    Lancets MISC Use 1 lancet to test blood sugar twice daily. Dispense per insurance and patient preference. 100 each 5    Bacillus Coagulans-Inulin (PROBIOTIC) 1-250 BILLION-MG CAPS Take 1 capsule by mouth every evening      lamoTRIgine (LAMICTAL) 100 MG tablet Take 1 tablet by mouth at bedtime Indications: mood      aspirin EC 81 MG EC tablet Take 1 tablet by mouth every morning      blood glucose monitor strips Test once dialy for diabetes e11.9 100 strip 5    Blood Glucose Monitoring Suppl

## 2025-05-21 LAB
ALBUMIN SERPL-MCNC: 4.7 G/DL (ref 3.4–5)
ALBUMIN/GLOB SERPL: 1.9 {RATIO} (ref 1.1–2.2)
ALP SERPL-CCNC: 112 U/L (ref 40–129)
ALT SERPL-CCNC: 30 U/L (ref 10–40)
ANION GAP SERPL CALCULATED.3IONS-SCNC: 15 MMOL/L (ref 3–16)
AST SERPL-CCNC: 21 U/L (ref 15–37)
BILIRUB SERPL-MCNC: 0.5 MG/DL (ref 0–1)
BUN SERPL-MCNC: 17 MG/DL (ref 7–20)
CALCIUM SERPL-MCNC: 10 MG/DL (ref 8.3–10.6)
CHLORIDE SERPL-SCNC: 100 MMOL/L (ref 99–110)
CHOLEST SERPL-MCNC: 153 MG/DL (ref 0–199)
CO2 SERPL-SCNC: 22 MMOL/L (ref 21–32)
CREAT SERPL-MCNC: 1 MG/DL (ref 0.6–1.2)
GFR SERPLBLD CREATININE-BSD FMLA CKD-EPI: 63 ML/MIN/{1.73_M2}
GLUCOSE SERPL-MCNC: 195 MG/DL (ref 70–99)
HDLC SERPL-MCNC: 59 MG/DL (ref 40–60)
LDLC SERPL CALC-MCNC: 70 MG/DL
POTASSIUM SERPL-SCNC: 5 MMOL/L (ref 3.5–5.1)
PROT SERPL-MCNC: 7.2 G/DL (ref 6.4–8.2)
SODIUM SERPL-SCNC: 137 MMOL/L (ref 136–145)
TRIGL SERPL-MCNC: 118 MG/DL (ref 0–150)
VLDLC SERPL CALC-MCNC: 24 MG/DL

## 2025-05-22 ENCOUNTER — RESULTS FOLLOW-UP (OUTPATIENT)
Dept: FAMILY MEDICINE CLINIC | Age: 65
End: 2025-05-22

## 2025-06-09 ENCOUNTER — OFFICE VISIT (OUTPATIENT)
Age: 65
End: 2025-06-09

## 2025-06-09 VITALS
HEART RATE: 103 BPM | DIASTOLIC BLOOD PRESSURE: 90 MMHG | SYSTOLIC BLOOD PRESSURE: 134 MMHG | OXYGEN SATURATION: 97 % | TEMPERATURE: 97.3 F

## 2025-06-09 DIAGNOSIS — H60.502 ACUTE OTITIS EXTERNA OF LEFT EAR, UNSPECIFIED TYPE: Primary | ICD-10-CM

## 2025-06-09 DIAGNOSIS — R21 RASH AND NONSPECIFIC SKIN ERUPTION: ICD-10-CM

## 2025-06-09 RX ORDER — CLOTRIMAZOLE 1 %
CREAM (GRAM) TOPICAL
Qty: 28 G | Refills: 0 | Status: SHIPPED | OUTPATIENT
Start: 2025-06-09 | End: 2025-06-16

## 2025-06-09 RX ORDER — CIPROFLOXACIN/HYDROCORTISONE 0.2 %-1 %
3 SUSPENSION, DROPS(FINAL DOSAGE FORM)(ML) OTIC (EAR) 2 TIMES DAILY
Qty: 10 ML | Refills: 0 | Status: SHIPPED | OUTPATIENT
Start: 2025-06-09 | End: 2025-06-16

## 2025-06-09 NOTE — PROGRESS NOTES
is pruritic but not painful. No bleeding or discharge.   No known new contacts.   She has been putting OTC hydrocortisone on it but has not seen improvement.       Ear Pain:  Yesterday she started with left ear pain. It worsened and interfered with sleep last night.   Denies discharge or bleeding.   No muffled hearing.   She does state it feels like there is some \"crackling or popping\".   No fever.   Denies right ear symptoms. Minimal rhinorrhea. Denies other URI symptoms.             Vitals:    06/09/25 1219   BP: (!) 134/90   Pulse: (!) 103   Temp: 97.3 °F (36.3 °C)   TempSrc: Temporal   SpO2: 97%         Physical Exam  Vitals and nursing note reviewed.   Constitutional:       Appearance: Normal appearance.   HENT:      Head: Atraumatic.      Right Ear: Tympanic membrane and ear canal normal.      Ears:      Comments: Left ear: External ear normal. EAC edematous and mildly erythematous. Mild tenderness with traction of pinnae. Excess cerumen but no impaction. Visualization of TM limited but appears clear.      Mouth/Throat:      Mouth: Mucous membranes are moist.   Eyes:      Extraocular Movements: Extraocular movements intact.      Conjunctiva/sclera: Conjunctivae normal.   Cardiovascular:      Rate and Rhythm: Normal rate and regular rhythm.   Pulmonary:      Effort: Pulmonary effort is normal. No respiratory distress.   Skin:     General: Skin is warm.      Comments: 3 round, well demarcated erythematous lesions to left upper arm. No induration. No warmth. See Clinical images.    Neurological:      Mental Status: She is alert.            An electronic signature was used to authenticate this note.    --Silvestre Prado PA-C

## 2025-06-09 NOTE — PATIENT INSTRUCTIONS
Rash:   Apply topical medication twice daily x 2 weeks.   If not improved, follow-up with dermatology.     Dermatologist:  [facility]:   [address], [place], [place] [id number] [phone]     Dermatology Specialist of [facility]:  [address] - [place] [address]   [phone]     Dermatologist of [facility]  [address], [address]  [place], [place]   [phone]     [facility]  [address]  [phone]    [facility]  [facility]  [address]  [place], [place]   [phone]    Ear:   Use ear drops as prescribed.   Follow-up with ENT if not improved.   [facility]  [address], [place] [address] [phone]

## 2025-06-23 ENCOUNTER — OFFICE VISIT (OUTPATIENT)
Dept: FAMILY MEDICINE CLINIC | Age: 65
End: 2025-06-23
Payer: COMMERCIAL

## 2025-06-23 VITALS
OXYGEN SATURATION: 98 % | RESPIRATION RATE: 16 BRPM | BODY MASS INDEX: 33.13 KG/M2 | HEIGHT: 68 IN | DIASTOLIC BLOOD PRESSURE: 84 MMHG | SYSTOLIC BLOOD PRESSURE: 126 MMHG | HEART RATE: 81 BPM | WEIGHT: 218.6 LBS

## 2025-06-23 DIAGNOSIS — R10.10 UPPER ABDOMINAL PAIN: Primary | ICD-10-CM

## 2025-06-23 LAB
AMYLASE SERPL-CCNC: 69 U/L (ref 25–115)
BILIRUBIN, POC: 0
BLOOD URINE, POC: NORMAL
CLARITY, POC: CLEAR
COLOR, POC: YELLOW
DEPRECATED RDW RBC AUTO: 14 % (ref 12.4–15.4)
GLUCOSE URINE, POC: NORMAL MG/DL
HCT VFR BLD AUTO: 47.5 % (ref 36–48)
HGB BLD-MCNC: 15.6 G/DL (ref 12–16)
KETONES, POC: 0 MG/DL
LEUKOCYTE EST, POC: 0
LIPASE SERPL-CCNC: 44 U/L (ref 13–60)
MCH RBC QN AUTO: 30.3 PG (ref 26–34)
MCHC RBC AUTO-ENTMCNC: 32.9 G/DL (ref 31–36)
MCV RBC AUTO: 91.9 FL (ref 80–100)
NITRITE, POC: 0
PH, POC: 5
PLATELET # BLD AUTO: 315 K/UL (ref 135–450)
PMV BLD AUTO: 9.5 FL (ref 5–10.5)
PROTEIN, POC: 0 MG/DL
RBC # BLD AUTO: 5.17 M/UL (ref 4–5.2)
SPECIFIC GRAVITY, POC: 1.01
UROBILINOGEN, POC: 0.2 MG/DL
WBC # BLD AUTO: 11.7 K/UL (ref 4–11)

## 2025-06-23 PROCEDURE — 1036F TOBACCO NON-USER: CPT | Performed by: PHYSICIAN ASSISTANT

## 2025-06-23 PROCEDURE — G9899 SCRN MAM PERF RSLTS DOC: HCPCS | Performed by: PHYSICIAN ASSISTANT

## 2025-06-23 PROCEDURE — 3079F DIAST BP 80-89 MM HG: CPT | Performed by: PHYSICIAN ASSISTANT

## 2025-06-23 PROCEDURE — G8417 CALC BMI ABV UP PARAM F/U: HCPCS | Performed by: PHYSICIAN ASSISTANT

## 2025-06-23 PROCEDURE — 81002 URINALYSIS NONAUTO W/O SCOPE: CPT | Performed by: PHYSICIAN ASSISTANT

## 2025-06-23 PROCEDURE — 36415 COLL VENOUS BLD VENIPUNCTURE: CPT | Performed by: PHYSICIAN ASSISTANT

## 2025-06-23 PROCEDURE — 99213 OFFICE O/P EST LOW 20 MIN: CPT | Performed by: PHYSICIAN ASSISTANT

## 2025-06-23 PROCEDURE — G8427 DOCREV CUR MEDS BY ELIG CLIN: HCPCS | Performed by: PHYSICIAN ASSISTANT

## 2025-06-23 PROCEDURE — 3074F SYST BP LT 130 MM HG: CPT | Performed by: PHYSICIAN ASSISTANT

## 2025-06-23 PROCEDURE — 3017F COLORECTAL CA SCREEN DOC REV: CPT | Performed by: PHYSICIAN ASSISTANT

## 2025-06-23 RX ORDER — OFLOXACIN 3 MG/ML
5 SOLUTION AURICULAR (OTIC) DAILY
COMMUNITY
Start: 2025-06-09

## 2025-06-23 RX ORDER — CLOTRIMAZOLE 1 %
CREAM (GRAM) TOPICAL 2 TIMES DAILY
COMMUNITY
Start: 2025-06-09

## 2025-06-23 ASSESSMENT — PATIENT HEALTH QUESTIONNAIRE - PHQ9
6. FEELING BAD ABOUT YOURSELF - OR THAT YOU ARE A FAILURE OR HAVE LET YOURSELF OR YOUR FAMILY DOWN: NOT AT ALL
2. FEELING DOWN, DEPRESSED OR HOPELESS: NOT AT ALL
9. THOUGHTS THAT YOU WOULD BE BETTER OFF DEAD, OR OF HURTING YOURSELF: NOT AT ALL
7. TROUBLE CONCENTRATING ON THINGS, SUCH AS READING THE NEWSPAPER OR WATCHING TELEVISION: NOT AT ALL
SUM OF ALL RESPONSES TO PHQ QUESTIONS 1-9: 0
SUM OF ALL RESPONSES TO PHQ QUESTIONS 1-9: 0
5. POOR APPETITE OR OVEREATING: NOT AT ALL
8. MOVING OR SPEAKING SO SLOWLY THAT OTHER PEOPLE COULD HAVE NOTICED. OR THE OPPOSITE, BEING SO FIGETY OR RESTLESS THAT YOU HAVE BEEN MOVING AROUND A LOT MORE THAN USUAL: NOT AT ALL
3. TROUBLE FALLING OR STAYING ASLEEP: NOT AT ALL
SUM OF ALL RESPONSES TO PHQ QUESTIONS 1-9: 0
4. FEELING TIRED OR HAVING LITTLE ENERGY: NOT AT ALL
SUM OF ALL RESPONSES TO PHQ QUESTIONS 1-9: 0
1. LITTLE INTEREST OR PLEASURE IN DOING THINGS: NOT AT ALL
10. IF YOU CHECKED OFF ANY PROBLEMS, HOW DIFFICULT HAVE THESE PROBLEMS MADE IT FOR YOU TO DO YOUR WORK, TAKE CARE OF THINGS AT HOME, OR GET ALONG WITH OTHER PEOPLE: NOT DIFFICULT AT ALL

## 2025-06-23 ASSESSMENT — ENCOUNTER SYMPTOMS
RESPIRATORY NEGATIVE: 1
ABDOMINAL PAIN: 1
DIARRHEA: 0
CONSTIPATION: 0
NAUSEA: 0

## 2025-06-23 NOTE — PROGRESS NOTES
Subjective   Patient ID: Karishma Lee is a 64 y.o. female.    HPI  Patient present with RUQ pain that has radiated from her back to the abdomen for the last week. No nausea or vomiting. NO bowel changes. No rash. No injury. No changes with food. Moving in general bothers it. No alleviating factors. Has used tyelnol and muscle relaxers with no relief.     Review of Systems   Constitutional: Negative.    Respiratory: Negative.     Gastrointestinal:  Positive for abdominal pain. Negative for constipation, diarrhea and nausea.   Genitourinary:  Positive for flank pain. Negative for dysuria and frequency.          Objective   Physical Exam  Constitutional:       Appearance: Normal appearance. She is obese.   Cardiovascular:      Rate and Rhythm: Normal rate and regular rhythm.      Heart sounds: Normal heart sounds.   Pulmonary:      Effort: Pulmonary effort is normal.      Breath sounds: Normal breath sounds.   Abdominal:      Palpations: Abdomen is soft.      Tenderness: There is abdominal tenderness.      Comments: Upper abdomen tenderness greater right and midepigastric   Musculoskeletal:      Comments: Right flank tenderness radiating to the abdomen   Neurological:      General: No focal deficit present.      Mental Status: She is alert and oriented to person, place, and time.            Assessment /Plan:     Diagnosis Orders   1. Upper abdominal pain  CBC    Lipase    Amylase    POCT Urinalysis no Micro              PAULA Bryant

## 2025-06-24 ENCOUNTER — APPOINTMENT (OUTPATIENT)
Dept: GENERAL RADIOLOGY | Age: 65
End: 2025-06-24
Payer: COMMERCIAL

## 2025-06-24 ENCOUNTER — HOSPITAL ENCOUNTER (EMERGENCY)
Age: 65
Discharge: HOME | End: 2025-06-24
Attending: EMERGENCY MEDICINE
Payer: COMMERCIAL

## 2025-06-24 ENCOUNTER — APPOINTMENT (OUTPATIENT)
Dept: CT IMAGING | Age: 65
End: 2025-06-24
Payer: COMMERCIAL

## 2025-06-24 VITALS
TEMPERATURE: 98.1 F | RESPIRATION RATE: 16 BRPM | OXYGEN SATURATION: 100 % | WEIGHT: 215.8 LBS | DIASTOLIC BLOOD PRESSURE: 73 MMHG | HEART RATE: 75 BPM | SYSTOLIC BLOOD PRESSURE: 132 MMHG | BODY MASS INDEX: 32.82 KG/M2

## 2025-06-24 DIAGNOSIS — K57.90 DIVERTICULOSIS: ICD-10-CM

## 2025-06-24 DIAGNOSIS — M54.14 THORACIC RADICULOPATHY: Primary | ICD-10-CM

## 2025-06-24 DIAGNOSIS — R10.11 ABDOMINAL PAIN, RIGHT UPPER QUADRANT: ICD-10-CM

## 2025-06-24 DIAGNOSIS — K76.0 HEPATIC STEATOSIS: ICD-10-CM

## 2025-06-24 LAB
ALBUMIN SERPL-MCNC: 5.1 G/DL (ref 3.4–5)
ALBUMIN/GLOB SERPL: 2 {RATIO} (ref 1.1–2.2)
ALP SERPL-CCNC: 123 U/L (ref 40–129)
ALT SERPL-CCNC: 32 U/L (ref 10–40)
ANION GAP SERPL CALCULATED.3IONS-SCNC: 12 MMOL/L (ref 3–16)
AST SERPL-CCNC: 26 U/L (ref 15–37)
BASOPHILS # BLD: 0.1 K/UL (ref 0–0.2)
BASOPHILS NFR BLD: 1 %
BILIRUB SERPL-MCNC: 0.4 MG/DL (ref 0–1)
BUN SERPL-MCNC: 15 MG/DL (ref 7–20)
CALCIUM SERPL-MCNC: 10.1 MG/DL (ref 8.3–10.6)
CHLORIDE SERPL-SCNC: 100 MMOL/L (ref 99–110)
CO2 SERPL-SCNC: 25 MMOL/L (ref 21–32)
CREAT SERPL-MCNC: 1 MG/DL (ref 0.6–1.2)
DEPRECATED RDW RBC AUTO: 14.2 % (ref 12.4–15.4)
EOSINOPHIL # BLD: 0.8 K/UL (ref 0–0.6)
EOSINOPHIL NFR BLD: 7.1 %
GFR SERPLBLD CREATININE-BSD FMLA CKD-EPI: 63 ML/MIN/{1.73_M2}
GLUCOSE SERPL-MCNC: 175 MG/DL (ref 70–99)
HCT VFR BLD AUTO: 47.3 % (ref 36–48)
HGB BLD-MCNC: 16 G/DL (ref 12–16)
LIPASE SERPL-CCNC: 33 U/L (ref 13–60)
LYMPHOCYTES # BLD: 1.8 K/UL (ref 1–5.1)
LYMPHOCYTES NFR BLD: 16.2 %
MCH RBC QN AUTO: 30.9 PG (ref 26–34)
MCHC RBC AUTO-ENTMCNC: 33.7 G/DL (ref 31–36)
MCV RBC AUTO: 91.7 FL (ref 80–100)
MONOCYTES # BLD: 0.6 K/UL (ref 0–1.3)
MONOCYTES NFR BLD: 5.1 %
NEUTROPHILS # BLD: 7.9 K/UL (ref 1.7–7.7)
NEUTROPHILS NFR BLD: 70.6 %
PLATELET # BLD AUTO: 302 K/UL (ref 135–450)
PMV BLD AUTO: 8.6 FL (ref 5–10.5)
POTASSIUM SERPL-SCNC: 4.7 MMOL/L (ref 3.5–5.1)
PROT SERPL-MCNC: 7.6 G/DL (ref 6.4–8.2)
RBC # BLD AUTO: 5.16 M/UL (ref 4–5.2)
SODIUM SERPL-SCNC: 137 MMOL/L (ref 136–145)
WBC # BLD AUTO: 11.2 K/UL (ref 4–11)

## 2025-06-24 PROCEDURE — 83690 ASSAY OF LIPASE: CPT

## 2025-06-24 PROCEDURE — 74177 CT ABD & PELVIS W/CONTRAST: CPT

## 2025-06-24 PROCEDURE — 71046 X-RAY EXAM CHEST 2 VIEWS: CPT

## 2025-06-24 PROCEDURE — 80053 COMPREHEN METABOLIC PANEL: CPT

## 2025-06-24 PROCEDURE — 36415 COLL VENOUS BLD VENIPUNCTURE: CPT

## 2025-06-24 PROCEDURE — 85025 COMPLETE CBC W/AUTO DIFF WBC: CPT

## 2025-06-24 PROCEDURE — 6360000004 HC RX CONTRAST MEDICATION: Performed by: PHYSICIAN ASSISTANT

## 2025-06-24 PROCEDURE — 99285 EMERGENCY DEPT VISIT HI MDM: CPT

## 2025-06-24 RX ORDER — IOPAMIDOL 755 MG/ML
75 INJECTION, SOLUTION INTRAVASCULAR
Status: COMPLETED | OUTPATIENT
Start: 2025-06-24 | End: 2025-06-24

## 2025-06-24 RX ORDER — METHYLPREDNISOLONE 4 MG/1
TABLET ORAL
Qty: 1 KIT | Refills: 0 | Status: SHIPPED | OUTPATIENT
Start: 2025-06-24 | End: 2025-06-30

## 2025-06-24 RX ORDER — ACETAMINOPHEN 500 MG
1000 TABLET ORAL EVERY 6 HOURS PRN
Qty: 180 TABLET | Refills: 0 | Status: SHIPPED | OUTPATIENT
Start: 2025-06-24

## 2025-06-24 RX ORDER — CYCLOBENZAPRINE HCL 10 MG
10 TABLET ORAL 3 TIMES DAILY PRN
Qty: 30 TABLET | Refills: 0 | Status: SHIPPED | OUTPATIENT
Start: 2025-06-24 | End: 2025-07-04

## 2025-06-24 RX ADMIN — IOPAMIDOL 75 ML: 755 INJECTION, SOLUTION INTRAVENOUS at 10:46

## 2025-06-24 ASSESSMENT — PAIN DESCRIPTION - LOCATION: LOCATION: BACK

## 2025-06-24 ASSESSMENT — PAIN DESCRIPTION - DESCRIPTORS: DESCRIPTORS: ACHING

## 2025-06-24 ASSESSMENT — PAIN - FUNCTIONAL ASSESSMENT: PAIN_FUNCTIONAL_ASSESSMENT: 0-10

## 2025-06-24 ASSESSMENT — PAIN SCALES - GENERAL: PAINLEVEL_OUTOF10: 7

## 2025-06-24 ASSESSMENT — PAIN DESCRIPTION - ORIENTATION: ORIENTATION: RIGHT;LOWER

## 2025-06-24 NOTE — ED PROVIDER NOTES
[facility] EMERGENCY DEPARTMENT     EMERGENCY DEPARTMENT ENCOUNTER     Location: [facility] EMERGENCY DEPARTMENT  6/24/2025  Note Started: 2:41 PM EDT 6/24/25      Patient Identification  Karishma Lee is a 64 y.o. female  Chief Complaint   Patient presents with    Back Pain     Right sided back pain. Seen at PCP office yesterday for same. States had \"some labs done already.\" Pain began \"a long time ago.\" Denies injury       HPI:Karishma Lee was evaluated in the Emergency Department for back pain.  Patient reports right-sided back pain that wraps around to the right upper quadrant of the abdomen.  It follows a dermatomal distribution by her description.  She was evaluated for this by primary care to check for shingles which it was found to not be because there is no rash.  Blood work obtained.  Pain continues.  Pain is a continuous pain.  No shortness of breath.  No fevers, chills or sweats.  No nausea or vomiting or any urinary or bowel symptoms.  Patient has a history of disc disease but not in the thoracic region as far she knows.. Although initial history and physical exam information was obtained by TRISH/NPP (who also dictated a record of this visit), I personally saw the patient and performed and made/approved the management plan and take responsibility for the patient management.  TRISH report to me: RUQ wrapping pain, sp sim, sp appe,  5d of pain,     PHYSICAL EXAM:  Heart regular rate and rhythm and lungs clear to auscultation bilaterally and abdomen benign.  Tender along the right T7 dermatomal distribution.    CT ABDOMEN PELVIS W IV CONTRAST Additional Contrast? None   Final Result   1.  No acute process in the abdomen or pelvis.   2.  Hepatic steatosis.   3.  Status post cholecystectomy.   4.  Status post hysterectomy.   5.  Diverticulosis without evidence of diverticulitis.      Electronically signed by Melvin Vásquez MD      XR CHEST (2 VW)   Final Result   1.  No acute cardiopulmonary 
TOTAL KNEE ARTHROPLASTY Right 03/29/2019    RIGHT TOTAL KNEE MAKOPLASTY REPLACEMENT WITH ADDUCTOR CANAL BLOCK FOR PAIN CONTROL                 JOE MCCLENDON  CPT CODE - 59182  performed by Lester Morales MD at [facility] OR    UPPER GASTROINTESTINAL ENDOSCOPY         CURRENT MEDICATIONS:       Discharge Medication List as of 6/24/2025 11:47 AM        CONTINUE these medications which have NOT CHANGED    Details   ofloxacin (FLOXIN) 0.3 % otic solution Place 5 drops in ear(s) dailyHistorical Med      clotrimazole (LOTRIMIN) 1 % cream Apply topically 2 times daily, Topical, 2 TIMES DAILY Starting Mon 6/9/2025, Historical Med      ondansetron (ZOFRAN) 8 MG tablet Take 1 tablet by mouth every 8 hours as needed for Nausea or VomitingHistorical Med      metFORMIN (GLUCOPHAGE) 1000 MG tablet Take 1 tablet by mouth 2 times daily (with meals), Disp-180 tablet, R-1Normal      glyBURIDE (DIABETA) 2.5 MG tablet TAKE 1 TABLET BY MOUTH TWICE A DAY WITH A MEAL, Disp-60 tablet, R-2Normal      rOPINIRole (REQUIP) 0.5 MG tablet Take 1 tablet by mouth nightly, Disp-30 tablet, R-5Normal      empagliflozin (JARDIANCE) 25 MG tablet Take 1 tablet by mouth daily, Disp-90 tablet, R-1Normal      valsartan (DIOVAN) 80 MG tablet TAKE 1 TABLET BY MOUTH DAILY, Disp-90 tablet, R-2Normal      !! blood glucose test strips (ASCENSIA AUTODISC VI;ONE TOUCH ULTRA TEST VI) strip DAILY Starting Thu 11/7/2024, Disp-100 each, R-3, NormalAs needed.      atorvastatin (LIPITOR) 10 MG tablet TAKE 1 TABLET BY MOUTH DAILY, Disp-90 tablet, R-2Normal      metoprolol succinate (TOPROL XL) 50 MG extended release tablet TAKE 1 TABLET BY MOUTH DAILY, Disp-90 tablet, R-2Normal      buPROPion (WELLBUTRIN) 100 MG tablet Take 1 tablet by mouth dailyHistorical Med      !! blood glucose monitor kit and supplies Use as Directed to test for blood sugar. Dispense per insurance and patient preference., Disp-1 kit, R-0, Normal      Lancets MISC Disp-100 each, R-5, NormalUse 1 lancet to

## 2025-06-30 ENCOUNTER — OFFICE VISIT (OUTPATIENT)
Dept: FAMILY MEDICINE CLINIC | Age: 65
End: 2025-06-30
Payer: COMMERCIAL

## 2025-06-30 VITALS
HEART RATE: 110 BPM | OXYGEN SATURATION: 97 % | SYSTOLIC BLOOD PRESSURE: 126 MMHG | HEIGHT: 68 IN | BODY MASS INDEX: 31.8 KG/M2 | WEIGHT: 209.8 LBS | DIASTOLIC BLOOD PRESSURE: 84 MMHG

## 2025-06-30 DIAGNOSIS — M54.6 ACUTE RIGHT-SIDED THORACIC BACK PAIN: Primary | ICD-10-CM

## 2025-06-30 PROCEDURE — G8427 DOCREV CUR MEDS BY ELIG CLIN: HCPCS | Performed by: PHYSICIAN ASSISTANT

## 2025-06-30 PROCEDURE — 3074F SYST BP LT 130 MM HG: CPT | Performed by: PHYSICIAN ASSISTANT

## 2025-06-30 PROCEDURE — G8417 CALC BMI ABV UP PARAM F/U: HCPCS | Performed by: PHYSICIAN ASSISTANT

## 2025-06-30 PROCEDURE — 3017F COLORECTAL CA SCREEN DOC REV: CPT | Performed by: PHYSICIAN ASSISTANT

## 2025-06-30 PROCEDURE — 1036F TOBACCO NON-USER: CPT | Performed by: PHYSICIAN ASSISTANT

## 2025-06-30 PROCEDURE — 3079F DIAST BP 80-89 MM HG: CPT | Performed by: PHYSICIAN ASSISTANT

## 2025-06-30 PROCEDURE — G9899 SCRN MAM PERF RSLTS DOC: HCPCS | Performed by: PHYSICIAN ASSISTANT

## 2025-06-30 PROCEDURE — 99213 OFFICE O/P EST LOW 20 MIN: CPT | Performed by: PHYSICIAN ASSISTANT

## 2025-06-30 ASSESSMENT — ENCOUNTER SYMPTOMS: BACK PAIN: 1

## 2025-06-30 NOTE — PROGRESS NOTES
Subjective   Patient ID: Karishma Lee is a 64 y.o. female.    HPI  Patient presents for ER follow up from 6/24/25 for right sided back pain radiating to right upper abdomen. Negative GI work up. They felt may be related to thoracic radiculopathy.   Is claustrophobic will require open MRI.     Review of Systems   Musculoskeletal:  Positive for back pain.          Objective   Physical Exam  Constitutional:       Appearance: Normal appearance. She is obese.   Pulmonary:      Effort: Pulmonary effort is normal.   Musculoskeletal:         General: No swelling or tenderness. Normal range of motion.   Neurological:      Mental Status: She is alert.            Assessment /Plan:     Diagnosis Orders   1. Acute right-sided thoracic back pain  MRI THORACIC SPINE WO CONTRAST              PAULA Bryant

## 2025-07-15 ENCOUNTER — TELEPHONE (OUTPATIENT)
Dept: FAMILY MEDICINE CLINIC | Age: 65
End: 2025-07-15

## 2025-07-15 NOTE — TELEPHONE ENCOUNTER
[facility] called to request office notes to support MRI order placed by Trina Nixon.  They need this in order to do pre-cert for insurance approval.  Notes have been faxed to [phone] per request

## 2025-07-21 DIAGNOSIS — M51.9 THORACIC DISC DISORDER: Primary | ICD-10-CM

## 2025-08-01 DIAGNOSIS — E78.00 PURE HYPERCHOLESTEROLEMIA: ICD-10-CM

## 2025-08-01 RX ORDER — ATORVASTATIN CALCIUM 10 MG/1
10 TABLET, FILM COATED ORAL DAILY
Qty: 30 TABLET | Refills: 0 | Status: SHIPPED | OUTPATIENT
Start: 2025-08-01

## 2025-08-01 RX ORDER — METOPROLOL SUCCINATE 50 MG/1
50 TABLET, EXTENDED RELEASE ORAL DAILY
Qty: 30 TABLET | Refills: 0 | Status: SHIPPED | OUTPATIENT
Start: 2025-08-01

## 2025-08-01 NOTE — TELEPHONE ENCOUNTER
Last Office Visit: 8/26/2024 Provider: [facility]  **Is provider OOT? Yes    Next Office Visit: 9/2/2025 Provider: [facility]    **Has patient already had 30 day supply? No    Lab orders needed? no   Encounter provider correct? Yes If not, change provider  Script changes since last refill? no    LAST LABS:   CBC:  Lab Results   Component Value Date    WBC 11.2 (H) 06/24/2025    HGB 16.0 06/24/2025    HCT 47.3 06/24/2025    MCV 91.7 06/24/2025     06/24/2025    LYMPHOPCT 16.2 06/24/2025    RBC 5.16 06/24/2025    MCH 30.9 06/24/2025    MCHC 33.7 06/24/2025    RDW 14.2 06/24/2025           CMP:  Lab Results   Component Value Date     06/24/2025    K 4.7 06/24/2025     06/24/2025    CO2 25 06/24/2025    BUN 15 06/24/2025    CREATININE 1.0 06/24/2025    GLUCOSE 175 (H) 06/24/2025    CALCIUM 10.1 06/24/2025    BILITOT 0.4 06/24/2025    ALKPHOS 123 06/24/2025    AST 26 06/24/2025    ALT 32 06/24/2025    LABGLOM 63 06/24/2025    GFRAA >60 07/05/2022    AGRATIO 2.0 06/24/2025    GLOB 2.7 10/25/2021         Last 3 Lipids:  Lab Results   Component Value Date    CHOL 153 05/20/2025    CHOL 159 10/21/2024    CHOL 179 07/24/2023     Lab Results   Component Value Date    TRIG 118 05/20/2025    TRIG 135 10/21/2024    TRIG 123 07/24/2023     Lab Results   Component Value Date    HDL 59 05/20/2025    HDL 63 (H) 10/21/2024    HDL 72 (H) 07/24/2023     Lab Results   Component Value Date    LDL 70 05/20/2025    LDL 69 10/21/2024    LDL 82 07/24/2023     Lab Results   Component Value Date    VLDL 24 05/20/2025    VLDL 27 10/21/2024    VLDL 25 07/24/2023     No results found for: \"CHOLHDLRATIO\"

## 2025-08-05 RX ORDER — GLYBURIDE 2.5 MG/1
TABLET ORAL
Qty: 60 TABLET | Refills: 2 | Status: SHIPPED | OUTPATIENT
Start: 2025-08-05

## 2025-08-05 RX ORDER — ROPINIROLE 0.5 MG/1
TABLET, FILM COATED ORAL
Qty: 30 TABLET | Refills: 5 | Status: SHIPPED | OUTPATIENT
Start: 2025-08-05

## 2025-08-10 DIAGNOSIS — E11.69 TYPE 2 DIABETES MELLITUS WITH HYPERLIPIDEMIA (HCC): ICD-10-CM

## 2025-08-10 DIAGNOSIS — E78.5 TYPE 2 DIABETES MELLITUS WITH HYPERLIPIDEMIA (HCC): ICD-10-CM

## 2025-08-11 RX ORDER — EMPAGLIFLOZIN 25 MG/1
25 TABLET, FILM COATED ORAL DAILY
Qty: 90 TABLET | Refills: 1 | Status: SHIPPED | OUTPATIENT
Start: 2025-08-11

## 2025-08-14 ENCOUNTER — PATIENT MESSAGE (OUTPATIENT)
Dept: FAMILY MEDICINE CLINIC | Age: 65
End: 2025-08-14

## 2025-08-14 DIAGNOSIS — H92.02 EAR PAIN, LEFT: Primary | ICD-10-CM

## 2025-08-21 ENCOUNTER — OFFICE VISIT (OUTPATIENT)
Dept: SLEEP MEDICINE | Age: 65
End: 2025-08-21
Payer: COMMERCIAL

## 2025-08-21 VITALS
SYSTOLIC BLOOD PRESSURE: 121 MMHG | OXYGEN SATURATION: 96 % | HEART RATE: 113 BPM | RESPIRATION RATE: 16 BRPM | BODY MASS INDEX: 34.81 KG/M2 | DIASTOLIC BLOOD PRESSURE: 85 MMHG | WEIGHT: 216.6 LBS | HEIGHT: 66 IN

## 2025-08-21 DIAGNOSIS — F51.04 PSYCHOPHYSIOLOGICAL INSOMNIA: ICD-10-CM

## 2025-08-21 DIAGNOSIS — E66.9 OBESITY (BMI 30-39.9): ICD-10-CM

## 2025-08-21 DIAGNOSIS — I10 ESSENTIAL HYPERTENSION: ICD-10-CM

## 2025-08-21 DIAGNOSIS — G25.81 RLS (RESTLESS LEGS SYNDROME): ICD-10-CM

## 2025-08-21 DIAGNOSIS — Z71.89 CPAP USE COUNSELING: ICD-10-CM

## 2025-08-21 DIAGNOSIS — G47.33 MILD OBSTRUCTIVE SLEEP APNEA: Primary | ICD-10-CM

## 2025-08-21 PROCEDURE — 1036F TOBACCO NON-USER: CPT | Performed by: NURSE PRACTITIONER

## 2025-08-21 PROCEDURE — 3074F SYST BP LT 130 MM HG: CPT | Performed by: NURSE PRACTITIONER

## 2025-08-21 PROCEDURE — G8427 DOCREV CUR MEDS BY ELIG CLIN: HCPCS | Performed by: NURSE PRACTITIONER

## 2025-08-21 PROCEDURE — G9899 SCRN MAM PERF RSLTS DOC: HCPCS | Performed by: NURSE PRACTITIONER

## 2025-08-21 PROCEDURE — 3079F DIAST BP 80-89 MM HG: CPT | Performed by: NURSE PRACTITIONER

## 2025-08-21 PROCEDURE — 3017F COLORECTAL CA SCREEN DOC REV: CPT | Performed by: NURSE PRACTITIONER

## 2025-08-21 PROCEDURE — G8417 CALC BMI ABV UP PARAM F/U: HCPCS | Performed by: NURSE PRACTITIONER

## 2025-08-21 PROCEDURE — 99214 OFFICE O/P EST MOD 30 MIN: CPT | Performed by: NURSE PRACTITIONER

## 2025-08-21 RX ORDER — GABAPENTIN 300 MG/1
CAPSULE ORAL
COMMUNITY
Start: 2025-07-30

## 2025-08-21 RX ORDER — TRAZODONE HYDROCHLORIDE 50 MG/1
TABLET ORAL
COMMUNITY
Start: 2025-08-14

## 2025-08-21 RX ORDER — CYCLOBENZAPRINE HCL 10 MG
10 TABLET ORAL 3 TIMES DAILY
COMMUNITY
Start: 2025-07-30 | End: 2025-08-29

## 2025-08-21 RX ORDER — ROPINIROLE 0.5 MG/1
0.5 TABLET, FILM COATED ORAL NIGHTLY
Qty: 30 TABLET | Refills: 5 | Status: SHIPPED | OUTPATIENT
Start: 2025-08-21

## 2025-08-21 ASSESSMENT — SLEEP AND FATIGUE QUESTIONNAIRES
HOW LIKELY ARE YOU TO NOD OFF OR FALL ASLEEP WHILE WATCHING TV: MODERATE CHANCE OF DOZING
ESS TOTAL SCORE: 7
HOW LIKELY ARE YOU TO NOD OFF OR FALL ASLEEP WHILE SITTING INACTIVE IN A PUBLIC PLACE: WOULD NEVER DOZE
HOW LIKELY ARE YOU TO NOD OFF OR FALL ASLEEP WHILE LYING DOWN TO REST IN THE AFTERNOON WHEN CIRCUMSTANCES PERMIT: HIGH CHANCE OF DOZING
HOW LIKELY ARE YOU TO NOD OFF OR FALL ASLEEP IN A CAR, WHILE STOPPED FOR A FEW MINUTES IN TRAFFIC: WOULD NEVER DOZE
HOW LIKELY ARE YOU TO NOD OFF OR FALL ASLEEP WHILE SITTING QUIETLY AFTER LUNCH WITHOUT ALCOHOL: WOULD NEVER DOZE
HOW LIKELY ARE YOU TO NOD OFF OR FALL ASLEEP WHEN YOU ARE A PASSENGER IN A CAR FOR AN HOUR WITHOUT A BREAK: WOULD NEVER DOZE
HOW LIKELY ARE YOU TO NOD OFF OR FALL ASLEEP WHILE SITTING AND READING: MODERATE CHANCE OF DOZING
HOW LIKELY ARE YOU TO NOD OFF OR FALL ASLEEP WHILE SITTING AND TALKING TO SOMEONE: WOULD NEVER DOZE

## 2025-08-25 RX ORDER — VALSARTAN 80 MG/1
80 TABLET ORAL DAILY
Qty: 90 TABLET | Refills: 2 | Status: SHIPPED | OUTPATIENT
Start: 2025-08-25

## 2025-09-02 ENCOUNTER — OFFICE VISIT (OUTPATIENT)
Dept: CARDIOLOGY CLINIC | Age: 65
End: 2025-09-02
Payer: COMMERCIAL

## 2025-09-02 VITALS
SYSTOLIC BLOOD PRESSURE: 114 MMHG | DIASTOLIC BLOOD PRESSURE: 72 MMHG | OXYGEN SATURATION: 97 % | HEIGHT: 66 IN | HEART RATE: 119 BPM | WEIGHT: 218 LBS | BODY MASS INDEX: 35.03 KG/M2

## 2025-09-02 DIAGNOSIS — E78.00 PURE HYPERCHOLESTEROLEMIA: ICD-10-CM

## 2025-09-02 DIAGNOSIS — I10 ESSENTIAL HYPERTENSION: ICD-10-CM

## 2025-09-02 DIAGNOSIS — I25.10 CORONARY ARTERY DISEASE INVOLVING NATIVE CORONARY ARTERY OF NATIVE HEART WITHOUT ANGINA PECTORIS: Primary | ICD-10-CM

## 2025-09-02 PROCEDURE — 3078F DIAST BP <80 MM HG: CPT | Performed by: INTERNAL MEDICINE

## 2025-09-02 PROCEDURE — 99214 OFFICE O/P EST MOD 30 MIN: CPT | Performed by: INTERNAL MEDICINE

## 2025-09-02 PROCEDURE — 1036F TOBACCO NON-USER: CPT | Performed by: INTERNAL MEDICINE

## 2025-09-02 PROCEDURE — G9899 SCRN MAM PERF RSLTS DOC: HCPCS | Performed by: INTERNAL MEDICINE

## 2025-09-02 PROCEDURE — G8427 DOCREV CUR MEDS BY ELIG CLIN: HCPCS | Performed by: INTERNAL MEDICINE

## 2025-09-02 PROCEDURE — 3017F COLORECTAL CA SCREEN DOC REV: CPT | Performed by: INTERNAL MEDICINE

## 2025-09-02 PROCEDURE — G8417 CALC BMI ABV UP PARAM F/U: HCPCS | Performed by: INTERNAL MEDICINE

## 2025-09-02 PROCEDURE — 3074F SYST BP LT 130 MM HG: CPT | Performed by: INTERNAL MEDICINE

## 2025-09-02 RX ORDER — EZETIMIBE 10 MG/1
10 TABLET ORAL DAILY
Qty: 90 TABLET | Refills: 3 | Status: SHIPPED | OUTPATIENT
Start: 2025-09-02

## 2025-09-02 RX ORDER — METOPROLOL SUCCINATE 50 MG/1
50 TABLET, EXTENDED RELEASE ORAL DAILY
Qty: 90 TABLET | Refills: 3 | Status: SHIPPED | OUTPATIENT
Start: 2025-09-02

## 2025-09-02 RX ORDER — ATORVASTATIN CALCIUM 10 MG/1
10 TABLET, FILM COATED ORAL DAILY
Qty: 90 TABLET | Refills: 3 | Status: SHIPPED | OUTPATIENT
Start: 2025-09-02

## 2025-09-05 ENCOUNTER — OFFICE VISIT (OUTPATIENT)
Dept: ENT CLINIC | Age: 65
End: 2025-09-05
Payer: COMMERCIAL

## 2025-09-05 VITALS
WEIGHT: 215 LBS | HEIGHT: 66 IN | DIASTOLIC BLOOD PRESSURE: 78 MMHG | BODY MASS INDEX: 34.55 KG/M2 | SYSTOLIC BLOOD PRESSURE: 113 MMHG | HEART RATE: 111 BPM

## 2025-09-05 DIAGNOSIS — H60.392 OTHER INFECTIVE ACUTE OTITIS EXTERNA OF LEFT EAR: ICD-10-CM

## 2025-09-05 DIAGNOSIS — H61.21 IMPACTED CERUMEN OF RIGHT EAR: Primary | ICD-10-CM

## 2025-09-05 DIAGNOSIS — E78.00 PURE HYPERCHOLESTEROLEMIA: ICD-10-CM

## 2025-09-05 PROCEDURE — 99204 OFFICE O/P NEW MOD 45 MIN: CPT | Performed by: STUDENT IN AN ORGANIZED HEALTH CARE EDUCATION/TRAINING PROGRAM

## 2025-09-05 PROCEDURE — 69210 REMOVE IMPACTED EAR WAX UNI: CPT | Performed by: STUDENT IN AN ORGANIZED HEALTH CARE EDUCATION/TRAINING PROGRAM

## 2025-09-05 PROCEDURE — G9899 SCRN MAM PERF RSLTS DOC: HCPCS | Performed by: STUDENT IN AN ORGANIZED HEALTH CARE EDUCATION/TRAINING PROGRAM

## 2025-09-05 PROCEDURE — G8417 CALC BMI ABV UP PARAM F/U: HCPCS | Performed by: STUDENT IN AN ORGANIZED HEALTH CARE EDUCATION/TRAINING PROGRAM

## 2025-09-05 PROCEDURE — G8427 DOCREV CUR MEDS BY ELIG CLIN: HCPCS | Performed by: STUDENT IN AN ORGANIZED HEALTH CARE EDUCATION/TRAINING PROGRAM

## 2025-09-05 PROCEDURE — 3017F COLORECTAL CA SCREEN DOC REV: CPT | Performed by: STUDENT IN AN ORGANIZED HEALTH CARE EDUCATION/TRAINING PROGRAM

## 2025-09-05 PROCEDURE — 4130F TOPICAL PREP RX AOE: CPT | Performed by: STUDENT IN AN ORGANIZED HEALTH CARE EDUCATION/TRAINING PROGRAM

## 2025-09-05 PROCEDURE — 3074F SYST BP LT 130 MM HG: CPT | Performed by: STUDENT IN AN ORGANIZED HEALTH CARE EDUCATION/TRAINING PROGRAM

## 2025-09-05 PROCEDURE — 3078F DIAST BP <80 MM HG: CPT | Performed by: STUDENT IN AN ORGANIZED HEALTH CARE EDUCATION/TRAINING PROGRAM

## 2025-09-05 PROCEDURE — 1036F TOBACCO NON-USER: CPT | Performed by: STUDENT IN AN ORGANIZED HEALTH CARE EDUCATION/TRAINING PROGRAM

## 2025-09-05 RX ORDER — ATORVASTATIN CALCIUM 10 MG/1
10 TABLET, FILM COATED ORAL DAILY
Qty: 30 TABLET | OUTPATIENT
Start: 2025-09-05

## 2025-09-05 RX ORDER — METOPROLOL SUCCINATE 50 MG/1
50 TABLET, EXTENDED RELEASE ORAL DAILY
Qty: 30 TABLET | OUTPATIENT
Start: 2025-09-05

## 2025-09-05 RX ORDER — CLOTRIMAZOLE 1 G/ML
SOLUTION TOPICAL
Qty: 29.57 ML | Refills: 1 | Status: SHIPPED | OUTPATIENT
Start: 2025-09-05

## 2025-09-05 ASSESSMENT — ENCOUNTER SYMPTOMS
VOMITING: 0
SHORTNESS OF BREATH: 0
RHINORRHEA: 0
COUGH: 0
EYE PAIN: 0
NAUSEA: 0

## (undated) DEVICE — PIN FIX L140MM DIA4MM BNE

## (undated) DEVICE — DRAINBAG,ANTI-REFLUX TOWER,L/F,2000ML,LL: Brand: MEDLINE

## (undated) DEVICE — 35 ML SYRINGE LUER-LOCK TIP: Brand: MONOJECT

## (undated) DEVICE — PIN BONE FIX L110MM DIA3.2MM

## (undated) DEVICE — SURGICAL PROCEDURE PACK RESTORIS MCK CAPMAKOPFJ] MAKO]

## (undated) DEVICE — SUTURE PERMA-HAND SZ 2-0 L30IN NONABSORBABLE BLK L26MM SH K833H

## (undated) DEVICE — DRESSING FOAM W4XL12IN SIL RECT ADH WTRPRF FLM BK W/ BORD

## (undated) DEVICE — Device

## (undated) DEVICE — APPLICATOR MEDICATED 26 CC SOLUTION HI LT ORNG CHLORAPREP

## (undated) DEVICE — PIN FIX L170MM DIA4MM BNE SELF DRL

## (undated) DEVICE — BLADE SURG SAW S STL NAR OSC W/ SERR EDGE DISP

## (undated) DEVICE — PIN BNE FIX L140MM DIA3.2MM SELF DRL

## (undated) DEVICE — BREAST: Brand: MEDLINE INDUSTRIES, INC.

## (undated) DEVICE — SOLUTION IV IRRIG POUR BRL 0.9% SODIUM CHL 2F7124

## (undated) DEVICE — GLOVE ORANGE PI 7 1/2   MSG9075

## (undated) DEVICE — CATHETER URETH 12FR BLLN 5CC LTX HYDRGEL 2 W BARDX LUB F

## (undated) DEVICE — CORD RETRCT SIL

## (undated) DEVICE — PIN GUIDE ORTHO 3.2X89MM FLTD DISP PK OF 4 PER PATIENT

## (undated) DEVICE — ADHESIVE SKIN CLSR 0.7ML TOP DERMBND ADV

## (undated) DEVICE — CLIP LIG M BLU TI HRT SHP WIRE HORZ 24 CLIPS/PK 25PK/CA

## (undated) DEVICE — CYSTO: Brand: MEDLINE INDUSTRIES, INC.

## (undated) DEVICE — STERILE POLYISOPRENE POWDER-FREE SURGICAL GLOVES: Brand: PROTEXIS

## (undated) DEVICE — 19 IN KNEE IMMOBILIZER: Brand: DEROYAL

## (undated) DEVICE — CUTTER SURG OD42MM PAT KNEE DISP FOR RM SYS XCELERATE

## (undated) DEVICE — DRAPE,TOP,ARM COVERS,106X59,STERILE: Brand: MEDLINE

## (undated) DEVICE — CATHETER URETH 16FR BLLN 5CC L16IN SIL F INDWL 2 W SHT LEN

## (undated) DEVICE — KIT INT FIX FEM TIB CKPT MAKOPLASTY

## (undated) DEVICE — GLOVE SURG SZ 65 THK91MIL LTX FREE SYN POLYISOPRENE

## (undated) DEVICE — PROBE SET W/DRAPE

## (undated) DEVICE — KIT DRP FOR RIO ROBOTIC ARM ASST SYS

## (undated) DEVICE — GLOVE,SURG,SENSICARE SLT,LF,PF,7: Brand: MEDLINE

## (undated) DEVICE — SYRINGE MED 10ML LUERLOCK TIP W/O SFTY DISP

## (undated) DEVICE — PENCIL SMK EVAC ALL IN 1 DSGN ENH VISIBILITY IMPROVED AIR

## (undated) DEVICE — HOOD, PEEL-AWAY: Brand: FLYTE

## (undated) DEVICE — DUAL CUT SAGITTAL BLADE

## (undated) DEVICE — GOWN SIRUS NONREIN XL W/TWL: Brand: MEDLINE INDUSTRIES, INC.

## (undated) DEVICE — PEEL-AWAY TOGA, 2X LARGE: Brand: FLYTE

## (undated) DEVICE — SYSTEM SKIN CLSR 22CM DERMBND PRINEO

## (undated) DEVICE — SOLUTION IRRIG 2000ML 0.9% SOD CHL USP UROMATIC PLAS CONT

## (undated) DEVICE — GLOVE ORANGE PI 7   MSG9070

## (undated) DEVICE — GLOVE ORANGE PI 8 1/2   MSG9085

## (undated) DEVICE — LIQUIBAND RAPID ADHESIVE 36/CS 0.8ML: Brand: MEDLINE

## (undated) DEVICE — SUTURE MCRYL + SZ 4-0 L18IN ABSRB UD L19MM PS-2 3/8 CIR MCP496G

## (undated) DEVICE — BOOT POS LEG DEMAYO

## (undated) DEVICE — KIT TRK KNEE PROC VIZADISC

## (undated) DEVICE — STERILE PVP: Brand: MEDLINE INDUSTRIES, INC.

## (undated) DEVICE — GLOVE ORANGE PI 8   MSG9080

## (undated) DEVICE — BLADE SURG SAW STD S STL OSC W/ SERR EDGE DISP

## (undated) DEVICE — SOLUTION IRRIG 2000ML STRL H2O UROMATIC PLAS CONT USP

## (undated) DEVICE — HANDPIECE SET WITH HIGH FLOW TIP AND SUCTION TUBE: Brand: INTERPULSE